# Patient Record
Sex: FEMALE | Race: WHITE | Employment: STUDENT | URBAN - METROPOLITAN AREA
[De-identification: names, ages, dates, MRNs, and addresses within clinical notes are randomized per-mention and may not be internally consistent; named-entity substitution may affect disease eponyms.]

---

## 2017-05-10 ENCOUNTER — OFFICE VISIT (OUTPATIENT)
Dept: FAMILY MEDICINE | Facility: CLINIC | Age: 22
End: 2017-05-10

## 2017-05-10 VITALS
OXYGEN SATURATION: 98 % | DIASTOLIC BLOOD PRESSURE: 83 MMHG | HEIGHT: 70 IN | BODY MASS INDEX: 27.43 KG/M2 | HEART RATE: 74 BPM | RESPIRATION RATE: 18 BRPM | WEIGHT: 191.6 LBS | TEMPERATURE: 98.1 F | SYSTOLIC BLOOD PRESSURE: 136 MMHG

## 2017-05-10 DIAGNOSIS — F64.0 GENDER DYSPHORIA IN ADULT: Primary | ICD-10-CM

## 2017-05-10 NOTE — PROGRESS NOTES
Transgender History Intake:       APURVA Mooney is a 22 year old individual  who presents today for interest in possible feminizing hormone therapy to better align their body with their gender identity.  Came to this clinic via referral from: Psychologist    1-How do you identify? BOLD all that apply     Male   Female Transgender  FTM  MTF  Intersex    Genderqueer Gender non-conforming Bigender Don't know Other:     2. What pronouns do you prefer?  She/Her/Hers/Herself   3-What age did you first feel your gender identity (internal sense of gender) did not match your physical body?      8 years old   4-Have you ever felt depressed or suicidal because your gender identity and body don't match?      YES Has felt depressed on/off, not suicidal  5-Have you legally changed your name? no   If yes: prior name for PETER:   Gender marker on ID's?   no  6-Have you ever seen a health care provider about being transgender?No  When were you first treated and where? NA  7-What hormones have you been on and for how long? (These can be treatments you were prescribed, that you got from a friend or bought without a prescription. Include any treatments you currently take.) NA  8-Ever had any problems with hormone treatment?  No  9-If not on hormones, would you like to be?   Yes, eventually  What are your goals for hormone therapy ? Wants to feel effects of hormones on brain (emotions, skin, sex drive- decreased erections, skin, body hair, breast development)  10-Have you had any gender affirmation surgery? No  11-Do you want to have surgery now or in future?  YES, Maybe (open to idea)  12-Are you out at work or at school or at home?      Some people know. Out to roommates and parents.  Not out  in public.  13-What are your other questions or concerns today? none  14-What else we should know about you?  Will recently came out to family (difficult). Parents disappointed, conservative.    ----------      Past Surgical History:   Procedure  Laterality Date     KNEE SURGERY Left 2014       Patient Active Problem List   Diagnosis   (none) - all problems resolved or deleted     Past Medical History:   Diagnosis Date     Uncomplicated asthma        No current outpatient prescriptions on file.       Family History   Problem Relation Age of Onset     Lung Cancer Maternal Grandmother      Parkinsonism Paternal Grandfather      Dementia Paternal Grandfather      Autoimmune Disease Sister        Allergies   Allergen Reactions     Banana Anaphylaxis       History   Smoking Status     Never Smoker   Smokeless Tobacco     Not on file       Mental Health Assessment:  Non gender related Therapist? Yes, patient has a psychologist (not gender specialist, but works with trans patients)  Chemical use history Alcohol approximately once weekly, marijuana twice weekly, has experimented with mushrooms  Mental Health diagnosis  history Depression on/off (recently started seeing a psychologist 2 months ago)  Medications prescribed for above and by whom? NA  PETER sent to:  [facility] (last physician was a Pediatrician 3-4 years ago)           Review of Systems:   CONSTITUTIONAL: NEGATIVE for fever, chills, change in weight  INTEGUMENTARY/SKIN: NEGATIVE for worrisome rashes, moles or lesions  EYES: POSITIVE for blurry vision  ENT/MOUTH: NEGATIVE for ear, mouth and throat problems  RESP: NEGATIVE for significant cough or SOB  CV: NEGATIVE for chest pain, palpitations or peripheral edema  GI: NEGATIVE for nausea, abdominal pain, heartburn, or change in bowel habits  : NEGATIVE for frequency, dysuria, or hematuria  MUSCULOSKELETAL: NEGATIVE for significant arthralgias or myalgia  NEURO: NEGATIVE for weakness, dizziness or paresthesias  ENDOCRINE: POSITIVE for feeling cold, negative for skin/hair changes  HEME/ALLERGY: POSITIVE for bloody noses (once daily for last 3 weeks)  PSYCHIATRIC: NEGATIVE for changes in mood or affect           Social History     Social History     Social History  "    Marital status: Single     Spouse name: N/A     Number of children: N/A     Years of education: N/A     Social History Main Topics     Smoking status: Never Smoker     Smokeless tobacco: None     Alcohol use Yes     Drug use: Yes      Comment: canabis lsd mushrooms      Sexual activity: No     Other Topics Concern     None     Social History Narrative     None       Marital Status: Single  Who lives in your household? Lives with 3 roommates    Has anyone hurt you physically, for example by pushing, hitting, slapping or kicking you or forcing you to have sex? Denies  Do you feel threatened or controlled by a partner, ex-partner or anyone in your life? Denies    Sexual Health     Sexual concerns:  No   History of sexual abuse:  No  STI History:   Neg  Pregnancy History:  No obstetric history on file.  Last Pap Smear :  No results found for: PAP  Abnormal Pap Hx:  NA    Sexual health and relationships:  Current sexual partners: None     Past sexual partners:    [employer]women           Physical Exam:     Vitals:    05/10/17 1603   BP: 136/83   Pulse: 74   Resp: 18   Temp: 98.1  F (36.7  C)   TempSrc: Oral   SpO2: 98%   Weight: 191 lb 9.6 oz (86.9 kg)   Height: 5' 10.25\" (178.4 cm)     BMI= Body mass index is 27.3 kg/(m^2).   Appearance: male appearance and dress  GENERAL:: healthy, alert and no distress  EYES: Eyes grossly normal to inspection  MS: extremities normal- no gross deformities noted, no edema  Psych: Alert and oriented times 3; coherent speech, normal rate and volume, able to articulate logical thoughts, able to abstract reason, no tangential thoughts, no hallucinations or delusions. Affect is appropriate.  Affect: Appropriate/mood-congruent      Assessment and Plan   Vinicio was seen today for establish care and physical.      Will presented today to establish care with a trans-friendly clinic. Unfortunately, we did not have time to complete a full physical exam, but I will have her come back in the next " "month to complete our visit.  Livan did express frequent bleeding from her nose.  We both think it is most likely from the dry air, but I offered to check platelets and coagulation factors if patient continues to have issues.     She has significant anxiety in recently coming out to family.  Unfortunately family is not supportive at this time.  Livan is seeing a psychologist to facilitate the transition.  Livan has not yet started using female pronouns, but seems happy about the idea.  Would potentially like to be called \"Risa\" in the future.  I discussed how the process works at [facility].  Livan has support through her psychologist.  I discussed that I will need a letter of support from the psychologist prior to starting hormones.  Will most likely refer to Antoinette at next visit as well for additional support.  Will plan to obtain labs and eventually start with spironolactone.  Livan was given a copy of the estrogen consent form to look over between now and next visit.    Diagnoses and all orders for this visit:    Gender dysphoria in adult     Today s visit included assessment of interventions to alleviate symptoms related to gender dysphoria or gender nonconformity, including     psychological support    medical treatment (hormones or blockers)    options for social support or changes in gender expression.   Hormones can be provided in 3-6 months IF:     Patient able to provide informed consent     Likely to take hormones in a responsible manner    Discussed physical effects, benefits, and risk assessment & modification    Discussed the clinical and biochemical monitoring of hormonal changes and the potential impact on reproductive health (see smiavsconsent)    Stable mental health. Transgender patients are at higher risk of suicide. This patient has been assessed for suicide risk.  Oriented to overall gender assessment and hormone start process, may be 3-6 months before hormones start, need for r7ynrbo visits then " q3mo, then q6mo    (This assessment is based on the 2011 published Standards of Care for the Health of Transsexual, Transgender, and Gender-Nonconforming People, Version 7, by the World Professional Association of Transgender Health. WPATH SOC Guidelines)  .   Follow up:  Follow up within 1 month.    Bridgette Olguin M.D.  PGY-2, Family Medicine

## 2017-05-10 NOTE — PROGRESS NOTES
Preceptor Attestation:   Patient seen and discussed with the resident. Assessment and plan reviewed with resident and agreed upon.   Supervising Physician:  Susana Brown MD  [facility]

## 2017-05-10 NOTE — PATIENT INSTRUCTIONS
Effects and Expected Time Course of Feminizing Hormones    Effect Expected Onset Expected Maximum Effect   Body Fat redistribution 3-6 months 2-5 years   Decreased Muscle mass 3-6 months 1-2 years   Softening of skin/decreased oiliness 3-6 months Unknown   Decreased Libido 1-3 months 1-2 years   Decreased Spontaneous Erections 1-3 months 3-6 months   Male Sexual Dysfunction Variable Variable   Breast Growth 3-6 months 2-3 years   Decreased Testicular volume 3-6 months 2-3 years   Decreased sperm production Variable Variable   Thinning and slowed growth of body and facial hair  + 6-12 months >3 years   Male pattern baldness No regrowth, loss stops 1-3 months 1-2 years   +complete removal of male facial hair and body hair requires electrolysis, laser treatment or both      Effects and Expected Time Course of Masculinizing  Hormones    Effect Expected Onset Expected Maximum Effect   Skin oiliness/Acne 1-6 months 1-2 years   Facial/body hair growth 3-6 months 3-5 years    Scalp hair loss >12 months+ Variable   Increased muscle mass/strength 6-12 months 2-5 years   Body fat redistribution 3-6 months 2-5 years   Cessation of menses 2-6 months n/a   Clitoral enlargement 3-6 months 1-2 years   Vaginal atrophy 3-6 months 1-2 years   Deepened voice 3-12 months 1-2 years       Here is the plan from today's visit    1. Gender dysphoria in adult  We will continue our discussion at your next visit.  Please schedule a visit with me at the  within the next month.  We will perform an exam at that time and get labs.  I will also follow up on your bloody nose.    Please call or return to clinic if your symptoms don't go away.    Follow up plan  Please make a clinic appointment for follow up with me ([facility]) within 1 month.    Thank you for coming to [facility] today.  Lab Testing:  **If you had lab testing today and your results are reassuring or normal they will be mailed to you or sent through [employer]  within 7 days.   **If the lab tests need quick action we will call you with the results.  The phone number we will call with results is # [phone] (home) . If this is not the best number please call our clinic and change the number.  Medication Refills:  If you need any refills please call your pharmacy and they will contact us.   If you need to  your refill at a new pharmacy, please contact the new pharmacy directly. The new pharmacy will help you get your medications transferred faster.   Scheduling:  If you have any concerns about today's visit or wish to schedule another appointment please call our office during normal business hours [phone] (8-5:00 M-F)  If a referral was made to a [facility] and you don't get a call from central scheduling please call [phone].  If a Mammogram was ordered for you at [facility] call [phone] to schedule or change your appointment.  If you had an XRay/CT/Ultrasound/MRI ordered the number is [phone] to schedule or change your radiology appointment.   Medical Concerns:  If you have urgent medical concerns please call [phone] at any time of the day.      Effects and Expected Time Course of Feminizing Hormones    Effect Expected Onset Expected Maximum Effect   Body Fat redistribution 3-6 months 2-5 years   Decreased Muscle mass 3-6 months 1-2 years   Softening of skin/decreased oiliness 3-6 months Unknown   Decreased Libido 1-3 months 1-2 years   Decreased Spontaneous Erections 1-3 months 3-6 months   Male Sexual Dysfunction Variable Variable   Breast Growth 3-6 months 2-3 years   Decreased Testicular volume 3-6 months 2-3 years   Decreased sperm production Variable Variable   Thinning and slowed growth of body and facial hair  + 6-12 months >3 years   Male pattern baldness No regrowth, loss stops 1-3 months 1-2 years   +complete removal of male facial hair and body hair requires electrolysis, laser treatment or  both      Effects and Expected Time Course of Masculinizing  Hormones    Effect Expected Onset Expected Maximum Effect   Skin oiliness/Acne 1-6 months 1-2 years   Facial/body hair growth 3-6 months 3-5 years    Scalp hair loss >12 months+ Variable   Increased muscle mass/strength 6-12 months 2-5 years   Body fat redistribution 3-6 months 2-5 years   Cessation of menses 2-6 months n/a   Clitoral enlargement 3-6 months 1-2 years   Vaginal atrophy 3-6 months 1-2 years   Deepened voice 3-12 months 1-2 years

## 2017-05-10 NOTE — MR AVS SNAPSHOT
After Visit Summary   5/10/2017    Vinicio Givens    MRN: [id number]           Patient Information     Date Of Birth          1995        Visit Information        Provider Department      5/10/2017 4:00 PM Bridgette Olguin MD [facility]        Today's Diagnoses     Gender dysphoria in adult    -  1      Care Instructions    Effects and Expected Time Course of Feminizing Hormones    Effect Expected Onset Expected Maximum Effect   Body Fat redistribution 3-6 months 2-5 years   Decreased Muscle mass 3-6 months 1-2 years   Softening of skin/decreased oiliness 3-6 months Unknown   Decreased Libido 1-3 months 1-2 years   Decreased Spontaneous Erections 1-3 months 3-6 months   Male Sexual Dysfunction Variable Variable   Breast Growth 3-6 months 2-3 years   Decreased Testicular volume 3-6 months 2-3 years   Decreased sperm production Variable Variable   Thinning and slowed growth of body and facial hair  + 6-12 months >3 years   Male pattern baldness No regrowth, loss stops 1-3 months 1-2 years   +complete removal of male facial hair and body hair requires electrolysis, laser treatment or both      Effects and Expected Time Course of Masculinizing  Hormones    Effect Expected Onset Expected Maximum Effect   Skin oiliness/Acne 1-6 months 1-2 years   Facial/body hair growth 3-6 months 3-5 years    Scalp hair loss >12 months+ Variable   Increased muscle mass/strength 6-12 months 2-5 years   Body fat redistribution 3-6 months 2-5 years   Cessation of menses 2-6 months n/a   Clitoral enlargement 3-6 months 1-2 years   Vaginal atrophy 3-6 months 1-2 years   Deepened voice 3-12 months 1-2 years       Here is the plan from today's visit    1. Gender dysphoria in adult  We will continue our discussion at your next visit.  Please schedule a visit with me at the  within the next month.  We will perform an exam at that time and get labs.  I will also follow up on your bloody  nose.    Please call or return to clinic if your symptoms don't go away.    Follow up plan  Please make a clinic appointment for follow up with me ([facility]) within 1 month.    Thank you for coming to [facility] today.  Lab Testing:  **If you had lab testing today and your results are reassuring or normal they will be mailed to you or sent through [employer] within 7 days.   **If the lab tests need quick action we will call you with the results.  The phone number we will call with results is # [phone] (home) . If this is not the best number please call our clinic and change the number.  Medication Refills:  If you need any refills please call your pharmacy and they will contact us.   If you need to  your refill at a new pharmacy, please contact the new pharmacy directly. The new pharmacy will help you get your medications transferred faster.   Scheduling:  If you have any concerns about today's visit or wish to schedule another appointment please call our office during normal business hours [phone] (8-5:00 M-F)  If a referral was made to a [facility] and you don't get a call from central scheduling please call [phone].  If a Mammogram was ordered for you at [facility] call [phone] to schedule or change your appointment.  If you had an XRay/CT/Ultrasound/MRI ordered the number is [phone] to schedule or change your radiology appointment.   Medical Concerns:  If you have urgent medical concerns please call [phone] at any time of the day.            Follow-ups after your visit        Who to contact     Please call your clinic at [phone] to:    Ask questions about your health    Make or cancel appointments    Discuss your medicines    Learn about your test results    Speak to your doctor   If you have compliments or concerns about an experience at your clinic, or if you wish to file a complaint, please contact [facility]  "[facility] Patient Relations at [phone] or email us at [email or web address]         Additional Information About Your Visit        [employer] Information     [employer] is an electronic gateway that provides easy, online access to your medical records. With [employer], you can request a clinic appointment, read your test results, renew a prescription or communicate with your care team.     To sign up for [employer] visit the website at [email or web address]   You will be asked to enter the access code listed below, as well as some personal information. Please follow the directions to create your username and password.     Your access code is: [email or web address]  Expires: 2017  4:50 PM     Your access code will  in 90 days. If you need help or a new code, please contact your [facility] or call [phone] for assistance.        Care EveryWhere ID     This is your Care EveryWhere ID. This could be used by other organizations to access your [place] medical records  [id number]        Your Vitals Were     Pulse Temperature Respirations Height Pulse Oximetry BMI (Body Mass Index)    74 98.1  F (36.7  C) (Oral) 18 5' 10.25\" (178.4 cm) 98% 27.3 kg/m2       Blood Pressure from Last 3 Encounters:   05/10/17 136/83    Weight from Last 3 Encounters:   05/10/17 191 lb 9.6 oz (86.9 kg)              Today, you had the following     No orders found for display       Primary Care Provider Office Phone # Fax #    Bridgette Olguin [phone]       [facility] [address] [facility] [address]        Thank you!     Thank you for choosing [facility]  for your care. Our goal is always to provide you with excellent care. Hearing back from our patients is one way we can continue to improve our services. Please take a few minutes to complete the written survey that you may receive in the mail after your visit with us. Thank " you!             Your Updated Medication List - Protect others around you: Learn how to safely use, store and throw away your medicines at www.disposemymeds.org.      Notice  As of 5/10/2017  4:50 PM    You have not been prescribed any medications.

## 2017-05-15 ENCOUNTER — OFFICE VISIT (OUTPATIENT)
Dept: FAMILY MEDICINE | Facility: CLINIC | Age: 22
End: 2017-05-15

## 2017-05-15 VITALS
SYSTOLIC BLOOD PRESSURE: 138 MMHG | HEIGHT: 70 IN | HEART RATE: 63 BPM | RESPIRATION RATE: 18 BRPM | OXYGEN SATURATION: 99 % | TEMPERATURE: 98 F | WEIGHT: 191.6 LBS | DIASTOLIC BLOOD PRESSURE: 83 MMHG | BODY MASS INDEX: 27.43 KG/M2

## 2017-05-15 DIAGNOSIS — Z23 IMMUNIZATION DUE: Primary | ICD-10-CM

## 2017-05-15 DIAGNOSIS — I82.90 BLOOD CLOT IN VEIN: ICD-10-CM

## 2017-05-15 DIAGNOSIS — F64.0 GENDER DYSPHORIA IN ADULT: ICD-10-CM

## 2017-05-15 LAB
ALBUMIN SERPL-MCNC: 4.7 MG/DL (ref 3.8–5)
ALP SERPL-CCNC: 43.1 U/L (ref 31.7–110.7)
ALT SERPL-CCNC: 20.9 U/L (ref 0–45)
AST SERPL-CCNC: 22.6 U/L (ref 0–55)
BILIRUB SERPL-MCNC: 0.5 MG/DL (ref 0.2–1.3)
BUN SERPL-MCNC: 18.3 MG/DL (ref 7–21)
CALCIUM SERPL-MCNC: 9.3 MG/DL (ref 8.5–10.1)
CHLORIDE SERPLBLD-SCNC: 99.5 MMOL/L (ref 98–110)
CHOLEST SERPL-MCNC: 141.3 MG/DL (ref 0–200)
CHOLEST/HDLC SERPL: 2.5 {RATIO} (ref 0–5)
CO2 SERPL-SCNC: 29.3 MMOL/L (ref 20–32)
CREAT SERPL-MCNC: 1 MG/DL (ref 0.7–1.3)
GFR SERPL CREATININE-BSD FRML MDRD: >90 ML/MIN/1.7 M2
GLUCOSE SERPL-MCNC: 104.1 MG'DL (ref 70–99)
GLUCOSE SERPL-MCNC: 109 MG'DL (ref 70–99)
HDLC SERPL-MCNC: 56.7 MG/DL
HEMOGLOBIN: 15.3 G/DL (ref 13.3–17.7)
LDLC SERPL CALC-MCNC: 74 MG/DL (ref 0–129)
POTASSIUM SERPL-SCNC: 3.9 MMOL/DL (ref 3.3–4.5)
PROT SERPL-MCNC: 7.4 G/DL (ref 6.8–8.8)
SODIUM SERPL-SCNC: 134.9 MMOL/L (ref 132.6–141.4)
TRIGL SERPL-MCNC: 50.8 MG/DL (ref 0–150)
VLDL CHOLESTEROL: 10.2 MG/DL (ref 7–32)

## 2017-05-15 NOTE — PROGRESS NOTES
Preceptor Attestation:   Patient seen and discussed with the resident. Assessment and plan reviewed with resident and agreed upon.   Supervising Physician:  Samira Soto MD  [facility]

## 2017-05-15 NOTE — PROGRESS NOTES
Transgender History Intake:    Note:  HPI and Sexual Health copied from prior note 5/10.  Problem list and history updated to reflect patient's history of a DVT (discussed today).  Note today started with a review of ROS and PE.       HPI   Vinicio is a 22 year old individual  who presents today for interest in possible feminizing hormone therapy to better align their body with their gender identity.  Came to this clinic via referral from: Psychologist     1-How do you identify? BOLD all that apply      Male   Female Transgender  FTM  MTF  Intersex     Genderqueer Gender non-conforming Bigender Don't know Other:      2. What pronouns do you prefer? She/Her/Hers/Herself   3-What age did you first feel your gender identity (internal sense of gender) did not match your physical body?    8 years old   4-Have you ever felt depressed or suicidal because your gender identity and body don't match?      YES Has felt depressed on/off, not suicidal  5-Have you legally changed your name? no   If yes: prior name for PETER:   Gender marker on ID's?   no  6-Have you ever seen a health care provider about being transgender?No  When were you first treated and where? NA  7-What hormones have you been on and for how long? (These can be treatments you were prescribed, that you got from a friend or bought without a prescription. Include any treatments you currently take.) NA  8-Ever had any problems with hormone treatment?  No  9-If not on hormones, would you like to be?   Yes, eventually  What are your goals for hormone therapy ? Wants to feel effects of hormones on brain (emotions, skin, sex drive- decreased erections, skin, body hair, breast development)  10-Have you had any gender affirmation surgery? No  11-Do you want to have surgery now or in future?  YES, Maybe (open to idea)  12-Are you out at work or at school or at home? Some people know. Out to roommates and parents. Not out in public.  13-What are your other questions or  concerns today? none  14-What else we should know about you?  Will recently came out to family (difficult). Parents disappointed, conservative.    ----------      Past Surgical History:   Procedure Laterality Date     KNEE SURGERY Left 2014       Patient Active Problem List   Diagnosis     Blood clot in vein     Past Medical History:   Diagnosis Date     Uncomplicated asthma        No current outpatient prescriptions on file.       Family History   Problem Relation Age of Onset     Lung Cancer Maternal Grandmother      Parkinsonism Paternal Grandfather      Dementia Paternal Grandfather      Autoimmune Disease Sister        Allergies   Allergen Reactions     Banana Anaphylaxis       History   Smoking Status     Never Smoker   Smokeless Tobacco     Not on file     Mental Health Assessment:   Non gender related Therapist? Yes, patient has a psychologist (not gender specialist, but works with trans patients)  Chemical use history Alcohol approximately once weekly, marijuana twice weekly, has experimented with mushrooms  Mental Health diagnosis history Depression on/off (recently started seeing a psychologist 2 months ago)  Medications prescribed for above and by whom? NA  PETER sent to:  [facility] (last physician was a Pediatrician 3-4 years ago)           Review of Systems:   CONSTITUTIONAL: NEGATIVE for fever, chills, change in weight  INTEGUMENTARY/SKIN: NEGATIVE for worrisome rashes, moles or lesions  EYES: NEGATIVE for vision changes or irritation  ENT/MOUTH: NEGATIVE for ear, mouth and throat problems  RESP: NEGATIVE for significant cough or SOB  BREAST: NEGATIVE for masses, tenderness or discharge  CV: NEGATIVE for chest pain, palpitations or peripheral edema  GI: NEGATIVE for nausea, abdominal pain, heartburn, or change in bowel habits  : NEGATIVE for frequency, dysuria, or hematuria  MUSCULOSKELETAL: NEGATIVE for significant arthralgias or myalgia  NEURO: NEGATIVE for weakness, dizziness or paresthesias  ENDOCRINE:  "NEGATIVE for temperature intolerance, skin/hair changes  HEME/ALLERGY: NEGATIVE for bleeding problems, POSITIVE for one bloody nose over the last week (last week patient was reporting bloody noses every day).   PSYCHIATRIC: NEGATIVE for changes in mood or affect           Social History     Social History     Social History     Marital status: Single     Spouse name: N/A     Number of children: N/A     Years of education: N/A     Social History Main Topics     Smoking status: Never Smoker     Smokeless tobacco: None     Alcohol use Yes     Drug use: Yes      Comment: canabis lsd mushrooms      Sexual activity: No     Other Topics Concern     None     Social History Narrative       Marital Status: Single  Who lives in your household? Lives with roommates    Has anyone hurt you physically, for example by pushing, hitting, slapping or kicking you or forcing you to have sex? Denies  Do you feel threatened or controlled by a partner, ex-partner or anyone in your life? Denies    Sexual Health     Sexual concerns:  Prefers to have fewer erections  History of sexual abuse:  No  STI History:   Neg  Pregnancy History:  No obstetric history on file.  Last Pap Smear :  NA  Abnormal Pap Hx:  NA    Sexual health and relationships:  Current sexual partners: None     Past sexual partners:    [employer]women           Physical Exam:     Vitals:    05/15/17 1511 05/15/17 1513   BP: 148/79 138/83   Pulse: 63    Resp: 18    Temp: 98  F (36.7  C)    TempSrc: Oral    SpO2: 99%    Weight: 191 lb 9.6 oz (86.9 kg)    Height: 5' 10\" (177.8 cm)      BMI= Body mass index is 27.49 kg/(m^2).   Appearance: male appearance and dress  GENERAL:: healthy, alert and no distress  EYES: Eyes grossly normal to inspection, Pupils large, minimally reactive to light  HENT: ear canals normal, TM's scarred bilaterally, Nose normal, Mouth- no ulcers, no lesions  NECK: no adenopathy, no asymmetry, no masses,  and thyroid normal to palpation, supple  RESP: lungs clear " "to auscultation - no rales, no rhonchi, no wheezes  CV: regular rates and rhythm, normal S1 S2, no S3 or S4 and no murmur, no click or rub -  ABDOMEN: soft, no tenderness, no  hepatosplenomegaly, no masses, normal bowel sounds  MS: extremities normal- no gross deformities noted, no edema  SKIN: no suspicious lesions, no rashes  NEURO: Normal strength and tone, sensory exam grossly normal, mentation intact and speech normal, reflexes symmetric  GU_male: testicles normal without atrophy or masses and penis normal without urethral discharge  Psych: Alert and oriented times 3; coherent speech, normal rate and volume, able to articulate logical thoughts, able to abstract reason, no tangential thoughts, no hallucinations or delusions. Affect is appropriate.  Affect: Appropriate/mood-congruent      Assessment and Plan   Vinicio was seen today for physical.    Katie's (Vinicio) visit today was primarily to complete the intake we started last week.  She fits the criteria for gender dysphoria.  She has minimal mental health history (mild depression and anxiety), but no history of suicide ideation or medical treatment of mental disorders.  She is newly \"out\" to only certain people (roommates and parents), does not feel comfortable being referred to as a female in public yet, but is enthusiastic about the transition, being called \"Katie\", and being referred to as a woman.  She is also eager to start hormone therapy.    Katie is seeing a therapist and we discussed that it would help to have a letter of support from her therapist.  Today Katie said she had a blood clot in her left leg immediately after knee surgery in 2014 (this was not discussed at last visit).  She received anticoagulation for 6 months and has not had any other blood clots.  Discussed this issue with staff.  Since blood clot was in the setting of surgery, it will be okay for her to start estrogen, but only patches.  Will discuss this with Katie in more detail at next " visit.  Feminine hormone consent reviewed with patient today.  Labs also obtained.      Diagnoses and all orders for this visit:    Immunization due  -     ADMIN VACCINE, INITIAL  -     HPV9 (Gardasil 9 )    Blood clot in vein    Gender dysphoria in adult  -     Testosterone Total  -     Comprehensive Metabolic Panel  -     Hemoglobin (HGB)  -     Lipid Cascade  -     Glucose     Today s visit included assessment of interventions to alleviate symptoms related to gender dysphoria or gender nonconformity, including     psychological support    medical treatment (hormones or blockers)    options for social support or changes in gender expression.   Hormones can be provided in 3-6 months IF:     Patient able to provide informed consent     Likely to take hormones in a responsible manner    Discussed physical effects, benefits, and risk assessment & modification    Discussed the clinical and biochemical monitoring of hormonal changes and the potential impact on reproductive health (see smiavsconsent)    Stable mental health. Transgender patients are at higher risk of suicide. This patient has been assessed for suicide risk.  Oriented to overall gender assessment and hormone start process, may be 3-6 months before hormones start, need for a4ccalx visits then q3mo, then q6mo.     Follow up:  Follow up in 1-2 months.    Bridgette Olguin MD

## 2017-05-15 NOTE — MR AVS SNAPSHOT
After Visit Summary   5/15/2017    Vinicio Givens    MRN: [id number]           Patient Information     Date Of Birth          1995        Visit Information        Provider Department      5/15/2017 3:00 PM Bridgette Olguin MD [facility]        Today's Diagnoses     Immunization due    -  1    Blood clot in vein        Gender dysphoria in adult          Care Instructions    Here is the plan from today's visit    1. Immunization due  We will give you your next HPV within the next 6 months  - ADMIN VACCINE, INITIAL  - HPV9 (Gardasil 9 )    2. Blood clot in vein  I will discuss with my supervisors about your specific risk with starting estrogen and your history of a blood clot    3. Gender dysphoria in adult  We will check your labs today.  I will call you if anything is abnormal.  Otherwise, I will send you your labs results.  - Testosterone Total  - Comprehensive Metabolic Panel  - Hemoglobin (HGB)  - Lipid Cascade  - Glucose    Please call or return to clinic if your symptoms don't go away.    Follow up plan  Please make a clinic appointment for follow up with me (BRIDGETTE OLGUIN) in 1-2 months (maximum).  Please have your therapist write a letter on your behalf.  It can be sent to me at [email or web address] or you can send it to our clinic with Attn:  Bridgette Olguin MD  You can also fax the letter to [phone] (Attn:  Bridgette Olguin)      Thank you for coming to [facility] today.  Lab Testing:  **If you had lab testing today and your results are reassuring or normal they will be mailed to you or sent through [employer] within 7 days.   **If the lab tests need quick action we will call you with the results.  The phone number we will call with results is # [phone] (home) . If this is not the best number please call our clinic and change the number.  Medication Refills:  If you need any refills please call your pharmacy and they will contact us.   If you need to   your refill at a new pharmacy, please contact the new pharmacy directly. The new pharmacy will help you get your medications transferred faster.   Scheduling:  If you have any concerns about today's visit or wish to schedule another appointment please call our office during normal business hours [phone] (8-5:00 M-F)  If a referral was made to a [facility] and you don't get a call from central scheduling please call [phone].  If a Mammogram was ordered for you at [facility] call [phone] to schedule or change your appointment.  If you had an XRay/CT/Ultrasound/MRI ordered the number is [phone] to schedule or change your radiology appointment.   Medical Concerns:  If you have urgent medical concerns please call [phone] at any time of the day.          Follow-ups after your visit        Who to contact     Please call your clinic at [phone] to:    Ask questions about your health    Make or cancel appointments    Discuss your medicines    Learn about your test results    Speak to your doctor   If you have compliments or concerns about an experience at your clinic, or if you wish to file a complaint, please contact [facility] Patient Relations at [phone] or email us at [email or web address]         Additional Information About Your Visit        MyChart Information     [employer] is an electronic gateway that provides easy, online access to your medical records. With [employer], you can request a clinic appointment, read your test results, renew a prescription or communicate with your care team.     To sign up for [employer] visit the website at [email or web address]   You will be asked to enter the access code listed below, as well as some personal information. Please follow the directions to create your username and password.     Your access code is: [email or web address]  Expires: 8/8/2017  4:50 PM     Your access code will  " in 90 days. If you need help or a new code, please contact your [facility] or call [phone] for assistance.        Care EveryWhere ID     This is your Care EveryWhere ID. This could be used by other organizations to access your [place] medical records  [id number]        Your Vitals Were     Pulse Temperature Respirations Height Pulse Oximetry BMI (Body Mass Index)    63 98  F (36.7  C) (Oral) 18 5' 10\" (177.8 cm) 99% 27.49 kg/m2       Blood Pressure from Last 3 Encounters:   05/15/17 138/83   05/10/17 136/83    Weight from Last 3 Encounters:   05/15/17 191 lb 9.6 oz (86.9 kg)   05/10/17 191 lb 9.6 oz (86.9 kg)              We Performed the Following     ADMIN VACCINE, INITIAL     Comprehensive Metabolic Panel     Glucose     Hemoglobin (HGB)     HPV9 (Gardasil 9 )     Lipid Cascade     Testosterone Total        Primary Care Provider Office Phone # Fax #    Bridgette Olguin [phone]       [facility] [address] [facility] [address]        Thank you!     Thank you for choosing [facility]  for your care. Our goal is always to provide you with excellent care. Hearing back from our patients is one way we can continue to improve our services. Please take a few minutes to complete the written survey that you may receive in the mail after your visit with us. Thank you!             Your Updated Medication List - Protect others around you: Learn how to safely use, store and throw away your medicines at www.disposemymeds.org.      Notice  As of 5/15/2017  4:10 PM    You have not been prescribed any medications.      "

## 2017-05-15 NOTE — LETTER
May 17, 2017      Vinicio Givens  [address]  [place] [facility] [address]        Dear Katie,    Thank you for getting your care at [facility]. Please see below for your test results.    Resulted Orders   Testosterone Total   Result Value Ref Range    Testosterone Total 840 240 - 950 ng/dL      Comment:      This test was developed and its performance characteristics determined by the   [facility],  Special Chemistry Laboratory. It has   not been cleared or approved by the FDA. The laboratory is regulated under   CLIA   as qualified to perform high-complexity testing. This test is used for   clinical   purposes. It should not be regarded as investigational or for research.     Comprehensive Metabolic Panel   Result Value Ref Range    Albumin 4.7 3.8 - 5.0 mg/dL    Alkaline Phosphatase 43.1 31.7 - 110.7 U/L    ALT 20.9 0.0 - 45.0 U/L    AST 22.6 0.0 - 55.0 U/L    Bilirubin Total 0.5 0.2 - 1.3 mg/dL    Urea Nitrogen 18.3 7.0 - 21.0 mg/dL    Calcium 9.3 8.5 - 10.1 mg/dL    Chloride 99.5 98.0 - 110.0 mmol/L    Carbon Dioxide 29.3 20.0 - 32.0 mmol/L    Creatinine 1.0 0.7 - 1.3 mg/dL    Glucose 104.1 (H) 70.0 - 99.0 mg'dL    Potassium 3.9 3.3 - 4.5 mmol/dL    Sodium 134.9 132.6 - 141.4 mmol/L    Protein Total 7.4 6.8 - 8.8 g/dL    GFR Estimate >90 >60.0 mL/min/1.7 m2    GFR Estimate If Black >90 >60.0 mL/min/1.7 m2   Hemoglobin (HGB)   Result Value Ref Range    Hemoglobin 15.3 13.3 - 17.7 g/dL   Lipid Cascade   Result Value Ref Range    Cholesterol 141.3 0.0 - 200.0 mg/dL    Cholesterol/HDL Ratio 2.5 0.0 - 5.0    HDL Cholesterol 56.7 >40.0 mg/dL    LDL Cholesterol Calculated 74 0 - 129 mg/dL    Triglycerides 50.8 0.0 - 150.0 mg/dL    VLDL Cholesterol 10.2 7.0 - 32.0 mg/dL   Glucose   Result Value Ref Range    Glucose 109.0 (H) 70.0 - 99.0 mg'dL     All of your labs looked great.  Your glucose was a bit elevated- we will keep an eye on this, but otherwise all is normal.    If you have any  concerns about these results please call and leave a message for me or send a MyChart message to the clinic.    Sincerely,    Bridgette Olguin MD

## 2017-05-15 NOTE — PATIENT INSTRUCTIONS
Here is the plan from today's visit    1. Immunization due  We will give you your next HPV within the next 6 months  - ADMIN VACCINE, INITIAL  - HPV9 (Gardasil 9 )    2. Blood clot in vein  I will discuss with my supervisors about your specific risk with starting estrogen and your history of a blood clot    3. Gender dysphoria in adult  We will check your labs today.  I will call you if anything is abnormal.  Otherwise, I will send you your labs results.  - Testosterone Total  - Comprehensive Metabolic Panel  - Hemoglobin (HGB)  - Lipid Cascade  - Glucose    Please call or return to clinic if your symptoms don't go away.    Follow up plan  Please make a clinic appointment for follow up with me (BRIDGETTE OLGUIN) in 1-2 months (maximum).  Please have your therapist write a letter on your behalf.  It can be sent to me at [email or web address] or you can send it to our clinic with Attn:  Bridgette Olguin MD  You can also fax the letter to [phone] (Attn:  Bridgette Olguin)      Thank you for coming to [facility] today.  Lab Testing:  **If you had lab testing today and your results are reassuring or normal they will be mailed to you or sent through [employer] within 7 days.   **If the lab tests need quick action we will call you with the results.  The phone number we will call with results is # [phone] (home) . If this is not the best number please call our clinic and change the number.  Medication Refills:  If you need any refills please call your pharmacy and they will contact us.   If you need to  your refill at a new pharmacy, please contact the new pharmacy directly. The new pharmacy will help you get your medications transferred faster.   Scheduling:  If you have any concerns about today's visit or wish to schedule another appointment please call our office during normal business hours [phone] (8-5:00 M-F)  If a referral was made to a [facility] and you don't get a call  from central scheduling please call [phone].  If a Mammogram was ordered for you at [facility] call [phone] to schedule or change your appointment.  If you had an XRay/CT/Ultrasound/MRI ordered the number is [phone] to schedule or change your radiology appointment.   Medical Concerns:  If you have urgent medical concerns please call [phone] at any time of the day.

## 2017-05-17 LAB — TESTOST SERPL-MCNC: 840 NG/DL (ref 240–950)

## 2017-05-17 NOTE — PROGRESS NOTES
Dear Vinicio Givens    Your recent lab results were normal and reassuring.  Please call [phone] if you have any questions.    Bridgette Olguin M.D.

## 2017-06-15 ENCOUNTER — TELEPHONE (OUTPATIENT)
Dept: FAMILY MEDICINE | Facility: CLINIC | Age: 22
End: 2017-06-15

## 2017-06-15 NOTE — TELEPHONE ENCOUNTER
[facility] phone call message- patient requesting to speak with PCP or provider:    PCP: Bridgette Olguin    Additional Comments: Pt would like to speak to PCP regarding testing for intersex. Pt states PCP will know what it is.     Is a call back needed? Yes    Patient informed that it may take up to 2 business days to hear back from PCP:Yes    OK to leave a message on voice mail? Yes    Primary language: English      needed? No    Call taken on Angeles 15, 2017 at 12:38 PM by Janna Llanos

## 2017-06-20 NOTE — TELEPHONE ENCOUNTER
Patient sent email (in addition to phone call) to me regarding her concerns about recent abnormal sperm tests.  I advised her to make an appointment to see me in clinic (via email).  She did, we will discuss her issues in more detail at that time.    Bridgette Olguin M.D.  PGY-2, Family Medicine

## 2017-06-21 ENCOUNTER — OFFICE VISIT (OUTPATIENT)
Dept: FAMILY MEDICINE | Facility: CLINIC | Age: 22
End: 2017-06-21

## 2017-06-21 VITALS
BODY MASS INDEX: 27.66 KG/M2 | OXYGEN SATURATION: 97 % | RESPIRATION RATE: 18 BRPM | SYSTOLIC BLOOD PRESSURE: 136 MMHG | DIASTOLIC BLOOD PRESSURE: 78 MMHG | TEMPERATURE: 97.6 F | WEIGHT: 193.2 LBS | HEART RATE: 66 BPM | HEIGHT: 70 IN

## 2017-06-21 DIAGNOSIS — N46.9 INFERTILITY MALE: Primary | ICD-10-CM

## 2017-06-21 NOTE — PROGRESS NOTES
Preceptor Attestation:   Patient seen and discussed with the resident. Assessment and plan reviewed with resident and agreed upon.   Supervising Physician:  Bob Pennington MD  [facility]

## 2017-06-21 NOTE — PROGRESS NOTES
"      HPI:       Vinicio Givens is a 22 year old who presents for the following  Patient presents with:  RECHECK: Patient has concerns aboutr sperm not being active        Concern:  Infertility    Patient(Katie) (\"they\") is a 21 yo transgender MTF patient that presents for concerns of infertility.  They went to a sperm bank recently (prior to starting hormones) and was told that they are infertile.  Their sperm count was decreased and motility was 0%.  Patient also has concerns that they may have Klinefelter's Syndrome.  They read the criteria and thinks they have infertility, gynecomastia, a tall slender body habitus with long legs and shorter torso, speech and language difficulties, and reading dysfunction.  Patient denies having small firm testes, motor delay, attention deficits, learning problems or behavioral problems.    Patient is requesting to be checked for Klinfelter's Syndrome today.    Of note, patient also admits to tucking their testicles.  They stated they do this on a regular basis, but had testicles out for 3 days prior to having semen analysis performed at sperm bank.  Patient would like to know if tucking testicles affects fertility.      Problem, Medication and Allergy Lists were reviewed and are current.  Patient is an established patient of this clinic.         Review of Systems:   Review of Systems   Constitutional: Negative for chills and fever.   Respiratory: Negative for cough and shortness of breath.    Cardiovascular: Negative for chest pain.   Gastrointestinal: Negative for abdominal pain.   Genitourinary: Negative for discharge, frequency, penile pain, scrotal swelling and testicular pain.   Neurological: Negative for headaches.   Psychiatric/Behavioral: The patient is not nervous/anxious.              Physical Exam:   Patient Vitals for the past 24 hrs:   BP Temp Temp src Pulse Resp SpO2 Height Weight   06/21/17 1528 136/78 97.6  F (36.4  C) Oral 66 18 97 % 5' 10\" (177.8 cm) 193 lb 3.2 oz " (87.6 kg)     Body mass index is 27.72 kg/(m^2).  Vitals were reviewed and were normal     Physical Exam   Constitutional: He is oriented to person, place, and time. He appears well-developed. No distress.   Tall stature   HENT:   Head: Normocephalic.   Eyes: Conjunctivae are normal.   Neck: Normal range of motion. Neck supple.   Cardiovascular: Normal rate.    Pulmonary/Chest: Effort normal.   Mild gynecomastia   Abdominal: Soft.   Genitourinary: Testes normal. Right testis shows no mass, no swelling and no tenderness. Left testis shows no mass, no swelling and no tenderness.   Genitourinary Comments: Testicles measured approximately 6x4 cm bilaterally   Musculoskeletal: Normal range of motion. He exhibits no edema or tenderness.   Neurological: He is alert and oriented to person, place, and time.   Skin: Skin is warm and dry.   Psychiatric: He has a normal mood and affect. His behavior is normal. Judgment and thought content normal.   Vitals reviewed.        Results:     FSH, LH, Testosterone levels ordered    Assessment and Plan     Vinicio was seen today for recheck.    Patient presented for concerns of Klinefelter's Syndrome and infertility.  Patient states that they tuck their testicles, and I believe this may have contributed to his infertile sperm sample, even if they did not tuck them for up to 3 days prior to giving the semen analysis.  We went through the criteria for Klinefelter's Syndrome together.  Patient circled 5 of the 12 criteria.  His testicles were normal in size.  They have mild gynecomastia and a tall stature.  I think it is unlikely that he has Klinefelter's, but it does seem reasonable to refer him to a specialist that can discuss these issues with him and order genetic testing for Klinefelter's if it is indicated.      Will also check a testosterone level, FSH, and LH to start the workup for infertility.      Will wait for infertility workup prior to starting HRT.    Vinicio was seen today  for recheck.    Diagnoses and all orders for this visit:    Infertility male  -     Testosterone total  -     Follicle stimulating hormone  -     Lutropin  -     UROLOGY ADULT REFERRAL - INTERNAL    There are no discontinued medications.  Options for treatment and follow-up care were reviewed with the patient. Vinicio Givens  engaged in the decision making process and verbalized understanding of the options discussed and agreed with the final plan.    Bridgette Olguin M.D.  PGY-2, Family Medicine

## 2017-06-21 NOTE — MR AVS SNAPSHOT
After Visit Summary   6/21/2017    Vinicio Givens    MRN: [id number]           Patient Information     Date Of Birth          1995        Visit Information        Provider Department      6/21/2017 3:20 PM Bridgette Olguin MD [facility]        Today's Diagnoses     Infertility male    -  1      Care Instructions    Here is the plan from today's visit    1. Infertility male  - Testosterone total  - Follicle stimulating hormone  - Lutropin  I will call you to let you know who I need to refer you to for further evaluation.  It will either be a genetic counselor or an endocrinologist.  They can perform the chromosomal analysis.    Follow up plan  Please make a clinic appointment for follow up with me (BRIDGETTE OLGUIN) as needed.  Thank you for coming to [facility] today.  Lab Testing:  **If you had lab testing today and your results are reassuring or normal they will be mailed to you or sent through Real Intent within 7 days.   **If the lab tests need quick action we will call you with the results.  The phone number we will call with results is # [phone] (home) . If this is not the best number please call our clinic and change the number.  Medication Refills:  If you need any refills please call your pharmacy and they will contact us.   If you need to  your refill at a new pharmacy, please contact the new pharmacy directly. The new pharmacy will help you get your medications transferred faster.   Scheduling:  If you have any concerns about today's visit or wish to schedule another appointment please call our office during normal business hours [phone] (8-5:00 M-F)  If a referral was made to a [facility] and you don't get a call from central scheduling please call [phone].  If a Mammogram was ordered for you at [facility] call [phone] to schedule or change your appointment.  If you had an XRay/CT/Ultrasound/MRI  "ordered the number is [phone] to schedule or change your radiology appointment.   Medical Concerns:  If you have urgent medical concerns please call [phone] at any time of the day.            Follow-ups after your visit        Who to contact     Please call your clinic at [phone] to:    Ask questions about your health    Make or cancel appointments    Discuss your medicines    Learn about your test results    Speak to your doctor   If you have compliments or concerns about an experience at your clinic, or if you wish to file a complaint, please contact [facility] Patient Relations at [phone] or email us at [email or web address]         Additional Information About Your Visit        AirSagehart Information     [employer] is an electronic gateway that provides easy, online access to your medical records. With [employer], you can request a clinic appointment, read your test results, renew a prescription or communicate with your care team.     To sign up for [employer] visit the website at [email or web address]   You will be asked to enter the access code listed below, as well as some personal information. Please follow the directions to create your username and password.     Your access code is: [email or web address]  Expires: 2017  4:50 PM     Your access code will  in 90 days. If you need help or a new code, please contact your [facility] or call [phone] for assistance.        Care EveryWhere ID     This is your Care EveryWhere ID. This could be used by other organizations to access your [facility] medical records  [id number]        Your Vitals Were     Pulse Temperature Respirations Height Pulse Oximetry BMI (Body Mass Index)    66 97.6  F (36.4  C) (Oral) 18 5' 10\" (177.8 cm) 97% 27.72 kg/m2       Blood Pressure from Last 3 Encounters:   17 136/78   05/15/17 138/83   05/10/17 136/83    Weight from Last 3 Encounters: "   06/21/17 193 lb 3.2 oz (87.6 kg)   05/15/17 191 lb 9.6 oz (86.9 kg)   05/10/17 191 lb 9.6 oz (86.9 kg)              We Performed the Following     Follicle stimulating hormone     Lutropin     Testosterone total        Primary Care Provider Office Phone # Fax #    Bridgette Olguin [phone]       [facility] [address] [facility] [address]        Equal Access to Services     LETITIA HAMILTON : Hadii aad ku hadasho Soomaali, waaxda luqadaha, qaybta kaalmada adeegyada, waxay idiin hayaan adeeg richie marie . So [facility] [phone].    ATENCIÓN: Si habla español, tiene a alarcon disposición servicios gratuitos de asistencia lingüística. [facility] [phone].    We comply with applicable federal civil rights laws and [place] laws. We do not discriminate on the basis of race, color, national origin, age, disability sex, sexual orientation or gender identity.            Thank you!     Thank you for choosing [facility]  for your care. Our goal is always to provide you with excellent care. Hearing back from our patients is one way we can continue to improve our services. Please take a few minutes to complete the written survey that you may receive in the mail after your visit with us. Thank you!             Your Updated Medication List - Protect others around you: Learn how to safely use, store and throw away your medicines at www.disposemymeds.org.      Notice  As of 6/21/2017  4:45 PM    You have not been prescribed any medications.

## 2017-06-21 NOTE — LETTER
June 26, 2017      Vinicio Givens  [address]  [place] [address]        Dear Vinicio,    Thank you for getting your care at [facility]. Please see below for your test results.    Resulted Orders   Testosterone total   Result Value Ref Range    Testosterone Total 720 240 - 950 ng/dL      Comment:      This test was developed and its performance characteristics determined by the   [facility],  Special Chemistry Laboratory. It has   not been cleared or approved by the FDA. The laboratory is regulated under   CLIA   as qualified to perform high-complexity testing. This test is used for   clinical   purposes. It should not be regarded as investigational or for research.     Follicle stimulating hormone   Result Value Ref Range    FSH 3.6 0.7 - 10.8 IU/L   Lutropin   Result Value Ref Range    Lutropin 4.1 1.5 - 9.3 IU/L     Your hormone results were normal.  I have put in a Urology referral to further evaluate the semen analysis you had performed at the sperm bank.  They should call you for an appointment.      If you have any concerns about these results please call and leave a message for me or send a MyChart message to the clinic.    Sincerely,    Bridgette Olguin MD

## 2017-06-21 NOTE — PATIENT INSTRUCTIONS
Here is the plan from today's visit    1. Infertility male  - Testosterone total  - Follicle stimulating hormone  - Lutropin  I will call you to let you know who I need to refer you to for further evaluation.  It will either be a genetic counselor or an endocrinologist.  They can perform the chromosomal analysis.    Follow up plan  Please make a clinic appointment for follow up with me ([facility]) as needed.  Thank you for coming to [facility] today.  Lab Testing:  **If you had lab testing today and your results are reassuring or normal they will be mailed to you or sent through Gipis within 7 days.   **If the lab tests need quick action we will call you with the results.  The phone number we will call with results is # [phone] (home) . If this is not the best number please call our clinic and change the number.  Medication Refills:  If you need any refills please call your pharmacy and they will contact us.   If you need to  your refill at a new pharmacy, please contact the new pharmacy directly. The new pharmacy will help you get your medications transferred faster.   Scheduling:  If you have any concerns about today's visit or wish to schedule another appointment please call our office during normal business hours [phone] (8-5:00 M-F)  If a referral was made to a [facility] and you don't get a call from central scheduling please call [phone].  If a Mammogram was ordered for you at [facility] call [phone] to schedule or change your appointment.  If you had an XRay/CT/Ultrasound/MRI ordered the number is [phone] to schedule or change your radiology appointment.   Medical Concerns:  If you have urgent medical concerns please call [phone] at any time of the day.      Urology referral  [phone]  Patient is scheduled on Friday 9/1/17 @ 9:40 am           ThanksRachelle

## 2017-06-22 LAB
FSH SERPL-ACNC: 3.6 IU/L (ref 0.7–10.8)
LH SERPL-ACNC: 4.1 IU/L (ref 1.5–9.3)

## 2017-06-23 LAB — TESTOST SERPL-MCNC: 720 NG/DL (ref 240–950)

## 2017-06-25 ASSESSMENT — ENCOUNTER SYMPTOMS
FREQUENCY: 0
COUGH: 0
NERVOUS/ANXIOUS: 0
CHILLS: 0
SHORTNESS OF BREATH: 0
HEADACHES: 0
ABDOMINAL PAIN: 0
FEVER: 0

## 2017-07-10 ENCOUNTER — PRE VISIT (OUTPATIENT)
Dept: UROLOGY | Facility: CLINIC | Age: 22
End: 2017-07-10

## 2017-07-12 ENCOUNTER — OFFICE VISIT (OUTPATIENT)
Dept: FAMILY MEDICINE | Facility: CLINIC | Age: 22
End: 2017-07-12

## 2017-07-12 VITALS
OXYGEN SATURATION: 98 % | WEIGHT: 197.4 LBS | HEART RATE: 70 BPM | BODY MASS INDEX: 28.32 KG/M2 | DIASTOLIC BLOOD PRESSURE: 73 MMHG | TEMPERATURE: 98.7 F | SYSTOLIC BLOOD PRESSURE: 120 MMHG

## 2017-07-12 DIAGNOSIS — F64.0 GENDER DYSPHORIA IN ADULT: Primary | ICD-10-CM

## 2017-07-12 RX ORDER — SPIRONOLACTONE 100 MG/1
50 TABLET, FILM COATED ORAL DAILY
Qty: 30 TABLET | Refills: 0 | Status: SHIPPED | OUTPATIENT
Start: 2017-07-12 | End: 2017-08-01

## 2017-07-12 RX ORDER — ESTRADIOL 0.05 MG/D
1 PATCH, EXTENDED RELEASE TRANSDERMAL
Qty: 8 PATCH | Refills: 0 | Status: SHIPPED | OUTPATIENT
Start: 2017-07-13 | End: 2017-07-12

## 2017-07-12 RX ORDER — SPIRONOLACTONE 100 MG/1
50 TABLET, FILM COATED ORAL DAILY
Qty: 30 TABLET | Refills: 0 | Status: SHIPPED | OUTPATIENT
Start: 2017-07-12 | End: 2017-07-12

## 2017-07-12 RX ORDER — ESTRADIOL 0.05 MG/D
1 PATCH, EXTENDED RELEASE TRANSDERMAL
Qty: 8 PATCH | Refills: 0 | Status: SHIPPED | OUTPATIENT
Start: 2017-07-13 | End: 2017-12-07

## 2017-07-12 NOTE — MR AVS SNAPSHOT
After Visit Summary   7/12/2017    Vinicio Givens    MRN: [id number]           Patient Information     Date Of Birth          1995        Visit Information        Provider Department      7/12/2017 4:00 PM Bridgette Olguin MD [facility]        Today's Diagnoses     Gender dysphoria in adult    -  1      Care Instructions    Here is the plan from today's visit    1. Gender dysphoria in adult  We are starting estrogen and spironolactone.  I would like you to follow up in 3-4 weeks.  At that time, we will recheck labs and may consider increasing your medications.  - spironolactone (ALDACTONE) 100 MG tablet; Take 0.5 tablets (50 mg) by mouth daily  Dispense: 30 tablet; Refill: 0  - estradiol (VIVELLE-DOT) 0.05 MG/24HR BIW patch; Place 1 patch onto the skin twice a week  Dispense: 8 patch; Refill: 0    Follow up plan  Please make a clinic appointment for follow up with me (BRIDGETTE OLGUIN) in 3-4   weeks.    Thank you for coming to [facility] today.  Lab Testing:  **If you had lab testing today and your results are reassuring or normal they will be mailed to you or sent through SourceClear within 7 days.   **If the lab tests need quick action we will call you with the results.  The phone number we will call with results is # [phone] (home) . If this is not the best number please call our clinic and change the number.  Medication Refills:  If you need any refills please call your pharmacy and they will contact us.   If you need to  your refill at a new pharmacy, please contact the new pharmacy directly. The new pharmacy will help you get your medications transferred faster.   Scheduling:  If you have any concerns about today's visit or wish to schedule another appointment please call our office during normal business hours [phone] (8-5:00 M-F)  If a referral was made to a [facility] and you don't get a call from central scheduling  please call [phone].  If a Mammogram was ordered for you at [facility] call [phone] to schedule or change your appointment.  If you had an XRay/CT/Ultrasound/MRI ordered the number is [phone] to schedule or change your radiology appointment.   Medical Concerns:  If you have urgent medical concerns please call [phone] at any time of the day.            Follow-ups after your visit        Your next 10 appointments already scheduled     Sep 01, 2017  9:40 AM CDT   (Arrive by 9:25 AM)   Infertility with Fran Miranda MD   [facility]ic [facility] ([facility])    [address] [facility]  [address] [place] [facility] [address] [phone]              Who to contact     Please call your clinic at [phone] to:    Ask questions about your health    Make or cancel appointments    Discuss your medicines    Learn about your test results    Speak to your doctor   If you have compliments or concerns about an experience at your clinic, or if you wish to file a complaint, please contact [facility] Patient Relations at [phone] or email us at [email or web address]         Additional Information About Your Visit        [employer] Information     [employer] is an electronic gateway that provides easy, online access to your medical records. With [employer], you can request a clinic appointment, read your test results, renew a prescription or communicate with your care team.     To sign up for [employer] visit the website at [email or web address]   You will be asked to enter the access code listed below, as well as some personal information. Please follow the directions to create your username and password.     Your access code is: [email or web address]  Expires: 2017  4:50 PM     Your access code will  in 90 days. If you need help or a new code, please contact your [facility]  or call [phone] for assistance.        Care EveryWhere ID     This is your Care EveryWhere ID. This could be used by other organizations to access your [facility] medical records  [id number]        Your Vitals Were     Pulse Temperature Pulse Oximetry BMI (Body Mass Index)          70 98.7  F (37.1  C) (Oral) 98% 28.32 kg/m2         Blood Pressure from Last 3 Encounters:   07/12/17 120/73   06/21/17 136/78   05/15/17 138/83    Weight from Last 3 Encounters:   07/12/17 197 lb 6.4 oz (89.5 kg)   06/21/17 193 lb 3.2 oz (87.6 kg)   05/15/17 191 lb 9.6 oz (86.9 kg)              Today, you had the following     No orders found for display         Today's Medication Changes          These changes are accurate as of: 7/12/17  4:43 PM.  If you have any questions, ask your nurse or doctor.               Start taking these medicines.        Dose/Directions    estradiol 0.05 MG/24HR BIW patch   Commonly known as:  VIVELLE-DOT   Used for:  Gender dysphoria in adult   Started by:  Bridgette Olguin MD        Dose:  1 patch   Start taking on:  7/13/2017   Place 1 patch onto the skin twice a week   Quantity:  8 patch   Refills:  0       spironolactone 100 MG tablet   Commonly known as:  ALDACTONE   Used for:  Gender dysphoria in adult   Started by:  Bridgette Olguin MD        Dose:  50 mg   Take 0.5 tablets (50 mg) by mouth daily   Quantity:  30 tablet   Refills:  0            Where to get your medicines      These medications were sent to [facility] [place], [place] - [address] [facility] [address]     Phone:  [phone]     estradiol 0.05 MG/24HR BIW patch    spironolactone 100 MG tablet                Primary Care Provider Office Phone # Fax #    Bridgette Olguin [phone]       [facility] [address]  [facility] [address]        Equal Access to Services     LETITIA HAMILTON AH: armand Lei, yehuda  emani varmachloe marie ah. [facility] [phone].    ATENCIÓN: Si rashid delong, tiene a alarcon disposición servicios gratuitos de asistencia lingüística. Llelsa al [phone].    We comply with applicable federal civil rights laws and [place] laws. We do not discriminate on the basis of race, color, national origin, age, disability sex, sexual orientation or gender identity.            Thank you!     Thank you for choosing [facility]  for your care. Our goal is always to provide you with excellent care. Hearing back from our patients is one way we can continue to improve our services. Please take a few minutes to complete the written survey that you may receive in the mail after your visit with us. Thank you!             Your Updated Medication List - Protect others around you: Learn how to safely use, store and throw away your medicines at www.disposemymeds.org.          This list is accurate as of: 7/12/17  4:43 PM.  Always use your most recent med list.                   Brand Name Dispense Instructions for use Diagnosis    estradiol 0.05 MG/24HR BIW patch   Start taking on:  7/13/2017    VIVELLE-DOT    8 patch    Place 1 patch onto the skin twice a week    Gender dysphoria in adult       spironolactone 100 MG tablet    ALDACTONE    30 tablet    Take 0.5 tablets (50 mg) by mouth daily    Gender dysphoria in adult

## 2017-07-12 NOTE — PATIENT INSTRUCTIONS
Here is the plan from today's visit    1. Gender dysphoria in adult  We are starting estrogen and spironolactone.  I would like you to follow up in 3-4 weeks.  At that time, we will recheck labs and may consider increasing your medications.  - spironolactone (ALDACTONE) 100 MG tablet; Take 0.5 tablets (50 mg) by mouth daily  Dispense: 30 tablet; Refill: 0  - estradiol (VIVELLE-DOT) 0.05 MG/24HR BIW patch; Place 1 patch onto the skin twice a week  Dispense: 8 patch; Refill: 0    Follow up plan  Please make a clinic appointment for follow up with me ([facility]) in 3-4   weeks.    Thank you for coming to [facility] today.  Lab Testing:  **If you had lab testing today and your results are reassuring or normal they will be mailed to you or sent through Corous360 within 7 days.   **If the lab tests need quick action we will call you with the results.  The phone number we will call with results is # [phone] (home) . If this is not the best number please call our clinic and change the number.  Medication Refills:  If you need any refills please call your pharmacy and they will contact us.   If you need to  your refill at a new pharmacy, please contact the new pharmacy directly. The new pharmacy will help you get your medications transferred faster.   Scheduling:  If you have any concerns about today's visit or wish to schedule another appointment please call our office during normal business hours [phone] (8-5:00 M-F)  If a referral was made to a [facility] and you don't get a call from central scheduling please call [phone].  If a Mammogram was ordered for you at [facility] call [phone] to schedule or change your appointment.  If you had an XRay/CT/Ultrasound/MRI ordered the number is [phone] to schedule or change your radiology appointment.   Medical Concerns:  If you have urgent medical concerns please call [phone] at any time of the  day.

## 2017-07-12 NOTE — PROGRESS NOTES
"          APURVA Mooney (\"Katie\") is a 22 year old individual that uses pronouns They/Them/Their/Theirs that presents today for follow up of:  feminizing hormone therapy.     Gender identity: Female    Any special concerns today?  Patient was initially seen by myself 5/10/2017 for their initial evaluation to start HRT.  They went to a sperm bank prior to starting hormones.  Patient was told their sperm was not moving and sperm count was low.  Patient thought they had a diagnosis of Klinefelter's Syndrome, but FSH, LH, and Testosterone were WNL.  It was also brought up in discussion that patient was pushing their testicles into the inguinal canal.  I suggested not tucking the testicles for at least a month and having semen retested.  I also referred patient to [facility] (Urology) for infertility.  Today patient returns and states they went a month without tucking their testicles and provided another sample with the same results (low sperm count, immotile).  Patient no longer desires to workup their fertility issues and does not want to see Urology.  They plan on cancelling their appointment.  Today patient wants to start HRT.  They are 100% confident about their decision.      On hormones?  No  ---    Past Surgical History:   Procedure Laterality Date     KNEE SURGERY Left 2014       Patient Active Problem List   Diagnosis     Blood clot in vein       No current outpatient prescriptions on file.       History   Smoking Status     Never Smoker   Smokeless Tobacco     Not on file      Allergies   Allergen Reactions     Banana Anaphylaxis       Problem, Medication and Allergy Lists were reviewed and updated if needed..         Review of Systems:      NA- Patient has not started hormones    General    Fat redistribution: no    Weight change: no HEENT    Voice change: no     Cardiovascular (CV)    Chest Pains: no    Shortness of breath: no Chest    Decreased exercise tolerance:  no    Breast changes/development: no " "    Gastrointestinal (GI)    Abdominal pain: no    Change in appetite: no Skin    Acne or oily skin: no    Change in hair: no     Genitourinary ()    Abnormal vaginal bleeding: not applicable     Decreased spontaneous erections: no    Change in libido: no    New sexual partners: no Musculoskeletal    Leg pain or swelling: no     Psychiatric (Psych)    Depression: no    Anxiety/Panic: no    Mood:  \"okay\"                    Physical Exam:     Vitals:    07/12/17 1616   BP: 120/73   Pulse: 70   Temp: 98.7  F (37.1  C)   TempSrc: Oral   SpO2: 98%   Weight: 197 lb 6.4 oz (89.5 kg)     BMI= Body mass index is 28.32 kg/(m^2).   Wt Readings from Last 10 Encounters:   07/12/17 197 lb 6.4 oz (89.5 kg)   06/21/17 193 lb 3.2 oz (87.6 kg)   05/15/17 191 lb 9.6 oz (86.9 kg)   05/10/17 191 lb 9.6 oz (86.9 kg)     Appearance: Male appearance and dress    GENERAL:: healthy, alert and no distress  EYES: Eyes grossly normal to inspection  CV: regular rates and rhythm, normal S1 S2, no murmur  ABDOMEN: soft, no tenderness  MS: extremities normal- no gross deformities noted, no edema  NEURO: Normal strength and tone, sensory exam grossly normal, mentation intact and speech normal, reflexes symmetric  Psych: Alert and oriented times 3; coherent speech, normal rate and volume, able to articulate logical thoughts, able to abstract reason, no tangential thoughts, no hallucinations or delusions. Affect is appropriate.  Affect: Appropriate/mood-congruent           Labs:   No labs ordered today.    Assessment and Plan     Vinicio was seen today for medication request.    Vinicio (\"Katie\") is confident about their decision to start estrogen and spironolactone today.  They no longer want to pursue the infertility workup.  Vinicio (\"Katie\") also understands that once they start estrogen, it will be more difficult or impossible to restart the infertility workup.   They have no contraindications to estrogen.  Patient does not smoke, but they did have " a blood clot after knee surgery a few years ago.  This issue was discussed with staff 5/2017.  Patient will be able to take estrogen but ONLY the patches.  Today we reviewed the consent again and it was signed by Vinicio.  I advised them to RTC in 3-4 weeks.  We will recheck labs at that time and increase estrogen and aldactone if patient desires.      Diagnoses and all orders for this visit:    Gender dysphoria in adult  -     spironolactone (ALDACTONE) 100 MG tablet; Take 0.5 tablets (50 mg) by mouth daily  -     estradiol (VIVELLE-DOT) 0.05 MG/24HR BIW patch; Place 1 patch onto the skin twice a week    Contraception:   not needed  .   Counselled patient about controlled substances: No    Follow up:  3-4 months  [facility]  Questions were elicited and answered.     Bridgette Olguin M.D.  PGY-3, Family Medicine

## 2017-07-19 NOTE — PROGRESS NOTES
Preceptor Attestation:   Patient seen and discussed with the resident. Assessment and plan reviewed with resident and agreed upon.   Supervising Physician:  Wesley Andino MD  [facility]

## 2017-08-01 ENCOUNTER — OFFICE VISIT (OUTPATIENT)
Dept: FAMILY MEDICINE | Facility: CLINIC | Age: 22
End: 2017-08-01

## 2017-08-01 VITALS
RESPIRATION RATE: 18 BRPM | TEMPERATURE: 98.2 F | BODY MASS INDEX: 28.12 KG/M2 | SYSTOLIC BLOOD PRESSURE: 134 MMHG | DIASTOLIC BLOOD PRESSURE: 80 MMHG | HEART RATE: 70 BPM | OXYGEN SATURATION: 98 % | WEIGHT: 196 LBS

## 2017-08-01 DIAGNOSIS — F64.0 GENDER DYSPHORIA IN ADULT: ICD-10-CM

## 2017-08-01 LAB
BUN SERPL-MCNC: 19 MG/DL (ref 7–21)
CALCIUM SERPL-MCNC: 9.7 MG/DL (ref 8.5–10.1)
CHLORIDE SERPLBLD-SCNC: 101.1 MMOL/L (ref 98–110)
CO2 SERPL-SCNC: 30.1 MMOL/L (ref 20–32)
CREAT SERPL-MCNC: 0.9 MG/DL (ref 0.7–1.3)
GFR SERPL CREATININE-BSD FRML MDRD: >90 ML/MIN/1.7 M2
GLUCOSE SERPL-MCNC: 133.5 MG'DL (ref 70–99)
POTASSIUM SERPL-SCNC: 4.1 MMOL/DL (ref 3.3–4.5)
SODIUM SERPL-SCNC: 137.5 MMOL/L (ref 132.6–141.4)

## 2017-08-01 RX ORDER — ESTRADIOL 0.1 MG/D
1 FILM, EXTENDED RELEASE TRANSDERMAL
Qty: 12 PATCH | Refills: 1 | Status: SHIPPED | OUTPATIENT
Start: 2017-08-03 | End: 2017-10-17

## 2017-08-01 RX ORDER — SPIRONOLACTONE 100 MG/1
100 TABLET, FILM COATED ORAL DAILY
Qty: 30 TABLET | Refills: 1 | Status: SHIPPED | OUTPATIENT
Start: 2017-08-01 | End: 2017-10-17

## 2017-08-01 NOTE — PROGRESS NOTES
Preceptor Attestation:   Patient seen and discussed with the resident. Assessment and plan reviewed with resident and agreed upon.   Supervising Physician:  Bridgette Powell MD  [facility]

## 2017-08-01 NOTE — PATIENT INSTRUCTIONS
Here is the plan from today's visit    1. Gender dysphoria in adult  We are increasing the dose of the spironolactone and estradiol patch today.  Let me know if there is any problem with your new dosing.    - spironolactone (ALDACTONE) 100 MG tablet; Take 1 tablet (100 mg) by mouth daily  Dispense: 30 tablet; Refill: 1  - estradiol (VIVELLE-DOT) 0.1 MG/24HR BIW patch; Place 1 patch onto the skin twice a week  Dispense: 12 patch; Refill: 1  - Basic Metabolic Panel ([facility])    Please call or return to clinic if your symptoms don't go away.    Follow up plan  Please make a clinic appointment for follow up with me ([facility]) in 2  weeks.    Thank you for coming to [facility] today.  Lab Testing:  **If you had lab testing today and your results are reassuring or normal they will be mailed to you or sent through [facility] within 7 days.   **If the lab tests need quick action we will call you with the results.  The phone number we will call with results is # [phone] (home) . If this is not the best number please call our clinic and change the number.  Medication Refills:  If you need any refills please call your pharmacy and they will contact us.   If you need to  your refill at a new pharmacy, please contact the new pharmacy directly. The new pharmacy will help you get your medications transferred faster.   Scheduling:  If you have any concerns about today's visit or wish to schedule another appointment please call our office during normal business hours [phone] (8-5:00 M-F)  If a referral was made to a [facility] and you don't get a call from central scheduling please call [phone].  If a Mammogram was ordered for you at [facility] call [phone] to schedule or change your appointment.  If you had an XRay/CT/Ultrasound/MRI ordered the number is [phone] to schedule or change your radiology appointment.   Medical Concerns:  If you have urgent  medical concerns please call [phone] at any time of the day.

## 2017-08-01 NOTE — LETTER
August 1, 2017      Vinicio Givens  [address]  [place] [address]        Dear Vinicio,    Thank you for getting your care at [facility]. Please see below for your test results.    Resulted Orders   Basic Metabolic Panel ([facility])   Result Value Ref Range    Urea Nitrogen 19.0 7.0 - 21.0 mg/dL    Calcium 9.7 8.5 - 10.1 mg/dL    Chloride 101.1 98.0 - 110.0 mmol/L    Carbon Dioxide 30.1 20.0 - 32.0 mmol/L    Creatinine 0.9 0.7 - 1.3 mg/dL    Glucose 133.5 (H) 70.0 - 99.0 mg'dL    Potassium 4.1 3.3 - 4.5 mmol/dL    Sodium 137.5 132.6 - 141.4 mmol/L    GFR Estimate >90 >60.0 mL/min/1.7 m2    GFR Estimate If Black >90 >60.0 mL/min/1.7 m2     Your electrolytes look good except for your glucose, which was a little high.  We will keep an eye on that.      If you have any concerns about these results please call and leave a message for me or send a [facility] message to the clinic.    Sincerely,    Bridgette Olguin MD

## 2017-08-01 NOTE — MR AVS SNAPSHOT
After Visit Summary   8/1/2017    Vinicio Givens    MRN: [id number]           Patient Information     Date Of Birth          1995        Visit Information        Provider Department      8/1/2017 4:00 PM Bridgette Olguin MD [facility]        Today's Diagnoses     Gender dysphoria in adult          Care Instructions    Here is the plan from today's visit    1. Gender dysphoria in adult  We are increasing the dose of the spironolactone and estradiol patch today.  Let me know if there is any problem with your new dosing.    - spironolactone (ALDACTONE) 100 MG tablet; Take 1 tablet (100 mg) by mouth daily  Dispense: 30 tablet; Refill: 1  - estradiol (VIVELLE-DOT) 0.1 MG/24HR BIW patch; Place 1 patch onto the skin twice a week  Dispense: 12 patch; Refill: 1  - Basic Metabolic Panel ([facility])    Please call or return to clinic if your symptoms don't go away.    Follow up plan  Please make a clinic appointment for follow up with me (BRIDGETTE OLGUIN) in 2  weeks.    Thank you for coming to [facility] today.  Lab Testing:  **If you had lab testing today and your results are reassuring or normal they will be mailed to you or sent through [employer] within 7 days.   **If the lab tests need quick action we will call you with the results.  The phone number we will call with results is # [phone] (home) . If this is not the best number please call our clinic and change the number.  Medication Refills:  If you need any refills please call your pharmacy and they will contact us.   If you need to  your refill at a new pharmacy, please contact the new pharmacy directly. The new pharmacy will help you get your medications transferred faster.   Scheduling:  If you have any concerns about today's visit or wish to schedule another appointment please call our office during normal business hours [phone] (8-5:00 M-F)  If a referral was made to a [facility] and you don't get a call from central scheduling please call [phone].  If a Mammogram was ordered for you at [facility] call [phone] to schedule or change your appointment.  If you had an XRay/CT/Ultrasound/MRI ordered the number is [phone] to schedule or change your radiology appointment.   Medical Concerns:  If you have urgent medical concerns please call [phone] at any time of the day.            Follow-ups after your visit        Your next 10 appointments already scheduled     Sep 01, 2017  9:40 AM CDT   (Arrive by 9:25 AM)   Infertility with Fran Miranda MD   [facility] ([facility])    [address] [place] [id number] [phone]              Who to contact     Please call your clinic at [phone] to:    Ask questions about your health    Make or cancel appointments    Discuss your medicines    Learn about your test results    Speak to your doctor   If you have compliments or concerns about an experience at your clinic, or if you wish to file a complaint, please contact [facility] Patient Relations at [phone] or email us at [email or web address]         Additional Information About Your Visit        [employer] Information     [employer] is an electronic gateway that provides easy, online access to your medical records. With [employer], you can request a clinic appointment, read your test results, renew a prescription or communicate with your care team.     To sign up for [employer] visit the website at [email or web address]   You will be asked to enter the access code listed below, as well as some personal information. Please follow the directions to create your username and password.     Your access code is: [email or web address]  Expires: 2017  4:50 PM     Your access code will  in 90 days. If you need help or a new code,  please contact your [facility] or call [phone] for assistance.        Care EveryWhere ID     This is your Care EveryWhere ID. This could be used by other organizations to access your [facility] medical records  [id number]        Your Vitals Were     Pulse Temperature Respirations Pulse Oximetry BMI (Body Mass Index)       70 98.2  F (36.8  C) (Oral) 18 98% 28.12 kg/m2        Blood Pressure from Last 3 Encounters:   08/01/17 134/80   07/12/17 120/73   06/21/17 136/78    Weight from Last 3 Encounters:   08/01/17 196 lb (88.9 kg)   07/12/17 197 lb 6.4 oz (89.5 kg)   06/21/17 193 lb 3.2 oz (87.6 kg)              We Performed the Following     Basic Metabolic Panel ([facility])          Today's Medication Changes          These changes are accurate as of: 8/1/17  4:35 PM.  If you have any questions, ask your nurse or doctor.               These medicines have changed or have updated prescriptions.        Dose/Directions    * estradiol 0.05 MG/24HR BIW patch   Commonly known as:  VIVELLE-DOT   This may have changed:  Another medication with the same name was added. Make sure you understand how and when to take each.   Used for:  Gender dysphoria in adult   Changed by:  Bridgette Olguin MD        Dose:  1 patch   Place 1 patch onto the skin twice a week   Quantity:  8 patch   Refills:  0       * estradiol 0.1 MG/24HR BIW patch   Commonly known as:  VIVELLE-DOT   This may have changed:  You were already taking a medication with the same name, and this prescription was added. Make sure you understand how and when to take each.   Used for:  Gender dysphoria in adult   Changed by:  Bridgette Olguin MD        Dose:  1 patch   Start taking on:  8/3/2017   Place 1 patch onto the skin twice a week   Quantity:  12 patch   Refills:  1       spironolactone 100 MG tablet   Commonly known as:  ALDACTONE   This may have changed:  how much to take   Used for:  Gender dysphoria in adult    Changed by:  Bridgette Olguin MD        Dose:  100 mg   Take 1 tablet (100 mg) by mouth daily   Quantity:  30 tablet   Refills:  1       * Notice:  This list has 2 medication(s) that are the same as other medications prescribed for you. Read the directions carefully, and ask your doctor or other care provider to review them with you.         Where to get your medicines      These medications were sent to [facility] [place], [place] - [address] [facility] [address]     Phone:  [phone]     estradiol 0.1 MG/24HR BIW patch    spironolactone 100 MG tablet                Primary Care Provider Office Phone # Fax #    Bridgette Olguin [phone]       [facility] [address]  [facility] [address]        Equal Access to Services     [facility] Center[facility] AH: Hadii elizabeth barajas hadasho Soeloise, waaxda luqadaha, qaybta kaalmada adeegyaanastasiia, emani marie . So [facility] [phone].    ATENCIÓN: Si habla español, tiene a alarcon disposición servicios gratuitos de asistencia lingüística. [facility] [phone].    We comply with applicable federal civil rights laws and [place] laws. We do not discriminate on the basis of race, color, national origin, age, disability sex, sexual orientation or gender identity.            Thank you!     Thank you for choosing \[facility]\"" [facility]  for your care. Our goal is always to provide you with excellent care. Hearing back from our patients is one way we can continue to improve our services. Please take a few minutes to complete the written survey that you may receive in the mail after your visit with us. Thank you!             Your Updated Medication List - Protect others around you: Learn how to safely use, store and throw away your medicines at www.disposemymeds.org.          This list is accurate as of: 8/1/17  4:35 PM.  Always use your most recent med list.                   Brand Name  Dispense Instructions for use Diagnosis    * estradiol 0.05 MG/24HR BIW patch    VIVELLE-DOT    8 patch    Place 1 patch onto the skin twice a week    Gender dysphoria in adult       * estradiol 0.1 MG/24HR BIW patch   Start taking on:  8/3/2017    VIVELLE-DOT    12 patch    Place 1 patch onto the skin twice a week    Gender dysphoria in adult       spironolactone 100 MG tablet    ALDACTONE    30 tablet    Take 1 tablet (100 mg) by mouth daily    Gender dysphoria in adult       * Notice:  This list has 2 medication(s) that are the same as other medications prescribed for you. Read the directions carefully, and ask your doctor or other care provider to review them with you.

## 2017-08-01 NOTE — PROGRESS NOTES
"          APURVA Mooney is a 22 year old individual that uses pronouns They/Them/Their/Theirs that presents today for follow up of:  feminizing hormone therapy. Gender identity: female    Any special concerns today?  concerns about patch falling off, not noticing effects of estrogen    On hormones?  YES +++ Shot day of the week? Not applicable-taking pills/patch/gel      Due for labs?  Yes (BMP)      +++ Refills of meds needed?  Yes  ---    Past Surgical History:   Procedure Laterality Date     KNEE SURGERY Left 2014       Patient Active Problem List   Diagnosis     Blood clot in vein       Current Outpatient Prescriptions   Medication Sig Dispense Refill     spironolactone (ALDACTONE) 100 MG tablet Take 0.5 tablets (50 mg) by mouth daily 30 tablet 0     estradiol (VIVELLE-DOT) 0.05 MG/24HR BIW patch Place 1 patch onto the skin twice a week 8 patch 0       History   Smoking Status     Never Smoker   Smokeless Tobacco     Never Used          Allergies   Allergen Reactions     Banana Anaphylaxis       Problem, Medication and Allergy Lists were reviewed and updated if needed..         Review of Systems:      General    Fat redistribution: no    Weight change: no HEENT    Voice change: no     Cardiovascular (CV)    Chest Pains: no    Shortness of breath: no Chest    Decreased exercise tolerance:  no    Breast changes/development: no     Gastrointestinal (GI)    Abdominal pain: no    Change in appetite: no Skin    Acne or oily skin: less acne, softer skin    Change in hair: no     Genitourinary ()    Abnormal vaginal bleeding: not applicable     Decreased spontaneous erections: no    Change in libido: no  New sexual partners: YES, female     Musculoskeletal    Leg pain or swelling: no     Psychiatric (Psych)    Depression: no    Anxiety/Panic: no    Mood:  \"good\"                    Physical Exam:     Vitals:    08/01/17 1556   BP: 134/80   Pulse: 70   Resp: 18   Temp: 98.2  F (36.8  C)   TempSrc: Oral   SpO2: 98% "   Weight: 196 lb (88.9 kg)     BMI= Body mass index is 28.12 kg/(m^2).   Wt Readings from Last 10 Encounters:   08/01/17 196 lb (88.9 kg)   07/12/17 197 lb 6.4 oz (89.5 kg)   06/21/17 193 lb 3.2 oz (87.6 kg)   05/15/17 191 lb 9.6 oz (86.9 kg)   05/10/17 191 lb 9.6 oz (86.9 kg)     Appearance: Both appearance and dress    GENERAL:: healthy, alert and no distress  RESP: lungs clear to auscultation - no rales, no rhonchi, no wheezes  CV: regular rates and rhythm, normal S1 S2, no S3 or S4 and no murmur, no click or rub -  ABDOMEN: soft, no tenderness, no  hepatosplenomegaly, no masses, normal bowel sounds  MS: extremities normal- no gross deformities noted, no edema  NEURO: Normal strength and tone, sensory exam grossly normal, mentation intact and speech normal, reflexes symmetric  Psych: Alert and oriented times 3; coherent speech, normal rate and volume, able to articulate logical thoughts, able to abstract reason, no tangential thoughts, no hallucinations or delusions. Affect is appropriate.  Affect: Appropriate/mood-congruent           Labs:      BMP pending      Assessment and Plan     Vinicio was seen today for recheck.  Vinicio/Katie is doing well, but eager to go up on estrogen dosing.  Will increase patch to 100 mcg/24 hours and increase aldactone to 100 mg daily.  Will check BMP today (before increasing dose of aldactone).  Patient will need to follow up in 2 months for routine visit and labs.    Diagnoses and all orders for this visit:    Gender dysphoria in adult  -     spironolactone (ALDACTONE) 100 MG tablet; Take 1 tablet (100 mg) by mouth daily  -     estradiol (VIVELLE-DOT) 0.1 MG/24HR BIW patch; Place 1 patch onto the skin twice a week  -     Basic Metabolic Panel ([facility])    Contraception:  not needed (patient is sexually active with a female, but not having intercourse)  .   Counselled patient about controlled substances: Yes.    Follow up:  Follow up in 2 months.  Results by mychart  Questions  were elicited and answered.     Bridgette Olguin M.D.  PGY-3, Family Medicine

## 2017-10-17 ENCOUNTER — OFFICE VISIT (OUTPATIENT)
Dept: FAMILY MEDICINE | Facility: CLINIC | Age: 22
End: 2017-10-17

## 2017-10-17 VITALS
RESPIRATION RATE: 16 BRPM | SYSTOLIC BLOOD PRESSURE: 131 MMHG | OXYGEN SATURATION: 96 % | BODY MASS INDEX: 26.26 KG/M2 | TEMPERATURE: 97.7 F | HEART RATE: 82 BPM | DIASTOLIC BLOOD PRESSURE: 83 MMHG | WEIGHT: 183 LBS

## 2017-10-17 DIAGNOSIS — R73.9 ELEVATED BLOOD SUGAR: Primary | ICD-10-CM

## 2017-10-17 DIAGNOSIS — F64.0 GENDER DYSPHORIA IN ADULT: ICD-10-CM

## 2017-10-17 LAB
ALBUMIN SERPL-MCNC: 4.6 MG/DL (ref 3.8–5)
ALP SERPL-CCNC: 43.2 U/L (ref 31.7–110.7)
ALT SERPL-CCNC: 14.5 U/L (ref 0–45)
AST SERPL-CCNC: 14.7 U/L (ref 0–55)
BILIRUB SERPL-MCNC: 0.4 MG/DL (ref 0.2–1.3)
BUN SERPL-MCNC: 20.8 MG/DL (ref 7–21)
CALCIUM SERPL-MCNC: 9.5 MG/DL (ref 8.5–10.1)
CHLORIDE SERPLBLD-SCNC: 100.6 MMOL/L (ref 98–110)
CHOLEST SERPL-MCNC: 148.8 MG/DL (ref 0–200)
CHOLEST/HDLC SERPL: 2.1 {RATIO} (ref 0–5)
CO2 SERPL-SCNC: 28.3 MMOL/L (ref 20–32)
CREAT SERPL-MCNC: 0.9 MG/DL (ref 0.7–1.3)
GFR SERPL CREATININE-BSD FRML MDRD: >90 ML/MIN/1.7 M2
GLUCOSE SERPL-MCNC: 93.4 MG'DL (ref 70–99)
HBA1C MFR BLD: 5.3 % (ref 4.1–5.7)
HDLC SERPL-MCNC: 69.3 MG/DL
HEMOGLOBIN: 13.5 G/DL (ref 13.3–17.7)
LDLC SERPL CALC-MCNC: 62 MG/DL (ref 0–129)
POTASSIUM SERPL-SCNC: 3.9 MMOL/DL (ref 3.3–4.5)
PROT SERPL-MCNC: 7.4 G/DL (ref 6.8–8.8)
SODIUM SERPL-SCNC: 136.6 MMOL/L (ref 132.6–141.4)
TRIGL SERPL-MCNC: 85.4 MG/DL (ref 0–150)
VLDL CHOLESTEROL: 17.1 MG/DL (ref 7–32)

## 2017-10-17 RX ORDER — ESTRADIOL 0.1 MG/D
1 FILM, EXTENDED RELEASE TRANSDERMAL
Qty: 10 PATCH | Refills: 3 | Status: SHIPPED | OUTPATIENT
Start: 2017-10-19 | End: 2017-12-01

## 2017-10-17 RX ORDER — SPIRONOLACTONE 100 MG/1
150 TABLET, FILM COATED ORAL DAILY
Qty: 90 TABLET | Refills: 1 | Status: SHIPPED | OUTPATIENT
Start: 2017-10-17 | End: 2018-02-22

## 2017-10-17 RX ORDER — ESTRADIOL 0.05 MG/D
1 PATCH, EXTENDED RELEASE TRANSDERMAL
Qty: 10 PATCH | Refills: 3 | Status: SHIPPED | OUTPATIENT
Start: 2017-10-19 | End: 2018-02-22

## 2017-10-17 NOTE — LETTER
October 23, 2017      Vinicio Givens  [address]  [place] [facility] [address]        Dear Katie,    Thank you for getting your care at [facility]. Please see below for your test results.    Resulted Orders   Hemoglobin A1c ([facility])   Result Value Ref Range    Hemoglobin A1C 5.3 4.1 - 5.7 %   Testosterone Total   Result Value Ref Range    Testosterone Total 440 240 - 950 ng/dL      Comment:      This test was developed and its performance characteristics determined by the   [facility],  Special Chemistry Laboratory. It has   not been cleared or approved by the FDA. The laboratory is regulated under   CLIA as qualified to perform high-complexity testing. This test is used for   clinical purposes. It should not be regarded as investigational or for   research.     Comprehensive Metabolic Panel   Result Value Ref Range    Albumin 4.6 3.8 - 5.0 mg/dL    Alkaline Phosphatase 43.2 31.7 - 110.7 U/L    ALT 14.5 0.0 - 45.0 U/L    AST 14.7 0.0 - 55.0 U/L    Bilirubin Total 0.4 0.2 - 1.3 mg/dL    Urea Nitrogen 20.8 7.0 - 21.0 mg/dL    Calcium 9.5 8.5 - 10.1 mg/dL    Chloride 100.6 98.0 - 110.0 mmol/L    Carbon Dioxide 28.3 20.0 - 32.0 mmol/L    Creatinine 0.9 0.7 - 1.3 mg/dL    Glucose 93.4 70.0 - 99.0 mg'dL    Potassium 3.9 3.3 - 4.5 mmol/dL    Sodium 136.6 132.6 - 141.4 mmol/L    Protein Total 7.4 6.8 - 8.8 g/dL    GFR Estimate >90 >60.0 mL/min/1.7 m2    GFR Estimate If Black >90 >60.0 mL/min/1.7 m2   Hemoglobin (HGB)   Result Value Ref Range    Hemoglobin 13.5 13.3 - 17.7 g/dL   Lipid Cascade   Result Value Ref Range    Cholesterol 148.8 0.0 - 200.0 mg/dL    Cholesterol/HDL Ratio 2.1 0.0 - 5.0    HDL Cholesterol 69.3 >40.0 mg/dL    LDL Cholesterol Calculated 62 0 - 129 mg/dL    Triglycerides 85.4 0.0 - 150.0 mg/dL    VLDL Cholesterol 17.1 7.0 - 32.0 mg/dL   Estradiol Ultrasensitive   Result Value Ref Range    Estradiol Ultrasensitive 138 (H) 10 - 40 pg/mL      Comment:      Reference  "Ranges  Prepubertal Males:  0-13 pg/mL  Adult Males:  10-40 pg/mL  This test was developed and its performance characteristics determined by the   [facility],  Special Chemistry Laboratory. It has   not been cleared or approved by the FDA. The laboratory is regulated under   CLIA as qualified to perform high-complexity testing. This test is used for   clinical purposes. It should not be regarded as investigational or for   research.       Your A1c (test for blood sugars over last 3 months) was normal, so you are not \"prediabetic\".  Your testosterone level has come down from about 700 to 440, so this is significant.  Also, your estradiol level was within normal limits.  It should be between 100-200.  I checked the indications for esrradiol testing.  It should only be checked if you are on injectable estrogen, so we most likely will not check it again.  Let me know if you have questions.     If you have any concerns about these results please call and leave a message for me or send a MyChart message to the clinic.    Sincerely,    Bridgette Olguin MD    "

## 2017-10-17 NOTE — PROGRESS NOTES
"          HPI   Vinicio/Katie is a 22 year old individual that uses pronouns She/Her/Hers/Herself that presents today for follow up of:  feminizing hormone therapy. Gender identity: female    Any special concerns today?  No current concerns    On hormones?  YES +++     Due for labs?  Yes      +++ Refills of meds needed?  Yes  ---    Past Surgical History:   Procedure Laterality Date     KNEE SURGERY Left 2014       Patient Active Problem List   Diagnosis     Blood clot in vein       Current Outpatient Prescriptions   Medication Sig Dispense Refill     spironolactone (ALDACTONE) 100 MG tablet Take 1 tablet (100 mg) by mouth daily 30 tablet 1     estradiol (VIVELLE-DOT) 0.1 MG/24HR BIW patch Place 1 patch onto the skin twice a week 12 patch 1     estradiol (VIVELLE-DOT) 0.05 MG/24HR BIW patch Place 1 patch onto the skin twice a week 8 patch 0       History   Smoking Status     Never Smoker   Smokeless Tobacco     Never Used          Allergies   Allergen Reactions     Banana Anaphylaxis       Problem, Medication and Allergy Lists were reviewed and updated if needed..         Review of Systems:      General  Fat redistribution: Yes        Weight change: Maybe HEENT    Voice change: no     Cardiovascular (CV)    Chest Pains: no    Shortness of breath: no Chest    Decreased exercise tolerance:  no    Breast changes/development: no     Gastrointestinal (GI)    Abdominal pain: no    Change in appetite: no Skin    Acne or oily skin: same    Change in hair: same     Genitourinary ()    Abnormal vaginal bleeding: no     Decreased spontaneous erections: no    Change in libido: yes    New sexual partners: no Musculoskeletal    Leg pain or swelling: no     Psychiatric (Psych)    Depression: no    Anxiety/Panic: no    Mood:  \"okay\"                    Physical Exam:   Vitals:    10/17/17 1602   BP: 131/83   Pulse: 82   Resp: 16   Temp: 97.7  F (36.5  C)   TempSrc: Oral   SpO2: 96%   Weight: 183 lb (83 kg)     BMI= Body mass index " is 26.26 kg/(m^2).   Wt Readings from Last 10 Encounters:   10/17/17 183 lb (83 kg)   08/01/17 196 lb (88.9 kg)   07/12/17 197 lb 6.4 oz (89.5 kg)   06/21/17 193 lb 3.2 oz (87.6 kg)   05/15/17 191 lb 9.6 oz (86.9 kg)   05/10/17 191 lb 9.6 oz (86.9 kg)     Appearance: Female appearance and dress    GENERAL:: healthy, alert and no distress  EYES: Eyes grossly normal to inspection  CV: regular rates and rhythm, normal S1 S2  MS: extremities normal- no gross deformities noted, no edema  SKIN: no suspicious lesions, no rashes  NEURO: Normal strength and tone, sensory exam grossly normal, mentation intact and speech normal, reflexes symmetric  Psych: Alert and oriented times 3; coherent speech, normal rate and volume, able to articulate logical thoughts, able to abstract reason, no tangential thoughts, no hallucinations or delusions.  Affect: Appropriate/mood-congruent           Labs:   Labs pending    Assessment and Plan     Vinicio was seen today for recheck medication.    Vinicio/Katie is doing well, no complaints with current regimen.  Eager to go up on dosing.  Will check labs today.  I do not anticipate any problems, so will increase estradiol to 150 mcg/24 hr twice weekly and increase spironolactone to 150 mg daily.  Will plan for follow up in 3-4 months.  (Katie started HRT 7/2017).    Will also check an A1c.  Patient does not have risk factors for Diabetes mellitus, but last serum glucoses have been elevated and patient has concern about it.     Diagnoses and all orders for this visit:    Elevated blood sugar  -     Hemoglobin A1c ([facility])    Gender dysphoria in adult  -     spironolactone (ALDACTONE) 100 MG tablet; Take 1.5 tablets (150 mg) by mouth daily  -     estradiol (VIVELLE-DOT) 0.1 MG/24HR BIW patch; Place 1 patch onto the skin twice a week  -     estradiol (VIVELLE-DOT) 0.05 MG/24HR BIW patch; Place 1 patch onto the skin twice a week  -     Testosterone Total  -     Comprehensive Metabolic Panel  -      Hemoglobin (HGB)  -     Lipid Cascade  -     Cancel: Glucose  -     Estradiol Ultrasensitive    Contraception:  abstinence and not needed  Follow up:  Follow up in 3 months.  [facility]  Questions were elicited and answered.     Bridgette Olguin M.D.  PGY-3, Family Medicine

## 2017-10-17 NOTE — PROGRESS NOTES
Preceptor Attestation:   Patient seen and discussed with the resident. Assessment and plan reviewed with resident and agreed upon.   Supervising Physician:  Bridgette Powell MD  [facility]

## 2017-10-17 NOTE — MR AVS SNAPSHOT
After Visit Summary   10/17/2017    Vinicio Givens    MRN: [id number]           Patient Information     Date Of Birth          1995        Visit Information        Provider Department      10/17/2017 4:00 PM Bridgette Olguin MD [facility]        Today's Diagnoses     Elevated blood sugar    -  1    Gender dysphoria in adult          Care Instructions    Here is the plan from today's visit    1. Elevated blood sugar  I will call you if your A1c is abnormal.  - Hemoglobin A1c ([facility])    2. Gender dysphoria in adult  Ok to go up on spironolactone 150 mg daily.  Ok to use 100 mcg and 50 mcg Vivelle Dots.  - spironolactone (ALDACTONE) 100 MG tablet; Take 1.5 tablets (150 mg) by mouth daily  Dispense: 90 tablet; Refill: 1  - estradiol (VIVELLE-DOT) 0.1 MG/24HR BIW patch; Place 1 patch onto the skin twice a week  Dispense: 10 patch; Refill: 3  - estradiol (VIVELLE-DOT) 0.05 MG/24HR BIW patch; Place 1 patch onto the skin twice a week  Dispense: 10 patch; Refill: 3  - Testosterone Total  - Comprehensive Metabolic Panel  - Hemoglobin (HGB)  - Lipid Cascade  - Glucose  - Estradiol Ultrasensitive    Please call or return to clinic if your symptoms don't go away.    Follow up plan  Please make a clinic appointment for follow up with me (BRIDGETTE OLGUIN) in 3-4   months for follow up.    Thank you for coming to [facility] today.  Lab Testing:  **If you had lab testing today and your results are reassuring or normal they will be mailed to you or sent through [employer] within 7 days.   **If the lab tests need quick action we will call you with the results.  The phone number we will call with results is # [phone] (home) . If this is not the best number please call our clinic and change the number.  Medication Refills:  If you need any refills please call your pharmacy and they will contact us.   If you need to  your refill at a new pharmacy, please contact the new  pharmacy directly. The new pharmacy will help you get your medications transferred faster.   Scheduling:  If you have any concerns about today's visit or wish to schedule another appointment please call our office during normal business hours [phone] (8-5:00 M-F)  If a referral was made to a [facility] and you don't get a call from central scheduling please call [phone].  If a Mammogram was ordered for you at [facility] call [phone] to schedule or change your appointment.  If you had an XRay/CT/Ultrasound/MRI ordered the number is [phone] to schedule or change your radiology appointment.   Medical Concerns:  If you have urgent medical concerns please call [phone] at any time of the day.            Follow-ups after your visit        Who to contact     Please call your clinic at [phone] to:    Ask questions about your health    Make or cancel appointments    Discuss your medicines    Learn about your test results    Speak to your doctor   If you have compliments or concerns about an experience at your clinic, or if you wish to file a complaint, please contact [facility] Patient Relations at [phone] or email us at [email or web address]         Additional Information About Your Visit        Acomplihart Information     [employer] is an electronic gateway that provides easy, online access to your medical records. With [employer], you can request a clinic appointment, read your test results, renew a prescription or communicate with your care team.     To sign up for [employer] visit the website at [email or web address]   You will be asked to enter the access code listed below, as well as some personal information. Please follow the directions to create your username and password.     Your access code is: [email or web address]  Expires: 2017  6:30 AM     Your access code will  in 90 days. If you need help or a new code, please  contact your [facility] or call [phone] for assistance.        Care EveryWhere ID     This is your Care EveryWhere ID. This could be used by other organizations to access your [place] medical records  [id number]        Your Vitals Were     Pulse Temperature Respirations Pulse Oximetry BMI (Body Mass Index)       82 97.7  F (36.5  C) (Oral) 16 96% 26.26 kg/m2        Blood Pressure from Last 3 Encounters:   10/17/17 131/83   08/01/17 134/80   07/12/17 120/73    Weight from Last 3 Encounters:   10/17/17 183 lb (83 kg)   08/01/17 196 lb (88.9 kg)   07/12/17 197 lb 6.4 oz (89.5 kg)              We Performed the Following     Comprehensive Metabolic Panel     Estradiol Ultrasensitive     Glucose     Hemoglobin (HGB)     Hemoglobin A1c ([facility])     Lipid Cascade     Testosterone Total          Today's Medication Changes          These changes are accurate as of: 10/17/17  4:31 PM.  If you have any questions, ask your nurse or doctor.               These medicines have changed or have updated prescriptions.        Dose/Directions    * estradiol 0.05 MG/24HR BIW patch   Commonly known as:  VIVELLE-DOT   This may have changed:  Another medication with the same name was added. Make sure you understand how and when to take each.   Used for:  Gender dysphoria in adult   Changed by:  Bridgette Olguin MD        Dose:  1 patch   Place 1 patch onto the skin twice a week   Quantity:  8 patch   Refills:  0       * estradiol 0.1 MG/24HR BIW patch   Commonly known as:  VIVELLE-DOT   This may have changed:  Another medication with the same name was added. Make sure you understand how and when to take each.   Used for:  Gender dysphoria in adult   Changed by:  Bridgette Olguin MD        Dose:  1 patch   Start taking on:  10/19/2017   Place 1 patch onto the skin twice a week   Quantity:  10 patch   Refills:  3       * estradiol 0.05 MG/24HR BIW patch   Commonly known as:  VIVELLE-DOT    This may have changed:  You were already taking a medication with the same name, and this prescription was added. Make sure you understand how and when to take each.   Used for:  Gender dysphoria in adult   Changed by:  Bridgette Olguin MD        Dose:  1 patch   Start taking on:  10/19/2017   Place 1 patch onto the skin twice a week   Quantity:  10 patch   Refills:  3       spironolactone 100 MG tablet   Commonly known as:  ALDACTONE   This may have changed:  how much to take   Used for:  Gender dysphoria in adult   Changed by:  Bridgette Olguin MD        Dose:  150 mg   Take 1.5 tablets (150 mg) by mouth daily   Quantity:  90 tablet   Refills:  1       * Notice:  This list has 3 medication(s) that are the same as other medications prescribed for you. Read the directions carefully, and ask your doctor or other care provider to review them with you.         Where to get your medicines      These medications were sent to [facility] [place], [place] - [address]   [address] [place] [address]     Phone:  [phone]     estradiol 0.05 MG/24HR BIW patch    estradiol 0.1 MG/24HR BIW patch    spironolactone 100 MG tablet                Primary Care Provider Office Phone # Fax #    Bridgette Olguin [phone]       [address] [facility] [address]        Equal Access to Services     [facility] AH: Hadii elizabeth barajas hadasho Soeloise, waaxda luqadaha, qaybta kaalmada adeegyada, emani marie . So [facility] [phone].    ATENCIÓN: Si habla español, tiene a alarcon disposición servicios gratuitos de asistencia lingüística. Llame al [phone].    We comply with applicable federal civil rights laws and [place] laws. We do not discriminate on the basis of race, color, national origin, age, disability, sex, sexual orientation, or gender identity.            Thank you!     Thank you for choosing [facility]  for your care.  Our goal is always to provide you with excellent care. Hearing back from our patients is one way we can continue to improve our services. Please take a few minutes to complete the written survey that you may receive in the mail after your visit with us. Thank you!             Your Updated Medication List - Protect others around you: Learn how to safely use, store and throw away your medicines at www.disposemymeds.org.          This list is accurate as of: 10/17/17  4:31 PM.  Always use your most recent med list.                   Brand Name Dispense Instructions for use Diagnosis    * estradiol 0.05 MG/24HR BIW patch    VIVELLE-DOT    8 patch    Place 1 patch onto the skin twice a week    Gender dysphoria in adult       * estradiol 0.1 MG/24HR BIW patch   Start taking on:  10/19/2017    VIVELLE-DOT    10 patch    Place 1 patch onto the skin twice a week    Gender dysphoria in adult       * estradiol 0.05 MG/24HR BIW patch   Start taking on:  10/19/2017    VIVELLE-DOT    10 patch    Place 1 patch onto the skin twice a week    Gender dysphoria in adult       spironolactone 100 MG tablet    ALDACTONE    90 tablet    Take 1.5 tablets (150 mg) by mouth daily    Gender dysphoria in adult       * Notice:  This list has 3 medication(s) that are the same as other medications prescribed for you. Read the directions carefully, and ask your doctor or other care provider to review them with you.

## 2017-10-17 NOTE — PATIENT INSTRUCTIONS
Here is the plan from today's visit    1. Elevated blood sugar  I will call you if your A1c is abnormal.  - Hemoglobin A1c ([facility])    2. Gender dysphoria in adult  Ok to go up on spironolactone 150 mg daily.  Ok to use 100 mcg and 50 mcg Vivelle Dots.  - spironolactone (ALDACTONE) 100 MG tablet; Take 1.5 tablets (150 mg) by mouth daily  Dispense: 90 tablet; Refill: 1  - estradiol (VIVELLE-DOT) 0.1 MG/24HR BIW patch; Place 1 patch onto the skin twice a week  Dispense: 10 patch; Refill: 3  - estradiol (VIVELLE-DOT) 0.05 MG/24HR BIW patch; Place 1 patch onto the skin twice a week  Dispense: 10 patch; Refill: 3  - Testosterone Total  - Comprehensive Metabolic Panel  - Hemoglobin (HGB)  - Lipid Cascade  - Glucose  - Estradiol Ultrasensitive    Please call or return to clinic if your symptoms don't go away.    Follow up plan  Please make a clinic appointment for follow up with me ([facility]) in 3-4   months for follow up.    Thank you for coming to [facility] today.  Lab Testing:  **If you had lab testing today and your results are reassuring or normal they will be mailed to you or sent through [employer] within 7 days.   **If the lab tests need quick action we will call you with the results.  The phone number we will call with results is # [phone] (home) . If this is not the best number please call our clinic and change the number.  Medication Refills:  If you need any refills please call your pharmacy and they will contact us.   If you need to  your refill at a new pharmacy, please contact the new pharmacy directly. The new pharmacy will help you get your medications transferred faster.   Scheduling:  If you have any concerns about today's visit or wish to schedule another appointment please call our office during normal business hours [phone] (8-5:00 M-F)  If a referral was made to a [facility] and you don't get a call from central scheduling please call  [phone].  If a Mammogram was ordered for you at [facility] call [phone] to schedule or change your appointment.  If you had an XRay/CT/Ultrasound/MRI ordered the number is [phone] to schedule or change your radiology appointment.   Medical Concerns:  If you have urgent medical concerns please call [phone] at any time of the day.

## 2017-10-19 LAB — TESTOST SERPL-MCNC: 440 NG/DL (ref 240–950)

## 2017-10-20 LAB — ESTRADIOL SERPL HS-MCNC: 138 PG/ML (ref 10–40)

## 2017-12-01 DIAGNOSIS — F64.0 GENDER DYSPHORIA IN ADULT: ICD-10-CM

## 2017-12-01 RX ORDER — ESTRADIOL 0.1 MG/D
1 FILM, EXTENDED RELEASE TRANSDERMAL
Qty: 10 PATCH | Refills: 1 | Status: SHIPPED | OUTPATIENT
Start: 2017-12-04 | End: 2018-02-22

## 2017-12-06 ENCOUNTER — TELEPHONE (OUTPATIENT)
Dept: FAMILY MEDICINE | Facility: CLINIC | Age: 22
End: 2017-12-06

## 2017-12-06 ENCOUNTER — MEDICAL CORRESPONDENCE (OUTPATIENT)
Dept: HEALTH INFORMATION MANAGEMENT | Facility: CLINIC | Age: 22
End: 2017-12-06

## 2017-12-06 ENCOUNTER — HOSPITAL ENCOUNTER (INPATIENT)
Facility: CLINIC | Age: 22
LOS: 15 days | Discharge: HOME | End: 2017-12-22
Attending: FAMILY MEDICINE | Admitting: PSYCHIATRY & NEUROLOGY
Payer: COMMERCIAL

## 2017-12-06 DIAGNOSIS — F28 PSYCHOSIS, INVOLUTIONAL (H): ICD-10-CM

## 2017-12-06 DIAGNOSIS — F29 PSYCHOSIS, UNSPECIFIED PSYCHOSIS TYPE (H): ICD-10-CM

## 2017-12-06 DIAGNOSIS — F19.10 SUBSTANCE ABUSE (H): ICD-10-CM

## 2017-12-06 DIAGNOSIS — F41.9 ANXIETY: Primary | ICD-10-CM

## 2017-12-06 DIAGNOSIS — F19.14: ICD-10-CM

## 2017-12-06 LAB
AMPHETAMINES UR QL SCN: NEGATIVE
BARBITURATES UR QL: NEGATIVE
BENZODIAZ UR QL: NEGATIVE
CANNABINOIDS UR QL SCN: POSITIVE
COCAINE UR QL: NEGATIVE
ETHANOL UR QL SCN: NEGATIVE
OPIATES UR QL SCN: NEGATIVE

## 2017-12-06 PROCEDURE — 80307 DRUG TEST PRSMV CHEM ANLYZR: CPT | Performed by: FAMILY MEDICINE

## 2017-12-06 PROCEDURE — 90791 PSYCH DIAGNOSTIC EVALUATION: CPT

## 2017-12-06 PROCEDURE — 99285 EMERGENCY DEPT VISIT HI MDM: CPT | Mod: 25 | Performed by: FAMILY MEDICINE

## 2017-12-06 PROCEDURE — 96372 THER/PROPH/DIAG INJ SC/IM: CPT | Performed by: FAMILY MEDICINE

## 2017-12-06 PROCEDURE — 80320 DRUG SCREEN QUANTALCOHOLS: CPT | Performed by: FAMILY MEDICINE

## 2017-12-06 PROCEDURE — 99285 EMERGENCY DEPT VISIT HI MDM: CPT | Mod: Z6 | Performed by: FAMILY MEDICINE

## 2017-12-06 ASSESSMENT — ENCOUNTER SYMPTOMS
ABDOMINAL PAIN: 0
FEVER: 0
DYSPHORIC MOOD: 1
SHORTNESS OF BREATH: 0
NERVOUS/ANXIOUS: 1
AGITATION: 1

## 2017-12-06 NOTE — IP AVS SNAPSHOT
MRN:[id number]                      After Visit Summary   12/6/2017    Vinicio Givens    MRN: [id number]           Patient Information     Date Of Birth          1995        Designated Caregiver       Most Recent Value    Caregiver    Will someone help with your care after discharge? no      About your hospital stay     You were admitted on:  December 7, 2017 You last received care in the:  [facility]    You were discharged on:  December 22, 2017       Who to Call     For medical emergencies, please call 911.  For non-urgent questions about your medical care, please call your primary care provider or clinic, [phone]          Attending Provider     Provider Specialty    Oscar Templeton MD Emergency Medicine    Gerald Swenson MD Emergency Medicine    Bhavin Pretty MD Psychiatry       Primary Care Provider Office Phone # Fax #    Bridgette Risa Olguin [phone]      Further instructions from your care team        Behavioral Discharge Planning and Instructions      Summary:  You were admitted on 12/6/2017  due to Psychotic Disorder NOS.  You were treated by Debra Naegele, APRN, CNS and discharged on 12/22/17 from [facility] to Home      Principal Diagnosis:   1.  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   2.  Gender dysphoria.   3.  Cannabis use disorder, moderate.     Treatment Follow-Up: Bogdan Madrid [facility]  [facility]  Office: [phone]  Cell: [phone]  *Your Rule 25 Chemical Dependency Evaluation was completed by Bogdan Madrid. Please follow-up with her at the above number for referral to a treatment program.  Therapy Appointment Follow-Up:  Eloise Bacon Psychologist, [facility],  Address: [address], [place] [address]   Phone: [phone]   *Please place call to schedule follow-up appointment  Attend all scheduled appointments with your outpatient providers. Call at  "least 24 hours in advance if you need to reschedule an appointment to ensure continued access to your outpatient providers.   Major Treatments, Procedures and Findings:  You were provided with: a psychiatric assessment, assessed for medical stability, medication evaluation and/or management, group therapy, milieu management and medical interventions    Symptoms to Report: feeling more aggressive, increased confusion, losing more sleep, mood getting worse or thoughts of suicide    Early warning signs can include: increased depression or anxiety sleep disturbances increased thoughts or behaviors of suicide or self-harm  increased unusual thinking, such as paranoia or hearing voices    Safety and Wellness:  The patient should take medications as prescribed.  Patient's caregivers are highly encouraged to supervise administering of medications and follow treatment recommendations.     Patient's caregivers should ensure patient does not have access to:    Firearms  Medicines (both prescribed and over-the-counter)  Knives and other sharp objects  Ropes and like materials  Alcohol  Car keys  If there is a concern for safety, call 911.    Resources:   Crisis Intervention: 596.725.9556 or 013-700-4331 (TTY: 693.178.2021).  Call anytime for help.  National [place] on Mental Illness (www.[place].diane.org): 995.911.7047 or 859-061-6513.  [place] Association for Children's Mental Health (www.macmh.org): 345.311.7529.  Suicide Awareness Voices of Education (SAVE) (www.save.org): 500-335-XXXX (9283)  National Suicide Prevention Line (www.mentalhealthmn.org): 405-808-VBBB (1148)  Mental Health Consumer/Survivor Network of [place] (www.mhcsn.net): 795.849.3786 or 149-841-8526  Mental Health Association of [place] (www.mentalhealth.org): 500.113.7036 or 964-635-7711  Self- Management and Recovery Training., [employer] (SDI)-- Toll free: 732.629.5972  www.iLink.Traitify  [facility] Crisis (COPE) Response - Adult 696 670-6403  Text 4 Life: txt \"LIFE\" to " "[address]508 for immediate support and crisis intervention  Crisis text line: Text \"START\" to 201-032. Free, confidential, .  Crisis Intervention: 525.954.6044 or 215-363-0578. Call anytime for help.     The treatment team has appreciated the opportunity to work with you.     If you have any questions or concerns our unit number is [phone]  You may be receiving a follow-up phone call within the next three days from a representative from behavioral health.            Pending Results     No orders found from 2017 to 2017.            Admission Information     Date & Time Department Dept. Phone    2017 [facility]patient Mental Health [phone]      Your Vitals Were     Blood Pressure Pulse Temperature Respirations Height Weight    147/74 92 95.6  F (35.3  C) (Oral) 16 1.829 m (6') 79.1 kg (174 lb 6.1 oz)    Pulse Oximetry BMI (Body Mass Index)                99% 23.65 kg/m2          PrivacyProtector Information     PrivacyProtector lets you send messages to your doctor, view your test results, renew your prescriptions, schedule appointments and more. To sign up, go to [email or web address] . Click on \"Log in\" on the left side of the screen, which will take you to the Welcome page. Then click on \"Sign up Now\" on the right side of the page.     You will be asked to enter the access code listed below, as well as some personal information. Please follow the directions to create your username and password.     Your access code is: [email or web address]  Expires: 3/22/2018 12:08 PM     Your access code will  in 90 days. If you need help or a new code, please call your [facility] clinic or [phone].        Care EveryWhere ID     This is your Care EveryWhere ID. This could be used by other organizations to access your [facility] medical records  [id number]        Equal Access to Services     LETITIA HAMILTON AH: Stephenie Garcia, armand spears, emani hays" ah. So [facility] [phone].    ATENCIÓN: Si rashid delong, tiene a alarcon disposición servicios gratuitos de asistencia lingüística. Chelsea al [phone].    We comply with applicable federal civil rights laws and [place] laws. We do not discriminate on the basis of race, color, national origin, age, disability, sex, sexual orientation, or gender identity.               Review of your medicines      START taking        Dose / Directions    hydrOXYzine 25 MG tablet   Commonly known as:  ATARAX   Used for:  Anxiety        Dose:  25-50 mg   Take 1-2 tablets (25-50 mg) by mouth every 4 hours as needed for anxiety   Quantity:  120 tablet   Refills:  1       OLANZapine 20 MG tablet   Commonly known as:  zyPREXA   Used for:  Psychosis, unspecified psychosis type        Dose:  20 mg   Take 1 tablet (20 mg) by mouth At Bedtime   Quantity:  30 tablet   Refills:  1         CONTINUE these medicines which have NOT CHANGED        Dose / Directions    * estradiol 0.05 MG/24HR BIW patch   Commonly known as:  VIVELLE-DOT   Used for:  Gender dysphoria in adult        Dose:  1 patch   Place 1 patch onto the skin twice a week   Quantity:  10 patch   Refills:  3       * estradiol 0.1 MG/24HR BIW patch   Commonly known as:  VIVELLE-DOT   Used for:  Gender dysphoria in adult        Dose:  1 patch   Place 1 patch onto the skin twice a week   Quantity:  10 patch   Refills:  1       Melatonin 10 MG Tabs tablet        Dose:  10 mg   Take 10 mg by mouth nightly as needed for sleep   Refills:  0       spironolactone 100 MG tablet   Commonly known as:  ALDACTONE   Used for:  Gender dysphoria in adult        Dose:  150 mg   Take 1.5 tablets (150 mg) by mouth daily   Quantity:  90 tablet   Refills:  1       * Notice:  This list has 2 medication(s) that are the same as other medications prescribed for you. Read the directions carefully, and ask your doctor or other care provider to review them with you.         Where to get your medicines      These  medications were sent to [facility] [place], [place] - [address]  [address] [place] [address], [facility] [address]     Phone:  [phone]     hydrOXYzine 25 MG tablet    OLANZapine 20 MG tablet                Protect others around you: Learn how to safely use, store and throw away your medicines at www.disposemymeds.org.             Medication List: This is a list of all your medications and when to take them. Check marks below indicate your daily home schedule. Keep this list as a reference.      Medications           Morning Afternoon Evening Bedtime As Needed    * estradiol 0.05 MG/24HR BIW patch   Commonly known as:  VIVELLE-DOT   Place 1 patch onto the skin twice a week   Last time this was given:  1 patch on 12/21/2017  8:23 AM   Next Dose Due:  Monday, 12/25/17                                * estradiol 0.1 MG/24HR BIW patch   Commonly known as:  VIVELLE-DOT   Place 1 patch onto the skin twice a week   Last time this was given:  1 patch on 12/21/2017  8:24 AM   Next Dose Due:  Monday, 12/25/17                                hydrOXYzine 25 MG tablet   Commonly known as:  ATARAX   Take 1-2 tablets (25-50 mg) by mouth every 4 hours as needed for anxiety                                   Melatonin 10 MG Tabs tablet   Take 10 mg by mouth nightly as needed for sleep   Last time this was given:  10 mg on 12/18/2017  9:31 PM                            At bedtime         OLANZapine 20 MG tablet   Commonly known as:  zyPREXA   Take 1 tablet (20 mg) by mouth At Bedtime   Last time this was given:  20 mg on 12/21/2017  8:51 PM                                   spironolactone 100 MG tablet   Commonly known as:  ALDACTONE   Take 1.5 tablets (150 mg) by mouth daily   Last time this was given:  150 mg on 12/22/2017  9:07 AM                                   * Notice:  This list has 2 medication(s) that are the same as other medications prescribed for you. Read the directions carefully, and ask your  doctor or other care provider to review them with you.

## 2017-12-06 NOTE — TELEPHONE ENCOUNTER
[facility] phone call message- general phone call:    Reason for call: Eloise Bacon a physicologist called to speak with Dr Olguin about patient. She states she is in town and heard that patient is in a major crisis. She states that she want to discuss about this with Dr Olguin. Please give her a call    Return call needed: Yes    OK to leave a message on voice mail? Yes    Primary language: English      needed? No    Call taken on December 6, 2017 at 10:26 AM by Janna Llanos

## 2017-12-06 NOTE — TELEPHONE ENCOUNTER
RN returned call to Dr. Arleen Bacon follows her as psychologist. Dr Bacon states patient was off her estradiol for a week and started to develop some paranoia. Dr. Bacon has spoke with parents who think it is getting worse. Dr. Bacon did a phone session with her and pt was paranoid and not tracking well. Dr. Bacon states pt denied substance or alcohol abuse as well as no plan or intent for self harm.      Dr. Bacon recommended to parents to bring her in to be evaluated and did give them mobile crisis line. Dr. Bacon wondering if this could be a side effect of being off estradiol for week    Dr. Bacon also requesting to speak with Dr. Olguin regarding patient    Message routed to PCP    Shanda Romano RN

## 2017-12-06 NOTE — ED NOTES
"Bed: HW01  Expected date: 12/6/17  Expected time: 3:50 PM  Means of arrival:   Comments:  Hen [age]y \"person\"  psych  "

## 2017-12-06 NOTE — IP AVS SNAPSHOT
[facility]    [facility] [address]    [address] [address]    Phone:  [phone]                                       After Visit Summary   12/6/2017    Vinicio Givens    MRN: [id number]           After Visit Summary Signature Page     I have received my discharge instructions, and my questions have been answered. I have discussed any challenges I see with this plan with the nurse or doctor.    ..........................................................................................................................................  Patient/Patient Representative Signature      ..........................................................................................................................................  Patient Representative Print Name and Relationship to Patient    ..................................................               ................................................  Date                                            Time    ..........................................................................................................................................  Reviewed by Signature/Title    ...................................................              ..............................................  Date                                                            Time

## 2017-12-07 PROCEDURE — 25000125 ZZHC RX 250

## 2017-12-07 PROCEDURE — S0166 INJ OLANZAPINE 2.5MG: HCPCS

## 2017-12-07 PROCEDURE — 25000132 ZZH RX MED GY IP 250 OP 250 PS 637: Performed by: EMERGENCY MEDICINE

## 2017-12-07 PROCEDURE — 12400007 ZZH R&B MH INTERMEDIATE UMMC

## 2017-12-07 PROCEDURE — 25000132 ZZH RX MED GY IP 250 OP 250 PS 637: Performed by: CLINICAL NURSE SPECIALIST

## 2017-12-07 RX ORDER — ACETAMINOPHEN 325 MG/1
650 TABLET ORAL EVERY 4 HOURS PRN
Status: DISCONTINUED | OUTPATIENT
Start: 2017-12-07 | End: 2017-12-22 | Stop reason: HOSPADM

## 2017-12-07 RX ORDER — HYDROXYZINE HYDROCHLORIDE 25 MG/1
25-50 TABLET, FILM COATED ORAL EVERY 4 HOURS PRN
Status: DISCONTINUED | OUTPATIENT
Start: 2017-12-07 | End: 2017-12-22 | Stop reason: HOSPADM

## 2017-12-07 RX ORDER — PHENOL 1.4 %
10 AEROSOL, SPRAY (ML) MUCOUS MEMBRANE
COMMUNITY
End: 2019-08-01

## 2017-12-07 RX ORDER — TRAZODONE HYDROCHLORIDE 50 MG/1
50 TABLET, FILM COATED ORAL
Status: DISCONTINUED | OUTPATIENT
Start: 2017-12-07 | End: 2017-12-22 | Stop reason: HOSPADM

## 2017-12-07 RX ORDER — OLANZAPINE 10 MG/2ML
5 INJECTION, POWDER, FOR SOLUTION INTRAMUSCULAR
Status: DISCONTINUED | OUTPATIENT
Start: 2017-12-07 | End: 2017-12-20

## 2017-12-07 RX ORDER — OLANZAPINE 10 MG/2ML
INJECTION, POWDER, FOR SOLUTION INTRAMUSCULAR
Status: COMPLETED
Start: 2017-12-07 | End: 2017-12-07

## 2017-12-07 RX ORDER — ALUMINA, MAGNESIA, AND SIMETHICONE 2400; 2400; 240 MG/30ML; MG/30ML; MG/30ML
30 SUSPENSION ORAL EVERY 4 HOURS PRN
Status: DISCONTINUED | OUTPATIENT
Start: 2017-12-07 | End: 2017-12-22 | Stop reason: HOSPADM

## 2017-12-07 RX ORDER — OLANZAPINE 5 MG/1
10 TABLET, ORALLY DISINTEGRATING ORAL ONCE
Status: DISCONTINUED | OUTPATIENT
Start: 2017-12-07 | End: 2017-12-07

## 2017-12-07 RX ORDER — SPIRONOLACTONE 50 MG/1
150 TABLET, FILM COATED ORAL DAILY
Status: DISCONTINUED | OUTPATIENT
Start: 2017-12-07 | End: 2017-12-22 | Stop reason: HOSPADM

## 2017-12-07 RX ORDER — ESTRADIOL 0.05 MG/D
1 PATCH, EXTENDED RELEASE TRANSDERMAL
Status: DISCONTINUED | OUTPATIENT
Start: 2017-12-07 | End: 2017-12-22 | Stop reason: HOSPADM

## 2017-12-07 RX ORDER — OLANZAPINE 10 MG/2ML
10 INJECTION, POWDER, FOR SOLUTION INTRAMUSCULAR ONCE
Status: COMPLETED | OUTPATIENT
Start: 2017-12-07 | End: 2017-12-07

## 2017-12-07 RX ORDER — ESTRADIOL 0.1 MG/D
1 FILM, EXTENDED RELEASE TRANSDERMAL
Status: DISCONTINUED | OUTPATIENT
Start: 2017-12-07 | End: 2017-12-22 | Stop reason: HOSPADM

## 2017-12-07 RX ORDER — BISACODYL 10 MG
10 SUPPOSITORY, RECTAL RECTAL DAILY PRN
Status: DISCONTINUED | OUTPATIENT
Start: 2017-12-07 | End: 2017-12-22 | Stop reason: HOSPADM

## 2017-12-07 RX ORDER — OLANZAPINE 5 MG/1
5 TABLET ORAL
Status: DISCONTINUED | OUTPATIENT
Start: 2017-12-07 | End: 2017-12-07

## 2017-12-07 RX ORDER — OLANZAPINE 10 MG/2ML
5 INJECTION, POWDER, FOR SOLUTION INTRAMUSCULAR
Status: DISCONTINUED | OUTPATIENT
Start: 2017-12-07 | End: 2017-12-07

## 2017-12-07 RX ORDER — OLANZAPINE 5 MG/1
5-10 TABLET ORAL
Status: DISCONTINUED | OUTPATIENT
Start: 2017-12-07 | End: 2017-12-20

## 2017-12-07 RX ORDER — OLANZAPINE 10 MG/1
10 TABLET, ORALLY DISINTEGRATING ORAL
Status: DISCONTINUED | OUTPATIENT
Start: 2017-12-07 | End: 2017-12-11

## 2017-12-07 RX ADMIN — OLANZAPINE 10 MG: 10 INJECTION, POWDER, FOR SOLUTION INTRAMUSCULAR at 00:32

## 2017-12-07 RX ADMIN — OLANZAPINE 10 MG: 10 TABLET, ORALLY DISINTEGRATING ORAL at 21:10

## 2017-12-07 RX ADMIN — OLANZAPINE 10 MG: 10 INJECTION, POWDER, LYOPHILIZED, FOR SOLUTION INTRAMUSCULAR at 00:32

## 2017-12-07 RX ADMIN — ESTRADIOL 1 PATCH: 0.1 PATCH TRANSDERMAL at 08:51

## 2017-12-07 RX ADMIN — ESTRADIOL 1 PATCH: 0.05 PATCH TRANSDERMAL at 08:50

## 2017-12-07 RX ADMIN — SPIRONOLACTONE 150 MG: 50 TABLET ORAL at 16:29

## 2017-12-07 RX ADMIN — OLANZAPINE 5 MG: 5 TABLET, FILM COATED ORAL at 16:20

## 2017-12-07 ASSESSMENT — ACTIVITIES OF DAILY LIVING (ADL)
DRESS: 0-->INDEPENDENT
SWALLOWING: 0-->SWALLOWS FOODS/LIQUIDS WITHOUT DIFFICULTY
PRIOR_FUNCTIONAL_LEVEL_COMMENT: OK
FALL_HISTORY_WITHIN_LAST_SIX_MONTHS: NO
COGNITION: 2 - DIFFICULTY WITH ORGANIZING THOUGHTS
RETIRED_EATING: 0-->INDEPENDENT
BATHING: 0-->INDEPENDENT
AMBULATION: 0-->INDEPENDENT
TOILETING: 0-->INDEPENDENT
TRANSFERRING: 0-->INDEPENDENT
RETIRED_COMMUNICATION: 0-->UNDERSTANDS/COMMUNICATES WITHOUT DIFFICULTY

## 2017-12-07 NOTE — ED NOTES
Pt agrees to remain calm and cooperative. All restraints removed. Pt fell back to sleep after the restraints were removed.

## 2017-12-07 NOTE — ED PROVIDER NOTES
The pt was signed out at shift change pending inpt bed availability. She rested comfortably throughout the night without difficulty. The pt will be signed out again at shift change, still awaiting and inpt bed.     Ame Snyder MD  12/07/17 0634

## 2017-12-07 NOTE — PLAN OF CARE
"Problem: Psychotic Symptoms  Goal: Psychotic Symptoms  Signs and symptoms of listed problems will be absent or manageable.  ADMIT: This is the first admit for this young person who comes from our ED, gender neutral, (changed his name from denice to pastor to ambrosia to rafa on the unit) brought in by EMS from [facility]. \"Yes, I major in chemical engineering\". When this RN attempted to question pt in his room, he was standing there naked refusing to get dressed and inviting this RN in. (Malik, Psych Assoc was next to me) This RN asked this pt to get dressed and he finally complied but remained resistant. This RN stood in his doorway when doing the nursing assessment. Pt disagrees that there is anything wrong with him and asked to leave and the 72 hour hold was again brought up to the pt and with an explanation by Danielle CNS and this RN, he expressed not agreeing with it. \"I'm confident, I can leave\"! Pt came in for psychosis, poor behaviors. Changed his name multiple times in the ED and now on 4a. He was in seclusion in the ED last evening and as he states, \"I got poked in the butt with a medication\". Pt has done acid in the past, pot daily. Pt quite confused, poor tracking, grandiose, psychotic. Pt is also wanting to have this RN order pot to help him sleep and seemed quite serious. \"you can get me a bong, right\"? Pt has settled well on the unit at this time. Will place on sexual and assault precautions.      "

## 2017-12-07 NOTE — TELEPHONE ENCOUNTER
(Late Entry)  Called Dr. Bacon 12/6 at 4 pm regarding patient and her concerns.  Dr. Bacon (patient's psychologist) described symptoms of anuel and/or psychosis over the last week.  Patient had stopped taking transdermal estrogen (150 mcg daily) abruptly approximately 2 weeks prior and wanted to know if this would have anything to do with patient's symptoms.      After discussion with my staff, abrupt withdrawal from estrogen may exacerbate mood symptoms but most likely would not cause anuel or psychosis.  Recommendation is to restart 50 mcg transdermal estrogen to help with mood symptoms.      Attempted to call patient 12/8 and speak to her directly, but she has been admitted on a 72 hour hold due to her symptoms.  Will reach out to patient and ask her to follow up when discharged.    Bridgette Olguin M.D.  PGY-3, Family Medicine

## 2017-12-07 NOTE — ED NOTES
In person face to face assessment completed, including an evaluation of the patient's immediate reaction to the intervention, complete review of systems assessment, behavioral assessment and review/assessment of history, drugs and medications, recent labs, etc., and behavioral condition.  The patient experienced: No adverse physical outcome from seclusion/restraint initiation.  The intervention of restraint or seclusion needs to continue.     Oscar Templeton MD  12/07/17 0046

## 2017-12-07 NOTE — PROGRESS NOTES
12/07/17 1525   Patient Belongings   Did you bring any home meds/supplements to the hospital?  Yes   Disposition of meds  Sent to security/pharmacy per site process   Patient Belongings cell phone/electronics;shoes;other (see comments)   Disposition of Belongings Locker   Belongings Search Yes   Clothing Search Yes   Second Staff [place] MORALES (pt requested one male staff and one female staff for search)     To bin: tshirt, pants, socks, belt, shoes, coat, backpack, calculator, Iclicker2, [employer] Laptop, $2.73 in change, cell phone. Car keys     To security in two envelopes: One visa card ending in 4141              Half unidentified pill    Added 12/8/17:  [employer] t-shirt, khaki pants, [place] sweatshirt, Book, jeans, turtleneck sweater, green pants, 8 pairs of underwear    Items brought in 12/9/17: [employer] - Board game - has staples and small zip lock bags    Added 12/19/17: pajama pants and blue shirt.    Items brought in on 12/20/17: black tank top, 10 pairs of underwear, phone , 2 socks    A               Admission:  I am responsible for any personal items that are not sent to the safe or pharmacy.  [facility] is not responsible for loss, theft or damage of any property in my possession.    Signature:  _________________________________ Date: _______  Time: _____                                              Staff Signature:  ____________________________ Date: ________  Time: _____      2nd Staff person, if patient is unable/unwilling to sign:    Signature: ________________________________ Date: ________  Time: _____     Discharge:  [facility] has returned all of my personal belongings:    Signature: _________________________________ Date: ________  Time: _____                                          Staff Signature:  ____________________________ Date: ________  Time: _____

## 2017-12-07 NOTE — ED PROVIDER NOTES
History     Chief Complaint   Patient presents with     Psychiatric Evaluation     HPI  Vinicio Givens is a 22 year old male who presents to emergency room with history of paranoid delusions and tangential thought difficulty communicating disorganized thought processes.  Patient is currently a student at University and was brought to [facility] because of concerns about his recent behaviors.  The patient is a history of some depression and anxiety in the past but has been doing well academically have been high functioning.  Patient does have transgender diagnosis and is currently in the process of transitioning from male to female.  Patient had been on testosterone blocking medications as well as estradiol but stopped taking estrogen approximately 5 or 6 days ago.  There is some question as to whether or not the patient may have taken acid earlier this fall and admits to ongoing marijuana use.  Patient is accompanied today by his parents who state that this behavior is very different than her baseline.    I have reviewed the Medications, Allergies, Past Medical and Surgical History, and Social History in the Epic system.    PERSONAL MEDICAL HISTORY  Past Medical History:   Diagnosis Date     Blood clot in vein     Patient had a blood clot after surgery of left knee 4/2014 (occured 4 days after surgery).  Patient took warfarin for 6 months.     Uncomplicated asthma      PAST SURGICAL HISTORY  Past Surgical History:   Procedure Laterality Date     KNEE SURGERY Left 2014     FAMILY HISTORY  Family History   Problem Relation Age of Onset     Lung Cancer Maternal Grandmother      Parkinsonism Paternal Grandfather      Dementia Paternal Grandfather      Autoimmune Disease Sister      SOCIAL HISTORY  Social History   Substance Use Topics     Smoking status: Never Smoker     Smokeless tobacco: Never Used     Alcohol use Yes     MEDICATIONS  No current facility-administered medications for this encounter.      Current  Outpatient Prescriptions   Medication     spironolactone (ALDACTONE) 100 MG tablet     estradiol (VIVELLE-DOT) 0.1 MG/24HR BIW patch     estradiol (VIVELLE-DOT) 0.05 MG/24HR BIW patch     estradiol (VIVELLE-DOT) 0.05 MG/24HR BIW patch     ALLERGIES  Allergies   Allergen Reactions     Banana Anaphylaxis         Review of Systems   Constitutional: Negative for fever.   Respiratory: Negative for shortness of breath.    Cardiovascular: Negative for chest pain.   Gastrointestinal: Negative for abdominal pain.   Psychiatric/Behavioral: Positive for agitation, behavioral problems and dysphoric mood. The patient is nervous/anxious.    All other systems reviewed and are negative.      Physical Exam   BP: 123/85  Pulse: 73  Temp: 98.7  F (37.1  C)  Resp: 16  SpO2: 98 %      Physical Exam   Constitutional: He is oriented to person, place, and time. No distress.   HENT:   Head: Atraumatic.   Mouth/Throat: Oropharynx is clear and moist. No oropharyngeal exudate.   Eyes: Pupils are equal, round, and reactive to light. No scleral icterus.   Cardiovascular: Normal heart sounds and intact distal pulses.    Pulmonary/Chest: Breath sounds normal. No respiratory distress.   Abdominal: Soft. Bowel sounds are normal. There is no tenderness.   Musculoskeletal: He exhibits no edema or tenderness.   Neurological: He is alert and oriented to person, place, and time. He has normal reflexes. No cranial nerve deficit. He exhibits normal muscle tone. Coordination normal.   Skin: Skin is warm. No rash noted. He is not diaphoretic.   Psychiatric: His mood appears anxious. His affect is labile. His speech is rapid and/or pressured and tangential. He is agitated. Thought content is paranoid and delusional. He expresses impulsivity. He exhibits a depressed mood.       ED Course     ED Course     Procedures  Patient was seen by the  please refer to their report in the notes section of the Epic chart dated 12/6/17         Critical Care time:   none         Labs Ordered and Resulted from Time of ED Arrival Up to the Time of Departure from the ED   DRUG ABUSE SCREEN 6 CHEM DEP URINE ([facility]) - Abnormal; Notable for the following:        Result Value    Cannabinoids Qual Urine Positive (*)     All other components within normal limits            Assessments & Plan (with Medical Decision Making)       I have reviewed the nursing notes.    I have reviewed the findings, diagnosis, plan and need for follow up with the patient.  Patient with new-onset psychosis or some question as to whether or not this may be substance-induced at this time patient becoming increasingly tangential escalating behaviors and will require 72 hour hold for admission patient needs stabilization and further evaluation and treatment.    New Prescriptions    No medications on file       Final diagnoses:   Psychosis, unspecified psychosis type   Substance abuse       12/6/2017   [facility], [place], EMERGENCY DEPARTMENT     Oscar Templeton MD  12/06/17 9971

## 2017-12-07 NOTE — PHARMACY-ADMISSION MEDICATION HISTORY
Admission medication history interview status for the 12/6/2017 admission is complete. See Epic admission navigator for allergy information, pharmacy, prior to admission medications and immunization status.     Medication history interview sources:  Patient, Epic electronic medical record ([facility] records)    Changes made to PTA medication list (reason)  Added: melatonin  Deleted: none  Changed: none    Additional medication history information (including reliability of information, actions taken by pharmacist): The patient was a reliable historian and able to confirm the medications from his [facility] record.  He reported he changes his Vivell-dot patches on Sunday night and Thursday morning.      Prior to Admission medications    Medication Sig Last Dose Taking? Auth Provider   Melatonin 10 MG TABS tablet Take 10 mg by mouth nightly as needed for sleep  Yes Unknown, Entered By History   estradiol (VIVELLE-DOT) 0.1 MG/24HR BIW patch Place 1 patch onto the skin twice a week 12/7/2017 at AM Yes Oumou Lopez MD   spironolactone (ALDACTONE) 100 MG tablet Take 1.5 tablets (150 mg) by mouth daily 12/6/2017 at AM Yes Bridgette Powell MD   estradiol (VIVELLE-DOT) 0.05 MG/24HR BIW patch Place 1 patch onto the skin twice a week 12/7/2017 at AM Yes Bridgette Powell MD         Medication history completed by: Bridgette Ashton, PharmD, BCPP

## 2017-12-07 NOTE — ED NOTES
"ED physician saw pt. Pt became upset and yelled \"I'm insane.\" Pt tried to escape and run.  Pt was taken down per security and psych tech.Code 21 called. Pt placed in 5 point restraints and on the backboard. Pt was explained to that he would be taken out of restraints once he calmed down. Zyprexa given IM.   "

## 2017-12-08 LAB
ALBUMIN SERPL-MCNC: 3.7 G/DL (ref 3.4–5)
ALP SERPL-CCNC: 37 U/L (ref 40–150)
ALT SERPL W P-5'-P-CCNC: 17 U/L (ref 0–70)
ANION GAP SERPL CALCULATED.3IONS-SCNC: 6 MMOL/L (ref 3–14)
AST SERPL W P-5'-P-CCNC: 12 U/L (ref 0–45)
BASOPHILS # BLD AUTO: 0.1 10E9/L (ref 0–0.2)
BASOPHILS NFR BLD AUTO: 0.8 %
BILIRUB SERPL-MCNC: 0.9 MG/DL (ref 0.2–1.3)
BUN SERPL-MCNC: 13 MG/DL (ref 7–30)
CALCIUM SERPL-MCNC: 8.7 MG/DL (ref 8.5–10.1)
CHLORIDE SERPL-SCNC: 107 MMOL/L (ref 94–109)
CHOLEST SERPL-MCNC: 110 MG/DL
CO2 SERPL-SCNC: 29 MMOL/L (ref 20–32)
CREAT SERPL-MCNC: 0.97 MG/DL (ref 0.66–1.25)
DIFFERENTIAL METHOD BLD: NORMAL
EOSINOPHIL # BLD AUTO: 0.3 10E9/L (ref 0–0.7)
EOSINOPHIL NFR BLD AUTO: 3.4 %
ERYTHROCYTE [DISTWIDTH] IN BLOOD BY AUTOMATED COUNT: 12.8 % (ref 10–15)
GFR SERPL CREATININE-BSD FRML MDRD: >90 ML/MIN/1.7M2
GLUCOSE SERPL-MCNC: 83 MG/DL (ref 70–99)
HCT VFR BLD AUTO: 41.3 % (ref 40–53)
HDLC SERPL-MCNC: 69 MG/DL
HGB BLD-MCNC: 13.8 G/DL (ref 13.3–17.7)
IMM GRANULOCYTES # BLD: 0 10E9/L (ref 0–0.4)
IMM GRANULOCYTES NFR BLD: 0.1 %
LDLC SERPL CALC-MCNC: 31 MG/DL
LYMPHOCYTES # BLD AUTO: 4.5 10E9/L (ref 0.8–5.3)
LYMPHOCYTES NFR BLD AUTO: 48.4 %
MCH RBC QN AUTO: 30.4 PG (ref 26.5–33)
MCHC RBC AUTO-ENTMCNC: 33.4 G/DL (ref 31.5–36.5)
MCV RBC AUTO: 91 FL (ref 78–100)
MONOCYTES # BLD AUTO: 0.9 10E9/L (ref 0–1.3)
MONOCYTES NFR BLD AUTO: 10 %
NEUTROPHILS # BLD AUTO: 3.5 10E9/L (ref 1.6–8.3)
NEUTROPHILS NFR BLD AUTO: 37.3 %
NONHDLC SERPL-MCNC: 41 MG/DL
NRBC # BLD AUTO: 0 10*3/UL
NRBC BLD AUTO-RTO: 0 /100
PLATELET # BLD AUTO: 188 10E9/L (ref 150–450)
POTASSIUM SERPL-SCNC: 4 MMOL/L (ref 3.4–5.3)
PROT SERPL-MCNC: 7 G/DL (ref 6.8–8.8)
RBC # BLD AUTO: 4.54 10E12/L (ref 4.4–5.9)
SODIUM SERPL-SCNC: 142 MMOL/L (ref 133–144)
TRIGL SERPL-MCNC: 48 MG/DL
TSH SERPL DL<=0.005 MIU/L-ACNC: 2.29 MU/L (ref 0.4–4)
WBC # BLD AUTO: 9.3 10E9/L (ref 4–11)

## 2017-12-08 PROCEDURE — 99223 1ST HOSP IP/OBS HIGH 75: CPT | Mod: AI | Performed by: CLINICAL NURSE SPECIALIST

## 2017-12-08 PROCEDURE — 12400007 ZZH R&B MH INTERMEDIATE UMMC

## 2017-12-08 PROCEDURE — 80053 COMPREHEN METABOLIC PANEL: CPT | Performed by: CLINICAL NURSE SPECIALIST

## 2017-12-08 PROCEDURE — 25000132 ZZH RX MED GY IP 250 OP 250 PS 637: Performed by: CLINICAL NURSE SPECIALIST

## 2017-12-08 PROCEDURE — 36415 COLL VENOUS BLD VENIPUNCTURE: CPT | Performed by: CLINICAL NURSE SPECIALIST

## 2017-12-08 PROCEDURE — 80061 LIPID PANEL: CPT | Performed by: CLINICAL NURSE SPECIALIST

## 2017-12-08 PROCEDURE — 97150 GROUP THERAPEUTIC PROCEDURES: CPT | Mod: GO

## 2017-12-08 PROCEDURE — 84443 ASSAY THYROID STIM HORMONE: CPT | Performed by: CLINICAL NURSE SPECIALIST

## 2017-12-08 PROCEDURE — 85025 COMPLETE CBC W/AUTO DIFF WBC: CPT | Performed by: CLINICAL NURSE SPECIALIST

## 2017-12-08 RX ADMIN — OLANZAPINE 10 MG: 10 TABLET, ORALLY DISINTEGRATING ORAL at 22:36

## 2017-12-08 RX ADMIN — OLANZAPINE 10 MG: 10 TABLET, ORALLY DISINTEGRATING ORAL at 10:21

## 2017-12-08 RX ADMIN — SPIRONOLACTONE 150 MG: 50 TABLET ORAL at 10:20

## 2017-12-08 ASSESSMENT — ACTIVITIES OF DAILY LIVING (ADL)
GROOMING: INDEPENDENT
GROOMING: INDEPENDENT
DRESS: INDEPENDENT
ORAL_HYGIENE: INDEPENDENT
ORAL_HYGIENE: INDEPENDENT
DRESS: INDEPENDENT

## 2017-12-08 NOTE — PROGRESS NOTES
Initial Psychosocial Assessment    I have reviewed the chart, met with the patient, and developed Care Plan.  Information for assessment was obtained from:     Chart Review and Patient interview.    Presenting Problem:  Pt is admitted to [facility] for under the care of Debra Naegele, APRN for psychotic and disorganized thinking.  He has been using LSD and cannabis.  Parents and friends are concerned with his behaviors.    History of Mental Health and Chemical Dependency:  No previous psychiatric admissions.  Sees an outpatient psychologist for gender dysphoria.  Pt has taken LSD at least one time in recent months.  Family and friends report he has been using marijuana in various forms lately.    Family Description (Constellation, Family Psychiatric History):  Pt was born into an intact nuclear family in [facility].  He is the 3rd born of 4 children.  He has 3 sisters.      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    Legal Issues:  No    Ethnic/Cultural Issues:  Pt has self-identified as transgender and has been undergoing hormone therapy.  Gender pronouns were fluid on admission, changing from she to- not sure- to they- to she.  Name is also fluid.  DEC  was told Janey, then changed to Diane, now is Billie or Shelby.    Spiritual Orientation:  Raised Episcopalian but does not practice.     Service History:  No    Social Functioning (organization, interests):  Thinking about joining the [employer] group at  of .    Current Treatment Providers are:  Eloise Bacon Psychologist, [facility],  Address: [facility] [address] [facility] [id number], [place] [address]   Phone: [phone]  fax: [phone]      Social Service Assessment/Plan:  Pt will stabilize thinking and moods.  Pt will participate in psychiatric consultation, therapeutic milieu, and join in  Psychotherapy groups.  CTC will work with Team to develop an appropriate aftercare plan.

## 2017-12-08 NOTE — PROGRESS NOTES
12/08/17 1442   Behavioral Health   Hallucinations denies / not responding to hallucinations   Thinking confused;distractable   Orientation person: oriented;situation, disoriented   Memory other (see comment)  (to be further determined)   Insight poor   Judgement impaired   Eye Contact at examiner   Affect blunted, flat;tense   Mood depressed   Physical Appearance/Attire untidy   Hygiene neglected grooming - unclean body, hair, teeth   Suicidality other (see comments)  (Denies - history of chronic thoughts)   1. Wish to be Dead No   2. Non-Specific Active Suicidal Thoughts  No   Change in Protective Factors? No   Enviromental Risk Factors None   Self Injury other (see comment)  (denies)   Elopement (nothign to report)   Activity hyperactive (agitated, impulsive)   Speech clear;coherent   Medication Sensitivity no observed side effects   Psychomotor / Gait balanced;steady   Psycho Education   Type of Intervention 1:1 intervention   Response participates, initiates socially appropriate   Hours 0.5   Treatment Detail (Check In )   Activities of Daily Living   Hygiene/Grooming independent   Oral Hygiene independent   Dress independent   Room Organization independent   Behavioral Health Interventions   Psychotic Symptoms provide emotional support;reality orientation  (Grounding)   Patient was out of zir room most the shift and did attend two groups.  Patient was minimally participating in the groups and was minimally social with most of the peers group, but seems to have a longer most social conversation with one peer.  The patient was was tense during this shift and was constantly moving, or wandering, around the unit.  The patient understands they are in the hospital, but they lack the insight about why they are here.  The patient was confused while talking with staff and had trouble following simple tasks/instrucations.  The patient was asking about filling out menus, and after being given detail instruction and  assistance, they had trouble completing the task.  The stated they did not want to be 'special' and that the 's choice would be fine.  It appeared they became overwhelmed with trying to do their menu.  The patient was was being indecisive about their name of choice and pronouns of choice, changing with each contact.  The patient is oriented they identify as transgender, but is still working though what that mean for them.  The patient was tangential with speaking about their feelings and also about SI and SiB.  The closest this writer was able to get to answers was that they have chronic SI and SiB thoughts, but nothing today.  The patient endourced feeling confused and is seeking to get more stable.

## 2017-12-08 NOTE — H&P
"DATE OF ASSESSMENT:  12/08/2017      IDENTIFYING INFORMATION:  Vinicio Givens is a 22-year-old transgendered male to female who wants to be referred to as Billie with middle name Ong and prefers they pronouns.      CHIEF COMPLAINT:  \"My professors wouldn't tell me anything.\"      HISTORY OF PRESENT ILLNESS:  Vinicio Givens is a 22-year-old male to female transgendered person who is presenting with psychosis and disorganized thinking.  The patient was brought for evaluation due to concerns about recent behaviors.  The patient has been expressing paranoid ideation including saying that the professors at the [employer] were giving very complicated answers to patient's questions so that patient would not be able to understand.  The patient believes the professors were doing this intentionally and then stopped answering patient's questions.  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  The patient has maintained a 3.6 grade point average.  Spoke with patient's father regarding recent behavior.  The patient's father is reporting a common theme of paranoia.  The patient has been talking about how people are talking and scheming about patient and then will apologize about it.  The patient had been studying for an exam and took Adderall and stayed up for 48 hours.  The patient's parents talked with the patient's roommates regarding patient's drug use.  Parents found edible cannabis in the dorm room.  The patient reports that they used acid 1.75 tabs.  The patient stated that they cried for 2 days.  The patient is also reporting that patient smokes marijuana 3-4 times per week. Patient has change their name multiple times in the last 2 days. Patient has been agitated. Upon admission to the unit patient took off their clothes and needs encouragement to get dressed. Patient walked toward nurse unclothed.  The patient's father is very concerned about " "the significant change in patient's behavior and disorganized thinking recently and supports commitment MI CD.      PSYCHIATRIC REVIEW OF SYSTEMS:  The patient states that patient is \"okay\".  The patient reports that anxiety has been an issue, especially around exams.  The patient reports that patient stayed up for 48 hours due to taking Adderall.  The patient is endorsing psychosis including paranoia, talking about how other people are talking about the patient.  The patient denies any auditory or visual hallucinations.  The patient denies any panic attacks, denies any symptoms of PTSD, eating disorder or OCD. Patient denies suicidal or homicidal ideation.     PSYCHIATRIC HISTORY:  No prior inpatient hospitalizations.  The patient has sought out therapy twice, most recently this year for gender dysphoria.  The patient denies any prior suicide or self-injurious behavior.  The patient has not been treated with any psychiatric medications.      PAST MEDICAL HISTORY:  Blood clot in vein after surgery of left knee in 04/2014.  The patient took warfarin for 6 months.  Uncomplicated asthma.      SURGICAL HISTORY:  Knee surgery, left knee dated 2014.      SUBSTANCE ABUSE HISTORY:  The patient reports using cannabis 3-4 times per week.  The patient reports at one point was smoking daily.  The patient states that patient made hash and used that.  The patient reports using acid 1 time.  The patient has used Adderall to study and has stayed up multiple nights in a row.  The patient states that thye has taken other drugs but then became cryptic and did not want to disclose what other drugs they had been taking.  The patient has not had any prior chemical dependency treatments.  U-tox was positive for cannabis.      FAMILY HISTORY:  No prior mental health issues in family.      SOCIAL HISTORY:  The patient is living on [facility].  The patient is studying to be a , currently is a rome.  The " patient has been a good student.  The patient has 3 sisters and patient denies any abuse or trauma.      MEDICAL REVIEW OF SYSTEMS:  Reviewed documentation completed by Dr. Oscar Templeton dated 12/06/2017.  No changes noted.      PHYSICAL EXAMINATION:   VITAL SIGNS:  Blood pressure 123/85, pulse is 73, temperature is 98.7 Fahrenheit, respirations 16, SpO2 is at 98%.  Reviewed documentation for physical examination completed by Dr. Oscar Templeton dated 12/06/2017.  No changes noted.      MENTAL STATUS EXAMINATION:  The patient appears stated age.  The patient is dressed in scrubs.  The patient is very disheveled.  The patient was in room rearranging the mattresses.  The patient's room was strewn with cups and bed linen.  The patient was very guarded when talking with provider.  The patient maintained adequate eye contact.  The patient did not display any psychomotor abnormalities.  Speech was spontaneous.  The patient used conversational rate, rhythm and tone.  The patient was very guarded in answers.  The patient described mood as okay.  Affect was blunted and not congruent.  Thought process was very disorganized.  Associations were loosening.  Thought content endorses psychosis including paranoid ideation.  The patient made multiple statements about people talking about him.  The patient denied any auditory or visual hallucinations.  The patient denies any passive suicidal thoughts.  The patient denies any homicidal thoughts.  Insight and judgment are impaired.  Cognition appears intact including intact to interviewing including orientation to person, place, time and situation, use of language and fund of knowledge.  Recent and remote memory are grossly intact.  Muscle strength, tone and gait appear to be within normal limits upon observation.      ASSESSMENT:   1.  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   2.  Gender dysphoria.   3.  Cannabis use disorder, moderate.      PLAN:   1.  The patient has been  admitted to behavioral unit 4A on a 72-hour hold, which was placed on .  We will continue hold to allow for a period of observation and willingness to cooperate with treatment plan.  Discussed petitioning for commitment with the patient's father.  The patient is disorganized and vulnerable and is at risk for harming themself.  The patient has been demonstrating agitation and has needed Zyprexa in order to calm.  The patient's father is very worried about the patient putting patient's health at risk if they left the hospital.   2.  Discussed medications with patient.  Initiated Zyprexa 10 mg b.i.d.  The patient initially refused but eventually took the medication.   3.  When patient clears, will obtain chemical health assessment.   4.  Psychosocial treatments to be addressed with CTC.         DEBRA A. NAEGELE, APRN, CNS             D: 2017 12:11   T: 2017 12:53   MT: ANAHI      Name:     AMIRA DÍAZ   MRN:      [id number]        Account:      [id number]   :      1995           Admitted:     [id number]      Document: [id number]

## 2017-12-08 NOTE — PROGRESS NOTES
"Pt's mother called after pt's sister received a concerning voicemail from pt. According to patient's mother, this voicemail stated \"Thank you for everything. Goodbye\". Spoke with pt regarding this voicemail. Pt looks confused when asked about this voicemail, as if he didn't recall leaving it. When asked pt if he had any thoughts, plans or intentions of hurting himself on the unit, pt states \"no\". Pt guarded and slow to respond to questions. Difficult to get responses from pt and for him to answer assessment questions, though pt does contract for safety. Pt's mother and sister here to visit this evening during visiting hours. Will continue 15 minute checks and routine assessments for safety.  "

## 2017-12-08 NOTE — PLAN OF CARE
Problem: General Plan of Care (Inpatient Behavioral)  Goal: Team Discussion  Team Plan:  BEHAVIORAL TEAM DISCUSSION    Participants: THAO Frank, Sarah Marin RN, WILY Fernandez  Progress: Minimal just admitted  Continued Stay Criteria/Rationale: Pt is extremely disorganized and dangerous to self  Medical/Physical: Monitored by internal medicine  Precautions:   Behavioral Orders   Procedures     Assault precautions     Code 1 - Restrict to Unit     Elopement precautions     Routine Programming     As clinically indicated     Sexual precautions     Status 15     Every 15 minutes.     Plan: Petition for commitment, psychiatric evaluation, begin medication management, CTC will work on aftercare plan.  Rationale for change in precautions or plan: No changes

## 2017-12-08 NOTE — PROGRESS NOTES
"Pt began the shift stating that he needed to do everything for himself.  Pt became distraught when this  explained that they would not be able to make their own coffee.  Pt was visiblly upset when informed that he was not allowed to clean the containers which hold patient coffee.  Pt insisted on seeing the \"printed standards\" for how the pots were cleaned.  Pt became distraught after community meeting stating that it was unfair that pts were not allowed to touch while on the unit.      Pt stated to this  that it was his life mission to help everyone in the world to \"become more of themselves\" and that he was willing to do the work to make this happen.  Pt refused to eat supper.  Pt would drink juice from sealed containers.    As the evening progressed pt became more interactive with peers and appeared to become more comfortable in the milieu.  Pt appeared distraught when informed \"it would be best if you used your legal name to sign legal documents.\"  After a five minute discussion with this  and reassurance that we would continue to use whatever name they preferred while interacting with pt, pt was willing to sign an PETER and Electronic Health Record release.    On multiple occassions pt approached staff stating that he was \"ready to leave.\"  The pt's legal situation was explained to him which he appeared to understand.  A few hours later pt would re-approach staff stating that he was \"ready to leave.\"  Pt required regular redirection through out the night.    Pt remained redirectable through out the shift and appeared to respond positively to abundant reassurance.       12/07/17 2000   Behavioral Health   Thinking delusional;paranoid   Orientation person: oriented;place: oriented;date: oriented   Insight poor   Judgement impaired   Eye Contact staring;at examiner   Affect tense   Mood depressed   Physical Appearance/Attire untidy   Suicidality (denies)   1. Wish to be Dead No   2. " Non-Specific Active Suicidal Thoughts  No   Change in Protective Factors? No   Enviromental Risk Factors None   Self Injury (denies)   Elopement (no present concerns)   Activity other (see comment)  (present in milieu)   Speech flight of ideas;clear   Medication Sensitivity no stated side effects;no observed side effects   Psychomotor / Gait balanced;steady   Psycho Education   Type of Intervention 1:1 intervention   Response participates with cues/redirection   Hours 0.5

## 2017-12-08 NOTE — PROGRESS NOTES
"Attendence: Pt. Attended scheduled 3 of 3 OT sessions today.   Observations: pt went by several names throughout day, one of which included  \"Estrellita.\" pt appeared disorganized in discussion though could follow with discussion. Pt able to remain focused and engaged in painting task for duration of OT clinic. However during discussion pt would often repeat questions and say \"what are we talking about again?\" and \"I'm not sure about that.\" pt was difficult to follow and appeared slightly agitated though remained calm throughout groups. Initial assessment initiated, though pt did not return self-assessment.     12/08/17 1500   Occupational Therapy   Type of Intervention structured groups   Response Participates with cues/redirection   Hours 2.5         "

## 2017-12-09 PROCEDURE — 90853 GROUP PSYCHOTHERAPY: CPT

## 2017-12-09 PROCEDURE — 97150 GROUP THERAPEUTIC PROCEDURES: CPT | Mod: GO

## 2017-12-09 PROCEDURE — 12400007 ZZH R&B MH INTERMEDIATE UMMC

## 2017-12-09 PROCEDURE — 25000132 ZZH RX MED GY IP 250 OP 250 PS 637: Performed by: CLINICAL NURSE SPECIALIST

## 2017-12-09 RX ADMIN — OLANZAPINE 10 MG: 10 TABLET, ORALLY DISINTEGRATING ORAL at 10:17

## 2017-12-09 RX ADMIN — OLANZAPINE 10 MG: 10 TABLET, ORALLY DISINTEGRATING ORAL at 20:51

## 2017-12-09 ASSESSMENT — ACTIVITIES OF DAILY LIVING (ADL)
GROOMING: INDEPENDENT
GROOMING: INDEPENDENT
DRESS: INDEPENDENT
ORAL_HYGIENE: INDEPENDENT

## 2017-12-09 NOTE — PROGRESS NOTES
"   12/08/17 2100   Behavioral Health   Hallucinations other (see comment)  (none reported)   Thinking confused   Orientation person: oriented   Memory other (see comment)  (AIDA)   Insight poor   Judgement impaired   Eye Contact at examiner;staring   Affect blunted, flat;tense   Mood depressed   Physical Appearance/Attire other (see comment)  (pt showered)   Hygiene well groomed;other (see comment)  (pt showered)   Suicidality other (see comments)  (none reported or concerned)   1. Wish to be Dead No   2. Non-Specific Active Suicidal Thoughts  No   Self Injury other (see comment)  (none reported or observed)   Elopement (no concerns)   Activity withdrawn   Speech clear;coherent   Medication Sensitivity no stated side effects;no observed side effects   Psychomotor / Gait balanced;steady   Activities of Daily Living   Hygiene/Grooming independent   Oral Hygiene independent   Dress independent   Room Organization independent   Behavioral Health Interventions   Psychotic Symptoms maintain safety precautions;provide emotional support;establish therapeutic relationship;build upon strengths   Social and Therapeutic Interventions (Psychotic Symptoms) encourage socialization with peers;encourage effective boundaries with peers;encourage participation in therapeutic groups and milieu activities     Pt was observed giving delayed responses. Writer asked pt \"Is your name Lay?\" Pt replied \"It doesn't matter.\" Pt mom called unit and informed unit that pt had called xer sister and said \"thanks for everything, goodbye.\" Pt was cooperative with staff. Pt had a visit with family this evening. Pt did not shave today but the family reported that pt would like to shave. Pt showered this evening. Pt reported to writer \"I'm starting to understand now,\" and appeared to be able to have increased short-term memory recall than in the beginning of the shift. Pt asked this writer for bracelet (which had keys to unit attached). Pt was " withdrawn in the milieu and did not attend community meeting.

## 2017-12-09 NOTE — PROGRESS NOTES
Attendence: Pt. Attended scheduled 3  of 3 OT sessions today.   Observations: pt pt was quiet though able to participate throughout group. Pt was focused 75% of the time though would need cues to remember what the topic of conversation was or discussion question, or to remember it was the pt turn.       12/09/17 1700   Occupational Therapy   Type of Intervention structured groups   Response Participates   Hours 3

## 2017-12-09 NOTE — PROGRESS NOTES
"Pt walked out of bedroom into hallway with eyes shut and proceeded toward the desk with outstretched arms. Writer told pt Xe may open their eyes and pt replied \"Are you sure you want me too?\" Pt was asked to keep eyes open in order to not run into anything in the hallway and pt then opened eyes.  "

## 2017-12-10 PROCEDURE — 90853 GROUP PSYCHOTHERAPY: CPT

## 2017-12-10 PROCEDURE — 25000132 ZZH RX MED GY IP 250 OP 250 PS 637: Performed by: CLINICAL NURSE SPECIALIST

## 2017-12-10 PROCEDURE — 12400007 ZZH R&B MH INTERMEDIATE UMMC

## 2017-12-10 PROCEDURE — 97150 GROUP THERAPEUTIC PROCEDURES: CPT | Mod: GO

## 2017-12-10 RX ADMIN — OLANZAPINE 10 MG: 10 TABLET, ORALLY DISINTEGRATING ORAL at 21:16

## 2017-12-10 RX ADMIN — SPIRONOLACTONE 150 MG: 50 TABLET ORAL at 10:10

## 2017-12-10 RX ADMIN — OLANZAPINE 10 MG: 10 TABLET, ORALLY DISINTEGRATING ORAL at 10:11

## 2017-12-10 ASSESSMENT — ACTIVITIES OF DAILY LIVING (ADL)
GROOMING: INDEPENDENT
GROOMING: INDEPENDENT
ORAL_HYGIENE: INDEPENDENT
DRESS: INDEPENDENT

## 2017-12-10 NOTE — PLAN OF CARE
"Problem: Psychotic Symptoms  Goal: Psychotic Symptoms  Signs and symptoms of listed problems will be absent or manageable.   Outcome: Therapy, progress towards functional goals is fair  Patient has been up and visible in the milieu. Remains soft spoken, but good eye contact.  Reports feeling \"OK\", but tired--feels he has not been sleeping well.  Speech somewhat slow and deliberate.  Denies endorsement of suicide, self harm, or thought to hurt others.  Some disorganized thinking--was insistent early that everyone be awakened for breakfast and that he would eat breakfast when everyone was out of their rooms. Denies voices, and does not overtly appear to be responding. Very deliberate about taking Ze medications--insisting they be crushed, put in a drinking cup and then the cup be filled with soda and sips very slowly. Feels comfortable with his living situation (4 roommates), thinks ze may have a \"crush\" on one of them, but is unsure.  Goal: \"need to be well\".  Selectively social.  Describes mood as \"sad\" rather than depressed or anxious. Affect is mid-range. Denies concerns regarding appetite, medication side effects.      "

## 2017-12-10 NOTE — PROGRESS NOTES
"Attendence: Pt. Attended scheduled 2 of 3 OT sessions today.   Observations: pt was quiet though able to participate throughout group. Pt was focused 50% of the time though would need cues to remember what the topic of conversation was or discussion question, or to remember it was the pt turn. Pt engaged with peers however including learning how to play a new game. Pt appear slightly disorganized with out of context verbalizing such as \"is it ok if I go to the bathroom, never mind\"      12/10/17 1600   Occupational Therapy   Type of Intervention structured groups   Response Participates with cues/redirection   Hours 2.5         "

## 2017-12-10 NOTE — PROGRESS NOTES
"Per staff report pt complaining of tingling in fingers and poor sleep and is questioning wether or not these symptoms are side effects of his medication.      Pt was started on Zyprexa on December 7.    Pts complaints could be related to Zyprexa (per Web MD). Requesting attending physician to assess pts complaints.  Due to pts psychosis this information was not shared with pt     Update:  Further assessment- pt complaints are rather vague.  With regards to the tingling sensation: \"well, over my whole body, sometimes underneath my arms\"      With regards to sleep- \"I just don't think I'm sleeping well\"  Pt made no comments of an inability to sit still. Pt was observed sitting still for most of shift  "

## 2017-12-10 NOTE — PROGRESS NOTES
"   12/09/17 1900   Behavioral Health   Hallucinations denies / not responding to hallucinations   Thinking confused;delusional   Orientation person: oriented;place: oriented   Memory baseline memory   Insight poor   Judgement impaired   Eye Contact at examiner   Affect blunted, flat   Mood irritable;mood is calm   Physical Appearance/Attire attire appropriate to age and situation   Hygiene well groomed   Suicidality other (see comments)  (pt denied SI)   1. Wish to be Dead No   2. Non-Specific Active Suicidal Thoughts  No   Self Injury other (see comment)  (pt denied SIB)   Elopement Statements about wanting to leave   Activity withdrawn   Speech clear;coherent   Medication Sensitivity other (see comment)  (see progress notes)   Psychomotor / Gait balanced;steady   Safety   Assault status 15;private room   Elopement status 15   Sexual status 15;private room   Activities of Daily Living   Hygiene/Grooming independent   Oral Hygiene independent   Dress independent   Room Organization independent   Behavioral Health Interventions   Psychotic Symptoms maintain safety precautions;establish therapeutic relationship;provide emotional support;build upon strengths   Social and Therapeutic Interventions (Psychotic Symptoms) encourage participation in therapeutic groups and milieu activities;encourage effective boundaries with peers;encourage socialization with peers     Pt denied SI and SIB. Pt reported that the medications may be causing soreness, muscle spasms in leg and eye lids and tingling fingers. Pt asked to be transferred to another place where the pt would be allowed to smoke marijuana before bed time. Pt reported that they are less anxious than the previous three days and is \"steadily decreasing.\" Pt reported \"horrible\" appetite. Pt asked what they needed to do in order to discharge and pt was encouraged to participate in groups, take medications and practice coping skills. Pt reported the following stressors \"not " "presenting as myself, school and some friendships.\" Pt reported \"my concentration recently dropped,\" and expressed feelings of agitation and confusion \"when talking to people.\" Pt was cooperative with staff. Pt was withdrawn but visible in the milieu. Pt has a visit with family and friends tonight.  "

## 2017-12-11 ENCOUNTER — TELEPHONE (OUTPATIENT)
Dept: FAMILY MEDICINE | Facility: CLINIC | Age: 22
End: 2017-12-11

## 2017-12-11 PROCEDURE — 99232 SBSQ HOSP IP/OBS MODERATE 35: CPT | Performed by: CLINICAL NURSE SPECIALIST

## 2017-12-11 PROCEDURE — 25000132 ZZH RX MED GY IP 250 OP 250 PS 637: Performed by: CLINICAL NURSE SPECIALIST

## 2017-12-11 PROCEDURE — 97150 GROUP THERAPEUTIC PROCEDURES: CPT | Mod: GO

## 2017-12-11 PROCEDURE — 12400007 ZZH R&B MH INTERMEDIATE UMMC

## 2017-12-11 PROCEDURE — 25000132 ZZH RX MED GY IP 250 OP 250 PS 637: Performed by: EMERGENCY MEDICINE

## 2017-12-11 PROCEDURE — 90853 GROUP PSYCHOTHERAPY: CPT

## 2017-12-11 RX ORDER — OLANZAPINE 10 MG/1
10 TABLET, ORALLY DISINTEGRATING ORAL ONCE
Status: COMPLETED | OUTPATIENT
Start: 2017-12-11 | End: 2017-12-11

## 2017-12-11 RX ORDER — OLANZAPINE 5 MG/1
5 TABLET ORAL
Status: DISCONTINUED | OUTPATIENT
Start: 2017-12-12 | End: 2017-12-13

## 2017-12-11 RX ADMIN — SPIRONOLACTONE 150 MG: 50 TABLET ORAL at 09:08

## 2017-12-11 RX ADMIN — ESTRADIOL 1 PATCH: 0.1 PATCH TRANSDERMAL at 09:13

## 2017-12-11 RX ADMIN — ESTRADIOL 1 PATCH: 0.05 PATCH TRANSDERMAL at 09:09

## 2017-12-11 RX ADMIN — OLANZAPINE 10 MG: 10 TABLET, ORALLY DISINTEGRATING ORAL at 22:26

## 2017-12-11 RX ADMIN — OLANZAPINE 10 MG: 10 TABLET, ORALLY DISINTEGRATING ORAL at 09:08

## 2017-12-11 ASSESSMENT — ACTIVITIES OF DAILY LIVING (ADL)
DRESS: INDEPENDENT
ORAL_HYGIENE: INDEPENDENT
ORAL_HYGIENE: INDEPENDENT
HYGIENE/GROOMING: INDEPENDENT
DRESS: INDEPENDENT
GROOMING: INDEPENDENT

## 2017-12-11 NOTE — PROGRESS NOTES
"[facility], [place]   Psychiatric Progress Note        Interim History:   The patient's care was discussed with the treatment team during the daily team meeting and/or staff's chart notes were reviewed.  Staff report patient has attended groups.    Spoke with patient's father and reported that the court supported petition for MICD commitment. Patient understands that ze is under a court hold. Patient is making paranoid statements about Zyprexa. Ze believes that ze is getting more than 20 mg. Patient became very tearful and requested to have zer Zyprexa dosed 5 mg every four hours. Patient reports ze is \"exahusted.\" Ze reports the groups are helpful.     Patient displays disorganzied thinking. Ze has multiple yellow posit it notes on zer bedroom door expressing zer different thoughts. Patient is expressing paranoid thinking. Ze beleivs ze is not getting Zyprexa and is probably not getting 10 mg but more. Patient's father expressed his concern about patient's disorganization. Patient has improved since starting Zyprexa but continues to be disorganized.     Patient has changed  name multiple times. Today ze wants to be called Shell with ze and zer pronouns.zer       Medications:       estradiol biweekly   Transdermal Q8H     estradiol biweekly   Transdermal Once per day on Mon Thu     estradiol  1 patch Transdermal Once per day on Mon Thu     estradiol  1 patch Transdermal Once per day on Mon Thu     influenza quadrivalent (PF) vacc age 3 yrs and older  0.5 mL Intramuscular Prior to discharge     spironolactone  150 mg Oral Daily     OLANZapine zydis  10 mg Oral 2 times daily          Allergies:     Allergies   Allergen Reactions     Banana Anaphylaxis     Throat swelling          Labs:   No results found for this or any previous visit (from the past 24 hour(s)).       Psychiatric Examination:     /82  Pulse 92  Temp 95.9  F (35.5  C) (Oral)  Resp 16  Ht 1.829 m (6')  Wt 80.1 kg " (176 lb 9.6 oz)  SpO2 99%  BMI 23.95 kg/m2  Weight is 176 lbs 9.6 oz  Body mass index is 23.95 kg/(m^2).  Orthostatic Vitals       Most Recent      Sitting Orthostatic /82 12/11 0802    Sitting Orthostatic Pulse (bpm) 92 12/11 0802    Standing Orthostatic /78 12/11 0802    Standing Orthostatic Pulse (bpm) 115 12/11 0802            Appearance: awake, alert, adequately groomed and dressed in hospital scrubs  Attitude:  guarded  Eye Contact:  good  Mood:  angry and anxious  Affect:  intensity is heightened  Speech:  rambling  Psychomotor Behavior:  no evidence of tardive dyskinesia, dystonia, or tics  Throught Process:  disorganized  Associations:  loosening of associations present  Thought Content:  no evidence of suicidal ideation or homicidal ideation  Insight:  limited  Judgement:  limited  Oriented to:  time, person, and place  Attention Span and Concentration:  fair  Recent and Remote Memory:  intact         Precautions:     Behavioral Orders   Procedures     Assault precautions     Code 1 - Restrict to Unit     Elopement precautions     Routine Programming     As clinically indicated     Sexual precautions     Status 15     Every 15 minutes.          DIagnoses:   1.  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   2.  Gender dysphoria.   3.  Cannabis use disorder, moderate.   4. R/o Schizophreniform disorder             Plan:   Legal: Petitioned for MICD commitment. Court supported petition. Patient is on a court hold as of 12/11 at 4:06 pm per Dara from court. Parents support petition.    Medication management: Zyprexa 5mg QID    Dispo: Stabilization with medications,, MICD treatment.

## 2017-12-11 NOTE — PROGRESS NOTES
Attendence: Pt. Attended scheduled 3 of 3 OT sessions today.   Observations: pt was quiet though able to participate throughout group. Pt was focused 50% of the time though would need cues to remember what the topic of conversation was or discussion question, or to remember it was the pt turn. Pt requested to clean the brushes though needed one reminder to complete task, pt asked to engage in tasks with peers.      12/11/17 1300   Occupational Therapy   Type of Intervention structured groups   Response Participates with cues/redirection   Hours 3

## 2017-12-11 NOTE — PROGRESS NOTES
Writer took call from pre-petition screener Renee Sweeney at 12:35pm today. Renee confirms that petition will be supported and the unit will be receiving confirmation of court hold.     Took call later in the day from Ammy at 's office ([phone]) requesting that we fax Holden to them at [phone].  This was done and originals placed in chart.

## 2017-12-11 NOTE — PROGRESS NOTES
"Pt woke at the beginning of the shift, was cooperative with vital signs, and participated in community meeting.  Pt stated that their mood was \"calm but bitterly.\"  Pt recited a long list they had written of all the things that they felt needed to be accomplished.  Pt stated that their goal was to learn about the commitment process.  Pt was very verbose and required minor redirection in order to allow others time to share.    Pt presented as overly apologetic and deferential throughout the shift.  Pt frequently asked permission to do things that generally would not require permission such as sitting in a chair or entering the lounge while the television is on.  Pt approached this  and asked if there was anything they could do \"in order to make your life easier.\"    Pt wandered in and out of group.  Pt engaged with staff on a variety of shifting topics.  Pt appeared easily distracted.         12/11/17 1100 Behavioral Health   Hallucinations denies / not responding to hallucinations   Thinking confused;poor concentration   Orientation person: oriented;place: oriented;date: oriented   Memory baseline memory   Insight poor   Judgement impaired   Eye Contact at examiner   Affect blunted, flat   Mood mood is calm   Physical Appearance/Attire attire appropriate to age and situation   Hygiene well groomed   Suicidality (none reportered or observed)   1. Wish to be Dead No   2. Non-Specific Active Suicidal Thoughts  No   Self Injury (none stated nor observed)   Elopement (no present conerns)   Activity restless   Speech clear;coherent   Medication Sensitivity no stated side effects;no observed side effects   Psychomotor / Gait balanced;steady   Psycho Education   Type of Intervention 1:1 intervention   Response participates with cues/redirection   Hours 0.5   Activities of Daily Living   Hygiene/Grooming independent   Oral Hygiene independent   Dress independent   Room Organization independent   Behavioral Health " Interventions   Psychotic Symptoms maintain safety precautions   Social and Therapeutic Interventions (Psychotic Symptoms) encourage participation in therapeutic groups and milieu activities

## 2017-12-11 NOTE — PROGRESS NOTES
"Clinical Nutrition Services Brief Note - Nutrition Education    Vinicio is a 22 year old seen by the dietitian today for a consult for pt/family request - pt requesting nutrition consult in order to \"live a healthier lifestyle overall\".     Nutrition History:  Per discussion with provider, pt has been psychotic and having disorganized thinking. During visit, pt was disorganized, but expressed they wanted to know more about healthy and sustainable eating. Pt reports they don't follow any specific diet at home but were told that the Zyprexa medication they are on will affect their potassium and would like more information on this (however, per provider, this medication will only create increased appetite, no effect on potassium). They state they eat a lot of pizza at home and not many fruits/vegetables.     Nutrition Intervention:  Nutrition Education: Provided education on general healthy eating using the my plate planner method. Encouraged making half plate vegetables, 1/4 plate protein, 1/4 plate starches/grains, 1 serving of dairy. At this time patient interrupted writer and said they realized just then that they would learn better if they taught themselves. Pt requested additional educational handouts, provided 100 calorie snack list and portion guide handouts. Pt had no further nutrition-related questions at this time.    Nutrition Follow-up/Monitoring:  RD to sign off as no nutrition follow-up warranted at this time. Please consult if further needs arise prior to discharge.    Lori Lazar RD, LD  Pager #[phone]        "

## 2017-12-11 NOTE — TELEPHONE ENCOUNTER
"[facility] Family Medicine phone call message- general phone call:    Reason for call: I contacted mother to schedule a follow up discharge appointment. Per mother, they \"have a court date in a few days\". \"She may be committed\". So mother did not want to schedule a follow up appointment at this time. She does have my direct number to call me, should she need to . Mother did ask if it was \"possible for  to go to the hospital to see her (4th floor, [place])\". \"She trusts  and I feel that she needs positive encouragement, that if she listens to what the hospital has to say, everything will work out fine\". Message forwarded to .    Return call needed: No, visit to the hospital    OK to leave a message on voice mail? Yes    Primary language: English      needed? No    Call taken on December 11, 2017 at 3:40 PM by Mili Carr  "

## 2017-12-11 NOTE — PROGRESS NOTES
"   12/10/17 2200   Behavioral Health   Hallucinations denies / not responding to hallucinations   Thinking confused;distractable   Orientation person: oriented;place: oriented;date: oriented;time: oriented   Memory baseline memory   Insight poor   Judgement impaired   Eye Contact at examiner   Affect blunted, flat   Mood mood is calm   Physical Appearance/Attire attire appropriate to age and situation   Hygiene well groomed   Suicidality other (see comments)  (none reported or observed)   1. Wish to be Dead No   2. Non-Specific Active Suicidal Thoughts  No   Self Injury other (see comment)  (none reported or observed)   Elopement (no concerns)   Activity withdrawn;other (see comment);restless  (pt is visible in the milieu)   Speech clear;coherent   Medication Sensitivity no observed side effects;no stated side effects   Psychomotor / Gait balanced;steady   Activities of Daily Living   Hygiene/Grooming independent   Oral Hygiene independent   Dress independent   Room Organization independent   Behavioral Health Interventions   Psychotic Symptoms maintain safety precautions;establish therapeutic relationship;provide emotional support;build upon strengths   Social and Therapeutic Interventions (Psychotic Symptoms) encourage participation in therapeutic groups and milieu activities;encourage effective boundaries with peers;encourage socialization with peers     Pt had to be reminded three times that they had visitors in the lounge before they came out. Pt expressed desire to play games with peers and staff. Pt had blunted, flat affect with short responses. Pt is visible in the milieu. Pt would like to be called \"Shell\" and not \"Billie.\" Pt attended community meeting. Pt had a visit with family. Pt is often heard saying \"I don't know what to do right now.\" Pt was cooperative with staff directions.  "

## 2017-12-12 PROCEDURE — H2032 ACTIVITY THERAPY, PER 15 MIN: HCPCS

## 2017-12-12 PROCEDURE — 25000132 ZZH RX MED GY IP 250 OP 250 PS 637: Performed by: CLINICAL NURSE SPECIALIST

## 2017-12-12 PROCEDURE — 12400007 ZZH R&B MH INTERMEDIATE UMMC

## 2017-12-12 PROCEDURE — 99232 SBSQ HOSP IP/OBS MODERATE 35: CPT | Performed by: CLINICAL NURSE SPECIALIST

## 2017-12-12 RX ADMIN — OLANZAPINE 5 MG: 5 TABLET, FILM COATED ORAL at 20:17

## 2017-12-12 RX ADMIN — SPIRONOLACTONE 150 MG: 50 TABLET ORAL at 10:35

## 2017-12-12 RX ADMIN — OLANZAPINE 5 MG: 5 TABLET, FILM COATED ORAL at 13:46

## 2017-12-12 RX ADMIN — OLANZAPINE 5 MG: 5 TABLET, FILM COATED ORAL at 10:36

## 2017-12-12 RX ADMIN — OLANZAPINE 5 MG: 5 TABLET, FILM COATED ORAL at 17:03

## 2017-12-12 ASSESSMENT — ACTIVITIES OF DAILY LIVING (ADL)
HYGIENE/GROOMING: INDEPENDENT
DRESS: SCRUBS (BEHAVIORAL HEALTH);INDEPENDENT
GROOMING: HANDWASHING;INDEPENDENT
LAUNDRY: WITH SUPERVISION
ORAL_HYGIENE: INDEPENDENT
DRESS: INDEPENDENT
ORAL_HYGIENE: INDEPENDENT

## 2017-12-12 NOTE — PROGRESS NOTES
"   12/12/17 1300   Art Therapy   Type of Intervention structured groups   Response participates with cues/redirection   Hours 1   Pt attended for about an hour.Ze stared at times and said he didn't feel well, writer would check in verbally and ask them if they were ok. Pt reported they wanted to be called Lay today. Pt said they are a trans woman. Writer was drawing a floral pattern and pt complimented writer on her drawings and asked writer if she would draw them a tattoo of estradial. He reminded writer several times that he wants to work on this tattoo drawing and he also asked his nurse to print medical information about female hormones.    Pt attended afternoon group and worked with the [employer] molecular graphic and [employer]. They put a very nice tattoo design on paper. The design was like a tree. Writer encouraged them to use this tree for their house, tree person assessment. They were cooperative. They were able to name 5 top positive coping skills  Being\" being myself, trying my hardest, music, movies , and family. They made a very self aware comment asking if drawing which was a current coping skill in this group was just a distraction or because it was depicting being themselves and validating being transgendered. They said the drawing made them feel good. Writer validated that it was a great observation and that it was both a distraction and positive self expression.  "

## 2017-12-12 NOTE — PROGRESS NOTES
Pt began the day approaching this  to apologize for their behavior the previous day.  Pt stated that they had been irritated and their choice of words had been intended to frustrate this .  Pt and this  agreed that today was a new day and that we would begin anew.    Pt inquired about hospital policies regarding the placing of signs on the unit.  Pt stated that they have a short memory and thought it would be useful if they could place signs through out the unit reminder them of the location of the toaster and other items on the unit.  Pt stated that this was vital to their treatment on the unit and eventual recovery.  Pt eventually agreed to write notes in their room.    Pt required some redirection away from the desk and regarding unit structure.

## 2017-12-12 NOTE — PROGRESS NOTES
"Pt checked in feeling 'improved/ neutral.' Pt's stated goal of the day was, \"to complete my personal care plan and additional documents\"; this goal was met. Pt presents as tense and cooperative with a blunt/flat affect. Pt attended groups and participated appropriately. Pt reported feeling agitated (6), irritable (6), confused (4), and anxious (4). Pt denies auditory and visual hallucinations. Pt denies SI and SIB. Pt completed zer personal care plan and it was placed in the CTC bin. Pt asked specific questions about the commitment process and medications. This writer suggested for the pt to check in with zer assigned RN. During conversation, pt verbalized difficulty with short-term memory loss and believes it could be a side effect from the medication zer on. Pt's parents and sister came to visit. Pt had a productive night yet was fixated on zer legal involvement. Pt was irritated staff was able to answer specific questions in related to zer case. This writer and additional staff suggested the pt reaches out to patient relations.      12/11/17 3951   Behavioral Health   Hallucinations denies / not responding to hallucinations   Thinking confused;poor concentration   Orientation person: oriented;place: oriented;date: oriented;time: oriented   Memory baseline memory   Insight poor   Judgement impaired   Eye Contact at examiner   Affect blunted, flat   Mood mood is calm   Physical Appearance/Attire attire appropriate to age and situation   Hygiene well groomed   Suicidality other (see comments)  (Pt denies SI)   1. Wish to be Dead No   2. Non-Specific Active Suicidal Thoughts  No   Self Injury other (see comment)  (Pt denies SIB)   Elopement (No elopement concerns)   Activity restless   Speech clear;coherent   Medication Sensitivity no stated side effects;no observed side effects   Psychomotor / Gait balanced;steady   Safety   Suicidality Status 15   Psycho Education   Type of Intervention 1:1 intervention   Response " participates with cues/redirection   Hours 0.5   Treatment Detail Triggers   Activities of Daily Living   Hygiene/Grooming independent   Oral Hygiene independent   Dress independent   Room Organization independent   Behavioral Health Interventions   Psychotic Symptoms maintain safety precautions;monitor need to revise level of observation;maintain safe secure environment;reality orientation;simple, clear language;decrease environmental stimulation;redirection of intrusive behaviors;redirection of aggressive behaviors;assist patient in developing safety plan;assist patient in following safety plan;encourage nutrition and hydration;encourage participation / independence with adls;provide emotional support;establish therapeutic relationship;assist with developing & utilizing healthy coping strategies;build upon strengths   Social and Therapeutic Interventions (Psychotic Symptoms) encourage socialization with peers;encourage effective boundaries with peers;encourage participation in therapeutic groups and milieu activities

## 2017-12-12 NOTE — TELEPHONE ENCOUNTER
Spoke with patient's mother.  Patient is currently committed at [facility] for behavioral health issues.  Her condition is improving (less confused, tangential, inappropriate) but is still not herself.  Mother asked if I could come by to see patient at [facility].  Will try to come by tonight between 7-8 pm.    Bridgette Olguin M.D.  PGY-3, Family Medicine

## 2017-12-12 NOTE — PROGRESS NOTES
"[facility], [place]   Psychiatric Progress Note        Interim History:   The patient's care was discussed with the treatment team during the daily team meeting and/or staff's chart notes were reviewed.  Staff report patient has been attending groups.     Patient refused medication today at noon. Talked with patient and patient stated, \"I forgot to take my medication.\" Patient was then willing to take Zyprexa. Patient continues to demonstrate disorganized thinking. He has multiple post it notes on his bedroom door reminding him of his thoughts.     Patient stated he is not agitated today. He gave provider a written note saying that he loved me. Will meet patient with staff . Patient has been making multiple compliments on what provider is wearing. Patient has been more calm today. Stated that ze felt better today than yesterday. Medication was changed to 5 mg QID per patient request. Patient felt he would not get \"overdosed\" if ze was given medication multiple times per day.         Medications:       OLANZapine  5 mg Oral 4x daily     estradiol biweekly   Transdermal Q8H     estradiol biweekly   Transdermal Once per day on Mon Thu     estradiol  1 patch Transdermal Once per day on Mon Thu     estradiol  1 patch Transdermal Once per day on Mon Thu     influenza quadrivalent (PF) vacc age 3 yrs and older  0.5 mL Intramuscular Prior to discharge     spironolactone  150 mg Oral Daily          Allergies:     Allergies   Allergen Reactions     Banana Anaphylaxis     Throat swelling          Labs:   No results found for this or any previous visit (from the past 24 hour(s)).       Psychiatric Examination:     /82  Pulse 92  Temp 95.9  F (35.5  C) (Oral)  Resp 16  Ht 1.829 m (6')  Wt 80.1 kg (176 lb 9.6 oz)  SpO2 99%  BMI 23.95 kg/m2  Weight is 176 lbs 9.6 oz  Body mass index is 23.95 kg/(m^2).  Orthostatic Vitals       Most Recent      Sitting Orthostatic /82 12/11 0802    " Sitting Orthostatic Pulse (bpm) 92 12/11 0802    Standing Orthostatic /78 12/11 0802    Standing Orthostatic Pulse (bpm) 115 12/11 0802          Appearance: awake, alert, adequately groomed and dressed in hospital scrubs  Attitude:  guarded  Eye Contact:  good  Mood:  angry and anxious  Affect:  intensity is heightened  Speech:  rambling  Psychomotor Behavior:  no evidence of tardive dyskinesia, dystonia, or tics  Throught Process:  disorganized  Associations:  loosening of associations present  Thought Content:  no evidence of suicidal ideation or homicidal ideation  Insight:  limited  Judgement:  limited  Oriented to:  time, person, and place  Attention Span and Concentration:  fair  Recent and Remote Memory:  intact         Precautions:     Behavioral Orders   Procedures     Assault precautions     Code 1 - Restrict to Unit     Elopement precautions     Routine Programming     As clinically indicated     Sexual precautions     Status 15     Every 15 minutes.          DIagnoses:   1.  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   2.  Gender dysphoria.   3.  Cannabis use disorder, moderate.   4. R/o Schizophreniform disorder           Plan:     Legal: Petitioned for MICD commitment. Court supported petition. Patient is on a court hold as of 12/11 at 4:06 pm per Dara from court. Parents support petition.     Medication management: Zyprexa 5mg QID     Dispo: Stabilization with medications,, MICD treatment.

## 2017-12-12 NOTE — PROGRESS NOTES
"Writer met with patient for 1:1 conversation and patient expressed concerns about the commitment process and the medications. Writer discussed with patient about the concerns and patient expressed an understanding. Patient is very distractible with disorganized thinking, and making numerous request.  Patient is sometimes forgetful, denies SI/SIB, denies depression, anxiety, psychotic thinking. Patient refused 1200 Zyprexa dose saying \" the medication is making my joint stiff\". Patient denies other side effects. Patient is closely being monitored and encouraged to report worsening side effect. Patient's attending provider notified.   "

## 2017-12-12 NOTE — PROGRESS NOTES
"CTC met with patient at zer request and answered questions regarding commitment process. Patient repeatedly asked writer \"is this a safe place to talk\" and would answer each question with \"do you think.....\" repeating the question back to writer.  Patient seemed satisfied with information that was provided and thanked writer for meeting with zer.    "

## 2017-12-13 ENCOUNTER — DOCUMENTATION ONLY (OUTPATIENT)
Dept: FAMILY MEDICINE | Facility: CLINIC | Age: 22
End: 2017-12-13

## 2017-12-13 PROCEDURE — H2032 ACTIVITY THERAPY, PER 15 MIN: HCPCS

## 2017-12-13 PROCEDURE — 90686 IIV4 VACC NO PRSV 0.5 ML IM: CPT | Performed by: PSYCHIATRY & NEUROLOGY

## 2017-12-13 PROCEDURE — 99232 SBSQ HOSP IP/OBS MODERATE 35: CPT | Performed by: CLINICAL NURSE SPECIALIST

## 2017-12-13 PROCEDURE — 12400007 ZZH R&B MH INTERMEDIATE UMMC

## 2017-12-13 PROCEDURE — 25000132 ZZH RX MED GY IP 250 OP 250 PS 637: Performed by: CLINICAL NURSE SPECIALIST

## 2017-12-13 PROCEDURE — 25000128 H RX IP 250 OP 636: Performed by: PSYCHIATRY & NEUROLOGY

## 2017-12-13 RX ORDER — OLANZAPINE 10 MG/1
10 TABLET, ORALLY DISINTEGRATING ORAL 2 TIMES DAILY
Status: DISCONTINUED | OUTPATIENT
Start: 2017-12-14 | End: 2017-12-18

## 2017-12-13 RX ORDER — OLANZAPINE 5 MG/1
5 TABLET, ORALLY DISINTEGRATING ORAL
Status: DISCONTINUED | OUTPATIENT
Start: 2017-12-13 | End: 2017-12-13

## 2017-12-13 RX ORDER — OLANZAPINE 10 MG/1
10 TABLET, ORALLY DISINTEGRATING ORAL 2 TIMES DAILY
Status: DISCONTINUED | OUTPATIENT
Start: 2017-12-13 | End: 2017-12-13

## 2017-12-13 RX ADMIN — INFLUENZA A VIRUS A/MICHIGAN/45/2015 X-275 (H1N1) ANTIGEN (FORMALDEHYDE INACTIVATED), INFLUENZA A VIRUS A/HONG KONG/4801/2014 X-263B (H3N2) ANTIGEN (FORMALDEHYDE INACTIVATED), INFLUENZA B VIRUS B/PHUKET/3073/2013 ANTIGEN (FORMALDEHYDE INACTIVATED), AND INFLUENZA B VIRUS B/BRISBANE/60/2008 ANTIGEN (FORMALDEHYDE INACTIVATED) 0.5 ML: 15; 15; 15; 15 INJECTION, SUSPENSION INTRAMUSCULAR at 13:55

## 2017-12-13 RX ADMIN — OLANZAPINE 10 MG: 5 TABLET, FILM COATED ORAL at 13:17

## 2017-12-13 RX ADMIN — OLANZAPINE 15 MG: 10 TABLET, ORALLY DISINTEGRATING ORAL at 21:30

## 2017-12-13 RX ADMIN — OLANZAPINE 5 MG: 5 TABLET, ORALLY DISINTEGRATING ORAL at 10:14

## 2017-12-13 ASSESSMENT — ACTIVITIES OF DAILY LIVING (ADL)
HYGIENE/GROOMING: INDEPENDENT
DRESS: SCRUBS (BEHAVIORAL HEALTH)
ORAL_HYGIENE: INDEPENDENT
LAUNDRY: WITH SUPERVISION

## 2017-12-13 NOTE — PROGRESS NOTES
"Pt upset about wanting to see their family. Writer suggested having family visit during visiting hours, but pt did not respond to writer's words. Pt states they do not know where their family is. Pt states they assume their family is in [place] and all they want to do is see their family. \"Heaven is not a place on earth\". Pt states they have never missed their family before. Pt visibly upset, crying in milieu. Very slow to accept redirection.   "

## 2017-12-13 NOTE — PROGRESS NOTES
Placed call to patient's parents to inform them that patient's preliminary hearing is on 12/14 at 2:15 pm. Writer provided address for hearing, 's phone number and phone number for patient's .

## 2017-12-13 NOTE — PROGRESS NOTES
Pt was awake intermittently overnight. Pt approached the desk around 0500 with multiple requests. Pt asked if they could have the cord for the exercise bike. Pt was advised that the cord was not given out on the night shift. Pt then asked if they could take a shower, but was advised the showers didn't open until about 0730. Pt asked staff to play a card game, but due to activity on the unit and a new admission, staff were unable to. Pt then approached writer stating that they didn't think they had received all of their medications yesterday, specifically the spironolactone. Writer verified that pt had received all of their scheduled medications yesterday. Writer had to reassure pt multiple times that the did get all of their meds yesterday. Pt continues to frequently approach staff with random requests and questions. Pt has also changed preferred name multiple times this morning as well.

## 2017-12-13 NOTE — PROGRESS NOTES
Pt would like to be taken off elopement and given their personal clothes. Pt says being in their female clothes is an important part of their transition from male to female, and being in the unisex scrubs is upsetting to them.

## 2017-12-13 NOTE — PROGRESS NOTES
"Pt woke this morning and began calling this  by the wrong name.  After multiple reminders pt continued to demonstrate an inability to remember this staff member's name but was able to remember the name of every other staff person.      Pt requested to be called \"Debward\" instead of Shell or Lay for the day.  Pt became upset and indignant when a staff person who'd not yet been informed of the change referred to them as \"Shell.\"    Pt stated that today was Saturday.  Pt was unwilling to look at a calender or any other indicator of date.  Pt would make a request of staff and when the staff person would approach pt with the item requested, pt would make another request stating that they did not want the first item now but would take it later.    Pt required regular redirection and reminders regarding unit expectations.  Pt was resistant to staff requests such as putting on a shirt while in the hallway.  Pt was intrusive into the care of other patients telling them that they should not take their medications or approaching peers and stating \"please, I am begging you to play a game with me.\"    At the end of the shift pt became visibly upset.  Pt refused to talk to this  and began to pull on and kick unit doors while sobbing.  Pt was willing to converse with a female staff person.  Pt stated that they needed to see their family.  After a lengthy intervention pt was willing to move away from the unit door and sit in the lounge where sobbing and demanding to see their family continued.       12/13/17 1100   Behavioral Health   Hallucinations denies / not responding to hallucinations   Thinking distractable;delusional;paranoid   Orientation date, disoriented;person: oriented;place: oriented   Memory short term   Insight poor   Judgement impaired   Eye Contact at examiner   Affect blunted, flat;irritable   Mood mood is calm   Physical Appearance/Attire attire appropriate to age and situation   Hygiene well " groomed   Suicidality (denies)   1. Wish to be Dead No   2. Non-Specific Active Suicidal Thoughts  No   Self Injury (denies)   Elopement Statements about wanting to leave   Activity restless   Speech tangential;clear   Medication Sensitivity no stated side effects;no observed side effects   Psychomotor / Gait balanced;steady   Psycho Education   Type of Intervention 1:1 intervention   Response participates with cues/redirection   Hours 0.5   Activities of Daily Living   Hygiene/Grooming independent   Oral Hygiene independent   Dress scrubs (behavioral health)   Laundry with supervision   Room Organization independent   Behavioral Health Interventions   Psychotic Symptoms maintain safety precautions   Social and Therapeutic Interventions (Psychotic Symptoms) encourage effective boundaries with peers

## 2017-12-13 NOTE — PLAN OF CARE
"Problem: Psychotic Symptoms  Goal: Psychotic Symptoms  Signs and symptoms of listed problems will be absent or manageable.   48 Hour Nursing Assessment    Patient evaluation continues. Assessed mood, anxiety, thoughts and behavior. Is progressing toward goals. Encourage participation in groups and developing healthy coping skills. Will continue to assess. Patient denies auditory or visual hallucinations. Refer to daily team meeting notes for individualized plan of care.    Pt reported having a \"great\" day. Pt has been visible in the milieu and cooperative on the unit. Pt's parents visited, which they stated went well. Pt appears to be confused and forgetful at times. Writer has explained to pt that they change their patch on Mondays and Thursdays multiple times this week. Writer provided a December calendar for pt. Pt has been taking their meds and prefers to take them crushed up in pudding. Pt denies any sleep or nutrition concerns.             "

## 2017-12-13 NOTE — PLAN OF CARE
Problem: General Plan of Care (Inpatient Behavioral)  Goal: Individualization/Patient Specific Goal (IP Behavioral)  The patient and/or their representative will achieve their patient-specific goals related to the plan of care.    The patient-specific goals include:   Personal Plan of Care    Reasons you are in the Hospital  1.  Wasn't getting enough sleep  2.  Wasn't eating  3.  Over stressed with friendship  4.  Over stressed with coming out/school    Goals for Discharge   1.  Have a sleep schedule/go over with   2.  Have eating schedule  3.  Set up outpatient appointment          Problem: Patient Care Overview  Goal: Individualization/Patient Specific Goal (IP Behavioral)  The patient and/or their representative will achieve their patient-specific goals related to the plan of care.    The patient-specific goals include:   Personal Plan of Care    Reasons you are in the Hospital  1.  Wasn't getting enough sleep  2.  Wasn't eating  3.  Over stressed with friendship  4.  Over stressed with coming out/school    Goals for Discharge   1.  Have a sleep schedule/go over with   2.  Have eating schedule  3.  Set up outpatient appointment

## 2017-12-14 PROCEDURE — 90853 GROUP PSYCHOTHERAPY: CPT

## 2017-12-14 PROCEDURE — 25000132 ZZH RX MED GY IP 250 OP 250 PS 637: Performed by: EMERGENCY MEDICINE

## 2017-12-14 PROCEDURE — 99232 SBSQ HOSP IP/OBS MODERATE 35: CPT | Performed by: CLINICAL NURSE SPECIALIST

## 2017-12-14 PROCEDURE — 97150 GROUP THERAPEUTIC PROCEDURES: CPT | Mod: GO

## 2017-12-14 PROCEDURE — 12400007 ZZH R&B MH INTERMEDIATE UMMC

## 2017-12-14 PROCEDURE — 25000132 ZZH RX MED GY IP 250 OP 250 PS 637: Performed by: CLINICAL NURSE SPECIALIST

## 2017-12-14 RX ADMIN — OLANZAPINE 10 MG: 10 TABLET, ORALLY DISINTEGRATING ORAL at 10:20

## 2017-12-14 RX ADMIN — OLANZAPINE 10 MG: 10 TABLET, ORALLY DISINTEGRATING ORAL at 21:34

## 2017-12-14 RX ADMIN — ESTRADIOL 1 PATCH: 0.1 PATCH TRANSDERMAL at 10:20

## 2017-12-14 RX ADMIN — ESTRADIOL 1 PATCH: 0.05 PATCH TRANSDERMAL at 10:18

## 2017-12-14 RX ADMIN — SPIRONOLACTONE 150 MG: 50 TABLET ORAL at 10:20

## 2017-12-14 ASSESSMENT — ACTIVITIES OF DAILY LIVING (ADL)
DRESS: INDEPENDENT
ORAL_HYGIENE: INDEPENDENT
GROOMING: INDEPENDENT
DRESS: SCRUBS (BEHAVIORAL HEALTH)
ORAL_HYGIENE: INDEPENDENT
GROOMING: INDEPENDENT

## 2017-12-14 NOTE — PROGRESS NOTES
Attendence: Pt. Attended scheduled 1 of 2 OT sessions today.   Observations: pt was not seen in PM group though was motivated to participate with peers during AM group. had positive participation and contributed to group discussion with insight      12/14/17 1600   Occupational Therapy   Type of Intervention structured groups   Response Participates   Hours 1

## 2017-12-14 NOTE — PROGRESS NOTES
Pt was present and active in the milieu  Pt attended groups  Pt ate both meals     Pt spent the day interacting with other peers in the milieu. Pt appeared to be confused and delusional for much of the day. Pt frequently seemed to forget where they were or what was going on. Pt was able to be calm and rational at a few points but this quickly faded. Pt at one point said they had to pee but wouldn't go to own room. Pt was guided to their room by this writer pt then proceeded past the room to another pts room and said they were going to use that bathroom instead. Pt was redirected from this and then the pt tried the door to the stairway. Pt was redirected from this and then used their own bathroom. Pt also had a moment of teariness and distress about wanting to go home while in a group.  Pt was unwilling to leave group and was able to calm down slowly after talking with this writer and a few more moments of distress and tears. Pt also on a few occasions would look at this writer and ask if they should play the game. Pt would then be quiet when asked what this was about and then would walk away slowly. Pt was overall very confused and delusional for most of the shift. Pt had a flat affect much of the time.     Pt denied any SI SIB.        12/14/17 8285   Behavioral Health   Hallucinations denies / not responding to hallucinations   Thinking distractable;delusional;poor concentration;confused   Orientation person, disoriented;place, disoriented;situation, disoriented   Memory baseline memory   Insight poor   Judgement impaired   Eye Contact at examiner   Affect blunted, flat;incongruent   Mood mood is calm   Physical Appearance/Attire attire appropriate to age and situation   Hygiene well groomed   Suicidality other (see comments)  (denies)   1. Wish to be Dead No   2. Non-Specific Active Suicidal Thoughts  No   Self Injury other (see comment)  (denies)   Elopement Statements about wanting to leave   Activity  restless;other (see comment)  (visible in the milieu)   Speech incoherent;clear;coherent   Medication Sensitivity no stated side effects   Psychomotor / Gait balanced;steady   Activities of Daily Living   Hygiene/Grooming independent   Oral Hygiene independent   Dress scrubs (behavioral health)   Room Organization independent

## 2017-12-14 NOTE — PROGRESS NOTES
Behavioral Health  Note  Behavioral Health  Spirituality Group Note    Unit 4AW    Name: Vinicio Givens    YOB: 1995   MRN: [id number]    Age: 22 year old    Patient attended -led group, which included discussion of spirituality, coping with illness and building resilience.  Patient attended group for 1 hrs.  Patient was very emotional and had a hard time staying on topic.    Izabella Zavaleta M.S., M.Div.  Staff   Pager [phone]

## 2017-12-14 NOTE — PROGRESS NOTES
[facility], [place]   Psychiatric Progress Note        Interim History:   The patient's care was discussed with the treatment team during the daily team meeting and/or staff's chart notes were reviewed.  Staff report patient attended his initial court hearing.     Patient changes his name multiple times per day. He has been refusing his medication due to his paranoia. Patient was dysregulated yesterday and las laying on the floor crying. Patient continues to demonstrate disorganized thinking.         Medications:       OLANZapine zydis  10 mg Oral BID     estradiol biweekly   Transdermal Q8H     estradiol biweekly   Transdermal Once per day on Mon Thu     estradiol  1 patch Transdermal Once per day on Mon Thu     estradiol  1 patch Transdermal Once per day on Mon Thu     spironolactone  150 mg Oral Daily          Allergies:     Allergies   Allergen Reactions     Banana Anaphylaxis     Throat swelling          Labs:   No results found for this or any previous visit (from the past 24 hour(s)).       Psychiatric Examination:     /85  Pulse 95  Temp 96  F (35.6  C) (Oral)  Resp 16  Ht 1.829 m (6')  Wt 79.1 kg (174 lb 6.1 oz)  SpO2 99%  BMI 23.65 kg/m2  Weight is 174 lbs 6.14 oz  Body mass index is 23.65 kg/(m^2).  Orthostatic Vitals       Most Recent      Sitting Orthostatic BP --  Comment: Pt refused 12/14 0906    Sitting Orthostatic Pulse (bpm) --  Comment: Pt refused 12/14 0906    Standing Orthostatic BP --  Comment: Pt refused 12/14 0906    Standing Orthostatic Pulse (bpm) --  Comment: Pt refused 12/14 0906      Appearance: awake, alert, adequately groomed and dressed in hospital scrubs  Attitude:  guarded  Eye Contact:  good  Mood:  angry and anxious  Affect:  intensity is heightened  Speech:  rambling  Psychomotor Behavior:  no evidence of tardive dyskinesia, dystonia, or tics  Throught Process:  disorganized  Associations:  loosening of associations present  Thought  Content:  no evidence of suicidal ideation or homicidal ideation  Insight:  limited  Judgement:  limited  Oriented to:  time, person, and place  Attention Span and Concentration:  fair  Recent and Remote Memory:  intact       Precautions:     Behavioral Orders   Procedures     Assault precautions     Code 1 - Restrict to Unit     Routine Programming     As clinically indicated     Sexual precautions     Status 15     Every 15 minutes.          DIagnoses:   1.  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   2.  Gender dysphoria.   3.  Cannabis use disorder, moderate.   4. R/o Schizophreniform disorder         Plan:     Legal: Petitioned for MICD commitment. Court supported petition. Patient is on a court hold as of 12/11 at 4:06 pm per Dara from court. Parents support petition.      Medication management: Zyprexa 10mg BID      Dispo: Stabilization with medications,, MICD treatment.

## 2017-12-14 NOTE — PROGRESS NOTES
"Pt woke around 0500 and approached the desk. Pt appears to be confused this morning. Pt stated they needed to talk to their family and asked how they could do that. Pt was advised that they could have them come visit or they could call their family. Pt needed reminders about when then phones are turned on. Pt stated, I had a dream. I was taken out of here on a stretcher and I opened my eyes and my family was surrounding me, smiling. Was that real or a dream?\" Psych associate advised pt that if if just happened, then it was just a dream. Pt then asked, \"Can you help me make it real?\" Pt was advised that that would not be possible. Moments later, pt approached the desk again and stated that they had a dream. This time pt stated, \"I had a dream I was taken out of here on a stretcher and my family was all around me and they were crying. Can you make it real?\" Pt as again advised that this was not possible. Pt returned to their room. Moments later, pt was approaching the desk again, this time without a shirt on and covering their eyes, repeating the same statements about the dream. Psych associate accompanied pt back to their room, where pt was crying and tangential, stating that they are a \"failure\" and that they miss their family. Psych associate spent a significant amount of time with pt, offering reassurance. Later, pt approached the desk and asked, \"If I were trying to have the best day possible, if I were trying to be the best person possible, would I sit in the lounge and wait or go down to that fire escape door and try to break out of here?\" Pt was advised that they should sit in the lounge and that it would not be good if they tried to escape.    "

## 2017-12-14 NOTE — PROGRESS NOTES
"Pt has again refused his medication with his multiple stipulations as to how he is going to take them. \"I need pudding\", \"I need coffee with them\", \"can you smash them into a fine powder\". This is constant with this pt and at times it can take hours for pt to take a few small pills. Pt very paranoid, psychotic and completley disorganized. Pt is seen talking,  mumbling to self quite often. This RN will continue to try to get pt to take his medication.  "

## 2017-12-14 NOTE — PROGRESS NOTES
"   12/13/17 2217   Behavioral Health   Hallucinations denies / not responding to hallucinations   Thinking confused;distractable;delusional;poor concentration   Orientation person, disoriented;situation, disoriented   Memory short term   Insight poor   Judgement impaired   Eye Contact at examiner   Affect blunted, flat;incongruent   Mood mood is calm   Physical Appearance/Attire multiple layers   Hygiene well groomed   Suicidality (Pt denies)   Self Injury (Pt denies)   Elopement Statements about wanting to leave   Activity restless   Speech tangential;clear   Medication Sensitivity no stated side effects     Pt seemed obsessed with collecting his meals tickets from start of shift. Pt mentioned meeting the goal of forgiving his dad but was not clear about what his dad did. Afterwards pt repeatedly mentioned, \" I should had forgiven my dad while they were here, but well I guess I did\". Pt appeared delusional, often confused and distractible. Pt seemed responding to internal stimuli and showing psychotic symptoms. Pt did deny SI and SIB. Pt overall, had a good shift. Pt was pleasant and cooperative. Pt did not attend group and was not observed taking shower. Pt ate some food from his dinner tray. Pt also mentioned eating a sandwich his visitors brought him.    "

## 2017-12-14 NOTE — PROGRESS NOTES
Pt returned from court at 1645.  Pt was happy to be back.  Denies SI/SIB.  Pt remains on a court hold and will return to court for trial on 12/19/17 at 0900.

## 2017-12-15 PROCEDURE — 25000132 ZZH RX MED GY IP 250 OP 250 PS 637: Performed by: CLINICAL NURSE SPECIALIST

## 2017-12-15 PROCEDURE — 90853 GROUP PSYCHOTHERAPY: CPT

## 2017-12-15 PROCEDURE — 25000125 ZZHC RX 250: Performed by: CLINICAL NURSE SPECIALIST

## 2017-12-15 PROCEDURE — 97150 GROUP THERAPEUTIC PROCEDURES: CPT | Mod: GO

## 2017-12-15 PROCEDURE — 25000132 ZZH RX MED GY IP 250 OP 250 PS 637: Performed by: EMERGENCY MEDICINE

## 2017-12-15 PROCEDURE — 99232 SBSQ HOSP IP/OBS MODERATE 35: CPT | Performed by: CLINICAL NURSE SPECIALIST

## 2017-12-15 PROCEDURE — 12400007 ZZH R&B MH INTERMEDIATE UMMC

## 2017-12-15 RX ADMIN — Medication 10 MG: at 21:45

## 2017-12-15 RX ADMIN — SPIRONOLACTONE 150 MG: 50 TABLET ORAL at 08:25

## 2017-12-15 RX ADMIN — ESTRADIOL 1 PATCH: 0.1 PATCH TRANSDERMAL at 09:07

## 2017-12-15 RX ADMIN — OLANZAPINE 10 MG: 10 TABLET, ORALLY DISINTEGRATING ORAL at 20:35

## 2017-12-15 RX ADMIN — OLANZAPINE 10 MG: 10 TABLET, ORALLY DISINTEGRATING ORAL at 08:25

## 2017-12-15 RX ADMIN — OLANZAPINE 5 MG: 10 INJECTION, POWDER, LYOPHILIZED, FOR SOLUTION INTRAMUSCULAR at 15:18

## 2017-12-15 ASSESSMENT — ACTIVITIES OF DAILY LIVING (ADL)
ORAL_HYGIENE: INDEPENDENT
DRESS: STREET CLOTHES
GROOMING: INDEPENDENT
DRESS: INDEPENDENT
ORAL_HYGIENE: INDEPENDENT
GROOMING: INDEPENDENT

## 2017-12-15 NOTE — PROGRESS NOTES
"Patient was visible in the milieu when Pt returned from the court. According to Pt, court went fine and patient got committed. Patient stated that Ze likes it here, and would like to stay. Patient denies SI, SIB, depression,  hallucinations, and rated mood at seven out of ten. Around 2040, patient became  restless, exhibiting poor boundaries as evidenced by hugging Pt \"Q\" and was given room break. Patient went to sleep after the incident. Overall, patient appears restless, tangential, exhibits poor boundaries, displays labile affect, and needs redirections.       12/14/17 2046   Behavioral Health   Hallucinations denies / not responding to hallucinations   Thinking distractable;poor concentration   Orientation person: oriented;place: oriented   Memory baseline memory   Insight poor   Judgement impaired   Eye Contact at examiner   Affect incongruent   Mood labile   Physical Appearance/Attire appears stated age;attire appropriate to age and situation   Hygiene well groomed   Suicidality other (see comments)  (Pt denies)   1. Wish to be Dead No   2. Non-Specific Active Suicidal Thoughts  No   Self Injury other (see comment)  (Pt denies)   Elopement (Pt denies)   Activity other (see comment)  (Participates, and needs redirections)   Speech tangential   Psychomotor / Gait balanced   Sleep/Rest/Relaxation   Day/Evening Time Hours up all shift   Coping/Psychosocial   Verbalized Emotional State acceptance   Supportive Measures active listening utilized;verbalization of feelings encouraged;relaxation techniques promoted;problem solving facilitated   Trust Relationship/Rapport thoughts/feelings acknowledged;questions encouraged;questions answered;empathic listening provided;emotional support provided   Activities of Daily Living   Hygiene/Grooming independent   Oral Hygiene independent   Dress independent   Room Organization independent   Activity   Activity Assistance Provided independent   Behavioral Health Interventions "   Psychotic Symptoms maintain safety precautions;maintain safe secure environment;decrease environmental stimulation;redirection of intrusive behaviors;provide emotional support;establish therapeutic relationship;build upon strengths   Social and Therapeutic Interventions (Psychotic Symptoms) encourage socialization with peers;encourage effective boundaries with peers;encourage participation in therapeutic groups and milieu activities

## 2017-12-15 NOTE — PLAN OF CARE
Problem: General Plan of Care (Inpatient Behavioral)  Goal: Team Discussion  Team Plan:    BEHAVIORAL TEAM DISCUSSION    Participants: Deborah Naegele NP, RN Riki Garcia, [facility] Ame Kahn, Director Samira Stokes, [facility] Lady  Progress:minimal progress  Continued Stay Criteria/Rationale: Pt is in the commitment process and is on a court hold.  Medical/Physical: receiving hormone replacement  Precautions:   Behavioral Orders   Procedures     Assault precautions     Code 1 - Restrict to Unit     Routine Programming     As clinically indicated     Sexual precautions     Status 15     Every 15 minutes.     Plan: pursue commitment and rojo order, pt to complete neuropsychological testing for cognitive functioning, communicate with family, CTC to explore options for discharge planning, manage medications per provider.  Rationale for change in precautions or plan:

## 2017-12-15 NOTE — PROGRESS NOTES
"Continuity of Care Note    Case is a patient of mine at [facility].  I stopped by to visit her while she is being evaluated for psychotic symptoms.    When I saw her, she stated she was doing better.  It was during visiting hours and she was playing a board game with her mom and dad.  The four of us made small talk for about 30 minutes and she appeared to be thinking logically although she did express an aversion to taking the antipsychotics that she had been placed on.  She stated she did not like the way they made her feel.  She continues to use the estradiol patches (currently taking 150 mcg) and states she no longer wants to take the spironolactone.  She also prefers \"ze\" pronouns now.  We did not discuss the reasons for her hospitalizations but I encouraged her to continue to participate in group activities, therapy, and take medications as advised.  I also asked her to follow up with me after she is discharged.    Bridgette Olguin M.D.  PGY-3, Family Medicine      "

## 2017-12-15 NOTE — PROGRESS NOTES
"Pt climbed behind the desk and refused to get out from behind the desk.   Pt kept saying \"I don't need protection, I'm fine\".  Staff tried several times but was unsuccessful in redirecting pt back to his room.  Code 21 was called,however pt started walking towards his room while the code was being called.  Pt continues to be disorganized and psychotic.  Pt was given I.M. Zyprexa but pt was cooperative and did not need to be held down.  Pt has been sleeping since Zyprexa was given.  Will continue to monitor.  "

## 2017-12-15 NOTE — PROGRESS NOTES
"This writer attempted to check-in with pt but was unable to complete due to pt's tangential thinking. Pt presents as confused and mildly cooperative with a labile affect. Pt attended groups and participated appropriately. Pt does not appear to be responding to auditory or visual hallucinations. In regards to SI and SIB, none reported or observed. Pt repeatedly stated, \"I feel like I'm going insane.\" This writer encouraged the pt to use different terminology and expressed how the environment can be difficult. Towards the end of the shift, code 21 was called due to the pt crawling over the door to the desk. Pt was not open to redirection.      12/15/17 1536   Behavioral Health   Hallucinations denies / not responding to hallucinations   Thinking distractable;poor concentration   Orientation person: oriented;place: oriented;date: oriented;time: oriented   Memory baseline memory   Insight poor   Judgement impaired   Eye Contact at examiner   Affect incongruent   Mood labile   Physical Appearance/Attire appears stated age   Hygiene well groomed   Suicidality other (see comments)  (None reported or observed)   1. Wish to be Dead No   2. Non-Specific Active Suicidal Thoughts  No   Self Injury other (see comment)  (None reported or observed)   Elopement Statements about wanting to leave   Activity hyperactive (agitated, impulsive);restless   Speech rambling;tangential   Medication Sensitivity no observed side effects   Psychomotor / Gait balanced   Safety   Suicidality Status 15   Psycho Education   Type of Intervention 1:1 intervention   Response participates, initiates socially appropriate   Hours 0.5   Treatment Detail Taking Action   Activities of Daily Living   Hygiene/Grooming independent   Oral Hygiene independent   Dress independent   Room Organization independent   Activity   Activity Assistance Provided independent   Behavioral Health Interventions   Psychotic Symptoms maintain safety precautions;monitor need to " revise level of observation;maintain safe secure environment;reality orientation;simple, clear language;decrease environmental stimulation;redirection of intrusive behaviors;redirection of aggressive behaviors;assist patient in developing safety plan;assist patient in following safety plan;encourage nutrition and hydration;encourage participation / independence with adls;provide emotional support;establish therapeutic relationship;assist with developing & utilizing healthy coping strategies;build upon strengths   Social and Therapeutic Interventions (Psychotic Symptoms) encourage socialization with peers;encourage effective boundaries with peers;encourage participation in therapeutic groups and milieu activities

## 2017-12-15 NOTE — PROGRESS NOTES
Writer spoke with pt's father Gaurav to discuss the discharge process. Gaurav was concerned about services in the community that can address pt's gender issues, mental health issues and chemical use. Writer shared that at this point it is difficult to determine if pt's symptoms are related to drug use or if this will end up being a SPMI. If pt does clear to baseline, writer recommended [facility] to treat drug use and provide LGBTQ resources and support. We also discussed IRTs placement as an option if neuro-psych testing shows SPMI. Pt is covered under [employer] commercial insurance that will not fund IRTs. Gaurav was going to research the cost of an IRTs if they want to pay out of pocket. Writer told Gaurav medical assistance is an option down the road which will fund on IRTs. If pt clears, the family is willing to have patient live at home and seek out patient mental health support. Writer did say that pt would benefit from MICD treatment as pt's brain needs time to heal.

## 2017-12-15 NOTE — PROGRESS NOTES
Pt had been found standing inside the desk area near the shredder and swing door that had been remained open. Pt just stood there with delayed response; gentle persuasion/directionnot successful; and finally moved after writer firmly instructed patient to jump on the carpet area. Pt walked away.    Again at 1505, pt managed to wander back near the  area requiring to be redirected with multiple prompts. Staff to monitor behavior for safety; Evening staff recommended to have doors locked at desk at tall times.

## 2017-12-15 NOTE — PROGRESS NOTES
"Attendence: Pt. Attended scheduled 3 of 3 OT sessions today.   Observations: pt is minimally intrusive on other patients reporting \"can I help, can I do something.\" Pt wants to participate, and with encouragement from therapist was able to effectively engage with peers on group tasks, with materials provided pt engaged in complex craft task that involved problem solving. Pt affect is incongruent, pt appears confrontational verbally with calm affect such as saying \"why not? Why can't I leave.?\" Or \"I think I'm going to go now.\" This writer has difficulty interpreting pt communication but otherwise pt is loosely appropriate and on topic. Pt demonstrated ability to focuse for 15-20mn at a time.     12/15/17 0412   Occupational Therapy   Type of Intervention structured groups   Response Participates with cues/redirection   Hours 1.5           "

## 2017-12-15 NOTE — PROGRESS NOTES
"[facility], [place]   Psychiatric Progress Note        Interim History:   The patient's care was discussed with the treatment team during the daily team meeting and/or staff's chart notes were reviewed.  Staff report patient is attending groups.    Spoke with patient's father who wanted an update  on discharge planing. Dicussed with father that patient is not ready for discharge. Patient is disorganized  and labile. Patient has been refusing medications at times. He needs lots of encouragement with regard to taking medications.     Discussed with father and patient neuro psych testing. Patient sustained a concussion in 2014. Obtaining records. Want to determine if there is a change from baseline. Patient is reporting a \"hard time remembering things\". He write everything down on post it notes and puts in on his door.     Continue Zyprexa 10 mg BID.  Will schedule family meeting after testing and court decision.          Medications:       OLANZapine zydis  10 mg Oral BID     estradiol biweekly   Transdermal Q8H     estradiol biweekly   Transdermal Once per day on Mon Thu     estradiol  1 patch Transdermal Once per day on Mon Thu     estradiol  1 patch Transdermal Once per day on Mon Thu     spironolactone  150 mg Oral Daily          Allergies:     Allergies   Allergen Reactions     Banana Anaphylaxis     Throat swelling          Labs:   No results found for this or any previous visit (from the past 24 hour(s)).       Psychiatric Examination:     /87  Pulse 84  Temp 96.4  F (35.8  C) (Oral)  Resp 16  Ht 1.829 m (6')  Wt 79.1 kg (174 lb 6.1 oz)  SpO2 99%  BMI 23.65 kg/m2  Weight is 174 lbs 6.14 oz  Body mass index is 23.65 kg/(m^2).  Orthostatic Vitals       Most Recent      Sitting Orthostatic /87 12/15 0832    Sitting Orthostatic Pulse (bpm) 84 12/15 0832    Standing Orthostatic /76 12/15 0832    Standing Orthostatic Pulse (bpm) 100 12/15 0832        Appearance: " awake, alert, adequately groomed and dressed in hospital scrubs  Attitude:  guarded  Eye Contact:  good  Mood:  angry and anxious  Affect:  intensity is heightened  Speech:  rambling  Psychomotor Behavior:  no evidence of tardive dyskinesia, dystonia, or tics  Throught Process:  disorganized  Associations:  loosening of associations present  Thought Content:  no evidence of suicidal ideation or homicidal ideation  Insight:  limited  Judgement:  limited  Oriented to:  time, person, and place  Attention Span and Concentration:  fair  Recent and Remote Memory:  intact       Precautions:     Behavioral Orders   Procedures     Assault precautions     Code 1 - Restrict to Unit     Routine Programming     As clinically indicated     Sexual precautions     Status 15     Every 15 minutes.          DIagnoses:   1.  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   2.  Gender dysphoria.   3.  Cannabis use disorder, moderate.   4. R/o Schizophreniform disorder  5. History of concussion 2014         Plan:     Legal: Petitioned for MICD commitment. Court supported petition. Patient is on a court hold as of 12/11 at 4:06 pm per Dara from court. Parents support petition. Second court hearing scheduled for Tuesday 12/19 at 9:00 am.      Medication management: Zyprexa 10mg BID      Dispo: Stabilization with medications,, MICD treatment.

## 2017-12-16 PROCEDURE — H2032 ACTIVITY THERAPY, PER 15 MIN: HCPCS

## 2017-12-16 PROCEDURE — 12400007 ZZH R&B MH INTERMEDIATE UMMC

## 2017-12-16 PROCEDURE — 25000132 ZZH RX MED GY IP 250 OP 250 PS 637: Performed by: CLINICAL NURSE SPECIALIST

## 2017-12-16 RX ADMIN — OLANZAPINE 10 MG: 5 TABLET, FILM COATED ORAL at 01:14

## 2017-12-16 RX ADMIN — OLANZAPINE 10 MG: 10 TABLET, ORALLY DISINTEGRATING ORAL at 08:29

## 2017-12-16 RX ADMIN — SPIRONOLACTONE 150 MG: 50 TABLET ORAL at 08:29

## 2017-12-16 RX ADMIN — OLANZAPINE 10 MG: 10 TABLET, ORALLY DISINTEGRATING ORAL at 22:12

## 2017-12-16 ASSESSMENT — ACTIVITIES OF DAILY LIVING (ADL)
GROOMING: INDEPENDENT
DRESS: STREET CLOTHES
ORAL_HYGIENE: INDEPENDENT

## 2017-12-16 NOTE — PLAN OF CARE
Problem: Psychotic Symptoms  Goal: Psychotic Symptoms  Signs and symptoms of listed problems will be absent or manageable.   Outcome: Improving   12/15/17 2131   Psychotic Symptoms   Psychotic Symptoms Assessed all   Psychotic Symptoms Present affect;mood;anxiety   Pt has been calm and social after he received prn Zyprexa during the code.  Pt has been present in the milieu.  Pt needs constant redirection and continues to display psychotic sx.  Pt rated their anxiety at 6.  Denies feeling depressed.  Maintained full range affect.  Denies SI/SIB.  Pt has been pacing the hallways stating she cannot sleep.  Prn melatonin given per request.  Pt also c/o intrusive thoughts and was encouraged by writer to talk to staff.  Pt also believed staff are 3 -D alien and are here at their schaefer and call.  However, pt  reports they felt better after check in with staff.  Pt requested a shower before bedtime.  Will continue to assess.

## 2017-12-16 NOTE — PROGRESS NOTES
Pt awoke and approached the desk at approximately 0100. Pt was initially not responding to staff and was avoidant of eye contact. Pt eventually spoke to psych associate. Pt received PRN Zyprexa 10 mg PO at approximately 0115. See psych associate's note for more information.

## 2017-12-16 NOTE — PROGRESS NOTES
"Pt checked in feeling 'down and cloudy.' Pt's stated goal of the day was, \"to play Catan with someone\"; this goal was met. Pt presents as confused and cooperative with full range affect. Pt reported having paranoid thoughts. To add, pt reported feeling depressed (3), agitated (5), and confused. Pt denies auditory and visual hallucinations. In regards to SI, pt verbalized having passive thoughts with no plan or intent to act. Pt denies SIB. Pt took a shower and mentioned feeling more cleared and calm. Pt's parents came during visiting time and they played Catan together. Towards the end of the shift, pt appeared paranoid with this writer.      12/16/17 1527   Behavioral Health   Hallucinations denies / not responding to hallucinations   Thinking distractable;poor concentration   Orientation person: oriented   Memory baseline memory   Insight poor   Judgement impaired   Eye Contact at examiner   Affect incongruent   Mood labile   Physical Appearance/Attire attire appropriate to age and situation;neat   Hygiene well groomed   Suicidality thoughts only   1. Wish to be Dead No   2. Non-Specific Active Suicidal Thoughts  No   Self Injury other (see comment)  (Pt denies SIB)   Elopement (No elopement concerns)   Activity hyperactive (agitated, impulsive)   Speech rambling;tangential   Medication Sensitivity no stated side effects;no observed side effects   Psychomotor / Gait balanced;steady   Safety   Suicidality Status 15   Psycho Education   Type of Intervention 1:1 intervention   Response participates, initiates socially appropriate   Hours 0.5   Treatment Detail Ways to [place]   Activities of Daily Living   Hygiene/Grooming independent   Oral Hygiene independent   Dress street clothes   Room Organization independent   Activity   Activity Assistance Provided independent   Behavioral Health Interventions   Psychotic Symptoms maintain safety precautions;monitor need to revise level of observation;maintain safe secure " environment;reality orientation;simple, clear language;decrease environmental stimulation;redirection of intrusive behaviors;redirection of aggressive behaviors;assist patient in developing safety plan;assist patient in following safety plan;encourage nutrition and hydration;encourage participation / independence with adls;provide emotional support;establish therapeutic relationship;assist with developing & utilizing healthy coping strategies;build upon strengths   Social and Therapeutic Interventions (Psychotic Symptoms) encourage socialization with peers;encourage effective boundaries with peers;encourage participation in therapeutic groups and milieu activities

## 2017-12-16 NOTE — PROGRESS NOTES
Patient had a difficult time sleeping tonight. There were several instances where patient emerged from room and approached desk looking confused and disoriented. At around 1:00 am, patient emerged from room and shared that Ze cannot shake the fact that Ze has been a failure. When this writer inquired more about why Ze thought Ze was a failure, patient could not formulate an appropriate response and would perplexingly ask this writer what Ze should say. Patient ruminated on this topic for the majority of the night shift and at one time appeared to escalate; this writer engaged in a lengthy conversation with patient and Ze appeared to calm considerably afterward. Patient was able to take some medication and settled to sleep at around 4:30 am.

## 2017-12-17 PROCEDURE — 25000132 ZZH RX MED GY IP 250 OP 250 PS 637: Performed by: CLINICAL NURSE SPECIALIST

## 2017-12-17 PROCEDURE — 12400007 ZZH R&B MH INTERMEDIATE UMMC

## 2017-12-17 RX ADMIN — OLANZAPINE 10 MG: 10 TABLET, ORALLY DISINTEGRATING ORAL at 07:47

## 2017-12-17 RX ADMIN — OLANZAPINE 10 MG: 10 TABLET, ORALLY DISINTEGRATING ORAL at 20:43

## 2017-12-17 RX ADMIN — Medication 10 MG: at 22:32

## 2017-12-17 RX ADMIN — SPIRONOLACTONE 150 MG: 50 TABLET ORAL at 07:47

## 2017-12-17 ASSESSMENT — ACTIVITIES OF DAILY LIVING (ADL)
LAUNDRY: WITH SUPERVISION
GROOMING: INDEPENDENT
ORAL_HYGIENE: INDEPENDENT
DRESS: SCRUBS (BEHAVIORAL HEALTH)

## 2017-12-17 NOTE — CONSULTS
Writer met with patient to begin psychological testing. About half way through testing, patient requested a break for a snack. Patient took a break and during that break family was on the unit to visit. Writer spoke with patient about shortening the visit and then completing testing. Patient initially agreed to this, but when writer later returned to complete testing, patient stated they do not want to complete testing today.    Writer will attempt to finish evaluation on Tuesday (12/19).      Molly Holliday Psy.D  Post Doctoral Psychology Resident  [facility]  [phone]

## 2017-12-17 NOTE — PLAN OF CARE
"Problem: Psychotic Symptoms  Goal: Psychotic Symptoms  Signs and symptoms of listed problems will be absent or manageable.   Outcome: No Change  Pts goal today was \"to not be tired\" and pt states ze is \"waking up\".  Pt denied depression and anxiety.  Pt denies SI and SIB.  PT states ze has no pain but is \"sore\". Pt explains ze is sore due \"not exercising\" Writer explained several exercises ze could do on the unit.  Pt plans to discharge to his parents house when ready.  Denies hallucinations, stated when first arrived ze would as an example have a hair clip out but it would look like an ant.  Pt's mother and friend visited.  Pt would like to be called Katie.  Pt's mother reminded us that he would like to use the keyboard at times.  Pt ate breakfast and lunch.        "

## 2017-12-17 NOTE — PROGRESS NOTES
Pt was visible in milieu for most of evening. Pt was appropriate social with staff and peers. Pt had roommates visit which went well.  Pt says he has a little depression and anxiety but it has improved significantly since the day.  Pt denies SI/SIB. Pt talked about plans to visit family in [place] when discharged.  No further concerns to report at this time.     12/16/17 2100   Behavioral Health   Hallucinations denies / not responding to hallucinations   Thinking poor concentration   Orientation person: oriented;place: oriented;date: oriented;time: oriented   Memory baseline memory   Insight poor   Judgement impaired   Eye Contact at examiner   Affect full range affect   Mood mood is calm   Physical Appearance/Attire attire appropriate to age and situation   Hygiene well groomed   Suicidality other (see comments)  (denies)   1. Wish to be Dead No   2. Non-Specific Active Suicidal Thoughts  No   Self Injury (denies)   Elopement (none)   Activity other (see comment)  (visible in milieu)   Speech clear   Medication Sensitivity no stated side effects;no observed side effects   Psychomotor / Gait balanced;steady   Psycho Education   Type of Intervention 1:1 intervention   Response participates, initiates socially appropriate   Hours 0.5   Treatment Detail Check in   Behavioral Health Interventions   Psychotic Symptoms maintain safety precautions;maintain safe secure environment;assist patient in following safety plan;encourage nutrition and hydration;encourage participation / independence with adls;provide emotional support;assist with developing & utilizing healthy coping strategies;build upon strengths;assess patient response to medication   Social and Therapeutic Interventions (Psychotic Symptoms) encourage socialization with peers;encourage effective boundaries with peers;encourage participation in therapeutic groups and milieu activities

## 2017-12-18 PROCEDURE — H2032 ACTIVITY THERAPY, PER 15 MIN: HCPCS

## 2017-12-18 PROCEDURE — 25000132 ZZH RX MED GY IP 250 OP 250 PS 637: Performed by: CLINICAL NURSE SPECIALIST

## 2017-12-18 PROCEDURE — 25000132 ZZH RX MED GY IP 250 OP 250 PS 637: Performed by: EMERGENCY MEDICINE

## 2017-12-18 PROCEDURE — 12400007 ZZH R&B MH INTERMEDIATE UMMC

## 2017-12-18 PROCEDURE — 99232 SBSQ HOSP IP/OBS MODERATE 35: CPT | Performed by: CLINICAL NURSE SPECIALIST

## 2017-12-18 RX ORDER — OLANZAPINE 10 MG/1
20 TABLET, ORALLY DISINTEGRATING ORAL AT BEDTIME
Status: DISCONTINUED | OUTPATIENT
Start: 2017-12-19 | End: 2017-12-21

## 2017-12-18 RX ORDER — OLANZAPINE 10 MG/1
10 TABLET, ORALLY DISINTEGRATING ORAL ONCE
Status: COMPLETED | OUTPATIENT
Start: 2017-12-18 | End: 2017-12-18

## 2017-12-18 RX ADMIN — Medication 10 MG: at 21:31

## 2017-12-18 RX ADMIN — OLANZAPINE 10 MG: 10 TABLET, ORALLY DISINTEGRATING ORAL at 08:03

## 2017-12-18 RX ADMIN — ESTRADIOL 1 PATCH: 0.05 PATCH TRANSDERMAL at 10:16

## 2017-12-18 RX ADMIN — SPIRONOLACTONE 150 MG: 50 TABLET ORAL at 08:03

## 2017-12-18 RX ADMIN — ESTRADIOL 1 PATCH: 0.1 PATCH TRANSDERMAL at 10:20

## 2017-12-18 RX ADMIN — OLANZAPINE 10 MG: 10 TABLET, ORALLY DISINTEGRATING ORAL at 21:30

## 2017-12-18 ASSESSMENT — ACTIVITIES OF DAILY LIVING (ADL)
ORAL_HYGIENE: INDEPENDENT
DRESS: INDEPENDENT
ORAL_HYGIENE: INDEPENDENT
LAUNDRY: UNABLE TO COMPLETE
GROOMING: INDEPENDENT
LAUNDRY: WITH SUPERVISION
DRESS: INDEPENDENT
HYGIENE/GROOMING: INDEPENDENT

## 2017-12-18 NOTE — PROGRESS NOTES
"   12/17/17 1800   Art Therapy   Type of Intervention structured groups   Response participates with encouragement   Hours 2.5   Pt attended all groups and was cooperative. \"Katie\" didn't like the rule about no erasers in rooms butt hecandis was accepting of this. Their family brought store bought holiday cookies for the pts.   "

## 2017-12-18 NOTE — PROGRESS NOTES
Pt indicated goal is to be patient  and to take zier medication and plans towards discharge include taking a Psych testing and completed personal care plan.  Pt denies Psychotic symptoms  Denies SI/SIB, denies auditory or visual hallucination  Pt is concern why ze is still here  Pt insuccated good appetite, good sleep and no pain  Pt is concern why ze is still here     12/18/17 1258   Behavioral Health   Hallucinations denies / not responding to hallucinations   Thinking confused;distractable;poor concentration   Orientation person: oriented;place: oriented;date: oriented;time: oriented   Memory baseline memory   Insight denial of illness;poor   Judgement impaired   Eye Contact at examiner   Affect blunted, flat   Mood mood is calm   Physical Appearance/Attire disheveled   Hygiene well groomed   Suicidality other (see comments)  (Pt denies SI thoughts)   1. Wish to be Dead No   2. Non-Specific Active Suicidal Thoughts  No   Self Injury other (see comment)  (Pt denies SI thoughts)   Elopement (Pt is not at risk of elopement at the moment)   Activity (Pt participated in grp,socialized,visible in the milieu)   Speech clear;coherent   Psychomotor / Gait balanced;steady   Psycho Education   Type of Intervention 1:1 intervention   Response participates, initiates socially appropriate   Hours 0.5   Activities of Daily Living   Hygiene/Grooming independent   Oral Hygiene independent   Dress independent   Laundry unable to complete   Room Organization independent   Activity   Activity Assistance Provided independent   Behavioral Health Interventions   Psychotic Symptoms encourage nutrition and hydration;encourage participation / independence with adls;provide emotional support;establish therapeutic relationship;assist with developing & utilizing healthy coping strategies;build upon strengths   Social and Therapeutic Interventions (Psychotic Symptoms) encourage socialization with peers;encourage effective boundaries with  peers;encourage participation in therapeutic groups and milieu activities

## 2017-12-18 NOTE — PROGRESS NOTES
12/17/17 2147   Behavioral Health   Hallucinations denies / not responding to hallucinations   Thinking distractable   Orientation time: oriented;date: oriented;place: oriented;person: oriented   Memory baseline memory   Insight poor   Judgement impaired   Eye Contact at examiner   Affect full range affect   Mood mood is calm   Physical Appearance/Attire appears stated age   Hygiene well groomed   1. Wish to be Dead No   2. Non-Specific Active Suicidal Thoughts  No   Self Injury other (see comment)  (Denies)   Speech coherent;clear   Medication Sensitivity no stated side effects   Psychomotor / Gait balanced;steady   Psycho Education   Type of Intervention 1:1 intervention   Response participates, initiates socially appropriate   Hours 0.5   Activities of Daily Living   Hygiene/Grooming independent   Oral Hygiene independent   Dress scrubs (behavioral health)   Laundry with supervision   Room Organization independent   Activity   Activity Assistance Provided independent   Behavioral Health Interventions   Psychotic Symptoms maintain safety precautions;assist patient in developing safety plan;assist patient in following safety plan;provide emotional support;encourage participation / independence with adls;establish therapeutic relationship   Social and Therapeutic Interventions (Psychotic Symptoms) encourage socialization with peers;encourage participation in therapeutic groups and milieu activities   Pt spent most of zer time in the room and zer was observed on the phone this shift. Pt appears isolative/withdrawn to room and out for making requests. Pt did decline one on one with the writer while checking in. Pt denies suicidal ideations/SIB

## 2017-12-18 NOTE — PROGRESS NOTES
"[facility], [place]   Psychiatric Progress Note        Interim History:   The patient's care was discussed with the treatment team during the daily team meeting and/or staff's chart notes were reviewed.  Staff report patient is participating in groups.     Patient breeched the nurse station on Friday evening. Patient report that she wanted to find out \"what was back there\". Discussed the importance of privacy and that for safety reasons patient's are not allowed behind the nurses station. Patient felt like the rule did not make sense but agreed not to try to go behind the nurses station.     Patient's father was notified of the breech and patient receiving IM medication. Patient did not complete neuro psych testing . Neuropsychologist will return this week to finish. Yesenia father is obtaining records after patient concussion to determine if there is any change from baseline. Patient will remain on Zyprexa 20 mg daily. She requested to have the medication scheduled before bedtime.          Medications:       OLANZapine zydis  10 mg Oral BID     estradiol biweekly   Transdermal Q8H     estradiol biweekly   Transdermal Once per day on Mon Thu     estradiol  1 patch Transdermal Once per day on Mon Thu     estradiol  1 patch Transdermal Once per day on Mon Thu     spironolactone  150 mg Oral Daily          Allergies:     Allergies   Allergen Reactions     Banana Anaphylaxis     Throat swelling          Labs:   No results found for this or any previous visit (from the past 24 hour(s)).       Psychiatric Examination:     /74  Pulse 103  Temp 96.4  F (35.8  C) (Oral)  Resp 16  Ht 1.829 m (6')  Wt 79.1 kg (174 lb 6.1 oz)  SpO2 99%  BMI 23.65 kg/m2  Weight is 174 lbs 6.14 oz  Body mass index is 23.65 kg/(m^2).  Orthostatic Vitals       Most Recent      Sitting Orthostatic /74 12/17 0838    Sitting Orthostatic Pulse (bpm) 103 12/17 0838    Standing Orthostatic /79 12/17 " 0838    Standing Orthostatic Pulse (bpm) 116 12/17 0838        Appearance: awake, alert, adequately groomed and dressed in hospital scrubs  Attitude:  guarded  Eye Contact:  good  Mood:  angry and anxious  Affect:  intensity is heightened  Speech:  rambling  Psychomotor Behavior:  no evidence of tardive dyskinesia, dystonia, or tics  Throught Process:  disorganized  Associations:  loosening of associations present  Thought Content:  no evidence of suicidal ideation or homicidal ideation  Insight:  limited  Judgement:  limited  Oriented to:  time, person, and place  Attention Span and Concentration:  fair  Recent and Remote Memory:  intact         Precautions:     Behavioral Orders   Procedures     Code 1 - Restrict to Unit     Routine Programming     As clinically indicated     Sexual precautions     Status 15     Every 15 minutes.          DIagnoses:   1.  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   2.  Gender dysphoria.   3.  Cannabis use disorder, moderate.   4. R/o Schizophreniform disorder  5. History of concussion 2014            Plan:   Legal: Petitioned for MICD commitment. Court supported petition. Patient is on a court hold as of 12/11 at 4:06 pm per Dara from court. Parents support petition. Second court hearing scheduled for Tuesday 12/19 at 9:00 am.      Medication management: Zyprexa 20 mg at HS starting on 12/19      Dispo: Stabilization with medications,, MICD treatment.

## 2017-12-18 NOTE — PROGRESS NOTES
12/17/17 2147   Behavioral Health   Hallucinations denies / not responding to hallucinations   Thinking distractable   Orientation time: oriented;date: oriented;place: oriented;person: oriented   Memory baseline memory   Insight poor   Judgement impaired   Eye Contact at examiner   Affect full range affect   Mood mood is calm   Physical Appearance/Attire appears stated age   Hygiene well groomed   1. Wish to be Dead No   2. Non-Specific Active Suicidal Thoughts  No   Self Injury other (see comment)  (Denies)   Speech coherent;clear   Medication Sensitivity no stated side effects   Psychomotor / Gait balanced;steady   Psycho Education   Type of Intervention 1:1 intervention   Response participates, initiates socially appropriate   Hours 0.5   Activities of Daily Living   Hygiene/Grooming independent   Oral Hygiene independent   Dress scrubs (behavioral health)   Laundry with supervision   Room Organization independent   Activity   Activity Assistance Provided independent   Behavioral Health Interventions   Psychotic Symptoms maintain safety precautions;assist patient in developing safety plan;assist patient in following safety plan;provide emotional support;encourage participation / independence with adls;establish therapeutic relationship   Social and Therapeutic Interventions (Psychotic Symptoms) encourage socialization with peers;encourage participation in therapeutic groups and milieu activities   Pt reported being stressed out and he was observed watching sports this shift. He denies suicidal ideation and he has some family issues with his wife. He has been quiet and appears cooperative and pleasant on approach. Pt's mom and brother visited this evening and the interaction went well.

## 2017-12-18 NOTE — PROGRESS NOTES
"   12/18/17 1600   Art Therapy   Type of Intervention structured groups   Response participates with cues/redirection   Hours 3   Pt was mostly kind and cooperative throughout the day in groups. In the afternoon they came in and said \"help me, I'm scared.\" Writer talked him through it and theyseemed like they couldn't let go of some fear. They said they were afraid they would never see their family again . Writer talked about their family being here several times to visit and that was not going to happen. They then asked another pt if they stared into space often. The other pt said \" yes you do.\" Pt just seemed off in the afternoon.  "

## 2017-12-19 PROCEDURE — H2032 ACTIVITY THERAPY, PER 15 MIN: HCPCS

## 2017-12-19 PROCEDURE — 25000132 ZZH RX MED GY IP 250 OP 250 PS 637: Performed by: CLINICAL NURSE SPECIALIST

## 2017-12-19 PROCEDURE — 12400007 ZZH R&B MH INTERMEDIATE UMMC

## 2017-12-19 PROCEDURE — 99232 SBSQ HOSP IP/OBS MODERATE 35: CPT | Performed by: CLINICAL NURSE SPECIALIST

## 2017-12-19 RX ADMIN — OLANZAPINE 20 MG: 10 TABLET, ORALLY DISINTEGRATING ORAL at 20:48

## 2017-12-19 RX ADMIN — SPIRONOLACTONE 150 MG: 50 TABLET ORAL at 08:18

## 2017-12-19 ASSESSMENT — ACTIVITIES OF DAILY LIVING (ADL)
GROOMING: INDEPENDENT
DRESS: INDEPENDENT
ORAL_HYGIENE: INDEPENDENT
DRESS: INDEPENDENT
ORAL_HYGIENE: INDEPENDENT
GROOMING: INDEPENDENT

## 2017-12-19 NOTE — PROGRESS NOTES
Pt active and social in milieu for most of shift. Pt played [employer] pong with peers and staff. Pt reports feeling worse than yesterday but is not sure why. Reports having fatigue during the day which is new. Pt has been sleeping fine. Pt feels safe on unit and does not want to go home before ready. Pt would like to go live with mom and dad upon discharge and attend some sort of day treatment during the day. Pt reports does not like change and that's why pt would like to go back to parents after discharge. Pt reports keep smelling vomit and feces throughout shift but is not sure if it is real or not. Denies SI, SIB, HI, and AH.        12/18/17 2000   Behavioral Health   Hallucinations denies / not responding to hallucinations   Thinking distractable;confused   Orientation person: oriented;place: oriented   Memory baseline memory   Insight poor   Judgement impaired   Eye Contact at examiner   Affect blunted, flat;sad   Mood mood is calm   Physical Appearance/Attire attire appropriate to age and situation;appears stated age   Hygiene well groomed   Suicidality other (see comments)  (denies)   1. Wish to be Dead No   2. Non-Specific Active Suicidal Thoughts  No   Self Injury other (see comment)  (denies)   Elopement (no concerns )   Activity (visible and social in milieu)   Speech clear;coherent   Medication Sensitivity no stated side effects;no observed side effects   Psychomotor / Gait balanced;steady   Psycho Education   Type of Intervention 1:1 intervention   Response participates, initiates socially appropriate   Hours 0.5   Activities of Daily Living   Hygiene/Grooming independent   Oral Hygiene independent   Dress independent   Laundry with supervision   Room Organization independent

## 2017-12-19 NOTE — PROGRESS NOTES
[facility], [place]   Psychiatric Progress Note        Interim History:   The patient's care was discussed with the treatment team during the daily team meeting and/or staff's chart notes were reviewed.  Staff report patient went to court today.     Patient reports he was placed on a stay of commitment. Discussed with patient what that meant for him. He reports that he wants  to take his Zyprexa. He is scheduled for a Rule 25 on Thursday. Patient was more subdued after returning from court. He continues to improve. He appears more organized in his thinking.     Patient completed his neuropsych testing and results were delivered back to psychology. Analysis is pending.         Medications:       OLANZapine zydis  20 mg Oral At Bedtime     estradiol biweekly   Transdermal Q8H     estradiol biweekly   Transdermal Once per day on Mon Thu     estradiol  1 patch Transdermal Once per day on Mon Thu     estradiol  1 patch Transdermal Once per day on Mon Thu     spironolactone  150 mg Oral Daily          Allergies:     Allergies   Allergen Reactions     Banana Anaphylaxis     Throat swelling          Labs:   No results found for this or any previous visit (from the past 24 hour(s)).       Psychiatric Examination:     /74  Pulse 103  Temp 96.4  F (35.8  C) (Oral)  Resp 16  Ht 1.829 m (6')  Wt 79.1 kg (174 lb 6.1 oz)  SpO2 99%  BMI 23.65 kg/m2  Weight is 174 lbs 6.14 oz  Body mass index is 23.65 kg/(m^2).  Orthostatic Vitals     None      Appearance: awake, alert, adequately groomed and dressed in hospital scrubs  Attitude:  guarded  Eye Contact:  good  Mood:  angry and anxious  Affect:  intensity is heightened  Speech:  rambling  Psychomotor Behavior:  no evidence of tardive dyskinesia, dystonia, or tics  Throught Process:  disorganized  Associations:  loosening of associations present  Thought Content:  no evidence of suicidal ideation or homicidal ideation  Insight:   limited  Judgement:  limited  Oriented to:  time, person, and place  Attention Span and Concentration:  fair  Recent and Remote Memory:  intact         Precautions:     Behavioral Orders   Procedures     Code 1 - Restrict to Unit     Routine Programming     As clinically indicated     Sexual precautions     Status 15     Every 15 minutes.          DIagnoses:   1.  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   2.  Gender dysphoria.   3.  Cannabis use disorder, moderate.   4. R/o Schizophreniform disorder  5. History of concussion 2014          Plan:     Legal: Stay of commitment a of 12/19/2017.      Medication management: Zyprexa 20 mg at HS starting on 12/19      Dispo: Stabilization with medications,, MICD treatment.

## 2017-12-19 NOTE — PROGRESS NOTES
"   12/19/17 1400   Art Therapy   Type of Intervention structured groups   Response participates with encouragement   Hours 1.5   Pt attended the second part of group after returning from court. They did a thoughtful self portrait and also expressed an interest in talking to writer 1:1 about Art Therapy and Social Justice. Pt was a bit quiet, they mentioned that \" court had scared them.\", because change and new environments are difficult for them.    Pt asked to speak to writer 1:1, writer will do this Wednesday morning.   Writer gave pt written info about the field of Art Therapy which he expressed an interest in learning more about.  "

## 2017-12-19 NOTE — PROGRESS NOTES
Per [facility] [address], pt placed on a stay of commitment. The conditions are that pt take meds, complete Rule 25, and cooperate with staff and discharge plan.  The [facility] CM is [facility] [phone].

## 2017-12-19 NOTE — PROGRESS NOTES
Pt was present in the milieu  Pt attended groups and was an active participant  Pt ate all meals     Pt was quiet and subdued during this shift. Pt did not have any emotional outbursts and appeared flat and tense at times. Pt was quiet when around other pts for the most part. Pt displayed a flat affect. Pt was quiet and gave short answers when talking to other people.        12/19/17 1452   Behavioral Health   Hallucinations denies / not responding to hallucinations   Thinking distractable   Orientation person: oriented;place: oriented;time: oriented   Memory baseline memory   Insight poor   Judgement impaired   Eye Contact at examiner   Affect blunted, flat;tense   Mood mood is calm   Physical Appearance/Attire attire appropriate to age and situation   Hygiene well groomed   Suicidality other (see comments)  (does not appear responding )   1. Wish to be Dead No   2. Non-Specific Active Suicidal Thoughts  No   Self Injury other (see comment)  (does not appear responding)   Elopement (no concerns at this time)   Activity other (see comment)  (present in the milieu)   Speech clear;coherent   Medication Sensitivity no stated side effects   Psychomotor / Gait balanced;steady   Activities of Daily Living   Hygiene/Grooming independent   Oral Hygiene independent   Dress independent   Room Organization independent

## 2017-12-20 PROCEDURE — H2032 ACTIVITY THERAPY, PER 15 MIN: HCPCS

## 2017-12-20 PROCEDURE — 12400007 ZZH R&B MH INTERMEDIATE UMMC

## 2017-12-20 PROCEDURE — 90832 PSYTX W PT 30 MINUTES: CPT

## 2017-12-20 PROCEDURE — 25000132 ZZH RX MED GY IP 250 OP 250 PS 637: Performed by: CLINICAL NURSE SPECIALIST

## 2017-12-20 PROCEDURE — 99232 SBSQ HOSP IP/OBS MODERATE 35: CPT | Performed by: CLINICAL NURSE SPECIALIST

## 2017-12-20 RX ORDER — OLANZAPINE 5 MG/1
5-10 TABLET, ORALLY DISINTEGRATING ORAL 2 TIMES DAILY PRN
Status: DISCONTINUED | OUTPATIENT
Start: 2017-12-20 | End: 2017-12-22 | Stop reason: HOSPADM

## 2017-12-20 RX ADMIN — SPIRONOLACTONE 150 MG: 50 TABLET ORAL at 08:12

## 2017-12-20 RX ADMIN — OLANZAPINE 20 MG: 10 TABLET, ORALLY DISINTEGRATING ORAL at 21:37

## 2017-12-20 ASSESSMENT — ACTIVITIES OF DAILY LIVING (ADL)
DRESS: INDEPENDENT
GROOMING: INDEPENDENT
LAUNDRY: UNABLE TO COMPLETE
ORAL_HYGIENE: INDEPENDENT
ORAL_HYGIENE: INDEPENDENT
DRESS: SCRUBS (BEHAVIORAL HEALTH);INDEPENDENT
GROOMING: INDEPENDENT
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
## 2017-12-20 NOTE — PROGRESS NOTES
Was visible in the milieu, and reported that Ze feels fine, denies SI, SIB, hallucinations, and depression. Patient requested to talk to  Provider tomorrow about Pt's discharge. Overall, patient appears anxious, socially withdrawn, relates well with other patients,displays incongruent affect, and accepts redirections.     12/19/17 1845   Behavioral Health   Hallucinations denies / not responding to hallucinations   Thinking distractable   Orientation person: oriented;place: oriented   Memory baseline memory   Insight admits / accepts   Judgement impaired   Eye Contact at examiner   Affect incongruent   Mood anxious   Physical Appearance/Attire appears stated age;attire appropriate to age and situation   Hygiene well groomed   Suicidality other (see comments)  (Pt denies)   1. Wish to be Dead No   2. Non-Specific Active Suicidal Thoughts  No   Self Injury other (see comment)  (Pt denies)   Elopement (No concern)   Activity withdrawn;other (see comment)  (Participates)   Speech clear;coherent   Psychomotor / Gait balanced;steady   Sleep/Rest/Relaxation   Day/Evening Time Hours up all shift   Coping/Psychosocial   Verbalized Emotional State acceptance   Supportive Measures verbalization of feelings encouraged;self-care encouraged;relaxation techniques promoted;positive reinforcement provided;problem solving facilitated;active listening utilized   Trust Relationship/Rapport thoughts/feelings acknowledged;reassurance provided;questions encouraged;questions answered   Activities of Daily Living   Hygiene/Grooming independent   Oral Hygiene independent   Dress independent   Room Organization independent   Activity   Activity Assistance Provided independent   Behavioral Health Interventions   Psychotic Symptoms maintain safety precautions;maintain safe secure environment;decrease environmental stimulation;provide emotional support;establish therapeutic relationship;build upon strengths   Social and Therapeutic Interventions  (Psychotic Symptoms) encourage socialization with peers;encourage effective boundaries with peers;encourage participation in therapeutic groups and milieu activities

## 2017-12-20 NOTE — PROGRESS NOTES
[facility], [place]   Psychiatric Progress Note        Interim History:   The patient's care was discussed with the treatment team during the daily team meeting and/or staff's chart notes were reviewed.  Staff report patient completed neuropsych testing.     Patient agreed to a stay of commitment. Discussed with patient that he will meet with a CD  on Thursday. Patient wants to go to treatment but wants to live at home.     Patient is more organized. He was able to finish the neuropsych testing with encouragement. He has been calmer . He denies suicidal ideation. He presents with a flat affect . He has been more subdued since he returned from court yesterday. Patient has been compliant with Zyprexa.          Medications:       OLANZapine zydis  20 mg Oral At Bedtime     estradiol biweekly   Transdermal Q8H     estradiol biweekly   Transdermal Once per day on Mon Thu     estradiol  1 patch Transdermal Once per day on Mon Thu     estradiol  1 patch Transdermal Once per day on Mon Thu     spironolactone  150 mg Oral Daily          Allergies:     Allergies   Allergen Reactions     Banana Anaphylaxis     Throat swelling          Labs:   No results found for this or any previous visit (from the past 24 hour(s)).       Psychiatric Examination:     /74  Pulse 95  Temp 98.6  F (37  C) (Oral)  Resp 16  Ht 1.829 m (6')  Wt 79.1 kg (174 lb 6.1 oz)  SpO2 99%  BMI 23.65 kg/m2  Weight is 174 lbs 6.14 oz  Body mass index is 23.65 kg/(m^2).  Orthostatic Vitals       Most Recent      Sitting Orthostatic /74 12/20 0810    Sitting Orthostatic Pulse (bpm) 95 12/20 0810    Standing Orthostatic /75 12/20 0810    Standing Orthostatic Pulse (bpm) 115 12/20 0810        Appearance: awake, alert, adequately groomed and dressed in hospital scrubs  Attitude:  guarded  Eye Contact:  good  Mood:  angry and anxious  Affect:  intensity is heightened  Speech:  rambling  Psychomotor  Behavior:  no evidence of tardive dyskinesia, dystonia, or tics  Throught Process:  disorganized  Associations:  loosening of associations present  Thought Content:  no evidence of suicidal ideation or homicidal ideation  Insight:  limited  Judgement:  limited  Oriented to:  time, person, and place  Attention Span and Concentration:  fair  Recent and Remote Memory:  intact          Precautions:     Behavioral Orders   Procedures     Code 1 - Restrict to Unit     Routine Programming     As clinically indicated     Sexual precautions     Status 15     Every 15 minutes.          DIagnoses:   1.  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   2.  Gender dysphoria.   3.  Cannabis use disorder, moderate.   4. R/o Schizophreniform disorder  5. History of concussion 2014          Plan:      Legal: Stay of commitment a of 12/19/2017.      Medication management: Zyprexa 20 mg at HS starting on 12/19      Dispo: Stabilization with medications,, MICD treatment per parents..

## 2017-12-20 NOTE — PROGRESS NOTES
"Pt was present in the milieu  Pt attended groups and was an active participant  Pt ate both meals     Pt was mostly withdrawn during the day. Pt spent some time socializing over card games otherwise pt kept to self and was quiet for most of the day. Pt reported feeling \"all over the place\". When asked what this meant pt stated having some negative thoughts but feeling ok. Pt denied any depression or anxiety. Pt appeared calm and appeared to be dealing with any negative thoughts well.     Pt denied SI SIB Depression and Anxiety         12/20/17 1416   Behavioral Health   Hallucinations denies / not responding to hallucinations   Thinking distractable   Orientation person: oriented;place: oriented;time: oriented   Memory baseline memory   Insight admits / accepts   Judgement impaired   Eye Contact at examiner   Affect blunted, flat;tense   Mood mood is calm   Physical Appearance/Attire attire appropriate to age and situation   Hygiene well groomed   Suicidality other (see comments)  (denies)   1. Wish to be Dead No   2. Non-Specific Active Suicidal Thoughts  No   Self Injury other (see comment)  (denies)   Elopement (no concerns at this time)   Activity withdrawn   Speech clear;coherent   Medication Sensitivity no stated side effects   Psychomotor / Gait balanced;steady   Activities of Daily Living   Hygiene/Grooming independent   Oral Hygiene independent   Dress independent   Room Organization independent   Behavioral Health Interventions   Psychotic Symptoms maintain safety precautions;monitor need to revise level of observation;maintain safe secure environment;reality orientation;simple, clear language;decrease environmental stimulation;redirection of intrusive behaviors;provide emotional support;establish therapeutic relationship;build upon strengths;assist with developing & utilizing healthy coping strategies   Social and Therapeutic Interventions (Psychotic Symptoms) encourage socialization with peers;encourage " effective boundaries with peers;encourage participation in therapeutic groups and milieu activities

## 2017-12-20 NOTE — PROGRESS NOTES
Spoke to , Z[facility] [phone], via phone.  She will be on vacation starting tomorrow until Jan 2.  In her absence, her supervisor Jonathan Rahman can be called at [phone].     They are in favor of a MI/CD program that can support pt in her gender dysphoria.  They feel that [facility] or [facility] would be best. They are okay with pt returning home if the recommendation is outpatient programing.  They would also be okay with pt returning home while awaiting inpatient treatment if that is recommended.  She thinks that pt's parents would support this plan also.     When pt does discharge, they would like to be present for the discharge meeting.

## 2017-12-20 NOTE — PROGRESS NOTES
NAME: Vinicio Givens  MRN: [id number]  : 1995  PAYNE: 2017    Neuropsychology Laboratory  [facility]  [address]  [place] [address] [phone]    NEUROPSYCHOLOGICAL EVALUATION    RELEVANT HISTORY AND REASON FOR REFERRAL    This is a report of neuropsychological consultation regarding Vinicio Givens, a 22-year-old, right-handed, transgendered individual (male to female) currently hospitalized in a locked psychiatric unit on court commitment for what appears to be a psychotic episode. There have been concerns about cognitive difficulty and disorganization. They are referred for neuropsychological evaluation of brain function by Debra Ann Naegele, APRN, CNS.     The patient has stated her preferred name is Katie/Janey. There are notes indicate shifting gender identifications during this admission, with changing preferences for pronouns of she/her or they/their. Notes from primary are visits earlier in the year indicate female identification and preference for they/their pronouns, which will be used for this report.     I understand that the hospitalization began on 2017 and was preceded by a few weeks of increasing paranoid or persecutory ideation. The patient reported feeling like there were lasting effects from one use of LSD earlier this . They were using marijuana regularly, and cannabinoids screening was positive on admission. They began taking feminizing hormones earlier this summer and abruptly stopped them two weeks prior to the hospitalization. They were described as anxious, labile, agitated, tangential and disorganized (with loosening of associations and inability to track conversations), pressured, paranoid, delusional (grandiosity), and depressed. Early on, there were issues with trying to run from the unit and requirements for restraints and intramuscular olanzapine injection. Staff notes indicate some continued issues with impulsivity and  reduced adherence to boundaries from staff and with other patients.     Suicidal ideation and hallucinatory experiences have been consistently denied. There has been no evidence of tardive dyskinesia, dystonia, or tics. Orientation and memory have been seen as intact. Focus, concentration, insight, and judgment have been seen as reduced or limited. They are described as relying heavily on sticky notes to keep track of everything. Ms. Naegele raised concerns about possible cognitive contribution from a concussion in 2014.     In my interview, Katie reported always relying on notes and lists to track things. They felt this was more prominent in the early days of the admission but has seemed better as a routine was getting established. They described a longstanding tendency to  stare off  while thinking to themself, and that they are  well known  for this habit among friends and roommates. They said the roommates had not raised any concerns about cognitive changes in weeks prior to this admission.     They described the hospitalization as prompted by inability to complete school work. They denied any paranoid or persecutory ideation. I described reports in the medical records about professors seeming to work against them ( ...professors at the [facility] [employer] were giving very complicated answers to patient's questions so that patient would not be able to understand. The patient believes the professors were doing this intentionally and then stopped answering patient's questions,  per notes from Ms. Naegele). Katie indicated not knowing what I was talking about and guessed it must have been more about the course material becoming more difficult rather than professors refusing to provide more information or conspiring against them.     They denied any hallucinatory experiences. Suicidal ideation was denied. They stated this is their first psychiatric hospitalization. The first few days on olanzapine seemed accompanied  by exhaustion, but energy levels seem a bit better now.     Sleep is reported as pretty good now, but they feel less rested without access to marijuana. They reported using marijuana 2 to 4 times per week in order to fall asleep. They denied any problems with alcohol use. They did not think trazodone was being given regularly during this admission.     There is no history of stroke, seizure, migraine, or motoric dyscontrol. Visual acuity seems a little worse, whereas senses of taste and hearing seem heightened.     The past concussion was described as a minor and uncomplicated. I do not have access to acute records, and I see no related notes in University records or through [facility]. They told me that they hit their head during a wrestling practice. There was momentary altered awareness, and their vision seemed to have a green cast to it. Dizziness and a little confusion was present for the rest of the day. A few days later, they noticed some cognitive slowing and memory trouble that seemed to last for 1 to 2 months, and then it cleared.     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 Early academic functioning is notable for delays and struggles in language arts. They reported receiving special education services for reading until late elementary school (~4th grade). Reading, spelling, and pronunciation skills are still areas of weakness. In contrast, they reported always being about one grade level ahead for math skills. 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 They reported having a college GPA of 3.6. They have not been working outside of school.     Regarding relevant family history, Katie reported suspicion that their mother would qualify for a diagnosis of bipolar disorder, but other mental or behavioral issues in the family were denied.     BEHAVIORAL OBSERVATIONS    Katie was polite and cooperative with the examination. Mood  was neutral, with mood-congruent affect. Attentional control was appropriate. Comprehension was normal. They were alert and not somnolent. There were no indications in interview of markedly disorganized, tangential, or loosened thought processes. There were indications of subtle thought blocking and being a bit overly ideational or pseudophilosophical. Insight appeared fair, but I do not have access to collateral informants to confirm the patient s descriptions of recent history. Effort and persistence appeared to be good during the testing session. They were given the MMPI-2-RF with instructions to work on it right away, but it was not completed until ~24 hours later. They reported not working on it because it seemed unimportant. Approximately 25% of the items had darkening of both true and false circles, with one erased (i.e., they been either initially mismarked or the patient changed answers). Validity scales on the MMPI-2-RF were abnormal (see below). The cognitive data are seen as reasonable reflections of her current functioning, although current circumstances and a less than ideal testing environment could reduce reliability of the data. The self-report questionnaire data have a higher likelihood of being neither reliable nor valid.     MEASURES ADMINISTERED    Of note, Molly Holliday PsyD, a post-doc from [employer] began some cognitive assessments on 12/17/2017. She informs me that items from the RBANS and WAIS-IV were obtained, and more will be attempted when she returns to the clinic on 12/20/2017. In order to avoid unnecessary repetition of tests, I obtained a truncated evaluation. The results of this evaluation should be interpreted in light of the results from Dr. Holliday s evaluation, and vice versa.     In addition to my clinical interview and the [place] Multiphasic Personality [id number]-3-KM, the following measures were administered by a trained  psychometrist, under my direct supervision: Controlled Oral Word Association Test; Animal Naming Test; Test of Sustained Attention and Tracking; [place] Card Sorting Test; Jono-Osterrieth Complex Figure Test.    RESULTS AND INTERPRETATION    Complex figure copying was normal, with no indications of fundamental perceptual or constructional defects. All figure elements were drawn with normal gestalt organization and accuracy. A few minutes later, free recall of the figure was average. Of note, the patient was spontaneously able to provide the majority of details from the RBANS story memory test during my interview (a day after Dr. Holliday administered the measure), which should reduce any concerns about anterograde verbal memory. Associative verbal fluency was above average for alphabetic constraints and low average for semantic constraints (production rates were equivalent across conditions, but semantic fluency usually has higher yield). Immediate attention, sustained attention, working memory, vigilance, and control over divided attention were normal on a variety of items requiring speeded verbal responding (e.g., reciting days of the week forward and backward, counting while interspersing the alphabet). Conceptualization, inductive reasoning, and using continuous feedback to correct errors were in the normal range on an ambiguous card-sorting task. Notably, there was one set-loss error, which is rarely seen in healthy individuals and may be an indication of prefrontal issues or diminished engagement with the task.     The patient s response set on the MMPI-2-RF was probably invalid. As noted above, a little over 25% of the responses had been marked both true and false at some point. There were abnormal elevations on multiple validity scales, with indications of inconsistent responding, possible overreporting of psychological symptoms (but without excessive endorsement of psychosis symptoms), very likely  overreporting of somatic or seemingly neurologic symptoms, and possible overreporting associated with non-credible memory concerns. There were no indications of defensiveness or overly favorable self-presentation. In this context, Two of the core clinical scales were highly elevated, and five more were borderline to mildly elevated. The highest scale is associated with significant persecutory ideation that likely rises to the level of paranoid delusions. The second highest scale relates to pathological preoccupations with physical health, with likely expression of psychological distress through somatic complaints, fatigue, and malaise. In descending order, the other borderline to mild scales relate to aberrant perceptual experiences and disorganized thinking associated with psychosis, obsessive/ruminative anxiety and heightened stress reactivity, antisocial behaviors or generally falling outside of societal norms, subjective dysphoria and vegetative depression, and generalized demoralization and pessimism. Overall, the profile could be consistent with a psychotic disorder. However, the profile could be misleading from potential overreporting of symptoms due to acute distress (i.e., possible embellishment of suffering) or limited effort to respond appropriately and consistently to the test items.     IMPRESSIONS AND RECOMMENDATIONS    The neuropsychological results are abnormal.     The present cognitive dataset was limited by choice, as another provider was performing concurrent cognitive testing. Nonetheless, none of the patient s performances suggested impaired cognition. This included measures of attention, concentration, working memory, executive functioning, visuoperceptual and constructional abilities, lexical retrieval, and memory. All performances were within the normal range. Staff descriptions of disorganization and scattered cognition are within expectations for individuals experiencing a psychotic  episode, and this may explain the presenting concerns. Given the results of the MMPI-2-RF, there may also be a role for indirect expression of emotional distress through somatic concerns, which includes cognitive inefficiency.     The patient describes a mild, uncomplicated concussion several years ago. In that setting, directly related cognitive symptoms are expected to clear within 1  weeks, with exacerbation or prolonged recovery common in individuals with somatizing tendencies. The description of 1-2 months of mild symptoms is thus entirely within expectations for the injury and the patient s likely emotional coping patterns. There is no reason to suspect that past concussion has direct bearing on cognitive or behavioral abnormalities in the present admission.     A psychotic disorder is possible but not the only explanation. Persecutory ideation, somatic hyperfocus, and antisocial/alienated tendencies fit conceptually with expectations for the struggles associated with transgender status, but empirical data on expected transgender MMPI profiles is rather limited.    The primary recommendation is continued mental health treatment, with psychotherapeutic and pharmacologic approaches. After this admission, contact with the Department of Psychiatry s First Episode Psychosis clinic would be reasonable.     If cognitive functioning does not seem to return towards baseline with improved psychiatric status, then outpatient neuropsychological reevaluation could be considered.    Prashant Phillips, PhD,   Clinical Neuropsychologist

## 2017-12-20 NOTE — PROGRESS NOTES
48 hour: Pt stated he didn't feel right and requested a zyprexa 5 mg. This was given to pt but it wasn't the dissolvable zydis so this RN informed pt to swallow it down. Pt tried but kept it in his mouth and then had to spit it out because the taste was bad. Pt then declined another med. Pt appears to be involved with groups.

## 2017-12-21 ENCOUNTER — TRANSFERRED RECORDS (OUTPATIENT)
Dept: HEALTH INFORMATION MANAGEMENT | Facility: CLINIC | Age: 22
End: 2017-12-21

## 2017-12-21 ENCOUNTER — TELEPHONE (OUTPATIENT)
Dept: FAMILY MEDICINE | Facility: CLINIC | Age: 22
End: 2017-12-21

## 2017-12-21 PROCEDURE — 12400007 ZZH R&B MH INTERMEDIATE UMMC

## 2017-12-21 PROCEDURE — 90853 GROUP PSYCHOTHERAPY: CPT

## 2017-12-21 PROCEDURE — 97150 GROUP THERAPEUTIC PROCEDURES: CPT | Mod: GO

## 2017-12-21 PROCEDURE — 25000132 ZZH RX MED GY IP 250 OP 250 PS 637: Performed by: CLINICAL NURSE SPECIALIST

## 2017-12-21 PROCEDURE — 99232 SBSQ HOSP IP/OBS MODERATE 35: CPT | Performed by: CLINICAL NURSE SPECIALIST

## 2017-12-21 PROCEDURE — 25000132 ZZH RX MED GY IP 250 OP 250 PS 637: Performed by: EMERGENCY MEDICINE

## 2017-12-21 RX ORDER — OLANZAPINE 10 MG/1
20 TABLET ORAL AT BEDTIME
Status: DISCONTINUED | OUTPATIENT
Start: 2017-12-21 | End: 2017-12-22 | Stop reason: HOSPADM

## 2017-12-21 RX ORDER — OLANZAPINE 20 MG/1
20 TABLET ORAL AT BEDTIME
Qty: 30 TABLET | Refills: 1 | Status: SHIPPED | OUTPATIENT
Start: 2017-12-21 | End: 2018-04-27

## 2017-12-21 RX ORDER — HYDROXYZINE HYDROCHLORIDE 25 MG/1
25-50 TABLET, FILM COATED ORAL EVERY 4 HOURS PRN
Qty: 120 TABLET | Refills: 1 | Status: SHIPPED | OUTPATIENT
Start: 2017-12-21 | End: 2018-02-22

## 2017-12-21 RX ADMIN — OLANZAPINE 20 MG: 10 TABLET, FILM COATED ORAL at 20:51

## 2017-12-21 RX ADMIN — ESTRADIOL 1 PATCH: 0.1 PATCH TRANSDERMAL at 08:24

## 2017-12-21 RX ADMIN — ESTRADIOL 1 PATCH: 0.05 PATCH TRANSDERMAL at 08:23

## 2017-12-21 RX ADMIN — SPIRONOLACTONE 150 MG: 50 TABLET ORAL at 08:23

## 2017-12-21 ASSESSMENT — ACTIVITIES OF DAILY LIVING (ADL)
DRESS: SCRUBS (BEHAVIORAL HEALTH);INDEPENDENT
DRESS: SCRUBS (BEHAVIORAL HEALTH)
ORAL_HYGIENE: INDEPENDENT
GROOMING: INDEPENDENT
ORAL_HYGIENE: INDEPENDENT
GROOMING: INDEPENDENT

## 2017-12-21 NOTE — PROGRESS NOTES
Received phone call from pt's father Gaurav who was asking about the referral process to treatment. Answered his questions and set up family meeting time of 1:00 pm for 12/22 to go over discharge information and treatment options.  CM Key Page will facilitate this meeting.

## 2017-12-21 NOTE — PROGRESS NOTES
"[facility]   Psychiatric Progress Note        Interim History:   The patient's care was discussed with the treatment team during the daily team meeting and/or staff's chart notes were reviewed.  Staff report patient is attending groups.     Patient completed CD assessment. Patient has been improving and is displaying organized thinking.  Mood is described as \"OK\". She denies suicidal ideation.     Meds: Zyprexa 20 HS, estradiol patch, spironolactone.     Proposed discharge on Friday 12/22.         Medications:       OLANZapine  20 mg Oral At Bedtime     estradiol biweekly   Transdermal Q8H     estradiol biweekly   Transdermal Once per day on Mon Thu     estradiol  1 patch Transdermal Once per day on Mon Thu     estradiol  1 patch Transdermal Once per day on Mon Thu     spironolactone  150 mg Oral Daily          Allergies:     Allergies   Allergen Reactions     Banana Anaphylaxis     Throat swelling          Labs:   No results found for this or any previous visit (from the past 24 hour(s)).       Psychiatric Examination:     /74  Pulse 95  Temp 98.6  F (37  C) (Oral)  Resp 16  Ht 1.829 m (6')  Wt 79.1 kg (174 lb 6.1 oz)  SpO2 99%  BMI 23.65 kg/m2  Weight is 174 lbs 6.14 oz  Body mass index is 23.65 kg/(m^2).  Orthostatic Vitals       Most Recent      Sitting Orthostatic /74 12/20 0810    Sitting Orthostatic Pulse (bpm) 95 12/20 0810    Standing Orthostatic /75 12/20 0810    Standing Orthostatic Pulse (bpm) 115 12/20 0810          Appearance: awake, alert, adequately groomed and dressed in hospital scrubs  Attitude:  guarded  Eye Contact:  good  Mood:  angry and anxious  Affect:  intensity is heightened  Speech:  rambling  Psychomotor Behavior:  no evidence of tardive dyskinesia, dystonia, or tics  Throught Process:  organized  Associations:  loosening of associations present  Thought Content:  no evidence of suicidal ideation or homicidal " ideation  Insight:  limited  Judgement:  limited  Oriented to:  time, person, and place  Attention Span and Concentration:  fair  Recent and Remote Memory:  intact            Precautions:     Behavioral Orders   Procedures     Code 1 - Restrict to Unit     Routine Programming     As clinically indicated     Sexual precautions     Status 15     Every 15 minutes.          DIagnoses:   1.  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   2.  Gender dysphoria.   3.  Cannabis use disorder, moderate.   4.  History of concussion 2014         Plan:     Legal: Stay of commitment a of 12/19/2017.      Medication management: Zyprexa 20 mg at HS starting on 12/19      Dispo: Stabilization with medications,, MICD treatment per parents..

## 2017-12-21 NOTE — PROGRESS NOTES
"Attendence: Pt. Attended scheduled 2 of 2 OT sessions today.   Observations: pt participate in all groups and engaged with peers, pt appeared calm and focused throughout group. Pt appears to continue to demonstrate \"paranoid\" similar behavior such as questioning what appears to be Irrevellent things and actions by therapist such as \"why are you not answering questions?\" or \"why did you just do that?\", \"why did you stop\" etc. Otherwise pt was pleasant throughout groups.     12/21/17 1500   Occupational Therapy   Type of Intervention structured groups   Response Participates   Hours 2       "

## 2017-12-21 NOTE — PROGRESS NOTES
Writer got update from Bogdan at [facility] that she is making a referral for inpatient treatment for [place], Pride, or [place]. States she will have the assessment done in 2-3 days. Pt did not sign any PETER's for Bogdan. Pt will be d/c from unit back home on 12/22 and will need to follow-up with Bogdan for the referral process. Bogdan was updated via voicemail on pt's discharge from hospital and that pt will follow-up with her.  Writer placed call to pt's MAYCO Caruso [facility] at [phone] to update on d/c date. Zuly states she will be on vacation until January 2nd but will have her Supervisor Jonathan Rahman check in with pt to confirm pt is following up with referral.  AVS updated with all follow-up information.

## 2017-12-21 NOTE — PLAN OF CARE
Problem: Psychotic Symptoms  Goal: Psychotic Symptoms  Signs and symptoms of listed problems will be absent or manageable.   Outcome: Improving   12/20/17 2040   Psychotic Symptoms   Psychotic Symptoms Assessed all   Psychotic Symptoms Present none   Pt was calm, social and visible in the milieu.  Pt attended and participated in groups.  Pt denies SI/SIB..  Pt  watched movies and played ping pong with a peer. Pt's parents visited, visit went well.  Pt reports they miss school and wish they can go back to school, but they realize that they need to go to treatment.  Pt denies being depressed/sad/anxious.

## 2017-12-21 NOTE — PROGRESS NOTES
Writer met with pt to see how they were doing.  Pt appeared calm, with a flat affect.  Denies depression, anxiety, SI/SIB.  While meeting with patient, Dr. Naegele informed pt that they will be discharged tomorrow and that the Zyprexa will be switched from a dissolvable tab to an oral pill.  Pt stated this made them nervous as it is something new.  Writer provided support and encouragement.  Writer then suggested pt go to OT group, which pt appeared happy to attend.

## 2017-12-21 NOTE — DISCHARGE INSTRUCTIONS
Behavioral Discharge Planning and Instructions      Summary:  You were admitted on 12/6/2017  due to Psychotic Disorder NOS.  You were treated by Debra Naegele, APRN, CNS and discharged on 12/22/17 from [facility] to Home      Principal Diagnosis:   1.  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   2.  Gender dysphoria.   3.  Cannabis use disorder, moderate.     Treatment Follow-Up: Bogdan Madrid [facility] [place]  [facility]  Office: [phone]  Cell: [phone]  *Your Rule 25 Chemical Dependency Evaluation was completed by Bogdan Madrid. Please follow-up with her at the above number for referral to a treatment program.  Therapy Appointment Follow-Up:  Eloise Bacon Psychologist, [facility],  Address: [address], [place] [address]   Phone: [phone]   *Please place call to schedule follow-up appointment  Attend all scheduled appointments with your outpatient providers. Call at least 24 hours in advance if you need to reschedule an appointment to ensure continued access to your outpatient providers.   Major Treatments, Procedures and Findings:  You were provided with: a psychiatric assessment, assessed for medical stability, medication evaluation and/or management, group therapy, milieu management and medical interventions    Symptoms to Report: feeling more aggressive, increased confusion, losing more sleep, mood getting worse or thoughts of suicide    Early warning signs can include: increased depression or anxiety sleep disturbances increased thoughts or behaviors of suicide or self-harm  increased unusual thinking, such as paranoia or hearing voices    Safety and Wellness:  The patient should take medications as prescribed.  Patient's caregivers are highly encouraged to supervise administering of medications and follow treatment recommendations.     Patient's caregivers should ensure patient does not have access to:    Firearms  Medicines (both prescribed and  "over-the-counter)  Knives and other sharp objects  Ropes and like materials  Alcohol  Car keys  If there is a concern for safety, call 911.    Resources:   Crisis Intervention: 843.447.4727 or 223-067-3586 (TTY: 612.635.7466).  Call anytime for help.  National [place] on Mental Illness (www.[place].diane.org): 986.815.3149 or 084-952-6768.  [place] Association for Children's Mental Health (www.macmh.org): 856.372.4076.  Suicide Awareness Voices of Education (SAVE) (www.save.org): 845-176-HPCR (9420)  National Suicide Prevention Line (www.mentalhealthmn.org): 464-482-SQZP (6990)  Mental Health Consumer/Survivor Network of [place] (www.mhcsn.net): 899.860.4501 or 513-776-6070  Mental Health Association of [place] (www.mentalhealth.org): 862.206.7284 or 437-130-6759  Self- Management and Recovery Training., SMART-- Toll free: 164.235.8494  www.[employer].org  [facility] Crisis (COPE) Response - Adult 859 792-7560  Text 4 Life: txt \"LIFE\" to 33502 for immediate support and crisis intervention  Crisis text line: Text \"START\" to 401-521. Free, confidential, 24/7.  Crisis Intervention: 563.648.9126 or 735-207-1321. Call anytime for help.     The treatment team has appreciated the opportunity to work with you.     If you have any questions or concerns our unit number is [phone]  You may be receiving a follow-up phone call within the next three days from a representative from behavioral health.          "

## 2017-12-21 NOTE — TELEPHONE ENCOUNTER
"[facility] phone call message- general phone call:    Reason for call: Per messaging report, patient called at 6:21pm on 12/20/17. Message: \"Pt in hospital, wanted to check in with doctor\".    Return call needed: unknown; message received via messaging report    OK to leave a message on voice mail? unknown; message received via messaging report    Primary language: English      needed? No    Call taken on December 21, 2017 at 10:00 AM by Reyna Ag  "

## 2017-12-21 NOTE — DISCHARGE SUMMARY
Psychiatric Discharge Summary    Vinicio Givens MRN# [id number]   Age: 22 year old YOB: 1995     Date of Admission:  12/6/2017  Date of Discharge:  12/22/2017  Admitting Physician:  Bhavin Pretty MD  Discharge Physician:  Debra A. Naegele, APRN CNS (Contact: [phone])         Event Leading to Hospitalization:   Vinicio Givens is a 22-year-old male to female transgendered person who is presenting with psychosis and disorganized thinking.  The patient was brought for evaluation due to concerns about recent behaviors.  The patient has been expressing paranoid ideation including saying that the professors at the [employer] were giving very complicated answers to patient's questions so that patient would not be able to understand.  The patient believes the professors were doing this intentionally and then stopped answering patient's questions.  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  The patient has maintained a 3.6 grade point average.  Spoke with patient's father regarding recent behavior.  The patient's father is reporting a common theme of paranoia.  The patient has been talking about how people are talking and scheming about patient and then will apologize about it.  The patient had been studying for an exam and took Adderall and stayed up for 48 hours.  The patient's parents talked with the patient's roommates regarding patient's drug use.  Parents found edible cannabis in the dorm room.  The patient reports that they used acid 1.75 tabs.  The patient stated that they cried for 2 days.  The patient is also reporting that patient smokes marijuana 3-4 times per week. Patient has change their name multiple times in the last 2 days. Patient has been agitated. Upon admission to the unit patient took off their clothes and needs encouragement to get dressed. Patient walked toward nurse unclothed.  The patient's father is  very concerned about the significant change in patient's behavior and disorganized thinking recently and supports commitment MI CD.            See Admission note by Debra Naegele APRN, CNS on 12/8/2017or additional details.          DIagnoses:   1.  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   2.  Gender dysphoria.   3.  Cannabis use disorder, moderate.   4.  History of concussion 2014            Labs:     Results for orders placed or performed during the hospital encounter of 12/06/17   Drug abuse screen 6 urine (tox)   Result Value Ref Range    Amphetamine Qual Urine Negative NEG^Negative    Barbiturates Qual Urine Negative NEG^Negative    Benzodiazepine Qual Urine Negative NEG^Negative    Cannabinoids Qual Urine Positive (A) NEG^Negative    Cocaine Qual Urine Negative NEG^Negative    Ethanol Qual Urine Negative NEG^Negative    Opiates Qualitative Urine Negative NEG^Negative   CBC with platelets differential   Result Value Ref Range    WBC 9.3 4.0 - 11.0 10e9/L    RBC Count 4.54 4.4 - 5.9 10e12/L    Hemoglobin 13.8 13.3 - 17.7 g/dL    Hematocrit 41.3 40.0 - 53.0 %    MCV 91 78 - 100 fl    MCH 30.4 26.5 - 33.0 pg    MCHC 33.4 31.5 - 36.5 g/dL    RDW 12.8 10.0 - 15.0 %    Platelet Count 188 150 - 450 10e9/L    Diff Method Automated Method     % Neutrophils 37.3 %    % Lymphocytes 48.4 %    % Monocytes 10.0 %    % Eosinophils 3.4 %    % Basophils 0.8 %    % Immature Granulocytes 0.1 %    Nucleated RBCs 0 0 /100    Absolute Neutrophil 3.5 1.6 - 8.3 10e9/L    Absolute Lymphocytes 4.5 0.8 - 5.3 10e9/L    Absolute Monocytes 0.9 0.0 - 1.3 10e9/L    Absolute Eosinophils 0.3 0.0 - 0.7 10e9/L    Absolute Basophils 0.1 0.0 - 0.2 10e9/L    Abs Immature Granulocytes 0.0 0 - 0.4 10e9/L    Absolute Nucleated RBC 0.0    Comprehensive metabolic panel   Result Value Ref Range    Sodium 142 133 - 144 mmol/L    Potassium 4.0 3.4 - 5.3 mmol/L    Chloride 107 94 - 109 mmol/L    Carbon Dioxide 29 20 - 32 mmol/L    Anion Gap 6 3 - 14 mmol/L     Glucose 83 70 - 99 mg/dL    Urea Nitrogen 13 7 - 30 mg/dL    Creatinine 0.97 0.66 - 1.25 mg/dL    GFR Estimate >90 >60 mL/min/1.7m2    GFR Estimate If Black >90 >60 mL/min/1.7m2    Calcium 8.7 8.5 - 10.1 mg/dL    Bilirubin Total 0.9 0.2 - 1.3 mg/dL    Albumin 3.7 3.4 - 5.0 g/dL    Protein Total 7.0 6.8 - 8.8 g/dL    Alkaline Phosphatase 37 (L) 40 - 150 U/L    ALT 17 0 - 70 U/L    AST 12 0 - 45 U/L   TSH with free T4 reflex and/or T3 as indicated   Result Value Ref Range    TSH 2.29 0.40 - 4.00 mU/L   Lipid panel   Result Value Ref Range    Cholesterol 110 <200 mg/dL    Triglycerides 48 <150 mg/dL    HDL Cholesterol 69 >39 mg/dL    LDL Cholesterol Calculated 31 <100 mg/dL    Non HDL Cholesterol 41 <130 mg/dL            Consults:   12/18/2017       NEUROPSYCHOLOGICAL EVALUATION     RELEVANT HISTORY AND REASON FOR REFERRAL     This is a report of neuropsychological consultation regarding Vinicio Givens, a 22-year-old, right-handed, transgendered individual (male to female) currently hospitalized in a locked psychiatric unit on court commitment for what appears to be a psychotic episode. There have been concerns about cognitive difficulty and disorganization. They are referred for neuropsychological evaluation of brain function by Debra Ann Naegele, APRN, CNS.      The patient has stated her preferred name is Katie/Janey. There are notes indicate shifting gender identifications during this admission, with changing preferences for pronouns of she/her or they/their. Notes from primary are visits earlier in the year indicate female identification and preference for they/their pronouns, which will be used for this report.      I understand that the hospitalization began on 12/06/2017 and was preceded by a few weeks of increasing paranoid or persecutory ideation. The patient reported feeling like there were lasting effects from one use of LSD earlier this fall. They were using marijuana regularly, and cannabinoids screening  was positive on admission. They began taking feminizing hormones earlier this summer and abruptly stopped them two weeks prior to the hospitalization. They were described as anxious, labile, agitated, tangential and disorganized (with loosening of associations and inability to track conversations), pressured, paranoid, delusional (grandiosity), and depressed. Early on, there were issues with trying to run from the unit and requirements for restraints and intramuscular olanzapine injection. Staff notes indicate some continued issues with impulsivity and reduced adherence to boundaries from staff and with other patients.      Suicidal ideation and hallucinatory experiences have been consistently denied. There has been no evidence of tardive dyskinesia, dystonia, or tics. Orientation and memory have been seen as intact. Focus, concentration, insight, and judgment have been seen as reduced or limited. They are described as relying heavily on sticky notes to keep track of everything. Ms. Naegele raised concerns about possible cognitive contribution from a concussion in 2014.      In my interview, Katie reported always relying on notes and lists to track things. They felt this was more prominent in the early days of the admission but has seemed better as a routine was getting established. They described a longstanding tendency to  stare off  while thinking to themself, and that they are  well known  for this habit among friends and roommates. They said the roommates had not raised any concerns about cognitive changes in weeks prior to this admission.      They described the hospitalization as prompted by inability to complete school work. They denied any paranoid or persecutory ideation. I described reports in the medical records about professors seeming to work against them ( ...professors at the [facility] were giving very complicated answers to patient's questions so that patient would not be able to  understand. The patient believes the professors were doing this intentionally and then stopped answering patient's questions,  per notes from MsJoseph Naegele). Katie indicated not knowing what I was talking about and guessed it must have been more about the course material becoming more difficult rather than professors refusing to provide more information or conspiring against them.      They denied any hallucinatory experiences. Suicidal ideation was denied. They stated this is their first psychiatric hospitalization. The first few days on olanzapine seemed accompanied by exhaustion, but energy levels seem a bit better now.      Sleep is reported as pretty good now, but they feel less rested without access to marijuana. They reported using marijuana 2 to 4 times per week in order to fall asleep. They denied any problems with alcohol use. They did not think trazodone was being given regularly during this admission.      There is no history of stroke, seizure, migraine, or motoric dyscontrol. Visual acuity seems a little worse, whereas senses of taste and hearing seem heightened.      The past concussion was described as a minor and uncomplicated. I do not have access to acute records, and I see no related notes in University records or through [facility]. They told me that they hit their head during a wrestling practice. There was momentary altered awareness, and their vision seemed to have a green cast to it. Dizziness and a little confusion was present for the rest of the day. A few days later, they noticed some cognitive slowing and memory trouble that seemed to last for 1 to 2 months, and then it cleared.      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 Early academic functioning is notable for delays and struggles in language arts. They reported receiving special education services for reading until late elementary school (~4th grade). Reading, spelling, and pronunciation skills are  still areas of weakness. In contrast, they reported always being about one grade level ahead for math skills. 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 They reported having a college GPA of 3.6. They have not been working outside of school.      Regarding relevant family history, Katie reported suspicion that their mother would qualify for a diagnosis of bipolar disorder, but other mental or behavioral issues in the family were denied.      BEHAVIORAL OBSERVATIONS     Katie was polite and cooperative with the examination. Mood was neutral, with mood-congruent affect. Attentional control was appropriate. Comprehension was normal. They were alert and not somnolent. There were no indications in interview of markedly disorganized, tangential, or loosened thought processes. There were indications of subtle thought blocking and being a bit overly ideational or pseudophilosophical. Insight appeared fair, but I do not have access to collateral informants to confirm the patient s descriptions of recent history. Effort and persistence appeared to be good during the testing session. They were given the MMPI-2-RF with instructions to work on it right away, but it was not completed until ~24 hours later. They reported not working on it because it seemed unimportant. Approximately 25% of the items had darkening of both true and false circles, with one erased (i.e., they been either initially mismarked or the patient changed answers). Validity scales on the MMPI-2-RF were abnormal (see below). The cognitive data are seen as reasonable reflections of her current functioning, although current circumstances and a less than ideal testing environment could reduce reliability of the data. The self-report questionnaire data have a higher likelihood of being neither reliable nor valid.      MEASURES ADMINISTERED     Of note, Molly Holliday PsyD, a post-doc from [facility] [employer] [facility]  [facility] began some cognitive assessments on 12/17/2017. She informs me that items from the RBANS and WAIS-IV were obtained, and more will be attempted when she returns to the clinic on 12/20/2017. In order to avoid unnecessary repetition of tests, I obtained a truncated evaluation. The results of this evaluation should be interpreted in light of the results from Dr. Holliday s evaluation, and vice versa.      In addition to my clinical interview and the [place] Multiphasic Personality [id number], the following measures were administered by a trained psychometrist, under my direct supervision: Controlled Oral Word Association Test; Animal Naming Test; Test of Sustained Attention and Tracking; [place] Card Sorting Test; Jono-Osterrieth Complex Figure Test.     RESULTS AND INTERPRETATION     Complex figure copying was normal, with no indications of fundamental perceptual or constructional defects. All figure elements were drawn with normal gestalt organization and accuracy. A few minutes later, free recall of the figure was average. Of note, the patient was spontaneously able to provide the majority of details from the RBANS story memory test during my interview (a day after Dr. Holliday administered the measure), which should reduce any concerns about anterograde verbal memory. Associative verbal fluency was above average for alphabetic constraints and low average for semantic constraints (production rates were equivalent across conditions, but semantic fluency usually has higher yield). Immediate attention, sustained attention, working memory, vigilance, and control over divided attention were normal on a variety of items requiring speeded verbal responding (e.g., reciting days of the week forward and backward, counting while interspersing the alphabet). Conceptualization, inductive reasoning, and using continuous feedback to correct errors were in the normal range on an ambiguous card-sorting  task. Notably, there was one set-loss error, which is rarely seen in healthy individuals and may be an indication of prefrontal issues or diminished engagement with the task.      The patient s response set on the MMPI-2-RF was probably invalid. As noted above, a little over 25% of the responses had been marked both true and false at some point. There were abnormal elevations on multiple validity scales, with indications of inconsistent responding, possible overreporting of psychological symptoms (but without excessive endorsement of psychosis symptoms), very likely overreporting of somatic or seemingly neurologic symptoms, and possible overreporting associated with non-credible memory concerns. There were no indications of defensiveness or overly favorable self-presentation. In this context, Two of the core clinical scales were highly elevated, and five more were borderline to mildly elevated. The highest scale is associated with significant persecutory ideation that likely rises to the level of paranoid delusions. The second highest scale relates to pathological preoccupations with physical health, with likely expression of psychological distress through somatic complaints, fatigue, and malaise. In descending order, the other borderline to mild scales relate to aberrant perceptual experiences and disorganized thinking associated with psychosis, obsessive/ruminative anxiety and heightened stress reactivity, antisocial behaviors or generally falling outside of societal norms, subjective dysphoria and vegetative depression, and generalized demoralization and pessimism. Overall, the profile could be consistent with a psychotic disorder. However, the profile could be misleading from potential overreporting of symptoms due to acute distress (i.e., possible embellishment of suffering) or limited effort to respond appropriately and consistently to the test items.      IMPRESSIONS AND RECOMMENDATIONS     The  neuropsychological results are abnormal.      The present cognitive dataset was limited by choice, as another provider was performing concurrent cognitive testing. Nonetheless, none of the patient s performances suggested impaired cognition. This included measures of attention, concentration, working memory, executive functioning, visuoperceptual and constructional abilities, lexical retrieval, and memory. All performances were within the normal range. Staff descriptions of disorganization and scattered cognition are within expectations for individuals experiencing a psychotic episode, and this may explain the presenting concerns. Given the results of the MMPI-2-RF, there may also be a role for indirect expression of emotional distress through somatic concerns, which includes cognitive inefficiency.      The patient describes a mild, uncomplicated concussion several years ago. In that setting, directly related cognitive symptoms are expected to clear within 1  weeks, with exacerbation or prolonged recovery common in individuals with somatizing tendencies. The description of 1-2 months of mild symptoms is thus entirely within expectations for the injury and the patient s likely emotional coping patterns. There is no reason to suspect that past concussion has direct bearing on cognitive or behavioral abnormalities in the present admission.      A psychotic disorder is possible but not the only explanation. Persecutory ideation, somatic hyperfocus, and antisocial/alienated tendencies fit conceptually with expectations for the struggles associated with transgender status, but empirical data on expected transgender MMPI profiles is rather limited.     The primary recommendation is continued mental health treatment, with psychotherapeutic and pharmacologic approaches. After this admission, contact with the Department of Psychiatry s First Episode Psychosis clinic would be reasonable.      If cognitive functioning does not  "seem to return towards baseline with improved psychiatric status, then outpatient neuropsychological reevaluation could be considered.     Prashant Phillips, PhD,   Clinical Neuropsychologist        DATE OF EVALUATION:  12/21/2017       PSYCHOLOGICAL EVALUATION       BACKGROUND INFORMATION:  Vinicio (preferred name Ceci Givens is a 22-year-old transgendered male to female who was seen for a psychological evaluation to help with the neuropsych testing that was completed by Dr. Phillips on 12/18/2017.  There were concerns over a possible head injury and difficulty with memory.  Records indicate that Katie had been keeping post-it notes all over their space to help them remember things and were having a very difficult time tracking information.       Katie reports that she brought herself into the hospital due to the fact that she was starting to rely on drugs to get through school.  She is currently attending the [employer] and is a rome majoring in chemical engineering.  She reports she gets A's and B's at school.  She attended [employer] and did well in high school as well and got A's and B's and denies ever having academic accommodations.  Reports that they got along well with peers at school as well.  They state that they are currently living with roommates just off of campus.  They denied being employed presently stating that they were focusing on school.       Katie reported that if she could have 3 wishes they would be to be with her family, to always do the right thing and to have everyone be themselves without hurting others.  She described her mood on the day of the evaluation as \"calm.\"  She stated she is closest to her parents.  She stated 3 things she enjoys doing are knitting, learning and playing video games.  She reports that she has fears of not being with her family.       Katie reported that her health is \"not the best, it used to be better.\"  She reports that she is currently on Zyprexa and " "melatonin and has an allergy to bananas.  As mentioned previously, Katie sustained a concussion somewhere between 10th and 11th grade through wrestling.  She denies being hospitalized and denies any lingering symptoms from this trauma.  She does report that she is currently being prescribed estrogen from her primary doctor.       When asked about trauma, Katie reports that they do not know of any history for themselves of physical, sexual or emotional abuse.  They do report that as a child they moved from [place] to [place] to [place] and that this was difficult for them.  They report that they have 2 older sisters and a younger sister.       Katie denied any history of auditory or visual hallucinations.  It was noted, however, in the hospital record that when Katie was first admitted to the hospital there were significant symptoms of psychosis noted and there was a question of whether these were organic in nature or whether they were related to Katie's drug use.       Katie reports that she sleeps well at night and uses the melatonin when she has a difficulty.  She reports that her appetite was previously not very good but has significantly improved.       When asked about symptoms of depression, Katie reports \"something has always been missing.\"  She states that she feels as though the depression is related to her trans issues.  She reports that she has sometimes had thoughts of suicide in the past but is always able to push those thoughts out of her head.  She did not report or endorse any other depressive symptoms.       Katie denied any anxiety symptoms.       Katie reports that prior to coming to the hospital they were using weed 3-4 times per week, sometimes none, often with other people but also sometimes alone.  They enjoyed using marijuana as it would help them calm down, help them sleep and \"make movies more entertaining.\"  They also report that they were using Adderall approximately 1 time per week alone to help them " get work done.  They report that they were also drinking a glass of wine a night, but in the past have abused alcohol significantly more.       Katie reports that they are unsure about a family history of mental health.  As far as they know, they do not think that any of their siblings have any mental health diagnoses nor do their parents.       Katie reports that they see Eloise Bacon at [facility] for therapy once every other week but reports that this is off and on and the time sometimes change.  When asked to rate their mood on the day of the evaluation on a scale from 1-10 (1 being awful, 10 being wonderful), Katie rated her mood at an 8 or 7.  She reported that her strengths are learning, stopping and listening to people and being a creative unique person.  She stated that things that are difficult for her communicating, spelling vocabulary words, pronunciation and understanding body language.       MENTAL STATUS AND BEHAVIOR:  Testing for Katie was the 2 days.  Initially testing was started on 12/17/2017.  However, Katie's family came for a family visit and Katie then did not want to participate in testing.  Throughout the testing, Katie was extremely guarded and asked the writer many times what the testing was for.  She seems suspicious as to why she had to complete the testing process.  On the second day of the evaluation when writer presented on 12/21/2017, Katie initially asked if she had to do testing.  Katie then spoke with her nurse who informed Katie that this would be best helping with placement.  It was also noted that Katie had been in court recently over a possible civil commitment.  Katie then agreed to do testing.       Katie was still continually guarded with writer throughout testing and did not endorse many mental health symptoms and would be very suspicious of writer when writer asked about mental health symptoms.  Katie maintained adequate eye contact throughout the evaluation and was oriented to person,  place and time.  Katie seemed to lack insight overall into her mental health and seems to have poor judgment when it comes to mental health decisions and seems to not completely understand the possible mental health diagnoses facing her.       TESTS ADMINISTERED:  Wechsler Adult Intelligence Scale-4th Edition (WAIS-IV), Repeatable Battery for the Assessment of Neuropsychological Status (RBANS-A).       TEST RESULTS:  It is important to note that there was a neuropsychological exam performed on Katie on 12/18/2017 by Dr. Alan PsyD.  It is recommended that reader also consult that report and take that into account with this report for overall results of Katie's functioning.       Katie was administered the WAIS-IV to gauge their cognitive functioning.  Katie seemed to give her best effort on the testing, although as noted before she was suspicious as to the purpose of this testing.  On the WAIS-IV, subtest scores range from 1-19 with an average score of 10 and a standard deviation of 3.  Katie's subtest scores are as follows:     Block design 19.   Similarities 12.   Digit span 8 (longest digit forward 8, longest digit backward 4, longest digit sequencing 4).   Matrix reasoning 16.   Vocabulary 12.   Arithmetic 5.   Symbol search 9.   Visual puzzle 10.   Information (prorated due to concerns with effort).   Coding 7.       Subtest scores were then combined to form composite scores.  Composite scores have an average score of 100 with a standard deviation of 15.  Katie's composite scores are as follows:     Verbal comprehension (prorated) 110, 75th percentile, high average range.   Perceptual reasoning 129, 97th percentile, superior range.   Working memory, 80, 9th percentile, low average range.   Processing speed 89, 23rd percentile, low average range.   Full score IQ invalid.   General ability index 123, 94th percentile, superior range.       As can be seen from above, the information subtest was prorated due to Katie not wanting  to participate in testing the second day.  Writer shortened testing to get the best participation out of Katie.  It is also noted that there are significant discrepancies in the above scores which is why a full scale IQ could not be calculated.  Perceptual reasoning seems to be a general strength for Katie and throughout testing Katie seemed to do best on things that were visual and required puzzles.  Processing speed and the working memory are in the low average range and do suggest that Katie likely processes information more slowly than others and may have a little bit more difficulty than others remembering information.  However, they are still in the low average range which does not suggest any clinically significant impairment.       Katie was also administered the RBANS-A to look at Katie's overall functioning from a neuropsych screening standpoint.  Katie's scores are as follows:     Immediate memory 87, scaled score of 8, 19th percentile.   Visuospatial/constructional 121, scaled score of 15, 92nd percentile.   Language 85, scaled score of 7, 16th percentile.   Attention 79, scaled score of 6, 8th percentile.   Delayed memory 97, scaled score of 10, percentile of 42.   Total scale score 91, scaled score of 9, percentile of 27.       In the above scaled score, scaled scores range from 1-19 with an average score of 10 and a standard deviation of 3.       As can be seen from above, Katie did well overall on the testing.  Their attention was slightly lower; however, it is difficult to determine if this is due to possible internal stimuli, difficulties with medication or withdrawing from medications.  Overall, it is still not extremely concerning that they were able to do well for the most part during the evaluation.  There were times when they may have been responding to internal stimuli, however.       It also be noted that with his memory scores are all within normal limits.  It was also noted in the neuropsychological  evaluation referenced earlier that the day after being administered the RBANS story Katie was able to then again repeat the story to Dr. Phillips during testing.       SUMMARY:  Katie is a 22-year-old transgendered (male to female) individual who was seen for psychological testing to help with placement and evaluation.  At the time that they were seen at the hospital they were also appearing in court on a possible civil commitment due to ongoing mental health concerns.  With writer they were quite guarded with regards to diagnosis and history.  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  It does seem possible that the use may have been causing Katie's symptoms and no other diagnosis will be made at this time.       With regards to overall cognitive and memory functioning, Katie did not had any concerning scores.  The neuropsychological evaluation completed by Dr. Phillips on 12/18/2017 should be referenced for further information on executive functioning and neuropsychological status.  There were a significant discrepancy in Katie's cognitive scores suggesting the working memory and processing speed are difficulties for her right now.  However, it is possible this is not always the case and when more healthy in terms of mental health and having longer sober times they may do better in this area.  The RBANS also shows that Katie's memory is well within clinical limits.  Their attention was slightly low which may have been due to a variety of factors, but is still not overly concerning.       TREATMENT PLAN AND SUGGESTIONS:     1.  It is recommended that Katie obtained some kind of treatment following discharge from the hospital to help her maintain her sobriety.     2.  Katie should continue individual therapy with her outpatient therapist to continue working on mental health needs.   3.  It may be beneficial for Katie to have ongoing  psychiatric medication management.  Whether this is through civil commitment or an outpatient provider is left to the court system as well as Katie's providers to determine.   4.  It may be beneficial if Katie continues to struggle with memory difficulties to have updated outpatient neuropsychological testing when Katie is in a more stable condition.       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       MEDICAL:  History of concussion in 10th or 11th grade without hospitalization.       RELEVANT PSYCHOSOCIAL FACTORS:  Difficult time performing academically due to drug use.       RECOMMENDATIONS:  Please refer to Dr. Deb Naegele's recommendations in the hospital record.                 Hospital Course:   Vinicio Givens was admitted to [facility] with attending Bhavin Pretty MD on a 72 hour mental health hold. The patient was placed under status 15 (15 minute checks) to ensure patient safety. Petitioned for MICD commitment. Patient is on a stay of commitment.    Patient was admitted for psychosis. Patient had been using substances and began displaying behavior not at his baseline. Patient was started on Zyprexa and was titrated to 20 mg. Patient refused medication in the beginning due to her paranoia but eventually  paranoia resolved and patient was consistent with taking her medications. Patient slowly began showing improvement with consistent treatment with Zyprexa.     Patient was given education on the detrimental effects of mood altering substances on her mental health and the risk of reducing the efficacy of her prescribed medications.     Vinicio Givens did participate in groups and was visible in the milieu. The patient's symptoms of psychosis improved. Patient presents with a stable mood and denies suicidal ideation. Patient has protective factors of supportive parents and CD treatment after discharge.     Patient will return home to live  with his parents and when a bed is available to attend inpatient CD treatment. This plan was discussed with parents and they agree to the treatment plan.     Vinicio Givens was released to home. At the time of discharge Vinicio Givens was determined to not be a danger to himself or others.          Discharge Medications:     Current Discharge Medication List      START taking these medications    Details   hydrOXYzine (ATARAX) 25 MG tablet Take 1-2 tablets (25-50 mg) by mouth every 4 hours as needed for anxiety  Qty: 120 tablet, Refills: 1    Associated Diagnoses: Anxiety      OLANZapine (ZYPREXA) 20 MG tablet Take 1 tablet (20 mg) by mouth At Bedtime  Qty: 30 tablet, Refills: 1    Associated Diagnoses: Psychosis, unspecified psychosis type         CONTINUE these medications which have NOT CHANGED    Details   Melatonin 10 MG TABS tablet Take 10 mg by mouth nightly as needed for sleep      estradiol (VIVELLE-DOT) 0.1 MG/24HR BIW patch Place 1 patch onto the skin twice a week  Qty: 10 patch, Refills: 1    Associated Diagnoses: Gender dysphoria in adult      spironolactone (ALDACTONE) 100 MG tablet Take 1.5 tablets (150 mg) by mouth daily  Qty: 90 tablet, Refills: 1    Associated Diagnoses: Gender dysphoria in adult      estradiol (VIVELLE-DOT) 0.05 MG/24HR BIW patch Place 1 patch onto the skin twice a week  Qty: 10 patch, Refills: 3    Associated Diagnoses: Gender dysphoria in adult                  Psychiatric Examination:   Appearance:  awake, alert and adequately groomed  Attitude:  cooperative  Eye Contact:  good  Mood:  better  Affect:  appropriate and in normal range  Speech:  clear, coherent  Psychomotor Behavior:  no evidence of tardive dyskinesia, dystonia, or tics  Thought Process:  linear  Associations:  no loose associations  Thought Content:  no evidence of suicidal ideation or homicidal ideation  Insight:  fair  Judgment:  fair  Oriented to:  time, person, and place  Attention Span and Concentration:   fair  Recent and Remote Memory:  intact  Language: Able to name objects, Able to repeat phrases and Able to read and write  Fund of Knowledge: adequate  Muscle Strength and Tone: normal  Gait and Station: Normal         Discharge Plan:         Summary:  You were admitted on 12/6/2017  due to Psychotic Disorder NOS.  You were treated by Debra Naegele, APRN, CNS and discharged on 12/22/17 from [facility] to Home        Principal Diagnosis:   1.  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   2.  Gender dysphoria.   3.  Cannabis use disorder, moderate.      Treatment Follow-Up: Bogdan Madrid [facility]  [facility]  Office: [phone]  Cell: [phone]  *Your Rule 25 Chemical Dependency Evaluation was completed by Bogdan Madrid. Please follow-up with her at the above number for referral to a treatment program.  Therapy Appointment Follow-Up:  Eloise Bacon Psychologist, [facility],  Address: [address], [place] [address]   Phone: [phone]   *Please place call to schedule follow-up appointment  Attend all scheduled appointments with your outpatient providers. Call at least 24 hours in advance if you need to reschedule an appointment to ensure continued access to your outpatient providers.   Major Treatments, Procedures and Findings:  You were provided with: a psychiatric assessment, assessed for medical stability, medication evaluation and/or management, group therapy, milieu management and medical interventions     Symptoms to Report: feeling more aggressive, increased confusion, losing more sleep, mood getting worse or thoughts of suicide     Early warning signs can include: increased depression or anxiety sleep disturbances increased thoughts or behaviors of suicide or self-harm  increased unusual thinking, such as paranoia or hearing voices     Safety and Wellness:  The patient should take medications as prescribed.  Patient's caregivers are highly encouraged to supervise  "administering of medications and follow treatment recommendations.     Patient's caregivers should ensure patient does not have access to:    Firearms  Medicines (both prescribed and over-the-counter)  Knives and other sharp objects  Ropes and like materials  Alcohol  Car keys  If there is a concern for safety, call 911.     Resources:   Crisis Intervention: 320.254.4118 or 430-950-2076 (TTY: 681.811.2418).  Call anytime for help.  National [place] on Mental Illness (www.[place].diane.org): 505.114.8928 or 865-808-1178.  [place] Association for Children's Mental Health (www.macmh.org): 965.883.5464.  Suicide Awareness Voices of Education (SAVE) (www.save.org): 857-461-FKYC (4715)  National Suicide Prevention Line (www.mentalhealthmn.org): 491-396-TRLR (6560)  Mental Health Consumer/Survivor Network of [place] (www.mhcsn.net): 125.613.7491 or 738-323-5114  Mental Health Association of [place] (www.mentalhealth.org): 970.550.1417 or 708-403-2169  Self- Management and Recovery Training., [employer]-- Toll free: 850.751.3144  www.[employer].PlanetHS  [facility] Crisis (COPE) Response - Adult 482 529-2270  Text 4 Life: txt \"LIFE\" to 81159 for immediate support and crisis intervention  Crisis text line: Text \"START\" to 211-576. Free, confidential, 24/7.  Crisis Intervention: 943.842.2781 or 309-334-1795. Call anytime for help.      The treatment team has appreciated the opportunity to work with you.     If you have any questions or concerns our unit number is [phone]  You may be receiving a follow-up phone call within the next three days from a representative from behavioral health.        Attestation:  The patient has been seen and evaluated by me,  Debra A. Naegele, APRN CNS on 12/22/2017  Discharge time > 30 minutes  "

## 2017-12-22 VITALS
RESPIRATION RATE: 16 BRPM | BODY MASS INDEX: 23.62 KG/M2 | OXYGEN SATURATION: 99 % | HEIGHT: 72 IN | SYSTOLIC BLOOD PRESSURE: 147 MMHG | DIASTOLIC BLOOD PRESSURE: 74 MMHG | HEART RATE: 92 BPM | TEMPERATURE: 95.6 F | WEIGHT: 174.38 LBS

## 2017-12-22 PROCEDURE — 90853 GROUP PSYCHOTHERAPY: CPT

## 2017-12-22 PROCEDURE — 97150 GROUP THERAPEUTIC PROCEDURES: CPT | Mod: GO

## 2017-12-22 PROCEDURE — 25000132 ZZH RX MED GY IP 250 OP 250 PS 637: Performed by: CLINICAL NURSE SPECIALIST

## 2017-12-22 PROCEDURE — 99239 HOSP IP/OBS DSCHRG MGMT >30: CPT | Performed by: CLINICAL NURSE SPECIALIST

## 2017-12-22 RX ADMIN — SPIRONOLACTONE 150 MG: 50 TABLET ORAL at 09:07

## 2017-12-22 ASSESSMENT — ACTIVITIES OF DAILY LIVING (ADL)
ORAL_HYGIENE: INDEPENDENT
DRESS: INDEPENDENT
LAUNDRY: UNABLE TO COMPLETE
GROOMING: INDEPENDENT

## 2017-12-22 NOTE — PLAN OF CARE
Problem: General Plan of Care (Inpatient Behavioral)  Goal: Individualization/Patient Specific Goal (IP Behavioral)  The patient and/or their representative will achieve their patient-specific goals related to the plan of care.    The patient-specific goals include:    Outcome: Adequate for Discharge Date Met: 12/22/17  Patient discharged to home from  with parents. AVS discussed and all questions answered. Patient's prescribed medications were sent with patient and discussed with patient and parents. All belongings were also returned to patient. Patient denies any thoughts of suicide or wanting to be dead, and denies any thoughts of self-harm. Patient states he has a good support network of friends and family, and that reading is a good coping skill. Patient and parents plan to seek outpatient treatment for patient.     Problem: Patient Care Overview  Goal: Individualization/Patient Specific Goal (IP Behavioral)  The patient and/or their representative will achieve their patient-specific goals related to the plan of care.    The patient-specific goals include:    Outcome: Adequate for Discharge Date Met: 12/22/17  Patient discharged to home from  with parents. AVS discussed and all questions answered. Patient's prescribed medications were sent with patient and discussed with patient and parents. All belongings were also returned to patient. Patient denies any thoughts of suicide or wanting to be dead, and denies any thoughts of self-harm. Patient states he has a good support network of friends and family, and that reading is a good coping skill. Patient and parents plan to seek outpatient treatment for patient.

## 2017-12-22 NOTE — PROGRESS NOTES
"Attendence: Pt. Attended scheduled 2 of 3 OT sessions today.   Observations: pt reported \"I really am looking forward to going home\" and \"thanks for doing this group\" pt completed all tasks and remained with focused attention throughout group. Pt had positive affect and initiated participation in groups.     12/22/17 3112   Occupational Therapy   Type of Intervention structured groups   Response Participates   Hours 2           "

## 2017-12-22 NOTE — CONSULTS
"DATE OF EVALUATION:  12/21/2017      PSYCHOLOGICAL EVALUATION      BACKGROUND INFORMATION:  Vinicio (preferred name Ceci Givens is a 22-year-old transgendered male to female who was seen for a psychological evaluation to help with the neuropsych testing that was completed by Dr. Phillips on 12/18/2017.  There were concerns over a possible head injury and difficulty with memory.  Records indicate that Katie had been keeping post-it notes all over their space to help them remember things and were having a very difficult time tracking information.      Katie reports that she brought herself into the hospital due to the fact that she was starting to rely on drugs to get through school.  She is currently attending the [employer] and is a rome majoring in chemical engineering.  She reports she gets A's and B's at school.  She attended [employer] and did well in high school as well and got A's and B's and denies ever having academic accommodations.  Reports that they got along well with peers at school as well.  They state that they are currently living with roommates just off of campus.  They denied being employed presently stating that they were focusing on school.      Katie reported that if she could have 3 wishes they would be to be with her family, to always do the right thing and to have everyone be themselves without hurting others.  She described her mood on the day of the evaluation as \"calm.\"  She stated she is closest to her parents.  She stated 3 things she enjoys doing are knitting, learning and playing video games.  She reports that she has fears of not being with her family.      Katie reported that her health is \"not the best, it used to be better.\"  She reports that she is currently on Zyprexa and melatonin and has an allergy to bananas.  As mentioned previously, Katie sustained a concussion somewhere between 10th and 11th grade through wrestling.  She denies being hospitalized and denies any lingering " "symptoms from this trauma.  She does report that she is currently being prescribed estrogen from her primary doctor.      When asked about trauma, Katie reports that they do not know of any history for themselves of physical, sexual or emotional abuse.  They do report that as a child they moved from [place] to [place] to [place] and that this was difficult for them.  They report that they have 2 older sisters and a younger sister.      Katie denied any history of auditory or visual hallucinations.  It was noted, however, in the hospital record that when Katie was first admitted to the hospital there were significant symptoms of psychosis noted and there was a question of whether these were organic in nature or whether they were related to Katie's drug use.      Katie reports that she sleeps well at night and uses the melatonin when she has a difficulty.  She reports that her appetite was previously not very good but has significantly improved.      When asked about symptoms of depression, Katie reports \"something has always been missing.\"  She states that she feels as though the depression is related to her trans issues.  She reports that she has sometimes had thoughts of suicide in the past but is always able to push those thoughts out of her head.  She did not report or endorse any other depressive symptoms.      Katie denied any anxiety symptoms.      Katie reports that prior to coming to the hospital they were using weed 3-4 times per week, sometimes none, often with other people but also sometimes alone.  They enjoyed using marijuana as it would help them calm down, help them sleep and \"make movies more entertaining.\"  They also report that they were using Adderall approximately 1 time per week alone to help them get work done.  They report that they were also drinking a glass of wine a night, but in the past have abused alcohol significantly more.      Katie reports that they are unsure about a family history of mental " health.  As far as they know, they do not think that any of their siblings have any mental health diagnoses nor do their parents.      Katie reports that they see Eloise Bacon at [facility] for therapy once every other week but reports that this is off and on and the time sometimes change.  When asked to rate their mood on the day of the evaluation on a scale from 1-10 (1 being awful, 10 being wonderful), Katie rated her mood at an 8 or 7.  She reported that her strengths are learning, stopping and listening to people and being a creative unique person.  She stated that things that are difficult for her communicating, spelling vocabulary words, pronunciation and understanding body language.      MENTAL STATUS AND BEHAVIOR:  Testing for Katie was the 2 days.  Initially testing was started on 12/17/2017.  However, Katie's family came for a family visit and Katie then did not want to participate in testing.  Throughout the testing, Katie was extremely guarded and asked the writer many times what the testing was for.  She seems suspicious as to why she had to complete the testing process.  On the second day of the evaluation when writer presented on 12/21/2017, Katie initially asked if she had to do testing.  Katie then spoke with her nurse who informed Katie that this would be best helping with placement.  It was also noted that Katie had been in court recently over a possible civil commitment.  Katie then agreed to do testing.      Katie was still continually guarded with writer throughout testing and did not endorse many mental health symptoms and would be very suspicious of writer when writer asked about mental health symptoms.  Katie maintained adequate eye contact throughout the evaluation and was oriented to person, place and time.  Katie seemed to lack insight overall into her mental health and seems to have poor judgment when it comes to mental health decisions and seems to not completely understand the possible mental health  diagnoses facing her.      TESTS ADMINISTERED:  Wechsler Adult Intelligence Scale-4th Edition (WAIS-IV), Repeatable Battery for the Assessment of Neuropsychological Status (RBANS-A).      TEST RESULTS:  It is important to note that there was a neuropsychological exam performed on Katie on 12/18/2017 by Dr. Alan PsyD.  It is recommended that reader also consult that report and take that into account with this report for overall results of Katie's functioning.      Katie was administered the WAIS-IV to gauge their cognitive functioning.  Katie seemed to give her best effort on the testing, although as noted before she was suspicious as to the purpose of this testing.  On the WAIS-IV, subtest scores range from 1-19 with an average score of 10 and a standard deviation of 3.  Katie's subtest scores are as follows:     Block design 19.   Similarities 12.   Digit span 8 (longest digit forward 8, longest digit backward 4, longest digit sequencing 4).   Matrix reasoning 16.   Vocabulary 12.   Arithmetic 5.   Symbol search 9.   Visual puzzle 10.   Information (prorated due to concerns with effort).   Coding 7.      Subtest scores were then combined to form composite scores.  Composite scores have an average score of 100 with a standard deviation of 15.  Katie's composite scores are as follows:     Verbal comprehension (prorated) 110, 75th percentile, high average range.   Perceptual reasoning 129, 97th percentile, superior range.   Working memory, 80, 9th percentile, low average range.   Processing speed 89, 23rd percentile, low average range.   Full score IQ invalid.   General ability index 123, 94th percentile, superior range.      As can be seen from above, the information subtest was prorated due to Katie not wanting to participate in testing the second day.  Writer shortened testing to get the best participation out of Katie.  It is also noted that there are significant discrepancies in the above scores which is why a full scale IQ  could not be calculated.  Perceptual reasoning seems to be a general strength for Katie and throughout testing Katie seemed to do best on things that were visual and required puzzles.  Processing speed and the working memory are in the low average range and do suggest that Katie likely processes information more slowly than others and may have a little bit more difficulty than others remembering information.  However, they are still in the low average range which does not suggest any clinically significant impairment.      Katie was also administered the RBANS-A to look at Katie's overall functioning from a neuropsych screening standpoint.  Katie's scores are as follows:     Immediate memory 87, scaled score of 8, 19th percentile.   Visuospatial/constructional 121, scaled score of 15, 92nd percentile.   Language 85, scaled score of 7, 16th percentile.   Attention 79, scaled score of 6, 8th percentile.   Delayed memory 97, scaled score of 10, percentile of 42.   Total scale score 91, scaled score of 9, percentile of 27.      In the above scaled score, scaled scores range from 1-19 with an average score of 10 and a standard deviation of 3.      As can be seen from above, Katie did well overall on the testing.  Their attention was slightly lower; however, it is difficult to determine if this is due to possible internal stimuli, difficulties with medication or withdrawing from medications.  Overall, it is still not extremely concerning that they were able to do well for the most part during the evaluation.  There were times when they may have been responding to internal stimuli, however.      It also be noted that with his memory scores are all within normal limits.  It was also noted in the neuropsychological evaluation referenced earlier that the day after being administered the RBANS story Katie was able to then again repeat the story to Dr. Phillips during testing.      SUMMARY:  Katie is a 22-year-old transgendered (male to female)  individual who was seen for psychological testing to help with placement and evaluation.  At the time that they were seen at the hospital they were also appearing in court on a possible civil commitment due to ongoing mental health concerns.  With writer they were quite guarded with regards to diagnosis and history.  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  It does seem possible that the use may have been causing Katie's symptoms and no other diagnosis will be made at this time.      With regards to overall cognitive and memory functioning, Katie did not had any concerning scores.  The neuropsychological evaluation completed by Dr. Phillips on 12/18/2017 should be referenced for further information on executive functioning and neuropsychological status.  There were a significant discrepancy in Katie's cognitive scores suggesting the working memory and processing speed are difficulties for her right now.  However, it is possible this is not always the case and when more healthy in terms of mental health and having longer sober times they may do better in this area.  The RBANS also shows that Katie's memory is well within clinical limits.  Their attention was slightly low which may have been due to a variety of factors, but is still not overly concerning.      TREATMENT PLAN AND SUGGESTIONS:     1.  It is recommended that Katie obtained some kind of treatment following discharge from the hospital to help her maintain her sobriety.     2.  Katie should continue individual therapy with her outpatient therapist to continue working on mental health needs.   3.  It may be beneficial for Katie to have ongoing psychiatric medication management.  Whether this is through civil commitment or an outpatient provider is left to the court system as well as Katie's providers to determine.   4.  It may be beneficial if Katie continues to struggle with  memory difficulties to have updated outpatient neuropsychological testing when Katie is in a more stable condition.      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      MEDICAL:  History of concussion in 10th or 11th grade without hospitalization.      RELEVANT PSYCHOSOCIAL FACTORS:  Difficult time performing academically due to drug use.      RECOMMENDATIONS:  Please refer to Dr. Deb Naegele's recommendations in the hospital record.         J CARLOS MATTHEW PSYD, LP       As dictated by YARON CARRION, Psychology Resident           D: 2017 16:17   T: 2017 00:21   MT:       Name:     AMIRA DÍAZ   MRN:      [id number]        Account:       [id number]   :      1995           Consult Date:  2017      Document: [id number]

## 2017-12-22 NOTE — PROGRESS NOTES
Left voicemail for Bogdan at [facility] that releases have been signed and to please fax the assessment to [facility].

## 2017-12-22 NOTE — PROGRESS NOTES
12/21/17 1900   Behavioral Health   Hallucinations denies / not responding to hallucinations   Thinking distractable   Orientation person: oriented;place: oriented;date: oriented;time: oriented   Memory baseline memory   Insight insight appropriate to situation   Judgement impaired   Eye Contact at examiner   Affect blunted, flat   Mood mood is calm   Physical Appearance/Attire attire appropriate to age and situation   Hygiene well groomed   Suicidality other (see comments)  (none reported or observed)   1. Wish to be Dead No   2. Non-Specific Active Suicidal Thoughts  No   Self Injury other (see comment)  (none reported or observed)   Elopement (no concerns)   Activity withdrawn;other (see comment)  (pt is visible in the milieu)   Speech coherent;clear   Medication Sensitivity no observed side effects;no stated side effects   Psychomotor / Gait balanced;steady   Activities of Daily Living   Hygiene/Grooming independent   Oral Hygiene independent   Dress scrubs (behavioral health)   Activity   Activity Assistance Provided independent   Behavioral Health Interventions   Psychotic Symptoms maintain safety precautions;provide emotional support;establish therapeutic relationship;build upon strengths;assist with developing & utilizing healthy coping strategies;redirection of intrusive behaviors;simple, clear language;maintain safe secure environment   Social and Therapeutic Interventions (Psychotic Symptoms) encourage socialization with peers;encourage effective boundaries with peers;encourage participation in therapeutic groups and milieu activities     Pt ate dinner. Pt attended community meeting and participated appropriately in focus group. Pt had a visit with friends. Pt was withdrawn but visible in the milieu. Pt did not make as many requests as the previous three days. Pt mood appears calm. Pt had blunted, flat affect. Pt was observed playing a game with a peer.

## 2017-12-22 NOTE — PROGRESS NOTES
Re: Family Meeting      Writer met with patient and parents to discuss discharge plan. Patient agreed to sign PETER's for [facility]. THAO Garber contacted [facility] asking them to forward CD assessment to both treatment facilities.  Patient will be staying with parents until a bed becomes available. Once parents were out of ear shot, this writer had to reconvince patient to sign PETER for [facility] and [facility], which he did eventually do.      eKy Carpenter, [facility], LADC

## 2018-01-09 ENCOUNTER — TELEPHONE (OUTPATIENT)
Dept: BEHAVIORAL HEALTH | Facility: CLINIC | Age: 23
End: 2018-01-09

## 2018-01-09 NOTE — TELEPHONE ENCOUNTER
Andrés Call the  who handled patients case called in requesting that we fax the patients AVS and Discharge Summary to [facility] as the parents are having a difficult time requesting this information from our records department. Writer faxed the information to two [facility] locations fax numbers [phone] and [phone].

## 2018-01-11 ENCOUNTER — TELEPHONE (OUTPATIENT)
Dept: FAMILY MEDICINE | Facility: CLINIC | Age: 23
End: 2018-01-11

## 2018-01-11 NOTE — TELEPHONE ENCOUNTER
Returned call to Nurse at [facility] where patient is admitted. Wanting to confirm patient's dose of estradiol. As noted by PCP, patient should be taking 150 mcg with two prescriptions prescribed. RN faxed to [phone].    Aleah Nguyễn RN

## 2018-01-11 NOTE — TELEPHONE ENCOUNTER
[facility] phone call message- medication clarification/question:    Full Medication Name: estradiol (VIVELLE-DOT) 0.1 MG/24HR BIW patch    Question: Patient is admitted to the [facility] and Alice is calling to confirm the dose on the above medication. Please call to discuss. Thank you!     Pharmacy confirmed as   [id number] - [place], [place] - [address] [place] [id number]  Phone: [phone] Fax: [phone]  : Yes    OK to leave a message on voice mail? No    Call taken on January 11, 2018 at 12:37 PM by Andrés Lira

## 2018-02-22 ENCOUNTER — OFFICE VISIT (OUTPATIENT)
Dept: FAMILY MEDICINE | Facility: CLINIC | Age: 23
End: 2018-02-22
Payer: COMMERCIAL

## 2018-02-22 VITALS
BODY MASS INDEX: 27.02 KG/M2 | SYSTOLIC BLOOD PRESSURE: 118 MMHG | TEMPERATURE: 98.4 F | OXYGEN SATURATION: 96 % | WEIGHT: 199.2 LBS | HEART RATE: 81 BPM | RESPIRATION RATE: 16 BRPM | DIASTOLIC BLOOD PRESSURE: 69 MMHG

## 2018-02-22 DIAGNOSIS — F64.0 GENDER DYSPHORIA IN ADULT: ICD-10-CM

## 2018-02-22 DIAGNOSIS — F99 PSYCHIATRIC DISORDER: ICD-10-CM

## 2018-02-22 RX ORDER — SPIRONOLACTONE 100 MG/1
TABLET, FILM COATED ORAL
Qty: 60 TABLET | Refills: 1 | Status: SHIPPED | OUTPATIENT
Start: 2018-02-22 | End: 2018-04-27

## 2018-02-22 RX ORDER — ESTRADIOL 0.1 MG/D
FILM, EXTENDED RELEASE TRANSDERMAL
Qty: 16 PATCH | Refills: 1 | Status: SHIPPED | OUTPATIENT
Start: 2018-02-22 | End: 2018-04-27

## 2018-02-22 NOTE — MR AVS SNAPSHOT
After Visit Summary   2/22/2018    Vinicio Givens    MRN: [id number]           Patient Information     Date Of Birth          1995        Visit Information        Provider Department      2/22/2018 1:20 PM Bridgette Olguin MD [facility]        Today's Diagnoses     Gender dysphoria in adult          Care Instructions    Here is the plan from today's visit    1. Gender dysphoria in adult  We are increasing your estrogen today to 2 patches (100 mcg each) twice a week.  We are also increasing the Spironolactone to 200 mg daily.  We will need you to have your electrolytes checked in approximately 2 weeks.  Ideally, call ahead, but not necessary.   - estradiol (VIVELLE-DOT) 0.1 MG/24HR BIW patch; Apply 2 patches twice a week  Dispense: 16 patch; Refill: 1  - spironolactone (ALDACTONE) 100 MG tablet; Take 2 tablets (200 mg) daily  Dispense: 60 tablet; Refill: 1  - Basic Metabolic Panel ([facility]); Future    Follow up plan  Please make a clinic appointment for follow up with me (BRIDGETTE OLGUIN) in 2  months for follow up or sooner if you are having any problems or mood changes.    Thank you for coming to [facility] today.  Lab Testing:  **If you had lab testing today and your results are reassuring or normal they will be mailed to you or sent through [employer] within 7 days.   **If the lab tests need quick action we will call you with the results.  The phone number we will call with results is # [phone] (home) . If this is not the best number please call our clinic and change the number.  Medication Refills:  If you need any refills please call your pharmacy and they will contact us.   If you need to  your refill at a new pharmacy, please contact the new pharmacy directly. The new pharmacy will help you get your medications transferred faster.   Scheduling:  If you have any concerns about today's visit or wish to schedule another appointment please call our  office during normal business hours [phone] (8-5:00 M-F)  If a referral was made to a [facility] and you don't get a call from central scheduling please call [phone].  If a Mammogram was ordered for you at [facility] call [phone] to schedule or change your appointment.  If you had an XRay/CT/Ultrasound/MRI ordered the number is [phone] to schedule or change your radiology appointment.   Medical Concerns:  If you have urgent medical concerns please call [phone] at any time of the day.    Birdgette Olguin MD            Follow-ups after your visit        Future tests that were ordered for you today     Open Future Orders        Priority Expected Expires Ordered    Basic Metabolic Panel ([facility]) Routine 3/5/2018 3/5/2019 2018            Who to contact     Please call your clinic at [phone] to:    Ask questions about your health    Make or cancel appointments    Discuss your medicines    Learn about your test results    Speak to your doctor            Additional Information About Your Visit        [email or web address] Information     [email or web address] is an electronic gateway that provides easy, online access to your medical records. With [email or web address], you can request a clinic appointment, read your test results, renew a prescription or communicate with your care team.     To sign up for [email or web address] visit the website at [email or web address]   You will be asked to enter the access code listed below, as well as some personal information. Please follow the directions to create your username and password.     Your access code is: [email or web address]  Expires: 3/22/2018 12:08 PM     Your access code will  in 90 days. If you need help or a new code, please contact your [facility] or call [phone] for assistance.        Care EveryWhere ID     This is your Care EveryWhere ID. This could be used by other organizations to access your [facility]  records  [id number]        Your Vitals Were     Pulse Temperature Respirations Pulse Oximetry BMI (Body Mass Index)       81 98.4  F (36.9  C) (Oral) 16 96% 27.02 kg/m2        Blood Pressure from Last 3 Encounters:   02/22/18 118/69   12/22/17 147/74   10/17/17 131/83    Weight from Last 3 Encounters:   02/22/18 199 lb 3.2 oz (90.4 kg)   12/12/17 174 lb 6.1 oz (79.1 kg)   10/17/17 183 lb (83 kg)                 Today's Medication Changes          These changes are accurate as of 2/22/18  1:51 PM.  If you have any questions, ask your nurse or doctor.               These medicines have changed or have updated prescriptions.        Dose/Directions    * estradiol 0.05 MG/24HR BIW patch   Commonly known as:  VIVELLE-DOT   This may have changed:  Another medication with the same name was changed. Make sure you understand how and when to take each.   Used for:  Gender dysphoria in adult   Changed by:  Bridgette Olguin MD        Dose:  1 patch   Place 1 patch onto the skin twice a week   Quantity:  10 patch   Refills:  3       * estradiol 0.1 MG/24HR BIW patch   Commonly known as:  VIVELLE-DOT   This may have changed:    - how much to take  - how to take this  - when to take this  - additional instructions   Used for:  Gender dysphoria in adult   Changed by:  Bridgette Olguin MD        Apply 2 patches twice a week   Quantity:  16 patch   Refills:  1       * spironolactone 100 MG tablet   Commonly known as:  ALDACTONE   This may have changed:  Another medication with the same name was added. Make sure you understand how and when to take each.   Used for:  Gender dysphoria in adult   Changed by:  Bridgette Olguin MD        Dose:  150 mg   Take 1.5 tablets (150 mg) by mouth daily   Quantity:  90 tablet   Refills:  1       * spironolactone 100 MG tablet   Commonly known as:  ALDACTONE   This may have changed:  You were already taking a medication with the same name, and this prescription was added. Make sure  you understand how and when to take each.   Used for:  Gender dysphoria in adult   Changed by:  Bridgette Olguin MD        Take 2 tablets (200 mg) daily   Quantity:  60 tablet   Refills:  1       * Notice:  This list has 4 medication(s) that are the same as other medications prescribed for you. Read the directions carefully, and ask your doctor or other care provider to review them with you.         Where to get your medicines      These medications were sent to [facility] [place], [place] - [address] [facility] [address]     Phone:  [phone]     estradiol 0.1 MG/24HR BIW patch    spironolactone 100 MG tablet                Primary Care Provider Office Phone # Fax #    Bridgette Olguin [phone]       [address]  [facility] [address]        Equal Access to Services     [facility] : Hadii elizabeth barajas hadasho Soeloise, waaxda luqadaha, qaybta kaalmada adechloeyada, emani marie . So [facility] [phone].    ATENCIÓN: Si habla español, tiene a alarcon disposición servicios gratuitos de asistencia lingüística. [facility] [phone].    We comply with applicable federal civil rights laws and [place] laws. We do not discriminate on the basis of race, color, national origin, age, disability, sex, sexual orientation, or gender identity.            Thank you!     Thank you for choosing [facility]  for your care. Our goal is always to provide you with excellent care. Hearing back from our patients is one way we can continue to improve our services. Please take a few minutes to complete the written survey that you may receive in the mail after your visit with us. Thank you!             Your Updated Medication List - Protect others around you: Learn how to safely use, store and throw away your medicines at www.disposemymeds.org.          This list is accurate as of 2/22/18  1:51 PM.  Always use your most recent med  list.                   Brand Name Dispense Instructions for use Diagnosis    * estradiol 0.05 MG/24HR BIW patch    VIVELLE-DOT    10 patch    Place 1 patch onto the skin twice a week    Gender dysphoria in adult       * estradiol 0.1 MG/24HR BIW patch    VIVELLE-DOT    16 patch    Apply 2 patches twice a week    Gender dysphoria in adult       hydrOXYzine 25 MG tablet    ATARAX    120 tablet    Take 1-2 tablets (25-50 mg) by mouth every 4 hours as needed for anxiety    Anxiety       Melatonin 10 MG Tabs tablet      Take 10 mg by mouth nightly as needed for sleep        OLANZapine 20 MG tablet    zyPREXA    30 tablet    Take 1 tablet (20 mg) by mouth At Bedtime    Psychosis, unspecified psychosis type       * spironolactone 100 MG tablet    ALDACTONE    90 tablet    Take 1.5 tablets (150 mg) by mouth daily    Gender dysphoria in adult       * spironolactone 100 MG tablet    ALDACTONE    60 tablet    Take 2 tablets (200 mg) daily    Gender dysphoria in adult       * Notice:  This list has 4 medication(s) that are the same as other medications prescribed for you. Read the directions carefully, and ask your doctor or other care provider to review them with you.

## 2018-02-22 NOTE — PROGRESS NOTES
Preceptor Attestation:  Patient seen and discussed with the resident. Assessment and plan reviewed with resident and agreed upon.  Supervising Physician:  Timmy Smith MD  [facility]

## 2018-02-22 NOTE — PATIENT INSTRUCTIONS
Here is the plan from today's visit    1. Gender dysphoria in adult  We are increasing your estrogen today to 2 patches (100 mcg each) twice a week.  We are also increasing the Spironolactone to 200 mg daily.  We will need you to have your electrolytes checked in approximately 2 weeks.  Ideally, call ahead, but not necessary.   - estradiol (VIVELLE-DOT) 0.1 MG/24HR BIW patch; Apply 2 patches twice a week  Dispense: 16 patch; Refill: 1  - spironolactone (ALDACTONE) 100 MG tablet; Take 2 tablets (200 mg) daily  Dispense: 60 tablet; Refill: 1  - Basic Metabolic Panel ([facility]); Future    Follow up plan  Please make a clinic appointment for follow up with me ([facility]) in 2  months for follow up or sooner if you are having any problems or mood changes.    Thank you for coming to [facility] today.  Lab Testing:  **If you had lab testing today and your results are reassuring or normal they will be mailed to you or sent through [employer] within 7 days.   **If the lab tests need quick action we will call you with the results.  The phone number we will call with results is # [phone] (home) . If this is not the best number please call our clinic and change the number.  Medication Refills:  If you need any refills please call your pharmacy and they will contact us.   If you need to  your refill at a new pharmacy, please contact the new pharmacy directly. The new pharmacy will help you get your medications transferred faster.   Scheduling:  If you have any concerns about today's visit or wish to schedule another appointment please call our office during normal business hours [phone] (8-5:00 M-F)  If a referral was made to a [facility] and you don't get a call from central scheduling please call [phone].  If a Mammogram was ordered for you at [facility] call [phone] to schedule or change your appointment.  If you had an XRay/CT/Ultrasound/MRI ordered the  number is [phone] to schedule or change your radiology appointment.   Medical Concerns:  If you have urgent medical concerns please call [phone] at any time of the day.    Bridgette Olguin MD

## 2018-02-22 NOTE — PROGRESS NOTES
"          APURVA Toth is a 22 year old individual that uses pronouns She/Her/Hers/Herself that presents today for follow up of:  feminizing hormone therapy. Gender identity: female    Patient recently hospitalized for psychosis- this was a new diagnosis for patient (drug induced vs idiopathic).  She is taking time off school right now.  She attends individual therapy on a weekly basis and is seeing a psychiatrist.  She would like to stop the Zyprexa, which was started during the hospitalization.     Patient now \"out\" to everyone.     Any special concerns today?  no current concerns    Patient does have a history of a DVT (provoked), occurred after knee surgery    On hormones?  YES +++      Due for labs?  No      +++ Refills of meds needed?  Yes, would like to go up on Estrogen and Spironolactone    ---    Past Surgical History:   Procedure Laterality Date     KNEE SURGERY Left 2014       Patient Active Problem List   Diagnosis     Blood clot in vein     Psychiatric disorder       Current Outpatient Prescriptions   Medication Sig Dispense Refill     estradiol (VIVELLE-DOT) 0.1 MG/24HR BIW patch Apply 2 patches twice a week 16 patch 1     spironolactone (ALDACTONE) 100 MG tablet Take 2 tablets (200 mg) daily 60 tablet 1     OLANZapine (ZYPREXA) 20 MG tablet Take 1 tablet (20 mg) by mouth At Bedtime 30 tablet 1     Melatonin 10 MG TABS tablet Take 10 mg by mouth nightly as needed for sleep       spironolactone (ALDACTONE) 100 MG tablet Take 1.5 tablets (150 mg) by mouth daily 90 tablet 1     estradiol (VIVELLE-DOT) 0.05 MG/24HR BIW patch Place 1 patch onto the skin twice a week 10 patch 3     hydrOXYzine (ATARAX) 25 MG tablet Take 1-2 tablets (25-50 mg) by mouth every 4 hours as needed for anxiety (Patient not taking: Reported on 2/22/2018) 120 tablet 1     [DISCONTINUED] estradiol (VIVELLE-DOT) 0.1 MG/24HR BIW patch Place 1 patch onto the skin twice a week 10 patch 1       History   Smoking Status     Never Smoker " "  Smokeless Tobacco     Never Used          Allergies   Allergen Reactions     Banana Anaphylaxis     Throat swelling       Problem, Medication and Allergy Lists were reviewed and updated if needed..         Review of Systems:      General    Fat redistribution: YES    Weight change: YES HEENT    Voice change: NO     Cardiovascular (CV)    Chest Pains: no    Shortness of breath: no Chest    Decreased exercise tolerance:  not applicable    Breast changes/development: YES, more breast development     Gastrointestinal (GI)    Abdominal pain: no    Change in appetite: no Skin    Acne or oily skin: no    Change in hair: no     Genitourinary ()    Abnormal vaginal bleeding: not applicable     Decreased spontaneous erections: YES    Change in libido: YES    New sexual partners: YES, cis-man (considers herself pansexual) Musculoskeletal    Leg pain or swelling: no     Psychiatric (Psych)    Depression: YES- feeling \"low\" but has been getting better over the last 2 weeks.  Feels bored. Not taking classes right now    Anxiety/Panic: YES- about being \"out\"    Mood:  neutral                    Physical Exam:     Vitals:    02/22/18 1321   BP: 118/69   Pulse: 81   Resp: 16   Temp: 98.4  F (36.9  C)   TempSrc: Oral   SpO2: 96%   Weight: 199 lb 3.2 oz (90.4 kg)     BMI= Body mass index is 27.02 kg/(m^2).   Wt Readings from Last 10 Encounters:   02/22/18 199 lb 3.2 oz (90.4 kg)   12/12/17 174 lb 6.1 oz (79.1 kg)   10/17/17 183 lb (83 kg)   08/01/17 196 lb (88.9 kg)   07/12/17 197 lb 6.4 oz (89.5 kg)   06/21/17 193 lb 3.2 oz (87.6 kg)   05/15/17 191 lb 9.6 oz (86.9 kg)   05/10/17 191 lb 9.6 oz (86.9 kg)     Appearance: Female appearance and dress    GENERAL:: healthy, alert and no distress  CV:  RRR, no murmurs  Lungs: CTA bilaterally  Abd: nontender, nondistended  Ext: No edema  Neuro: CNs 2-12 grossly intact  Affect: Appropriate/mood-congruent, affect mildly flat           Labs:   No labs ordered today.     Assessment and Plan " "    Vinicio was seen today for recheck.    Diagnoses and all orders for this visit:    Gender dysphoria in adult  Patient desires to have estrogen increased.  Currently taking 0.15 mg twice weekly.  We increased to max dose of 0.2 mg twice weekly.  We also increased spironolactone to 200 mg daily.  Patient advised to return to clinic for lab check next week to check electrolytes (with increased spironolactone dose).  Patient is not due for labs until 7/2018.  Patient will follow up in 2 months (make sure estrogen increase is okay with mood, no adverse effects).  -     estradiol (VIVELLE-DOT) 0.1 MG/24HR BIW patch; Apply 2 patches twice a week  -     spironolactone (ALDACTONE) 100 MG tablet; Take 2 tablets (200 mg) daily  -     Basic Metabolic Panel ([facility]); Future    Contraception:   not needed currently (recently had an encounter with cis-man), but is \"pansexual\".  We discussed using condoms if patient is with a transwoman or ciswoman    Counselled patient about controlled substances: Not applicable    Follow up:  Follow up in 2 months  Results by laura  Questions were elicited and answered.     Bridgette Olguin M.D.  PGY-3, Family Medicine    Phone number: [phone]  "

## 2018-04-27 ENCOUNTER — OFFICE VISIT (OUTPATIENT)
Dept: FAMILY MEDICINE | Facility: CLINIC | Age: 23
End: 2018-04-27
Payer: COMMERCIAL

## 2018-04-27 VITALS
OXYGEN SATURATION: 100 % | RESPIRATION RATE: 16 BRPM | BODY MASS INDEX: 25.58 KG/M2 | HEART RATE: 75 BPM | TEMPERATURE: 97 F | WEIGHT: 188.6 LBS | SYSTOLIC BLOOD PRESSURE: 136 MMHG | DIASTOLIC BLOOD PRESSURE: 82 MMHG

## 2018-04-27 DIAGNOSIS — F64.0 GENDER DYSPHORIA IN ADULT: ICD-10-CM

## 2018-04-27 DIAGNOSIS — R53.83 OTHER FATIGUE: Primary | ICD-10-CM

## 2018-04-27 DIAGNOSIS — Z00.00 HEALTH CARE MAINTENANCE: ICD-10-CM

## 2018-04-27 LAB
BUN SERPL-MCNC: 15.6 MG/DL (ref 7–21)
CALCIUM SERPL-MCNC: 9.3 MG/DL (ref 8.5–10.1)
CHLORIDE SERPLBLD-SCNC: 99.2 MMOL/L (ref 98–110)
CO2 SERPL-SCNC: 25.8 MMOL/L (ref 20–32)
CREAT SERPL-MCNC: 0.8 MG/DL (ref 0.7–1.3)
GFR SERPL CREATININE-BSD FRML MDRD: >90 ML/MIN/1.7 M2
GLUCOSE SERPL-MCNC: 97.1 MG'DL (ref 70–99)
HGB BLD-MCNC: 13.5 G/DL (ref 13.3–17.7)
POTASSIUM SERPL-SCNC: 4 MMOL/DL (ref 3.3–4.5)
SODIUM SERPL-SCNC: 133.6 MMOL/L (ref 132.6–141.4)
TSH SERPL DL<=0.005 MIU/L-ACNC: 1.36 MU/L (ref 0.4–4)

## 2018-04-27 RX ORDER — ESTRADIOL 0.1 MG/D
FILM, EXTENDED RELEASE TRANSDERMAL
Qty: 16 PATCH | Refills: 2 | Status: SHIPPED | OUTPATIENT
Start: 2018-04-27 | End: 2018-08-13

## 2018-04-27 RX ORDER — SPIRONOLACTONE 100 MG/1
TABLET, FILM COATED ORAL
Qty: 60 TABLET | Refills: 2 | Status: SHIPPED | OUTPATIENT
Start: 2018-04-27 | End: 2018-08-13

## 2018-04-27 NOTE — MR AVS SNAPSHOT
After Visit Summary   4/27/2018    Vinicio Givens    MRN: [id number]           Patient Information     Date Of Birth          1995        Visit Information        Provider Department      4/27/2018 1:00 PM Bridgette Olguin MD [facility]        Today's Diagnoses     Other fatigue    -  1    Gender dysphoria in adult          Care Instructions    Here is the plan from today's visit    1. Gender dysphoria in adult  WE will refill your meds today.  Return to clinic in 3 months for further refills and labs.    - estradiol (VIVELLE-DOT) 0.1 MG/24HR BIW patch; Apply 2 patches twice a week  Dispense: 16 patch; Refill: 2  - spironolactone (ALDACTONE) 100 MG tablet; Take 2 tablets (200 mg) daily  Dispense: 60 tablet; Refill: 2  - Basic Metabolic Panel ([facility])    2. Other fatigue  For your fatigue, I am checking routine labs.  I will get back to you with lab results and send them to you as well.  I will also tell you what my colleague says regarding your hormones and fatigue.   - Testosterone total  - Hemoglobin  - TSH with free T4 reflex  - Vitamin D Level      Follow up plan  Please make a clinic appointment for follow up with me (BRIDGETTE OLGUIN) in 3  months for follow up.    Thank you for coming to [facility] today.  Lab Testing:  **If you had lab testing today and your results are reassuring or normal they will be mailed to you or sent through [employer] within 7 days.   **If the lab tests need quick action we will call you with the results.  The phone number we will call with results is # [phone] (home) . If this is not the best number please call our clinic and change the number.  Medication Refills:  If you need any refills please call your pharmacy and they will contact us.   If you need to  your refill at a new pharmacy, please contact the new pharmacy directly. The new pharmacy will help you get your medications transferred faster.    Scheduling:  If you have any concerns about today's visit or wish to schedule another appointment please call our office during normal business hours [phone] (8-5:00 M-F)  If a referral was made to a [facility] and you don't get a call from central scheduling please call [phone].  If a Mammogram was ordered for you at [facility] call [phone] to schedule or change your appointment.  If you had an XRay/CT/Ultrasound/MRI ordered the number is [phone] to schedule or change your radiology appointment.   Medical Concerns:  If you have urgent medical concerns please call [phone] at any time of the day.    Bridgette Olguin MD            Follow-ups after your visit        Who to contact     Please call your clinic at [phone] to:    Ask questions about your health    Make or cancel appointments    Discuss your medicines    Learn about your test results    Speak to your doctor            Additional Information About Your Visit        MediaflyharJ[employer] Software Information     ioGenetics is an electronic gateway that provides easy, online access to your medical records. With io[employer]etics, you can request a clinic appointment, read your test results, renew a prescription or communicate with your care team.     To sign up for io[employer]etics visit the website at [email or web address]   You will be asked to enter the access code listed below, as well as some personal information. Please follow the directions to create your username and password.     Your access code is: [email or web address]  Expires: 2018  1:36 PM     Your access code will  in 90 days. If you need help or a new code, please contact your [facility] or call [phone] for assistance.        Care EveryWhere ID     This is your Care EveryWhere ID. This could be used by other organizations to access your [facility] medical records  [id number]        Your Vitals Were     Pulse Temperature  Respirations Pulse Oximetry BMI (Body Mass Index)       75 97  F (36.1  C) (Oral) 16 100% 25.58 kg/m2        Blood Pressure from Last 3 Encounters:   04/27/18 136/82   02/22/18 118/69   12/22/17 147/74    Weight from Last 3 Encounters:   04/27/18 188 lb 9.6 oz (85.5 kg)   02/22/18 199 lb 3.2 oz (90.4 kg)   12/12/17 174 lb 6.1 oz (79.1 kg)              We Performed the Following     Basic Metabolic Panel (\[facility]\"")     Hemoglobin     Testosterone total     TSH with free T4 reflex     Vitamin D Level          Today's Medication Changes          These changes are accurate as of 4/27/18  1:36 PM.  If you have any questions, ask your nurse or doctor.               Stop taking these medicines if you haven't already. Please contact your care team if you have questions.     OLANZapine 20 MG tablet   Commonly known as:  zyPREXA   Stopped by:  Bridgette Olguin MD                Where to get your medicines      These medications were sent to [facility] [place], [place] - [address]   [address] [place] [address]     Phone:  [phone]     estradiol 0.1 MG/24HR BIW patch    spironolactone 100 MG tablet                Primary Care Provider Office Phone # Fax #    Bridgette Olguin [phone]       [address] [facility] [address]        Equal Access to Services     [facility]: Hadii elizabeth barajas hadchiquiso Soeloise, waaxda luqadaha, qaybta kaalmada adechloeyada, emani marie . So [facility] [phone].    ATENCIÓN: Si habla español, tiene a alarcon disposición servicios gratuitos de asistencia lingüística. Llame al [phone].    We comply with applicable federal civil rights laws and [place] laws. We do not discriminate on the basis of race, color, national origin, age, disability, sex, sexual orientation, or gender identity.            Thank you!     Thank you for choosing [facility]  for your care. Our goal is always to provide you  with excellent care. Hearing back from our patients is one way we can continue to improve our services. Please take a few minutes to complete the written survey that you may receive in the mail after your visit with us. Thank you!             Your Updated Medication List - Protect others around you: Learn how to safely use, store and throw away your medicines at www.disposemymeds.org.          This list is accurate as of 4/27/18  1:36 PM.  Always use your most recent med list.                   Brand Name Dispense Instructions for use Diagnosis    estradiol 0.1 MG/24HR BIW patch    VIVELLE-DOT    16 patch    Apply 2 patches twice a week    Gender dysphoria in adult       Melatonin 10 MG Tabs tablet      Take 10 mg by mouth nightly as needed for sleep        spironolactone 100 MG tablet    ALDACTONE    60 tablet    Take 2 tablets (200 mg) daily    Gender dysphoria in adult

## 2018-04-27 NOTE — PROGRESS NOTES
APURVA Toth is a 23 year old individual that uses pronouns She/Her/Hers/Herself that presents today for follow up of:  feminizing hormone therapy. Gender identity: female    Any special concerns today?  Fatigue.  Thinks it is because testosterone level is low.  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  Living with roommates, denies depression but feels bored.  Patient not exercise.     On hormones?  YES +++ Estrogen patch      Due for labs?  No      +++ Refills of meds needed?  Yes  ---    Past Surgical History:   Procedure Laterality Date     KNEE SURGERY Left 2014       Patient Active Problem List   Diagnosis     Blood clot in vein     Psychiatric disorder     Gender dysphoria in adult       Current Outpatient Prescriptions   Medication Sig Dispense Refill     estradiol (VIVELLE-DOT) 0.1 MG/24HR BIW patch Apply 2 patches twice a week 16 patch 2     Melatonin 10 MG TABS tablet Take 10 mg by mouth nightly as needed for sleep       spironolactone (ALDACTONE) 100 MG tablet Take 2 tablets (200 mg) daily 60 tablet 2     [DISCONTINUED] estradiol (VIVELLE-DOT) 0.1 MG/24HR BIW patch Apply 2 patches twice a week 16 patch 1     [DISCONTINUED] spironolactone (ALDACTONE) 100 MG tablet Take 2 tablets (200 mg) daily 60 tablet 1       History   Smoking Status     Never Smoker   Smokeless Tobacco     Never Used        Allergies   Allergen Reactions     Banana Anaphylaxis     Throat swelling     Problem, Medication and Allergy Lists were reviewed and updated if needed..         Review of Systems:      General    Fat redistribution: YES    Weight change: YES HEENT    Voice change: no     Cardiovascular (CV)    Chest Pains: no    Shortness of breath: no Chest    Decreased exercise tolerance:  YES    Breast changes/development: YES     Gastrointestinal (GI)    Abdominal pain: no    Change in appetite: YES Skin    Acne or oily skin: no    Change in hair: no      Genitourinary ()    Abnormal vaginal bleeding: not applicable     Decreased spontaneous erections: YES    Change in libido: no    New sexual partners: no Musculoskeletal    Leg pain or swelling: no     Psychiatric (Psych)    Depression: YES- mild depression, doesn't like being out of school, feels bored.  Sees a therapist (not sure if it is helping)    Anxiety/Panic: no    Mood:  neutral and lazy                    Physical Exam:     Vitals:    04/27/18 1307   BP: 136/82   Pulse: 75   Resp: 16   Temp: 97  F (36.1  C)   TempSrc: Oral   SpO2: 100%   Weight: 188 lb 9.6 oz (85.5 kg)     BMI= Body mass index is 25.58 kg/(m^2).   Wt Readings from Last 10 Encounters:   04/27/18 188 lb 9.6 oz (85.5 kg)   02/22/18 199 lb 3.2 oz (90.4 kg)   12/12/17 174 lb 6.1 oz (79.1 kg)   10/17/17 183 lb (83 kg)   08/01/17 196 lb (88.9 kg)   07/12/17 197 lb 6.4 oz (89.5 kg)   06/21/17 193 lb 3.2 oz (87.6 kg)   05/15/17 191 lb 9.6 oz (86.9 kg)   05/10/17 191 lb 9.6 oz (86.9 kg)     Appearance: Female appearance and dress    GENERAL:: healthy, alert and no distress  EYES: Eyes grossly normal to inspection  RESP: lungs clear to auscultation - no rales, no rhonchi, no wheezes  CV: regular rates and rhythm, normal S1 S2, no murmur  MS: extremities normal- no gross deformities noted, no edema  SKIN: no suspicious lesions, no rashes  Psych: Alert and oriented times 3; coherent speech, normal rate and volume, able to articulate logical thoughts, able to abstract reason, no tangential thoughts, no hallucinations or delusions. Affect is appropriate.  Affect: Appropriate/mood-congruent           Labs:   Labs pending    Assessment and Plan   Vinicio was seen today for recheck.    Janey presents today for estrogen patch and spironolactone refills.  At his last visit we increased his spironolactone so a BMP was also obtained today.  In addition to needing refills, his primary complaint today is fatigue.  He is concerned that the decreased  testosterone levels is causing his fatigue.  This could be a possibility but I would also like to investigate other causes of fatigue such as anemia (unlikely), hypothyroid, and low vitamin D.  We also discussed his social environment.  Currently patient has little structure and feels bored.  He is taking time off school since he was recently hospitalized for psychosis.  He is not exercising.  He denies depression, but I think his lifestyle may be contributing to fatigue and boredom.  I encourage lifestyle modification such as exercise.  Will also check a testosterone level per patient request.  I also sent a message to Dr. Carlton regarding estrogen contributing fatigue in trans-male to female patients.  If testosterone is significantly decreased, we may need to either decrease his estradiol or spironolactone.    Diagnoses and all orders for this visit:    Other fatigue  -     Testosterone total  -     Hemoglobin  -     TSH with free T4 reflex  -     Vitamin D Level    Gender dysphoria in adult  -     estradiol (VIVELLE-DOT) 0.1 MG/24HR BIW patch; Apply 2 patches twice a week  -     spironolactone (ALDACTONE) 100 MG tablet; Take 2 tablets (200 mg) daily  -     Basic Metabolic Panel ([facility])    Health care maintenance  -     ADMIN VACCINE, INITIAL  -     HPV9 (Gardasil 9 )    Contraception:  not needed    Follow up:  Follow up in 3 months for refills and labs    Questions were elicited and answered.     Bridgtete Olguin M.D.  PGY-3, Family Medicine

## 2018-04-27 NOTE — PROGRESS NOTES
Preceptor Attestation:   Patient seen and discussed with the resident. Assessment and plan reviewed with resident and agreed upon.   Supervising Physician:  Raymond Cope MD  [facility]

## 2018-04-28 ASSESSMENT — PATIENT HEALTH QUESTIONNAIRE - PHQ9: SUM OF ALL RESPONSES TO PHQ QUESTIONS 1-9: 8

## 2018-04-30 LAB — DEPRECATED CALCIDIOL+CALCIFEROL SERPL-MC: 14 UG/L (ref 20–75)

## 2018-05-01 ENCOUNTER — TELEPHONE (OUTPATIENT)
Dept: FAMILY MEDICINE | Facility: CLINIC | Age: 23
End: 2018-05-01

## 2018-05-01 LAB — TESTOST SERPL-MCNC: 9 NG/DL (ref 240–950)

## 2018-05-02 NOTE — TELEPHONE ENCOUNTER
Called patient to discuss labs, no answer, LVM for patient to call clinic back.  Labs were okay except for low testosterone level (very low, as patient expected!).  It was 9 (last test 10/2017) was 440.  I spoke with Dr. Carlton (transgender expert) about the low level.  She suggested decreasing the estrogen dose.  I specifically asked about the spironolactone, but she stated it wouldn't make much difference and it is most like the low testosterone levels that is making patient so tired.  I would recommend taking 100 mcg (1 patch) twice weekly instead of 150 mcg (2 patches).    Also, vitamin D level was low.  This may also be contributing to fatigue.  I recommend OTC Vitamin D supplements (2000 IU/day).  TSH and hemoglobin were normal.  If she has additional questions, I would be happy to return call.    Thanks,  Bridgette Olguin M.D.  PGY-3, Family Medicine

## 2018-05-03 NOTE — TELEPHONE ENCOUNTER
Patient returned call, relayed message below. Patient would like a call back to discuss the change in the patch dosage. Please call back, okay to leave message.    Carmen Heath, Patient Representative

## 2018-05-03 NOTE — TELEPHONE ENCOUNTER
RN attempted to reach pt. Unable to reach. Message routed to PCP to discuss change in dosage    Shanda Romano RN

## 2018-05-03 NOTE — TELEPHONE ENCOUNTER
Called patient, no answer, LVM for patient to call clinic.    Bridgette Olguin M.D.  PGY-3, Family Medicine

## 2018-07-11 ENCOUNTER — OFFICE VISIT (OUTPATIENT)
Dept: FAMILY MEDICINE | Facility: CLINIC | Age: 23
End: 2018-07-11
Payer: COMMERCIAL

## 2018-07-11 VITALS
DIASTOLIC BLOOD PRESSURE: 83 MMHG | RESPIRATION RATE: 16 BRPM | HEART RATE: 98 BPM | BODY MASS INDEX: 25.52 KG/M2 | SYSTOLIC BLOOD PRESSURE: 125 MMHG | WEIGHT: 188.2 LBS | OXYGEN SATURATION: 97 %

## 2018-07-11 DIAGNOSIS — F64.0 GENDER DYSPHORIA IN ADULT: ICD-10-CM

## 2018-07-11 DIAGNOSIS — Z01.818 PREOP GENERAL PHYSICAL EXAM: Primary | ICD-10-CM

## 2018-07-11 LAB
% GRANULOCYTES: 64.2 %G (ref 40–75)
ALBUMIN SERPL-MCNC: 4.9 MG/DL (ref 3.8–5)
ALP SERPL-CCNC: 43.9 U/L (ref 31.7–110.7)
ALT SERPL-CCNC: 20.5 U/L (ref 0–45)
AST SERPL-CCNC: 15.2 U/L (ref 0–55)
BILIRUB SERPL-MCNC: 0.7 MG/DL (ref 0.2–1.3)
BILIRUBIN UR: NEGATIVE
BLOOD UR: NEGATIVE
BUN SERPL-MCNC: 10.9 MG/DL (ref 7–21)
CALCIUM SERPL-MCNC: 9.7 MG/DL (ref 8.5–10.1)
CHLORIDE SERPLBLD-SCNC: 98 MMOL/L (ref 98–110)
CO2 SERPL-SCNC: 28.7 MMOL/L (ref 20–32)
CREAT SERPL-MCNC: 0.8 MG/DL (ref 0.7–1.3)
GFR SERPL CREATININE-BSD FRML MDRD: >90 ML/MIN/1.7 M2
GLUCOSE SERPL-MCNC: 103.1 MG'DL (ref 70–99)
GLUCOSE URINE: NEGATIVE
GRANULOCYTES #: 4 K/UL (ref 1.6–8.3)
HCT VFR BLD AUTO: 44.7 % (ref 40–53)
HEMOGLOBIN: 13.9 G/DL (ref 13.3–17.7)
KETONES UR QL: ABNORMAL
LEUKOCYTE ESTERASE UR: NEGATIVE
LYMPHOCYTES # BLD AUTO: 1.7 K/UL (ref 0.8–5.3)
LYMPHOCYTES NFR BLD AUTO: 27.5 %L (ref 20–48)
MCH RBC QN AUTO: 30.1 PG (ref 26.5–35)
MCHC RBC AUTO-ENTMCNC: 31.1 G/DL (ref 32–36)
MCV RBC AUTO: 96.8 FL (ref 78–100)
MID #: 0.5 K/UL (ref 0–2.2)
MID %: 8.3 %M (ref 0–20)
NITRITE UR QL STRIP: NEGATIVE
PH UR STRIP: 6 [PH] (ref 5–7)
PLATELET # BLD AUTO: 243 K/UL (ref 150–450)
POTASSIUM SERPL-SCNC: 4.2 MMOL/DL (ref 3.3–4.5)
PROT SERPL-MCNC: 8 G/DL (ref 6.8–8.8)
PROTEIN UR: ABNORMAL
RBC # BLD AUTO: 4.62 M/UL (ref 4.4–5.9)
SODIUM SERPL-SCNC: 135.8 MMOL/L (ref 132.6–141.4)
SP GR UR STRIP: 1.02
UROBILINOGEN UR STRIP-ACNC: ABNORMAL
WBC # BLD AUTO: 6.3 K/UL (ref 4–11)

## 2018-07-11 NOTE — MR AVS SNAPSHOT
After Visit Summary   7/11/2018    Vinicio Givens    MRN: [id number]           Patient Information     Date Of Birth          1995        Visit Information        Provider Department      7/11/2018 3:20 PM Bridgette Olguin MD [facility]        Today's Diagnoses     Preop general physical exam    -  1    Gender dysphoria in adult          Care Instructions      Before Your Surgery      Call your surgeon if there is any change in your health. This includes signs of a cold or flu (such as a sore throat, runny nose, cough, rash or fever).    Do not smoke, drink alcohol or take over the counter medicine (unless your surgeon or primary care doctor tells you to) for the 24 hours before and after surgery.    If you take prescribed drugs: Follow your doctor s orders about which medicines to take and which to stop until after surgery.    Eating and drinking prior to surgery: follow the instructions from your surgeon    Take a shower or bath the night before surgery. Use the soap your surgeon gave you to gently clean your skin. If you do not have soap from your surgeon, use your regular soap. Do not shave or scrub the surgery site.  Wear clean pajamas and have clean sheets on your bed.     Presurgery Checklist  You are scheduled to have surgery. The healthcare staff will try to make your stay comfortable. Use the guidelines below to remind yourself what to do before surgery. Be sure to follow any specific pre-op instructions from your surgeon or nurse.   Preparing for Surgery  Ask your surgeon if you ll need a blood transfusion during surgery and if so, how to prepare for it. In some cases, you can donate blood before surgery. If needed, this blood can be given back (transfused) to you during or after surgery.  If you are having abdominal surgery, ask what you need to do to clear your bowel.  Tell your surgeon if you have allergies to any medications or foods.  Arrange for an adult  family member or friend to drive you home after surgery. If possible, have someone ready to help you at home as you recover.  Call the surgeon if you get a cold, fever, sore throat, diarrhea, or other health problem just before surgery. Your surgeon can decide whether or not to postpone the surgery.  Medications  Tell your surgeon about all medications you take, including prescription and over-the-counter products such as herbal remedies and vitamins. Ask if you should continue taking them.  If you take ibuprofen, naproxen, or  blood thinners  such as aspirin, clopidogrel (Plavix), or warfarin (Coumadin), ask your surgeon whether you should stop taking them and how long before surgery you should stop.  You may be told to take antibiotics just before surgery to prevent infection. If so, follow instructions carefully on how to take them.  If you are told to take medications called anticoagulants to prevent blood clots after surgery, be sure to follow the instructions on how to take them.  Stop Smoking  If you smoke, healing may take longer. So at least 2 week(s) before surgery, stop smoking.  Bathing or Showering Before Surgery  If instructed, wash with antibacterial soap. Afterward, do not use lotions or powders.  If you are having surgery on the head, you may be asked to shampoo with antibacterial soap. Follow instructions for doing so.  Do Not Remove Hair from the Surgery Site  Do not shave hair from the incision site, unless you are given specific instructions to do so. Usually, if hair needs to be removed, it will be done at the hospital right before surgery.  Don t Eat or Drink  Your doctor will tell you when to stop eating and drinking. If you do not follow your doctor's instructions, your procedure may be postponed or rescheduled for another day.  If your surgeon tells you to continue any medications, take them with small sips of water.  You can brush your teeth and rinse your mouth, but don t swallow any  water.  Day of Surgery  Do not wear makeup. Do not use perfume, deodorant, or hairspray. Remove nail polish and artificial nails.  Leave jewelry (including rings), watches, and other valuables at home.  Be sure to bring health insurance cards or forms and a photo ID.  Bring a list of your medications (include the name, dose, how often you take them, and the time last dose was taken).  Arrive on time at the hospital or surgery facility.    Here is the plan from today's visit    1. Preop general physical exam  I see no reason that you will not be cleared for surgery.    - CBC with Diff Plt ([facility])  - EKG 12-lead complete w/read - Clinics  - Comprehensive Metabolic Panel ([facility])  - Urinalysis, Micro If (UA) ([facility])      Please call or return to clinic if your symptoms don't go away.    Follow up plan  Please make a clinic appointment for follow up with your primary care physician in 1 year.      Thank you for coming to [facility] today.  Lab Testing:  **If you had lab testing today and your results are reassuring or normal they will be mailed to you or sent through [employer] within 7 days.   **If the lab tests need quick action we will call you with the results.  The phone number we will call with results is # [phone] (home) . If this is not the best number please call our clinic and change the number.  Medication Refills:  If you need any refills please call your pharmacy and they will contact us.   If you need to  your refill at a new pharmacy, please contact the new pharmacy directly. The new pharmacy will help you get your medications transferred faster.   Scheduling:  If you have any concerns about today's visit or wish to schedule another appointment please call our office during normal business hours [phone] (8-5:00 M-F)  If a referral was made to a [facility] and you don't get a call from central scheduling please call [phone].  If a Mammogram was  ordered for you at [facility] call [phone] to schedule or change your appointment.  If you had an XRay/CT/Ultrasound/MRI ordered the number is [phone] to schedule or change your radiology appointment.   Medical Concerns:  If you have urgent medical concerns please call [phone] at any time of the day.    Bridgette Olguin MD                Here is the plan from today's visit    1. Gender dysphoria in adult  We will start prometrium per your request.  Please follow up with your new provider or myself in the next month.    - progesterone (PROMETRIUM) 200 MG capsule; Take 1 capsule (200 mg) by mouth daily  Dispense: 30 capsule; Refill: 0    Follow up plan  Please make a clinic appointment for follow up with me (BRIDGETTE OLGUIN) in 1  month for follow up.    Thank you for coming to [facility] today.  Lab Testing:  **If you had lab testing today and your results are reassuring or normal they will be mailed to you or sent through [facility] within 7 days.   **If the lab tests need quick action we will call you with the results.  The phone number we will call with results is # [phone] (home) . If this is not the best number please call our clinic and change the number.  Medication Refills:  If you need any refills please call your pharmacy and they will contact us.   If you need to  your refill at a new pharmacy, please contact the new pharmacy directly. The new pharmacy will help you get your medications transferred faster.   Scheduling:  If you have any concerns about today's visit or wish to schedule another appointment please call our office during normal business hours [phone] (8-5:00 M-F)  If a referral was made to a [facility] and you don't get a call from central scheduling please call [phone].  If a Mammogram was ordered for you at [facility] call [phone] to schedule or change your appointment.  If you had an  XRay/CT/Ultrasound/MRI ordered the number is [phone] to schedule or change your radiology appointment.   Medical Concerns:  If you have urgent medical concerns please call [phone] at any time of the day.    Bridgette Olguin MD              Follow-ups after your visit        Who to contact     Please call your clinic at [phone] to:    Ask questions about your health    Make or cancel appointments    Discuss your medicines    Learn about your test results    Speak to your doctor            Additional Information About Your Visit        Care EveryWhere ID     This is your Care EveryWhere ID. This could be used by other organizations to access your [facility] medical records  [id number]        Your Vitals Were     Pulse Respirations Pulse Oximetry BMI (Body Mass Index)          98 16 97% 25.52 kg/m2         Blood Pressure from Last 3 Encounters:   07/11/18 125/83   04/27/18 136/82   02/22/18 118/69    Weight from Last 3 Encounters:   07/11/18 188 lb 3.2 oz (85.4 kg)   04/27/18 188 lb 9.6 oz (85.5 kg)   02/22/18 199 lb 3.2 oz (90.4 kg)              We Performed the Following     CBC with Diff Plt ([facility])     Comprehensive Metabolic Panel ([facility])     EKG 12-lead complete w/read - Clinics     Urinalysis, Micro If (UA) ([facility])          Today's Medication Changes          These changes are accurate as of 7/11/18  4:11 PM.  If you have any questions, ask your nurse or doctor.               Start taking these medicines.        Dose/Directions    progesterone 200 MG capsule   Commonly known as:  PROMETRIUM   Used for:  Gender dysphoria in adult   Started by:  Bridgette Olguin MD        Dose:  200 mg   Take 1 capsule (200 mg) by mouth daily   Quantity:  30 capsule   Refills:  0            Where to get your medicines      These medications were sent to [facility] [place], [place] - [address] [facility] [address]     Phone:  [phone]     progesterone 200  MG capsule                Primary Care Provider Office Phone # Fax #    Bridgette Risa Olguin [phone]       [address] [facility] [address]        Equal Access to Services     LETITIA HAMILTON : Stepehnie barajas clive Garcia, wakrystinada luesequiel, [place] kaefren grewal, emani quiroz frank adair. So [facility] [phone].    ATENCIÓN: Si habla español, tiene a alarcon disposición servicios gratuitos de asistencia lingüística. Llame al [phone].    We comply with applicable federal civil rights laws and [place] laws. We do not discriminate on the basis of race, color, national origin, age, disability, sex, sexual orientation, or gender identity.            Thank you!     Thank you for choosing [facility]  for your care. Our goal is always to provide you with excellent care. Hearing back from our patients is one way we can continue to improve our services. Please take a few minutes to complete the written survey that you may receive in the mail after your visit with us. Thank you!             Your Updated Medication List - Protect others around you: Learn how to safely use, store and throw away your medicines at www.disposemymeds.org.          This list is accurate as of 7/11/18  4:11 PM.  Always use your most recent med list.                   Brand Name Dispense Instructions for use Diagnosis    estradiol 0.1 MG/24HR BIW patch    VIVELLE-DOT    16 patch    Apply 2 patches twice a week    Gender dysphoria in adult       Melatonin 10 MG Tabs tablet      Take 10 mg by mouth nightly as needed for sleep        progesterone 200 MG capsule    PROMETRIUM    30 capsule    Take 1 capsule (200 mg) by mouth daily    Gender dysphoria in adult       spironolactone 100 MG tablet    ALDACTONE    60 tablet    Take 2 tablets (200 mg) daily    Gender dysphoria in adult

## 2018-07-11 NOTE — PROGRESS NOTES
[facility]  [address],  [address]  [facility] [address]  Phone: [phone]  Fax: [phone]    7/11/2018    Adult PRE-OP Evaluation:    Vinicio Givens, 1995 presents for pre-operative evaluation and assessment as requested by Dr. Jimenez, prior to undergoing surgery/procedure for facial feminization surgery .      Date of Surgery/ Procedure: August 21, 2018  Hospital/Surgical Facility: [facility]  Tel: [phone]  Fax: [phone]  Email: [email or web address]     Primary Physician: Bridgette Olguin  Type of Anesthesia Anticipated: General  History of anesthesia complications: NONE  History of  abnormal bleeding: NONE   History of blood transfusions: NO  Patient has a Health Care Directive or Living Will:  NO    Preoperative Questions   1. NO - Do you have a history of heart attack, stroke, stent, bypass or surgery on an artery in the head, neck, heart or legs?  2. NO - Do you ever have any pain or discomfort in your chest?  3. NO - Have you ever had a severe pain across the front of your chest lasting for half an hour or more?  4. NO - Do you have a history of Congestive Heart Failure?  5. NO - Are you troubled by shortness of breath when: walking on the level/ up a slight hill/ at night?  6. NO - Does your chest ever sound wheezy or whistling?  7. NO - Do you currently have a cold, bronchitis or other respiratory infection?  8. NO - Have you had a cold, bronchitis or other respiratory infection within the last 2 weeks?  9. NO - Do you usually have a cough?  10. NO - Do you sometimes get pains in the calves of your legs when you walk?  11. Yes - Do you or anyone in your family have previous history of blood clots? Provoked blood clot after knee surgery  12. NO - Do you or does anyone in your family have a serious bleeding problem such as prolonged bleeding following surgeries or cuts?  13. NO - Have you ever had problems with  anemia or been told to take iron pills?  14. NO - Have you had any abnormal blood loss such as black, tarry or bloody stools, or abnormal vaginal bleeding?  15. NO - Have you ever had a blood transfusion?  16. NO - Have you or any of your relatives ever had problems with anesthesia?  17. NO - Do you have sleep apnea, excessive snoring or daytime drowsiness?  18. NO - Do you have any prosthetic heart valves?  19. NO - Do you have prosthetic joints?  20. NO - Is there any chance that you may be pregnant?    Patient Active Problem List   Diagnosis     Blood clot in vein     Psychiatric disorder     Gender dysphoria in adult       Current Outpatient Prescriptions on File Prior to Visit:  estradiol (VIVELLE-DOT) 0.1 MG/24HR BIW patch Apply 2 patches twice a week   Melatonin 10 MG TABS tablet Take 10 mg by mouth nightly as needed for sleep   spironolactone (ALDACTONE) 100 MG tablet Take 2 tablets (200 mg) daily     No current facility-administered medications on file prior to visit.     OTC products: Vitamin D    Allergies   Allergen Reactions     Banana Anaphylaxis     Throat swelling     Latex Allergy: NO    Social History     Social History     Marital status: Single     Spouse name: N/A     Number of children: N/A     Years of education: N/A     Social History Main Topics     Smoking status: Never Smoker     Smokeless tobacco: Never Used     Alcohol use Yes     Drug use: Yes      Comment: canabis lsd mushrooms      Sexual activity: No     Other Topics Concern     None     Social History Narrative       REVIEW OF SYSTEMS:   Constitutional, HEENT, cardiovascular, pulmonary, GI, , musculoskeletal, neuro, skin, endocrine and psych systems are negative, except as otherwise noted.    EXAM:     Patient Vitals for the past 24 hrs:   BP Pulse Resp SpO2 Weight   07/11/18 1527 125/83 98 16 97 % 188 lb 3.2 oz (85.4 kg)     Body mass index is 25.52 kg/(m^2).  GENERAL: healthy, alert and no distress  EYES: Eyes grossly normal to  inspection, extraocular movements - intact, and PERRL  HENT: ear canals- normal; TMs- normal; Nose- normal; Mouth- no ulcers, no lesions  NECK: no tenderness, no adenopathy, no asymmetry, no masses, no stiffness; thyroid- normal to palpation  RESP: lungs clear to auscultation - no rales, no rhonchi, no wheezes  CV: regular rates and rhythm, normal S1 S2, no S3 or S4 and no murmur, no click or rub -  ABDOMEN: soft, no tenderness, no  hepatosplenomegaly, no masses, normal bowel sounds  MS: extremities- no gross deformities noted, no edema  SKIN: no suspicious lesions, no rashes  NEURO: strength and tone- normal, sensory exam- grossly normal, mentation- intact, speech- normal, reflexes- symmetric  BACK: no CVA tenderness, no paralumbar tenderness  PSYCH: Alert and oriented times 3; speech- coherent , normal rate and volume; able to articulate logical thoughts  LYMPHATICS: ant. cervical- normal, post. cervical- normal    DIAGNOSTICS:        Labs:  Results for AMIRA DÍAZ (MRN [id number]) as of 7/16/2018 08:34   Ref. Range 7/11/2018 16:30   Sodium Latest Ref Range: 132.6 - 141.4 mmol/L 135.8   Potassium Latest Ref Range: 3.3 - 4.5 mmol/dL 4.2   Chloride Latest Ref Range: 98.0 - 110.0 mmol/L 98.0   Carbon Dioxide Latest Ref Range: 20.0 - 32.0 mmol/L 28.7   Urea Nitrogen Latest Ref Range: 7.0 - 21.0 mg/dL 10.9   Creatinine Latest Ref Range: 0.7 - 1.3 mg/dL 0.8   GFR Estimate Latest Ref Range: >60.0 mL/min/1.7 m2 >90   GFR Estimate If Black Latest Ref Range: >60.0 mL/min/1.7 m2 >90   Calcium Latest Ref Range: 8.5 - 10.1 mg/dL 9.7   Albumin Latest Ref Range: 3.8 - 5.0 mg/dL 4.9   Protein Total Latest Ref Range: 6.8 - 8.8 g/dL 8.0   Bilirubin Total Latest Ref Range: 0.2 - 1.3 mg/dL 0.7   Alkaline Phosphatase Latest Ref Range: 31.7 - 110.7 U/L 43.9   ALT Latest Ref Range: 0.0 - 45.0 U/L 20.5   AST Latest Ref Range: 0.0 - 55.0 U/L 15.2   Glucose Latest Ref Range: 70.0 - 99.0 mg'dL 103.1 (H)   WBC Latest Ref Range: 4.0 - 11.0  K/uL 6.3   Hemoglobin Latest Ref Range: 13.3 - 17.7 g/dL 13.9   Hematocrit Latest Ref Range: 40.0 - 53.0 % 44.7   Platelets Latest Ref Range: 150.0 - 450.0 K/uL 243.0   RBC Latest Ref Range: 4.40 - 5.90 M/uL 4.62   MCV Latest Ref Range: 78.0 - 100.0 fL 96.8   MCH Latest Ref Range: 26.5 - 35.0 pg 30.1   MCHC Latest Ref Range: 32.0 - 36.0 g/dL 31.1 (L)   % Lymphocytes Latest Ref Range: 20.0 - 48.0 %L 27.5   % Granulocytes Latest Ref Range: 40.0 - 75.0 %G 64.2   Mid % Latest Ref Range: 0.0 - 20.0 %M 8.3   GRANULOCYTES # Latest Ref Range: 1.6 - 8.3 K/uL 4.0   Lymphocytes # Latest Ref Range: 0.8 - 5.3 K/uL 1.7   Mid # Latest Ref Range: 0.0 - 2.2 K/uL 0.5       Results for AMIRA DÍAZ (MRN [id number]) as of 7/16/2018 08:34   Ref. Range 7/11/2018 16:32   Glucose Urine Latest Ref Range: NEGATIVE  Negative   Bilirubin UR Latest Ref Range: NEGATIVE  Negative   Ketones Urine Latest Ref Range: NEGATIVE  Trace (A)   Specific Gravity Urine Latest Ref Range: 1.005 - 1.030  1.020   pH Urine Latest Ref Range: 4.5 - 8.0  6.0   Protein UR Latest Ref Range: NEGATIVE  Trace (A)   Urobilinogen mg/dL Latest Ref Range: 0.2 E.U./dL  0.2 E.U./dL   Nitrite Urine Latest Ref Range: NEGATIVE  Negative   Blood UR Latest Ref Range: NEGATIVE  Negative   Leukocyte Esterase UR Latest Ref Range: NEGATIVE  Negative       EKG: appears normal, NSR, Normal Sinus Rhythm, normal axis, normal intervals, no acute ST/T changes c/w ischemia, no LVH by voltage criteria, unchanged from previous tracings      RISK ASSESSMENT:     Cardiovascular Risk:  -Patient is able to participate in strenuous activities without chest pain.  -The patient does not have chest pain with exertion.  -Patient does not have a history of congestive heart failure.    -The patient does not have a history of stroke and does not have a history of valvular disease.    Pulmonary Risk:  -In terms of risk factors for pulmonary complication, the patient has no risk factors    Perioperative  Complications:  -The patient has had a blood clot in the past (provoked after knee surgery), no history of bleeding problems in the past.    -The patient has not had complications from surgeries.    -The patient does not have a family history of any anesthesia or surgical complications.      IMPRESSION:   Reason for surgery/procedure: Gender dysphoria    The proposed surgical procedure is considered INTERMEDIATE risk.    For above listed surgery and anesthesia:   Patient is at low risk for surgery/procedure and perioperative/procedure complications.    RECOMMENDATIONS:     Fasting:  Must be NPO after midnight    Preop Plan:  --Approval given to proceed with proposed procedure, without further diagnostic evaluation    Medications:  Patient should take their regular medications the morning of surgery unless otherwise instructed.    Hold aspirin 7 days prior to surgery.  Hold ibuprofen for 5 days prior.  Hold ACE inhibitors and ARB's the day of surgery. Hold spironolactone day of surgery.  Patient will need DVT prophylaxis.    Bridgette Olguin M.D.  PGY-3, Family Medicine    Please contact our office if there are any further questions or information required about this patient.

## 2018-07-11 NOTE — PATIENT INSTRUCTIONS
Before Your Surgery      Call your surgeon if there is any change in your health. This includes signs of a cold or flu (such as a sore throat, runny nose, cough, rash or fever).    Do not smoke, drink alcohol or take over the counter medicine (unless your surgeon or primary care doctor tells you to) for the 24 hours before and after surgery.    If you take prescribed drugs: Follow your doctor s orders about which medicines to take and which to stop until after surgery.    Eating and drinking prior to surgery: follow the instructions from your surgeon    Take a shower or bath the night before surgery. Use the soap your surgeon gave you to gently clean your skin. If you do not have soap from your surgeon, use your regular soap. Do not shave or scrub the surgery site.  Wear clean pajamas and have clean sheets on your bed.     Presurgery Checklist  You are scheduled to have surgery. The healthcare staff will try to make your stay comfortable. Use the guidelines below to remind yourself what to do before surgery. Be sure to follow any specific pre-op instructions from your surgeon or nurse.   Preparing for Surgery  Ask your surgeon if you ll need a blood transfusion during surgery and if so, how to prepare for it. In some cases, you can donate blood before surgery. If needed, this blood can be given back (transfused) to you during or after surgery.  If you are having abdominal surgery, ask what you need to do to clear your bowel.  Tell your surgeon if you have allergies to any medications or foods.  Arrange for an adult family member or friend to drive you home after surgery. If possible, have someone ready to help you at home as you recover.  Call the surgeon if you get a cold, fever, sore throat, diarrhea, or other health problem just before surgery. Your surgeon can decide whether or not to postpone the surgery.  Medications  Tell your surgeon about all medications you take, including prescription and  over-the-counter products such as herbal remedies and vitamins. Ask if you should continue taking them.  If you take ibuprofen, naproxen, or  blood thinners  such as aspirin, clopidogrel (Plavix), or warfarin (Coumadin), ask your surgeon whether you should stop taking them and how long before surgery you should stop.  You may be told to take antibiotics just before surgery to prevent infection. If so, follow instructions carefully on how to take them.  If you are told to take medications called anticoagulants to prevent blood clots after surgery, be sure to follow the instructions on how to take them.  Stop Smoking  If you smoke, healing may take longer. So at least 2 week(s) before surgery, stop smoking.  Bathing or Showering Before Surgery  If instructed, wash with antibacterial soap. Afterward, do not use lotions or powders.  If you are having surgery on the head, you may be asked to shampoo with antibacterial soap. Follow instructions for doing so.  Do Not Remove Hair from the Surgery Site  Do not shave hair from the incision site, unless you are given specific instructions to do so. Usually, if hair needs to be removed, it will be done at the hospital right before surgery.  Don t Eat or Drink  Your doctor will tell you when to stop eating and drinking. If you do not follow your doctor's instructions, your procedure may be postponed or rescheduled for another day.  If your surgeon tells you to continue any medications, take them with small sips of water.  You can brush your teeth and rinse your mouth, but don t swallow any water.  Day of Surgery  Do not wear makeup. Do not use perfume, deodorant, or hairspray. Remove nail polish and artificial nails.  Leave jewelry (including rings), watches, and other valuables at home.  Be sure to bring health insurance cards or forms and a photo ID.  Bring a list of your medications (include the name, dose, how often you take them, and the time last dose was taken).  Arrive  on time at the hospital or surgery facility.    Here is the plan from today's visit    1. Preop general physical exam  I see no reason that you will not be cleared for surgery.    - CBC with Diff Plt ([facility])  - EKG 12-lead complete w/read - Clinics  - Comprehensive Metabolic Panel ([facility])  - Urinalysis, Micro If (UA) ([facility])      Please call or return to clinic if your symptoms don't go away.    Follow up plan  Please make a clinic appointment for follow up with your primary care physician in 1 year.      Thank you for coming to [facility] today.  Lab Testing:  **If you had lab testing today and your results are reassuring or normal they will be mailed to you or sent through [employer] within 7 days.   **If the lab tests need quick action we will call you with the results.  The phone number we will call with results is # [phone] (home) . If this is not the best number please call our clinic and change the number.  Medication Refills:  If you need any refills please call your pharmacy and they will contact us.   If you need to  your refill at a new pharmacy, please contact the new pharmacy directly. The new pharmacy will help you get your medications transferred faster.   Scheduling:  If you have any concerns about today's visit or wish to schedule another appointment please call our office during normal business hours [phone] (8-5:00 M-F)  If a referral was made to a [facility] and you don't get a call from central scheduling please call [phone].  If a Mammogram was ordered for you at [facility] call [phone] to schedule or change your appointment.  If you had an XRay/CT/Ultrasound/MRI ordered the number is [phone] to schedule or change your radiology appointment.   Medical Concerns:  If you have urgent medical concerns please call [phone] at any time of the day.    Bridgette Olguin MD                Here is the plan from today's  visit    1. Gender dysphoria in adult  We will start prometrium per your request.  Please follow up with your new provider or myself in the next month.    - progesterone (PROMETRIUM) 200 MG capsule; Take 1 capsule (200 mg) by mouth daily  Dispense: 30 capsule; Refill: 0    Follow up plan  Please make a clinic appointment for follow up with me (BRIDGETTE OLGUIN) in 1  month for follow up.    Thank you for coming to [facility] today.  Lab Testing:  **If you had lab testing today and your results are reassuring or normal they will be mailed to you or sent through [employer] within 7 days.   **If the lab tests need quick action we will call you with the results.  The phone number we will call with results is # [phone] (home) . If this is not the best number please call our clinic and change the number.  Medication Refills:  If you need any refills please call your pharmacy and they will contact us.   If you need to  your refill at a new pharmacy, please contact the new pharmacy directly. The new pharmacy will help you get your medications transferred faster.   Scheduling:  If you have any concerns about today's visit or wish to schedule another appointment please call our office during normal business hours [phone] (8-5:00 M-F)  If a referral was made to a [facility] and you don't get a call from central scheduling please call [phone].  If a Mammogram was ordered for you at [facility] call [phone] to schedule or change your appointment.  If you had an XRay/CT/Ultrasound/MRI ordered the number is [phone] to schedule or change your radiology appointment.   Medical Concerns:  If you have urgent medical concerns please call [phone] at any time of the day.    Bridgette Olguin MD

## 2018-07-11 NOTE — LETTER
July 18, 2018      Vinicio Givens  [address]  [place] [address]        Dear Vinicio,    Your preop labs were normal and I have sent your preop to your surgeon.    Thank you for getting your care at [facility]. Please see below for your test results.    Resulted Orders   CBC with Diff Plt ([facility])   Result Value Ref Range    WBC 6.3 4.0 - 11.0 K/uL    Lymphocytes # 1.7 0.8 - 5.3 K/uL    % Lymphocytes 27.5 20.0 - 48.0 %L    Mid # 0.5 0.0 - 2.2 K/uL    Mid % 8.3 0.0 - 20.0 %M    GRANULOCYTES # 4.0 1.6 - 8.3 K/uL    % Granulocytes 64.2 40.0 - 75.0 %G    RBC 4.62 4.40 - 5.90 M/uL    Hemoglobin 13.9 13.3 - 17.7 g/dL    Hematocrit 44.7 40.0 - 53.0 %    MCV 96.8 78.0 - 100.0 fL    MCH 30.1 26.5 - 35.0 pg    MCHC 31.1 (L) 32.0 - 36.0 g/dL    Platelets 243.0 150.0 - 450.0 K/uL   Comprehensive Metabolic Panel ([facility])   Result Value Ref Range    Albumin 4.9 3.8 - 5.0 mg/dL    Alkaline Phosphatase 43.9 31.7 - 110.7 U/L    ALT 20.5 0.0 - 45.0 U/L    AST 15.2 0.0 - 55.0 U/L    Bilirubin Total 0.7 0.2 - 1.3 mg/dL    Urea Nitrogen 10.9 7.0 - 21.0 mg/dL    Calcium 9.7 8.5 - 10.1 mg/dL    Chloride 98.0 98.0 - 110.0 mmol/L    Carbon Dioxide 28.7 20.0 - 32.0 mmol/L    Creatinine 0.8 0.7 - 1.3 mg/dL    Glucose 103.1 (H) 70.0 - 99.0 mg'dL    Potassium 4.2 3.3 - 4.5 mmol/dL    Sodium 135.8 132.6 - 141.4 mmol/L    Protein Total 8.0 6.8 - 8.8 g/dL    GFR Estimate >90 >60.0 mL/min/1.7 m2    GFR Estimate If Black >90 >60.0 mL/min/1.7 m2   Urinalysis, Micro If (UA) ([facility])   Result Value Ref Range    Specific Gravity Urine 1.020 1.005 - 1.030    pH Urine 6.0 4.5 - 8.0    Leukocyte Esterase UR Negative NEGATIVE    Nitrite Urine Negative NEGATIVE    Protein UR Trace (A) NEGATIVE    Glucose Urine Negative NEGATIVE    Ketones Urine Trace (A) NEGATIVE    Urobilinogen mg/dL 0.2 E.U./dL 0.2 E.U./dL    Bilirubin UR Negative NEGATIVE    Blood UR Negative NEGATIVE       If you have any concerns about these results please call and  leave a message for me or send a MyChart message to the clinic.    Sincerely,    Bridgette Olguin MD

## 2018-07-11 NOTE — PROGRESS NOTES
Preceptor Attestation:   Patient seen, evaluated and discussed with the resident. I personally viewed the EKG and agree with the interpretation documented by the resident. I have verified the content of the note, which accurately reflects my assessment of the patient and the plan of care.   Supervising Physician:  Gagan Stover MD

## 2018-07-16 NOTE — PROGRESS NOTES
APURVA Toth is a 23 year old individual that uses pronouns She/Her/Hers/Herself that presents today for follow up of:  feminizing hormone therapy. Gender identity: female    Any special concerns today?  concerns about hormones, would like to start progesterone in addition to estrogen    On hormones?  YES  Due for labs?  Yes    +++ Refills of meds needed?  Yes  ---    Past Surgical History:   Procedure Laterality Date     KNEE SURGERY Left 2014       Patient Active Problem List   Diagnosis     Blood clot in vein     Psychiatric disorder     Gender dysphoria in adult       Current Outpatient Prescriptions   Medication Sig Dispense Refill     estradiol (VIVELLE-DOT) 0.1 MG/24HR BIW patch Apply 2 patches twice a week 16 patch 2     Melatonin 10 MG TABS tablet Take 10 mg by mouth nightly as needed for sleep       progesterone (PROMETRIUM) 200 MG capsule Take 1 capsule (200 mg) by mouth daily 30 capsule 0     spironolactone (ALDACTONE) 100 MG tablet Take 2 tablets (200 mg) daily 60 tablet 2       History   Smoking Status     Never Smoker   Smokeless Tobacco     Never Used        Allergies   Allergen Reactions     Banana Anaphylaxis     Throat swelling       Problem, Medication and Allergy Lists were reviewed and updated if needed..         Review of Systems:      General    Fat redistribution: YES    Weight change: YES HEENT    Voice change: no     Cardiovascular (CV)    Chest Pains: no    Shortness of breath: no Chest    Decreased exercise tolerance:  no    Breast changes/development: YES     Gastrointestinal (GI)    Abdominal pain: no    Change in appetite: no Skin    Acne or oily skin: no    Change in hair: no     Genitourinary ()    Abnormal vaginal bleeding: not applicable     Decreased spontaneous erections: no    Change in libido: YES, decreased libido    New sexual partners: no Musculoskeletal    Leg pain or swelling: no     Psychiatric (Psych)    Depression: no    Anxiety/Panic: no    Mood:   "\"fine\" and \"okay\"                    Physical Exam:     Vitals:    07/11/18 1527   BP: 125/83   Pulse: 98   Resp: 16   SpO2: 97%   Weight: 188 lb 3.2 oz (85.4 kg)     BMI= Body mass index is 25.52 kg/(m^2).   Wt Readings from Last 10 Encounters:   07/11/18 188 lb 3.2 oz (85.4 kg)   04/27/18 188 lb 9.6 oz (85.5 kg)   02/22/18 199 lb 3.2 oz (90.4 kg)   12/12/17 174 lb 6.1 oz (79.1 kg)   10/17/17 183 lb (83 kg)   08/01/17 196 lb (88.9 kg)   07/12/17 197 lb 6.4 oz (89.5 kg)   06/21/17 193 lb 3.2 oz (87.6 kg)   05/15/17 191 lb 9.6 oz (86.9 kg)   05/10/17 191 lb 9.6 oz (86.9 kg)     Appearance: Female appearance and dress    GENERAL:: healthy, alert and no distress  EYES: Eyes grossly normal to inspection  RESP: lungs clear to auscultation - no rales, no rhonchi, no wheezes  BREAST: small breasts present  CV: regular rates and rhythm, normal S1 S2 and no murmur  ABDOMEN: soft, no tenderness  MS: extremities normal- no gross deformities noted, no edema  SKIN: no suspicious lesions, no rashes  NEURO: Normal strength and tone, sensory exam grossly normal, mentation intact and speech normal  Psych: Alert and oriented times 3; coherent speech, normal rate and volume, able to articulate logical thoughts, able to abstract reason, no tangential thoughts, no hallucinations or delusions. Affect is appropriate.             Labs:   Pending (orderd as part of preop labs)    Assessment and Plan     Vinicio was seen today for pre-op exam (separate encounter) and gender dysphoria.  Patient plans to continue same dose of estrogen (6 mg daily), spironolactone (100 mg orally twice a day), and requests to start prometrium.  Discussed risk of depression with prometrium (especially with patient's recent history of hospitalization for psychosis), will plan for follow up in 1 month.     Diagnoses and all orders for this visit:    Gender dysphoria in adult  -     progesterone (PROMETRIUM) 200 MG capsule; Take 1 capsule (200 mg) by mouth daily  -  "    CBC with Diff Plt ([facility])  -     Comprehensive Metabolic Panel ([facility])    Contraception:   not needed, not sexually active, prefers cis-men  .   Counselled patient about controlled substances: Not applicable    Follow up:  Follow up in 1 month (starting prometrium)  Results by laura  Questions were elicited and answered.     Bridgette Olguin M.D.  PGY-3, Family Medicine

## 2018-07-31 ENCOUNTER — OFFICE VISIT (OUTPATIENT)
Dept: FAMILY MEDICINE | Facility: CLINIC | Age: 23
End: 2018-07-31
Payer: COMMERCIAL

## 2018-07-31 VITALS
WEIGHT: 185.8 LBS | OXYGEN SATURATION: 95 % | SYSTOLIC BLOOD PRESSURE: 113 MMHG | RESPIRATION RATE: 16 BRPM | BODY MASS INDEX: 25.2 KG/M2 | TEMPERATURE: 98.2 F | HEART RATE: 80 BPM | DIASTOLIC BLOOD PRESSURE: 74 MMHG

## 2018-07-31 DIAGNOSIS — Z02.89 ENCOUNTER FOR COMPLETION OF FORM WITH PATIENT: Primary | ICD-10-CM

## 2018-07-31 NOTE — PROGRESS NOTES
Preceptor Attestation:   Patient seen, evaluated and discussed with the resident. I have verified the content of the note, which accurately reflects my assessment of the patient and the plan of care.   Supervising Physician:  Bridgette Powell MD

## 2018-07-31 NOTE — PROGRESS NOTES
HPI       Vinicio Givens is a 23 year old  who presents for   Chief Complaint   Patient presents with     Patient Request for Note/Letter     Request letter to state gender change etc         Concern: Request for gender change letter    Patient is a 29 year old trans-MTF that presents requesting a letter for gender change.  She has no other complaints.  I have been treating her for over two years and she is committed to the change.  She has been taking estrogen, progesterone, and spironolactone for over a year for feminization effects and plans to have mild facial reconstruction in the next month.  She has no other complaints today.      +++++++    Problem, Medication and Allergy Lists were reviewed and updated if needed..    Patient is an established patient of this clinic..         Review of Systems:   Review of Systems   All other systems reviewed and are negative.           Physical Exam:     Vitals:    07/31/18 1553   BP: 113/74   Pulse: 80   Resp: 16   Temp: 98.2  F (36.8  C)   TempSrc: Oral   SpO2: 95%   Weight: 185 lb 12.8 oz (84.3 kg)     Body mass index is 25.2 kg/(m^2).       Physical Exam   Constitutional: He is oriented to person, place, and time. He appears well-developed. No distress.   Appears female in appearance and dress   HENT:   Head: Normocephalic.   Eyes: Conjunctivae are normal.   Neck: Normal range of motion. Neck supple.   Cardiovascular: Normal rate.    Pulmonary/Chest: Effort normal.   Abdominal: Soft.   Musculoskeletal: Normal range of motion. He exhibits no edema or tenderness.   Neurological: He is alert and oriented to person, place, and time.   Skin: Skin is warm and dry.   Psychiatric: He has a normal mood and affect. His behavior is normal. Judgment and thought content normal.   Vitals reviewed.        Results:   No labs ordered    Assessment and Plan        Vinicio was seen today for patient request for note/letter.    Patient presents only for letter request.  Letter filled  out with patient.  No other complaints/requests.     Diagnoses and all orders for this visit:    Encounter for completion of form with patient       There are no discontinued medications.    Options for treatment and follow-up care were reviewed with the patient. Vinicio Givens  engaged in the decision making process and verbalized understanding of the options discussed and agreed with the final plan.    Bridgette Olguin M.D.  PGY-3, Family Medicine

## 2018-07-31 NOTE — LETTER
July 31, 2018      Vinicio Givens IV (to be changed to Janey Givens)  [id number]  [facility] [address]      Dear Vinicio (to be changed to Janey Givens),    Per your request, this letter documents your participation in transgender health services at the [facility], [employer].  I am writing in support of your petition for name and gender marker change on your identification documents.  Your gender identity is female, you have made permanent physical changes to support your female gender identity, and you have been living as a female full-time since 2016.  Given that your identity is female and you live as a woman in society full time, your records, including birth certificate, should be amended to accurately reflect your female gender identification.  These documents are inaccurate as they presently read.  Additionally, you should be allowed to use facilities, including restrooms designated female, as this is congruent with your expressed gender role.     If you have any questions, or are in need of additional information, please do not hesitate to contact me.      Sincerely,            Bridgette Olguin MD

## 2018-07-31 NOTE — LETTER
July 31, 2018      Vinicio Givens to be changed to Janey Suceer  [address]  [facility] [address]      Dear Vinicio (to be changed to Janey),    Per your request, this letter documents your participation in transgender health services at the [facility], [employer] [facility] [employer].  I am writing in support of your petition for name and gender marker change on your identification documents.  Your gender identity is female, you have made permanent physical changes to support your female gender identity, and you have been living as a female full-time since 2016.  Given that your identity is female and you live as a woman in society full time, your records, including birth certificate, should be amended to accurately reflect your female gender identification.  These documents are inaccurate as they presently read.  Additionally, you should be allowed to use facilities, including restrooms designated female, as this is congruent with your expressed gender role.     If you have any questions, or are in need of additional information, please do not hesitate to contact me.      Sincerely,          Bridgette Olguin MD

## 2018-07-31 NOTE — MR AVS SNAPSHOT
After Visit Summary   7/31/2018    Vinicio Givens    MRN: [id number]           Patient Information     Date Of Birth          1995        Visit Information        Provider Department      7/31/2018 3:40 PM Bridgette Olguin MD [facility]        Today's Diagnoses     Encounter for completion of form with patient    -  1      Care Instructions    Declined          Follow-ups after your visit        Follow-up notes from your care team     Return for as scheduled.      Who to contact     Please call your clinic at [phone] to:    Ask questions about your health    Make or cancel appointments    Discuss your medicines    Learn about your test results    Speak to your doctor            Additional Information About Your Visit        Care EveryWhere ID     This is your Care EveryWhere ID. This could be used by other organizations to access your [place] medical records  [id number]        Your Vitals Were     Pulse Temperature Respirations Pulse Oximetry BMI (Body Mass Index)       80 98.2  F (36.8  C) (Oral) 16 95% 25.2 kg/m2        Blood Pressure from Last 3 Encounters:   07/31/18 113/74   07/11/18 125/83   04/27/18 136/82    Weight from Last 3 Encounters:   07/31/18 185 lb 12.8 oz (84.3 kg)   07/11/18 188 lb 3.2 oz (85.4 kg)   04/27/18 188 lb 9.6 oz (85.5 kg)              Today, you had the following     No orders found for display       Primary Care Provider Office Phone # Fax #    Bridgette Olguin [phone]       [address] [place] [address]        Equal Access to Services     LETITIA HAMILTON AH: Hadii aad ku hadasho Soomaali, waaxda luqadaha, qaybta kaalmada adeegyada, waxajay paulin haylylyn danilo cline'joe . So [facility] [phone].    ATENCIÓN: Si habla español, tiene a alarcon disposición servicios gratuitos de asistencia lingüística. Llame al [phone].    We comply with applicable federal civil rights laws and [place] laws. We do not  discriminate on the basis of race, color, national origin, age, disability, sex, sexual orientation, or gender identity.            Thank you!     Thank you for choosing [facility]  for your care. Our goal is always to provide you with excellent care. Hearing back from our patients is one way we can continue to improve our services. Please take a few minutes to complete the written survey that you may receive in the mail after your visit with us. Thank you!             Your Updated Medication List - Protect others around you: Learn how to safely use, store and throw away your medicines at www.disposemymeds.org.          This list is accurate as of 7/31/18  5:42 PM.  Always use your most recent med list.                   Brand Name Dispense Instructions for use Diagnosis    estradiol 0.1 MG/24HR BIW patch    VIVELLE-DOT    16 patch    Apply 2 patches twice a week    Gender dysphoria in adult       Melatonin 10 MG Tabs tablet      Take 10 mg by mouth nightly as needed for sleep        progesterone 200 MG capsule    PROMETRIUM    30 capsule    Take 1 capsule (200 mg) by mouth daily    Gender dysphoria in adult       spironolactone 100 MG tablet    ALDACTONE    60 tablet    Take 2 tablets (200 mg) daily    Gender dysphoria in adult

## 2018-08-02 NOTE — PROGRESS NOTES
Results discussed directly with patient while patient was present. Any further details documented in the note.   Bridgette Olguin MD

## 2018-08-07 ENCOUNTER — TELEPHONE (OUTPATIENT)
Dept: FAMILY MEDICINE | Facility: CLINIC | Age: 23
End: 2018-08-07

## 2018-08-07 NOTE — TELEPHONE ENCOUNTER
Mary called from pts surgeons office.  They are asking for a letter to be written indicating if the patient needs special dvt prophylaxis.  Dr Olguin typed up letter and I faxed to [phone]

## 2018-08-13 DIAGNOSIS — F64.0 GENDER DYSPHORIA IN ADULT: ICD-10-CM

## 2018-08-15 ENCOUNTER — OFFICE VISIT (OUTPATIENT)
Dept: FAMILY MEDICINE | Facility: CLINIC | Age: 23
End: 2018-08-15
Payer: COMMERCIAL

## 2018-08-15 VITALS
WEIGHT: 184.2 LBS | OXYGEN SATURATION: 99 % | TEMPERATURE: 98.4 F | DIASTOLIC BLOOD PRESSURE: 77 MMHG | BODY MASS INDEX: 24.98 KG/M2 | SYSTOLIC BLOOD PRESSURE: 128 MMHG | HEART RATE: 76 BPM

## 2018-08-15 DIAGNOSIS — F64.0 GENDER DYSPHORIA IN ADULT: ICD-10-CM

## 2018-08-15 DIAGNOSIS — Z86.718 PERSONAL HISTORY OF DVT (DEEP VEIN THROMBOSIS): Primary | ICD-10-CM

## 2018-08-15 NOTE — MR AVS SNAPSHOT
After Visit Summary   8/15/2018    Janey Givens    MRN: [id number]           Patient Information     Date Of Birth          1995        Visit Information        Provider Department      8/15/2018 1:40 PM Bridgette Olguin MD [facility]        Today's Diagnoses     Personal history of DVT (deep vein thrombosis)    -  1    Gender dysphoria in adult          Care Instructions    Here is the plan from today's visit    1. Personal history of DVT (deep vein thrombosis)  I am giving you the prescription for the Lovenox.  However, if you are walking around, you do not need it.  If in bed, not ambulating, take Lovenox.   - enoxaparin (LOVENOX) 40 MG/0.4ML injection; Inject 0.4 mLs (40 mg) Subcutaneous daily  Dispense: 4 Syringe; Refill: 0    2. Gender dysphoria in adult  Ok to continue prometrium.  Call clinic if having symptoms of depression or stop taking prometrium.   - progesterone (PROMETRIUM) 200 MG capsule; Take 1 capsule (200 mg) by mouth daily  Dispense: 30 capsule; Refill: 0    Follow up plan  Follow up with your new provider in 3 months.     Thank you for coming to [facility] today.  Lab Testing:  **If you had lab testing today and your results are reassuring or normal they will be mailed to you or sent through [employer] within 7 days.   **If the lab tests need quick action we will call you with the results.  The phone number we will call with results is # [phone] (home) . If this is not the best number please call our clinic and change the number.  Medication Refills:  If you need any refills please call your pharmacy and they will contact us.   If you need to  your refill at a new pharmacy, please contact the new pharmacy directly. The new pharmacy will help you get your medications transferred faster.   Scheduling:  If you have any concerns about today's visit or wish to schedule another appointment please call our office during normal business  hours [phone] (8-5:00 M-F)  If a referral was made to a [facility] and you don't get a call from central scheduling please call [phone].  If a Mammogram was ordered for you at [facility] call [phone] to schedule or change your appointment.  If you had an XRay/CT/Ultrasound/MRI ordered the number is [phone] to schedule or change your radiology appointment.   Medical Concerns:  If you have urgent medical concerns please call [phone] at any time of the day.    Bridgette Olguin MD            Follow-ups after your visit        Who to contact     Please call your clinic at [phone] to:    Ask questions about your health    Make or cancel appointments    Discuss your medicines    Learn about your test results    Speak to your doctor            Additional Information About Your Visit        Care EveryWhere ID     This is your Care EveryWhere ID. This could be used by other organizations to access your [place] medical records  [id number]        Your Vitals Were     Pulse Temperature Pulse Oximetry BMI (Body Mass Index)          76 98.4  F (36.9  C) (Oral) 99% 24.98 kg/m2         Blood Pressure from Last 3 Encounters:   08/15/18 128/77   07/31/18 113/74   07/11/18 125/83    Weight from Last 3 Encounters:   08/15/18 184 lb 3.2 oz (83.6 kg)   07/31/18 185 lb 12.8 oz (84.3 kg)   07/11/18 188 lb 3.2 oz (85.4 kg)              Today, you had the following     No orders found for display         Today's Medication Changes          These changes are accurate as of 8/15/18  2:29 PM.  If you have any questions, ask your nurse or doctor.               Start taking these medicines.        Dose/Directions    enoxaparin 40 MG/0.4ML injection   Commonly known as:  LOVENOX   Used for:  Personal history of DVT (deep vein thrombosis)   Started by:  Bridgette Olguin MD        Dose:  40 mg   Inject 0.4 mLs (40 mg) Subcutaneous daily   Quantity:  4 Syringe   Refills:  0          These medicines have changed or have updated prescriptions.        Dose/Directions    * progesterone 200 MG capsule   Commonly known as:  PROMETRIUM   This may have changed:  Another medication with the same name was added. Make sure you understand how and when to take each.   Used for:  Gender dysphoria in adult   Changed by:  Bridgette Olguin MD        Dose:  200 mg   Take 1 capsule (200 mg) by mouth daily   Quantity:  30 capsule   Refills:  0       * progesterone 200 MG capsule   Commonly known as:  PROMETRIUM   This may have changed:  You were already taking a medication with the same name, and this prescription was added. Make sure you understand how and when to take each.   Used for:  Gender dysphoria in adult   Changed by:  Bridgette Olguin MD        Dose:  200 mg   Take 1 capsule (200 mg) by mouth daily   Quantity:  30 capsule   Refills:  0       * Notice:  This list has 2 medication(s) that are the same as other medications prescribed for you. Read the directions carefully, and ask your doctor or other care provider to review them with you.         Where to get your medicines      These medications were sent to [facility] [place], [place] - [address] [place] [address]     Phone:  [phone]     progesterone 200 MG capsule         Some of these will need a paper prescription and others can be bought over the counter.  Ask your nurse if you have questions.     Bring a paper prescription for each of these medications     enoxaparin 40 MG/0.4ML injection                Primary Care Provider Office Phone # Fax #    Bridgette Olguin [phone]       [address] [facility] [address]        Equal Access to Services     [facility] AH: Hadii elizabeth cisneroso Soeloise, waaxda luqadaha, qaybta kaalmada uri, emani adair. So [facility] [phone].    ATENCIÓN: Si elsala espkanika, tiene a alarcon disposición  servicios gratuitos de asistencia lingüística. Chelsea syed [phone].    We comply with applicable federal civil rights laws and [place] laws. We do not discriminate on the basis of race, color, national origin, age, disability, sex, sexual orientation, or gender identity.            Thank you!     Thank you for choosing [facility]  for your care. Our goal is always to provide you with excellent care. Hearing back from our patients is one way we can continue to improve our services. Please take a few minutes to complete the written survey that you may receive in the mail after your visit with us. Thank you!             Your Updated Medication List - Protect others around you: Learn how to safely use, store and throw away your medicines at www.disposemymeds.org.          This list is accurate as of 8/15/18  2:29 PM.  Always use your most recent med list.                   Brand Name Dispense Instructions for use Diagnosis    enoxaparin 40 MG/0.4ML injection    LOVENOX    4 Syringe    Inject 0.4 mLs (40 mg) Subcutaneous daily    Personal history of DVT (deep vein thrombosis)       estradiol 0.1 MG/24HR BIW patch    VIVELLE-DOT    16 patch    APPLY TWO PATCHES TWICE A WEEK    Gender dysphoria in adult       Melatonin 10 MG Tabs tablet      Take 10 mg by mouth nightly as needed for sleep        * progesterone 200 MG capsule    PROMETRIUM    30 capsule    Take 1 capsule (200 mg) by mouth daily    Gender dysphoria in adult       * progesterone 200 MG capsule    PROMETRIUM    30 capsule    Take 1 capsule (200 mg) by mouth daily    Gender dysphoria in adult       spironolactone 100 MG tablet    ALDACTONE    60 tablet    TAKE TWO TABLETS BY MOUTH EVERY DAY    Gender dysphoria in adult       * Notice:  This list has 2 medication(s) that are the same as other medications prescribed for you. Read the directions carefully, and ask your doctor or other care provider to review them with you.

## 2018-08-15 NOTE — PATIENT INSTRUCTIONS
Here is the plan from today's visit    1. Personal history of DVT (deep vein thrombosis)  I am giving you the prescription for the Lovenox.  However, if you are walking around, you do not need it.  If in bed, not ambulating, take Lovenox.   - enoxaparin (LOVENOX) 40 MG/0.4ML injection; Inject 0.4 mLs (40 mg) Subcutaneous daily  Dispense: 4 Syringe; Refill: 0    2. Gender dysphoria in adult  Ok to continue prometrium.  Call clinic if having symptoms of depression or stop taking prometrium.   - progesterone (PROMETRIUM) 200 MG capsule; Take 1 capsule (200 mg) by mouth daily  Dispense: 30 capsule; Refill: 0    Follow up plan  Follow up with your new provider in 3 months.     Thank you for coming to [facility] today.  Lab Testing:  **If you had lab testing today and your results are reassuring or normal they will be mailed to you or sent through [employer] within 7 days.   **If the lab tests need quick action we will call you with the results.  The phone number we will call with results is # [phone] (home) . If this is not the best number please call our clinic and change the number.  Medication Refills:  If you need any refills please call your pharmacy and they will contact us.   If you need to  your refill at a new pharmacy, please contact the new pharmacy directly. The new pharmacy will help you get your medications transferred faster.   Scheduling:  If you have any concerns about today's visit or wish to schedule another appointment please call our office during normal business hours [phone] (8-5:00 M-F)  If a referral was made to a [facility] and you don't get a call from central scheduling please call [phone].  If a Mammogram was ordered for you at [facility] call [phone] to schedule or change your appointment.  If you had an XRay/CT/Ultrasound/MRI ordered the number is [phone] to schedule or change your radiology appointment.   Medical  Concerns:  If you have urgent medical concerns please call [phone] at any time of the day.    Bridgette Olguin MD

## 2018-08-15 NOTE — PROGRESS NOTES
Preceptor Attestation:   Patient seen, evaluated and discussed with the resident.   I have verified the content of the note, which accurately reflects my assessment of the patient and the plan of care.   Supervising Physician:  Gagan Stover MD

## 2018-08-16 ASSESSMENT — ENCOUNTER SYMPTOMS
NERVOUS/ANXIOUS: 0
DECREASED CONCENTRATION: 0
SHORTNESS OF BREATH: 0
FEVER: 0
CHILLS: 0

## 2018-08-16 NOTE — PROGRESS NOTES
APURVA Givens is a 23 year old  who presents for   Chief Complaint   Patient presents with     RECHECK     needs lovenox forl surgery on 08/21 FFS          Concern: Requesting prescription for Lovenox and Prometrium    Patient is a 23 year old MTF patient that is planning on having feminizing facial reconstruction surgery next week in [place].  The procedure is outpatient and she will then be staying in a hotel for approximately 5 days after the procedure.  She has had a blood clot in the past (provoked by knee surgery) so patient presents today via request of her surgeon asking for a 4 day prescription of Lovenox for DVT prophylaxis after surgery.  She will have been off estrogen patches for 2-3 weeks prior to surgery.    Patient also requests a refill of Prometrium.  She was started on it approximately one month prior.  Not sure if it is working, but states her breasts are more sore and wants to stay on it.       Problem, Medication and Allergy Lists were reviewed and updated if needed..    Patient is an established patient of this clinic..         Review of Systems:   Review of Systems   Constitutional: Negative for chills and fever.   Respiratory: Negative for shortness of breath.    Cardiovascular: Negative for chest pain and leg swelling.   Psychiatric/Behavioral: Negative for decreased concentration. The patient is not nervous/anxious.             Physical Exam:     Vitals:    08/15/18 1400   BP: 128/77   Pulse: 76   Temp: 98.4  F (36.9  C)   TempSrc: Oral   SpO2: 99%   Weight: 184 lb 3.2 oz (83.6 kg)     Body mass index is 24.98 kg/(m^2).  Vitals were reviewed and were normal     Physical Exam   Constitutional: He is oriented to person, place, and time. He appears well-developed. No distress.   Appears feminine in dress and appearance   HENT:   Head: Normocephalic.   Eyes: Conjunctivae are normal.   Neck: Normal range of motion. Neck supple.   Cardiovascular: Normal rate.     Pulmonary/Chest: Effort normal.   Abdominal: Soft.   Musculoskeletal: Normal range of motion. He exhibits no edema or tenderness.   Neurological: He is alert and oriented to person, place, and time.   Skin: Skin is warm and dry.   Psychiatric: He has a normal mood and affect. His behavior is normal. Judgment and thought content normal.   Vitals reviewed.        Results:   No labs ordered.     Assessment and Plan        Janey was seen today for recheck.    Discussed with patient that I do not feel strongly that she take Lovenox if she is going to be mobile/ambulating after surgery, but will prescribe per surgeon's request.  Will also refill Prometrium.  Patient has mental health history, so she was advised if she begins to have problems with mood (mode depression), she should discontinue Prometrium and follow up in clinic.     Diagnoses and all orders for this visit:    Personal history of DVT (deep vein thrombosis)  -     enoxaparin (LOVENOX) 40 MG/0.4ML injection; Inject 0.4 mLs (40 mg) Subcutaneous daily    Gender dysphoria in adult  -     progesterone (PROMETRIUM) 200 MG capsule; Take 1 capsule (200 mg) by mouth daily           There are no discontinued medications.    Options for treatment and follow-up care were reviewed with the patient. Janey Givens  engaged in the decision making process and verbalized understanding of the options discussed and agreed with the final plan.    Bridgette Olguin M.D.  PGY-3, Family Medicine

## 2018-09-19 ENCOUNTER — HOSPITAL ENCOUNTER (EMERGENCY)
Facility: CLINIC | Age: 23
Discharge: HOME | End: 2018-09-19
Attending: EMERGENCY MEDICINE | Admitting: EMERGENCY MEDICINE
Payer: COMMERCIAL

## 2018-09-19 VITALS
HEIGHT: 71 IN | DIASTOLIC BLOOD PRESSURE: 78 MMHG | SYSTOLIC BLOOD PRESSURE: 127 MMHG | OXYGEN SATURATION: 100 % | WEIGHT: 180 LBS | BODY MASS INDEX: 25.2 KG/M2 | TEMPERATURE: 98.3 F | HEART RATE: 64 BPM | RESPIRATION RATE: 16 BRPM

## 2018-09-19 DIAGNOSIS — M79.645 PAIN IN LEFT FINGER(S): ICD-10-CM

## 2018-09-19 PROCEDURE — 99282 EMERGENCY DEPT VISIT SF MDM: CPT

## 2018-09-19 PROCEDURE — 99283 EMERGENCY DEPT VISIT LOW MDM: CPT | Mod: Z6 | Performed by: EMERGENCY MEDICINE

## 2018-09-19 NOTE — ED PROVIDER NOTES
[facility] ([facility])  September 19, 2018    History     Chief Complaint   Patient presents with     Hand Pain     left     HPI  Janey Givens is a 23 year old transgender male to female with previous history of DVT (secondary to L knee surgery 2014) who presents to the ED with left hand pain.  Patient states that yesterday she did cut some jalapenos, and today at 8 AM while showering started to have a burning sensation in the tips of her fingers of the left hand.  She denies any trauma or cooking prior to this.  She states that the burning sensation is improved when she puts her left hand in ice and also has tried taking ibuprofen without relief.      PAST MEDICAL HISTORY  Past Medical History:   Diagnosis Date     Blood clot in vein     Patient had a blood clot after surgery of left knee 4/2014 (occured 4 days after surgery).  Patient took warfarin for 6 months.     Uncomplicated asthma      PAST SURGICAL HISTORY  Past Surgical History:   Procedure Laterality Date     KNEE SURGERY Left 2014     FAMILY HISTORY  Family History   Problem Relation Age of Onset     Lung Cancer Maternal Grandmother      Parkinsonism Paternal Grandfather      Dementia Paternal Grandfather      Autoimmune Disease Sister      SOCIAL HISTORY  Social History   Substance Use Topics     Smoking status: Never Smoker     Smokeless tobacco: Never Used     Alcohol use Yes     MEDICATIONS  No current facility-administered medications for this encounter.      Current Outpatient Prescriptions   Medication     DiazePAM (VALIUM PO)     estradiol (VIVELLE-DOT) 0.1 MG/24HR BIW patch     Melatonin 10 MG TABS tablet     MOTRIN IB PO     progesterone (PROMETRIUM) 200 MG capsule     progesterone (PROMETRIUM) 200 MG capsule     spironolactone (ALDACTONE) 100 MG tablet     ALLERGIES  Allergies   Allergen Reactions     Banana Anaphylaxis     Throat swelling         I have reviewed the Medications, Allergies, Past Medical and  "Surgical History, and Social History in the Epic system.    Review of Systems   Musculoskeletal:        Positive for L hand pain.    All other systems reviewed and are negative.      Physical Exam   BP: 110/73  Pulse: 82  Temp: 98.2  F (36.8  C)  Resp: 14  Height: 180.3 cm (5' 11\")  Weight: 81.6 kg (180 lb)  SpO2: 98 %      Physical Exam   Constitutional:   Male to female transgender.   Musculoskeletal:   Normal motor and sensation in left hand.  Normal just distal capillary refill all 5 fingers in the left hand.  No open wounds.  No erythema of the skin.  No blisters of the skin.   Physical Exam   Constitutional: oriented to person, place, and time. appears well-developed and well-nourished.   HENT:   Head: Normocephalic and atraumatic.   Neck: Normal range of motion.   Pulmonary/Chest: Effort normal. No respiratory distress.   Neurological: alert and oriented to person, place, and time.   Skin: Skin is warm and dry.   Psychiatric:  normal mood and affect.  behavior is normal. Thought content normal.       ED Course     ED Course     Procedures   11:39 AM  The patient was seen and examined by Dr. Richey in [facility]                Critical Care time:  none             Labs Ordered and Resulted from Time of ED Arrival Up to the Time of Departure from the ED - No data to display         Assessments & Plan (with Medical Decision Making)  Ms. Janey evans patient is a 23 year old male to female transgender patient who presented to the ER due to some tingling pain in his left hand and distal segments of his fingers.  Patient states that he cut some jalapenso yesterday ,and then today in the shower, he noticed some burning in his fingertips.  He has no pain when he is putting his fingers and eyes.  Patient here has normal range of motion of all fingers and has normal sensation.  He has normal capillary refill.  Patient has no blistering on the skin.  No signs of second-degree burn. I feel that patient's tingling and " pain is likely from cutting the spicy peppers yesterday.  I recommend he use a soothing cold cream and ice as needed.  No other acute recommendations at this time.  Patient will be given a school note that he is here in the ER and not in class.  Patient stable for discharge    This part of the medical record was transcribed by Vasquez Shelton Medical Scribe, from a dictation done by Ju Richey MD.          I have reviewed the nursing notes.    I have reviewed the findings, diagnosis, plan and need for follow up with the patient.    New Prescriptions    No medications on file       Final diagnoses:   Pain in left finger(s)     IVasquez, am serving as a trained medical scribe to document services personally performed by Ju Richey MD, based on the provider's statements to me.      IJu MD, was physically present and have reviewed and verified the accuracy of this note documented by Vasquez Shelton.     9/19/2018   [facility] [place], EMERGENCY DEPARTMENT     Ju Richey MD  09/19/18 1531

## 2018-09-19 NOTE — ED AVS SNAPSHOT
[facility], [place], Emergency Department    [address] [place] [address]    Phone:  [phone]                                       Janey Givens   MRN: [id number]    Department:  [facility], Emergency Department   Date of Visit:  9/19/2018           After Visit Summary Signature Page     I have received my discharge instructions, and my questions have been answered. I have discussed any challenges I see with this plan with the nurse or doctor.    ..........................................................................................................................................  Patient/Patient Representative Signature      ..........................................................................................................................................  Patient Representative Print Name and Relationship to Patient    ..................................................               ................................................  Date                                   Time    ..........................................................................................................................................  Reviewed by Signature/Title    ...................................................              ..............................................  Date                                               Time          22EPIC Rev 08/18

## 2018-09-19 NOTE — ED AVS SNAPSHOT
[facility], Emergency Department    [address], [place] [address]    Phone:  [phone]                                       Janey Givens   MRN: [id number]    Department:  [facility], Emergency Department   Date of Visit:  9/19/2018           Patient Information     Date Of Birth          1995        Your diagnoses for this visit were:     Pain in left finger(s)        You were seen by Ju Richey MD.        Discharge Instructions       Your finger and hand exam is normal.   Take ibuprofen/tylenol as needed for pain.     Use cold cream or ice on fingers for symptomatic relief.     Please make an appointment to follow up with [facility] (phone: [phone]) in 2-4 days even if entirely better.      Your next 10 appointments already scheduled     Sep 20, 2018  9:40 AM CDT   Return Visit with [facility] ([facility])    [address] [place] [address],  [address]  [place] [address]   [phone]              24 Hour Appointment Hotline       To make an appointment at any [facility], call [phone] ([phone]). If you don't have a family doctor or clinic, we will help you find one. [facility] are conveniently located to serve the needs of you and your family.             Review of your medicines      Our records show that you are taking the medicines listed below. If these are incorrect, please call your family doctor or clinic.        Dose / Directions Last dose taken    estradiol 0.1 MG/24HR BIW patch   Commonly known as:  VIVELLE-DOT   Quantity:  16 patch        APPLY TWO PATCHES TWICE A WEEK   Refills:  3        Melatonin 10 MG Tabs tablet   Dose:  10 mg        Take 10 mg by mouth nightly as needed for sleep   Refills:  0        MOTRIN IB PO        Refills:  0        * progesterone 200 MG capsule   Commonly known as:  PROMETRIUM   Dose:  200 mg   Quantity:  30 capsule        Take 1 capsule (200 mg)  by mouth daily   Refills:  0        * progesterone 200 MG capsule   Commonly known as:  PROMETRIUM   Dose:  200 mg   Quantity:  30 capsule        Take 1 capsule (200 mg) by mouth daily   Refills:  0        spironolactone 100 MG tablet   Commonly known as:  ALDACTONE   Quantity:  60 tablet        TAKE TWO TABLETS BY MOUTH EVERY DAY   Refills:  2        VALIUM PO        Refills:  0        * Notice:  This list has 2 medication(s) that are the same as other medications prescribed for you. Read the directions carefully, and ask your doctor or other care provider to review them with you.            Orders Needing Specimen Collection     None      Pending Results     No orders found from 9/17/2018 to 9/20/2018.            Pending Culture Results     No orders found from 9/17/2018 to 9/20/2018.            Pending Results Instructions     If you had any lab results that were not finalized at the time of your Discharge, you can call the ED Lab Result RN at [phone]. You will be contacted by this team for any positive Lab results or changes in treatment. The nurses are available 7 days a week from 10A to 6:30P.  You can leave a message 24 hours per day and they will return your call.        Thank you for choosing [facility]       Thank you for choosing [facility] for your care. Our goal is always to provide you with excellent care. Hearing back from our patients is one way we can continue to improve our services. Please take a few minutes to complete the written survey that you may receive in the mail after you visit with us. Thank you!        Care EveryWhere ID     This is your Care EveryWhere ID. This could be used by other organizations to access your [facility] medical records  [id number]        Equal Access to Services     [facility] ALFONSO : Stephenie Garcia, waaxda luqadaha, qaybta kaalmada adeshelley, emani adair. So [facility] [phone].    ATENCIÓN: george Qureshi  disposición servicios gratuitos de asistencia lingüística. Chelsea al [phone].    We comply with applicable federal civil rights laws and [place] laws. We do not discriminate on the basis of race, color, national origin, age, disability, sex, sexual orientation, or gender identity.            After Visit Summary       This is your record. Keep this with you and show to your community pharmacist(s) and doctor(s) at your next visit.

## 2018-09-19 NOTE — DISCHARGE INSTRUCTIONS
Your finger and hand exam is normal.   Take ibuprofen/tylenol as needed for pain.     Use cold cream or ice on fingers for symptomatic relief.     Please make an appointment to follow up with [facility] (phone: [phone]) in 2-4 days even if entirely better.

## 2018-09-20 ENCOUNTER — OFFICE VISIT (OUTPATIENT)
Dept: FAMILY MEDICINE | Facility: CLINIC | Age: 23
End: 2018-09-20
Payer: COMMERCIAL

## 2018-09-20 VITALS
BODY MASS INDEX: 24.52 KG/M2 | WEIGHT: 175.8 LBS | DIASTOLIC BLOOD PRESSURE: 75 MMHG | RESPIRATION RATE: 16 BRPM | HEART RATE: 71 BPM | SYSTOLIC BLOOD PRESSURE: 125 MMHG | TEMPERATURE: 97.9 F | OXYGEN SATURATION: 98 %

## 2018-09-20 DIAGNOSIS — G47.00 INSOMNIA, UNSPECIFIED TYPE: Primary | ICD-10-CM

## 2018-09-20 RX ORDER — HYDROXYZINE HYDROCHLORIDE 25 MG/1
25-50 TABLET, FILM COATED ORAL
Qty: 20 TABLET | Refills: 0 | Status: ON HOLD | OUTPATIENT
Start: 2018-09-20 | End: 2018-10-15

## 2018-09-20 ASSESSMENT — ENCOUNTER SYMPTOMS
DIZZINESS: 0
HEADACHES: 0
SLEEP DISTURBANCE: 1

## 2018-09-20 NOTE — PATIENT INSTRUCTIONS
Here is the plan from today's visit    1. Insomnia, unspecified type  Use as needed. Follow up in 2 weeks to see if this is working well or if we should try another medication.   - hydrOXYzine (ATARAX) 25 MG tablet; Take 1-2 tablets (25-50 mg) by mouth every evening as needed (Insomnia)  Dispense: 20 tablet; Refill: 0    Please call or return to clinic if your symptoms don't go away.    Follow up plan: as above     Thank you for coming to [facility] today.  Lab Testing:  **If you had lab testing today and your results are reassuring or normal they will be mailed to you or sent through [facility] within 7 days.   **If the lab tests need quick action we will call you with the results.  The phone number we will call with results is # [phone] (home) . If this is not the best number please call our clinic and change the number.  Medication Refills:  If you need any refills please call your pharmacy and they will contact us.   If you need to  your refill at a new pharmacy, please contact the new pharmacy directly. The new pharmacy will help you get your medications transferred faster.   Scheduling:  If you have any concerns about today's visit or wish to schedule another appointment please call our office during normal business hours [phone] (8-5:00 M-F)  If a referral was made to a [facility] and you don't get a call from central scheduling please call [phone].  If a Mammogram was ordered for you at [facility] call [phone] to schedule or change your appointment.  If you had an XRay/CT/Ultrasound/MRI ordered the number is [phone] to schedule or change your radiology appointment.   Medical Concerns:  If you have urgent medical concerns please call [phone] at any time of the day.    Ariana Andrea MD

## 2018-09-20 NOTE — PROGRESS NOTES
APURVA Givens is a 23 year old  who presents for   Chief Complaint   Patient presents with     Sleep Problem     x 5-6 yrs     Insomnia  Onset: Since high school. Patient has self medicated for several years including sleep hygiene, marijuana, alcohol, benadryl and melatonin with some relief. Most recently she has been doing 25-50 mg of benadryl plus 10 mg melatonin every night. She recently is back at school for chemical engineering. Denies any other life stressors.      Description:   Time to fall asleep (sleep latency): >1 hour   Middle of night awakening:  YES   Early morning awakening:    Yes    Progression of Symptoms:  waxing and waning    Accompanying Signs & Symptoms:  Daytime sleepiness/napping:   YES- maybe 1 nap a day 30 minutes to 4 hours   Excessive snoring/apnea:   no  Restless legs:   no  Frequent urination:   Yes, but for other reasons   Chronic pain:    no    History:  Prior Insomnia:  no    Precipitating factors:      Depression or anxiety?  YES- sees a therapist   New stressful situation:  no  Caffeine intake:   no  OTC decongestants:   no  Any new medications:   no    Alleviating factors:           Alcohol use:    no  Self medicating :    Benadryl and melatonin, every night, dosing above  Therapies Tried and outcome:   Alcohol - did help, no longer doing  THC - did help, no longer using   Benadryl 25-50 mg + Melatonin 10 mg, no change or relief   Glass of milk, no relief  Reading being sleep, sleep hygiene, no relief     Problem, Medication and Allergy Lists were reviewed and updated if needed..    Patient is an established patient of this clinic..         Review of Systems:   Review of Systems   Eyes: Negative for visual disturbance.   Neurological: Negative for dizziness and headaches.   Psychiatric/Behavioral: Positive for sleep disturbance.          Physical Exam:     Vitals:    09/20/18 0916   BP: 125/75   Pulse: 71   Resp: 16   Temp: 97.9  F (36.6  C)   TempSrc: Oral    SpO2: 98%   Weight: 175 lb 12.8 oz (79.7 kg)     Body mass index is 24.52 kg/(m^2).  Vitals were reviewed and were normal     Physical Exam   Constitutional: He is oriented to person, place, and time. He appears well-developed and well-nourished. No distress.   HENT:   Head: Normocephalic and atraumatic.   Eyes: Conjunctivae are normal.   Cardiovascular: Normal rate, regular rhythm and intact distal pulses.  Exam reveals no gallop and no friction rub.    No murmur heard.  Pulmonary/Chest: Effort normal and breath sounds normal.   Neurological: He is alert and oriented to person, place, and time.   Skin: Skin is warm and dry.   Psychiatric: He has a normal mood and affect. Thought content normal.       Results:   No testing ordered today    Assessment and Plan        Janey Givens is a 24 y/o transfemale who was seen today for sleep problem.    Insomnia, unspecified type  Unclear etiology of insomnia. Physical exam unremarkable. Patient has tried multiple therapies and behavorial adjustments to aid with insomnia. This has been a chronic issue. No concern for exacerbation of depression. Less concerning for medical etiology, though could consider testing the future such as TSH. Discussed medicinal sleep aids including hypnotics, though counseled about some of these medications can be habit forming. Patient prefers to try other alternatives first. Encouraged to follow up in 2 weeks either by appointment or MyChart to discuss if any relief with pharmaceutical aid.   -     hydrOXYzine (ATARAX) 25 MG tablet; Take 1-2 tablets (25-50 mg) by mouth every evening as needed (Insomnia)       There are no discontinued medications.    Options for treatment and follow-up care were reviewed with the patient. Janey Givens  engaged in the decision making process and verbalized understanding of the options discussed and agreed with the final plan.    Ariana Andrea MD  [facility] Resident PGY-2  [phone]    Those present during this  appointment were: patient and myself

## 2018-09-20 NOTE — MR AVS SNAPSHOT
After Visit Summary   9/20/2018    Janey Givens    MRN: [id number]           Patient Information     Date Of Birth          1995        Visit Information        Provider Department      9/20/2018 9:40 AM Ariana Andrea MD [facility]        Today's Diagnoses     Insomnia, unspecified type    -  1      Care Instructions    Here is the plan from today's visit    1. Insomnia, unspecified type  Use as needed. Follow up in 2 weeks to see if this is working well or if we should try another medication.   - hydrOXYzine (ATARAX) 25 MG tablet; Take 1-2 tablets (25-50 mg) by mouth every evening as needed (Insomnia)  Dispense: 20 tablet; Refill: 0    Please call or return to clinic if your symptoms don't go away.    Follow up plan: as above     Thank you for coming to [facility] today.  Lab Testing:  **If you had lab testing today and your results are reassuring or normal they will be mailed to you or sent through [facility] within 7 days.   **If the lab tests need quick action we will call you with the results.  The phone number we will call with results is # [phone] (home) . If this is not the best number please call our clinic and change the number.  Medication Refills:  If you need any refills please call your pharmacy and they will contact us.   If you need to  your refill at a new pharmacy, please contact the new pharmacy directly. The new pharmacy will help you get your medications transferred faster.   Scheduling:  If you have any concerns about today's visit or wish to schedule another appointment please call our office during normal business hours [phone] (8-5:00 M-F)  If a referral was made to a [facility] and you don't get a call from central scheduling please call [phone].  If a Mammogram was ordered for you at [facility] call [phone] to schedule or change your appointment.  If you had an XRay/CT/Ultrasound/MRI  ordered the number is [phone] to schedule or change your radiology appointment.   Medical Concerns:  If you have urgent medical concerns please call [phone] at any time of the day.    Ariana Andrea MD            Follow-ups after your visit        Your next 10 appointments already scheduled     Sep 20, 2018  9:40 AM CDT   Return Visit with Ariana VINCENT MD   [facility]    [address],  [address]  [place] [address]   [phone]              Who to contact     Please call your clinic at [phone] to:    Ask questions about your health    Make or cancel appointments    Discuss your medicines    Learn about your test results    Speak to your doctor            Additional Information About Your Visit        Care EveryWhere ID     This is your Care EveryWhere ID. This could be used by other organizations to access your [place] medical records  [id number]        Your Vitals Were     Pulse Temperature Respirations Pulse Oximetry BMI (Body Mass Index)       71 97.9  F (36.6  C) (Oral) 16 98% 24.52 kg/m2        Blood Pressure from Last 3 Encounters:   09/20/18 125/75   09/19/18 127/78   08/15/18 128/77    Weight from Last 3 Encounters:   09/20/18 175 lb 12.8 oz (79.7 kg)   09/19/18 180 lb (81.6 kg)   08/15/18 184 lb 3.2 oz (83.6 kg)              Today, you had the following     No orders found for display         Today's Medication Changes          These changes are accurate as of 9/20/18  9:37 AM.  If you have any questions, ask your nurse or doctor.               Start taking these medicines.        Dose/Directions    hydrOXYzine 25 MG tablet   Commonly known as:  ATARAX   Used for:  Insomnia, unspecified type   Started by:  Ariana Andrea MD        Dose:  25-50 mg   Take 1-2 tablets (25-50 mg) by mouth every evening as needed (Insomnia)   Quantity:  20 tablet   Refills:  0            Where to get your medicines      These medications were sent to  [facility] [place], [place] - [address] , [facility] [address]     Phone:  [phone]     hydrOXYzine 25 MG tablet                Primary Care Provider Office Phone # Fax #    Ariana VINCENT [phone]       [facility] [address] [facility] [address]        Equal Access to Services     LETITIA HAMILTON : Hadii aad ku hadasho Soomaali, waaxda luqadaha, qaybta kaalmada adeegyada, waxay idiin hayaan adeeg kharash la'aan ah. So [facility] [phone].    ATENCIÓN: Si habla español, tiene a alarcon disposición servicios gratuitos de asistencia lingüística. Chelsea al [phone].    We comply with applicable federal civil rights laws and [place] laws. We do not discriminate on the basis of race, color, national origin, age, disability, sex, sexual orientation, or gender identity.            Thank you!     Thank you for choosing \[facility]\"" [facility]  for your care. Our goal is always to provide you with excellent care. Hearing back from our patients is one way we can continue to improve our services. Please take a few minutes to complete the written survey that you may receive in the mail after your visit with us. Thank you!             Your Updated Medication List - Protect others around you: Learn how to safely use, store and throw away your medicines at www.disposemymeds.org.          This list is accurate as of 9/20/18  9:37 AM.  Always use your most recent med list.                   Brand Name Dispense Instructions for use Diagnosis    estradiol 0.1 MG/24HR BIW patch    VIVELLE-DOT    16 patch    APPLY TWO PATCHES TWICE A WEEK    Gender dysphoria in adult       hydrOXYzine 25 MG tablet    ATARAX    20 tablet    Take 1-2 tablets (25-50 mg) by mouth every evening as needed (Insomnia)    Insomnia, unspecified type       Melatonin 10 MG Tabs tablet      Take 10 mg by mouth nightly as needed for sleep        MOTRIN IB PO           * progesterone 200 MG  capsule    PROMETRIUM    30 capsule    Take 1 capsule (200 mg) by mouth daily    Gender dysphoria in adult       * progesterone 200 MG capsule    PROMETRIUM    30 capsule    Take 1 capsule (200 mg) by mouth daily    Gender dysphoria in adult       spironolactone 100 MG tablet    ALDACTONE    60 tablet    TAKE TWO TABLETS BY MOUTH EVERY DAY    Gender dysphoria in adult       * Notice:  This list has 2 medication(s) that are the same as other medications prescribed for you. Read the directions carefully, and ask your doctor or other care provider to review them with you.

## 2018-09-20 NOTE — PROGRESS NOTES
Preceptor Attestation:   Patient seen, evaluated and discussed with the resident. I have verified the content of the note, which accurately reflects my assessment of the patient and the plan of care.   Supervising Physician:  Lisbet Arroyo MD

## 2018-09-28 ENCOUNTER — HOSPITAL ENCOUNTER (INPATIENT)
Facility: CLINIC | Age: 23
LOS: 16 days | Discharge: HOME | End: 2018-10-15
Attending: EMERGENCY MEDICINE | Admitting: PSYCHIATRY & NEUROLOGY
Payer: COMMERCIAL

## 2018-09-28 DIAGNOSIS — F20.1 DISORGANIZED SCHIZOPHRENIA (H): ICD-10-CM

## 2018-09-28 DIAGNOSIS — F23 ACUTE PSYCHOSIS (H): ICD-10-CM

## 2018-09-28 DIAGNOSIS — R45.851 SUICIDAL IDEATION: ICD-10-CM

## 2018-09-28 DIAGNOSIS — F41.9 ANXIETY: Primary | ICD-10-CM

## 2018-09-28 PROCEDURE — 99285 EMERGENCY DEPT VISIT HI MDM: CPT | Mod: Z6 | Performed by: EMERGENCY MEDICINE

## 2018-09-28 PROCEDURE — 99285 EMERGENCY DEPT VISIT HI MDM: CPT | Mod: 25 | Performed by: EMERGENCY MEDICINE

## 2018-09-28 PROCEDURE — 80307 DRUG TEST PRSMV CHEM ANLYZR: CPT | Performed by: FAMILY MEDICINE

## 2018-09-28 PROCEDURE — 80320 DRUG SCREEN QUANTALCOHOLS: CPT | Performed by: FAMILY MEDICINE

## 2018-09-28 PROCEDURE — 90791 PSYCH DIAGNOSTIC EVALUATION: CPT

## 2018-09-28 NOTE — LETTER
[facility]  [address]  [facility] [place] [address]  [phone]    October 15, 2018    Re: Janey Givens  [address]  [facility] [address]  [phone] (home)     : 1995      To Whom It May Concern:      Janey Givens was hospitalized from 2018 until 10/15/2018 due to medical illness.  He may return to work and school when cleared at outpatient follow up with full duty.        Sincerely,        Fabian Cutler MD

## 2018-09-28 NOTE — IP AVS SNAPSHOT
MRN:[id number]                      After Visit Summary   9/28/2018    Janey Givens    MRN: [id number]           Thank you!     Thank you for choosing [facility] for your care. Our goal is always to provide you with excellent care.        Patient Information     Date Of Birth          1995        About your hospital stay     You were admitted on:  September 29, 2018 You last received care in the:  [address]    You were discharged on:  October 15, 2018        Reason for your hospital stay       Schizophrenia                  Who to Call     For medical emergencies, please call 911.  For non-urgent questions about your medical care, please call your primary care provider or clinic, [phone]          Attending Provider     Provider Specialty    Vasile Norton MD Emergency Medicine    Desrosier, Fabian Hay MD Psychiatry       Primary Care Provider Office Phone # Fax #    Ariana VINCENT [phone]      After Care Instructions     Activity       Your activity upon discharge: activity as tolerated            Diet       Follow this diet upon discharge: Orders Placed This Encounter      Snacks/Supplements Adult: Boost Plus; With Meals      Regular Diet Adult            Discharge Instructions       1. Please do not harm yourself or others.  2. Please continue to take your medications.  3. Please follow up with your outpatient care team.  4. Please do not take drugs or alcohol as this will worsen your condition.  5. Please do not take more than the prescribed doses of medications as this may make them dangerous.   6. Please follow your safety plan of action.  7. Please call crisis if having trouble.  8. If having thoughts of harming self or others please come in to the emergency department as soon as possible.                  Your next 10 appointments already scheduled     Oct 19, 2018  9:00 AM CDT   Navigate New with VERENICE See   [facility] ([facility])    [address]  [facility] [address]   [phone]            Oct 25, 2018  8:20 AM CDT   Return Visit with Ariana VINCENT MD   [facility] ([facility])    [address],  [address]  [facility] [address]   [phone]              Further instructions from your care team        Behavioral Discharge Planning and Instructions      Summary:  You were admitted on 9/28/2018  due to Psychotic Symptomology.  You were treated by Dr. Fabian Johnson MD and discharged on 10/15/18 from [facility] to Home.     Principal Diagnosis: Schizophrenia     Health Care Follow-up Appointments and Resources:   [facility] First Episode Psychosis Program  [address]   [address] [place] [address]   Office: [phone]  *You have a initial screening for the First Episode Psychosis Programming on Friday, October 19th at 9:00 am.    [facility]   [address] [address]  Phone: [phone]  Attending Provider: Dr. Ariana Andrea MD   *You have a post-hospitalization visit with Dr. Ariana Andrea MD scheduled for Thursday, October 25th at 8:20 am.    Resource:  [facility]   [address]   [address]  [place] [address]  Phone: [phone]    Attend all scheduled appointments with your outpatient providers. Call at least 24 hours in advance if you need to reschedule an appointment to ensure continued access to your outpatient providers.   Major Treatments, Procedures and Findings:  You were provided with: a psychiatric assessment, assessed for medical stability, medication evaluation and/or management, group therapy and milieu management    Symptoms to Report: feeling more aggressive, increased confusion, losing more sleep, mood getting worse or thoughts of suicide    Early warning signs can include: increased depression or  "anxiety sleep disturbances increased thoughts or behaviors of suicide or self-harm  increased unusual thinking, such as paranoia or hearing voices    Safety and Wellness:  Take all medicines as directed.  Make no changes unless your doctor suggests them.      Follow treatment recommendations.  Refrain from alcohol and non-prescribed drugs.  Ask your support system to help you reduce your access to items that could harm yourself or others. If there is a concern for safety, call 911.    Resources:   Crisis Intervention: 998.599.2939 or 303-355-7581 (TTY: 777.265.9502).  Call anytime for help.  National Stopover on Mental Illness (www.[place].diane.org): 545.398.8921 or 669-724-5166.  Suicide Awareness Voices of Education (SAVE) (www.save.org): 619-088-OKPV (1131)  National Suicide Prevention Line (www.mentalThoughtful Moversmn.org): 622-261-VGKQ (3498)  Mental Health Consumer/Survivor Network of [place] (www.mhcsn.net): 123.446.6127 or 762-918-4603  Mental Health Association of [place] (www.mentalhealth.org): 486.895.7252 or 909-061-5617  [facility] Crisis (COPE) Response - Adult [phone]    The treatment team has appreciated the opportunity to work with you.     Please take care and make your recovery a daily recovery.   If you have any questions or concerns our unit number is [phone].  Thank you.         Pending Results     No orders found from 9/26/2018 to 9/29/2018.            Statement of Approval     Ordered          10/15/18 1327  I have reviewed and agree with all the recommendations and orders detailed in this document.  EFFECTIVE NOW     Approved and electronically signed by:  Fabian Johnson MD             Admission Information     Date & Time Provider Department Dept. Phone    9/28/2018 Fabian Johnson MD [facility] [phone]      Your Vitals Were     Blood Pressure Pulse Temperature Respirations Height Weight    142/79 80 96.9  F (36.1  C) (Oral) 16 1.753 m (5' 9\") 75.9 kg (167 lb 4.8 oz)    " "Pulse Oximetry BMI (Body Mass Index)                99% 24.71 kg/m2          [employer] Information     [employer] lets you send messages to your doctor, view your test results, renew your prescriptions, schedule appointments and more. To sign up, go to [email or web address] . Click on \"Log in\" on the left side of the screen, which will take you to the Welcome page. Then click on \"Sign up Now\" on the right side of the page.     You will be asked to enter the access code listed below, as well as some personal information. Please follow the directions to create your username and password.     Your access code is: [email or web address]  Expires: 2019  4:13 PM     Your access code will  in 90 days. If you need help or a new code, please call your [place] clinic or [phone].        Care EveryWhere ID     This is your Care EveryWhere ID. This could be used by other organizations to access your [place] medical records  [id number]        Equal Access to Services     LETITIA HAMILTON : Hadii elizabeth cisneroso Soeloise, waaxda luqadaha, qaybta kaalmaanastasiia adeshelley, emani marie . So [facility] [phone].    ATENCIÓN: Si habla español, tiene a alarcon disposición servicios gratuitos de asistencia lingüística. Llame al [phone].    We comply with applicable federal civil rights laws and [place] laws. We do not discriminate on the basis of race, color, national origin, age, disability, sex, sexual orientation, or gender identity.               Review of your medicines      START taking        Dose / Directions    OLANZapine 15 MG tablet   Commonly known as:  zyPREXA   Used for:  Disorganized schizophrenia (H)        Dose:  15 mg   Take 1 tablet (15 mg) by mouth At Bedtime   Quantity:  30 tablet   Refills:  0         CONTINUE these medicines which may have CHANGED, or have new prescriptions. If we are uncertain of the size of tablets/capsules you have at home, strength may be listed as something that might " have changed.        Dose / Directions    hydrOXYzine 25 MG tablet   Commonly known as:  ATARAX   This may have changed:    - how much to take  - when to take this  - reasons to take this   Used for:  Anxiety        Dose:  25 mg   Take 1 tablet (25 mg) by mouth every 4 hours as needed for anxiety   Quantity:  120 tablet   Refills:  0         CONTINUE these medicines which have NOT CHANGED        Dose / Directions    estradiol 0.1 MG/24HR BIW patch   Commonly known as:  VIVELLE-DOT   Used for:  Gender dysphoria in adult        APPLY TWO PATCHES TWICE A WEEK   Quantity:  16 patch   Refills:  3       ibuprofen 200 MG tablet   Commonly known as:  ADVIL/MOTRIN        Dose:  200-400 mg   Take 200-400 mg by mouth every 4 hours as needed for mild pain   Refills:  0       Melatonin 10 MG Tabs tablet        Dose:  10 mg   Take 10 mg by mouth nightly as needed for sleep   Refills:  0       progesterone 200 MG capsule   Commonly known as:  PROMETRIUM   Used for:  Gender dysphoria in adult        Dose:  200 mg   Take 1 capsule (200 mg) by mouth daily   Quantity:  30 capsule   Refills:  0       spironolactone 100 MG tablet   Commonly known as:  ALDACTONE   Used for:  Gender dysphoria in adult        TAKE TWO TABLETS BY MOUTH EVERY DAY   Quantity:  60 tablet   Refills:  2            Where to get your medicines      These medications were sent to [facility] [place], [place] - [address]  [address] [address]     Phone:  [phone]     hydrOXYzine 25 MG tablet    OLANZapine 15 MG tablet                Protect others around you: Learn how to safely use, store and throw away your medicines at www.disposemymeds.org.             Medication List: This is a list of all your medications and when to take them. Check marks below indicate your daily home schedule. Keep this list as a reference.      Medications           Morning Afternoon Evening Bedtime As Needed    estradiol 0.1 MG/24HR BIW  patch   Commonly known as:  VIVELLE-DOT   APPLY TWO PATCHES TWICE A WEEK   Last time this was given:  2 patches on 10/15/2018  9:01 AM                                hydrOXYzine 25 MG tablet   Commonly known as:  ATARAX   Take 1 tablet (25 mg) by mouth every 4 hours as needed for anxiety   Last time this was given:  25 mg on 10/2/2018  9:02 PM                                ibuprofen 200 MG tablet   Commonly known as:  ADVIL/MOTRIN   Take 200-400 mg by mouth every 4 hours as needed for mild pain                                Melatonin 10 MG Tabs tablet   Take 10 mg by mouth nightly as needed for sleep   Last time this was given:  5 mg on 10/2/2018  9:02 PM                                OLANZapine 15 MG tablet   Commonly known as:  zyPREXA   Take 1 tablet (15 mg) by mouth At Bedtime   Last time this was given:  15 mg on 10/14/2018  9:00 PM                                progesterone 200 MG capsule   Commonly known as:  PROMETRIUM   Take 1 capsule (200 mg) by mouth daily   Last time this was given:  200 mg on 10/15/2018  9:00 AM                                spironolactone 100 MG tablet   Commonly known as:  ALDACTONE   TAKE TWO TABLETS BY MOUTH EVERY DAY   Last time this was given:  200 mg on 10/15/2018  9:00 AM

## 2018-09-28 NOTE — IP AVS SNAPSHOT
[address] [address]    Phone:  [phone]                                       After Visit Summary   9/28/2018    Janey Givens    MRN: [id number]           After Visit Summary Signature Page     I have received my discharge instructions, and my questions have been answered. I have discussed any challenges I see with this plan with the nurse or doctor.    ..........................................................................................................................................  Patient/Patient Representative Signature      ..........................................................................................................................................  Patient Representative Print Name and Relationship to Patient    ..................................................               ................................................  Date                                   Time    ..........................................................................................................................................  Reviewed by Signature/Title    ...................................................              ..............................................  Date                                               Time          22EPIC Rev 08/18

## 2018-09-29 PROBLEM — F23 ACUTE PSYCHOSIS (H): Status: ACTIVE | Noted: 2018-09-29

## 2018-09-29 LAB
AMPHETAMINES UR QL SCN: NEGATIVE
BARBITURATES UR QL: NEGATIVE
BENZODIAZ UR QL: NEGATIVE
CANNABINOIDS UR QL SCN: NEGATIVE
COCAINE UR QL: NEGATIVE
ETHANOL UR QL SCN: NEGATIVE
OPIATES UR QL SCN: NEGATIVE

## 2018-09-29 PROCEDURE — 99207 ZZC CDG-HISTORY COMP: MEETS EXP. PROBLEM FOCUSED-DOWN CODED LACK OF ROS: CPT | Performed by: PSYCHIATRY & NEUROLOGY

## 2018-09-29 PROCEDURE — 25000132 ZZH RX MED GY IP 250 OP 250 PS 637: Performed by: PHYSICIAN ASSISTANT

## 2018-09-29 PROCEDURE — 25000132 ZZH RX MED GY IP 250 OP 250 PS 637: Performed by: EMERGENCY MEDICINE

## 2018-09-29 PROCEDURE — 12400007 ZZH R&B MH INTERMEDIATE UMMC

## 2018-09-29 PROCEDURE — 25000132 ZZH RX MED GY IP 250 OP 250 PS 637: Performed by: PSYCHIATRY & NEUROLOGY

## 2018-09-29 PROCEDURE — 99207 ZZC DOWN CODE DUE TO SUBSEQUENT EXAM: CPT | Performed by: PSYCHIATRY & NEUROLOGY

## 2018-09-29 PROCEDURE — 99221 1ST HOSP IP/OBS SF/LOW 40: CPT | Mod: AI | Performed by: PSYCHIATRY & NEUROLOGY

## 2018-09-29 RX ORDER — IBUPROFEN 200 MG
200-400 TABLET ORAL EVERY 4 HOURS PRN
COMMUNITY
End: 2018-12-23

## 2018-09-29 RX ORDER — OLANZAPINE 5 MG/1
5 TABLET ORAL 2 TIMES DAILY
Status: DISCONTINUED | OUTPATIENT
Start: 2018-09-29 | End: 2018-10-08

## 2018-09-29 RX ORDER — OLANZAPINE 10 MG/1
10 TABLET ORAL
Status: DISCONTINUED | OUTPATIENT
Start: 2018-09-29 | End: 2018-10-15 | Stop reason: HOSPADM

## 2018-09-29 RX ORDER — ACETAMINOPHEN 325 MG/1
650 TABLET ORAL EVERY 4 HOURS PRN
Status: DISCONTINUED | OUTPATIENT
Start: 2018-09-29 | End: 2018-10-15 | Stop reason: HOSPADM

## 2018-09-29 RX ORDER — SPIRONOLACTONE 50 MG/1
200 TABLET, FILM COATED ORAL DAILY
Status: DISCONTINUED | OUTPATIENT
Start: 2018-09-29 | End: 2018-10-15 | Stop reason: HOSPADM

## 2018-09-29 RX ORDER — OLANZAPINE 10 MG/2ML
10 INJECTION, POWDER, FOR SOLUTION INTRAMUSCULAR
Status: DISCONTINUED | OUTPATIENT
Start: 2018-09-29 | End: 2018-10-15 | Stop reason: HOSPADM

## 2018-09-29 RX ORDER — TRAZODONE HYDROCHLORIDE 50 MG/1
50 TABLET, FILM COATED ORAL
Status: DISCONTINUED | OUTPATIENT
Start: 2018-09-29 | End: 2018-10-15 | Stop reason: HOSPADM

## 2018-09-29 RX ORDER — ESTRADIOL 0.1 MG/D
2 FILM, EXTENDED RELEASE TRANSDERMAL
Status: DISCONTINUED | OUTPATIENT
Start: 2018-10-01 | End: 2018-10-15 | Stop reason: HOSPADM

## 2018-09-29 RX ORDER — HYDROXYZINE HYDROCHLORIDE 25 MG/1
25 TABLET, FILM COATED ORAL EVERY 4 HOURS PRN
Status: DISCONTINUED | OUTPATIENT
Start: 2018-09-29 | End: 2018-10-15 | Stop reason: HOSPADM

## 2018-09-29 RX ADMIN — PROGESTERONE 200 MG: 200 CAPSULE ORAL at 11:11

## 2018-09-29 RX ADMIN — SPIRONOLACTONE 200 MG: 50 TABLET ORAL at 11:11

## 2018-09-29 RX ADMIN — OLANZAPINE 5 MG: 5 TABLET, FILM COATED ORAL at 11:11

## 2018-09-29 RX ADMIN — OLANZAPINE 5 MG: 5 TABLET, FILM COATED ORAL at 20:27

## 2018-09-29 RX ADMIN — OLANZAPINE 10 MG: 10 TABLET, FILM COATED ORAL at 04:31

## 2018-09-29 ASSESSMENT — ENCOUNTER SYMPTOMS
FEVER: 0
CHEST TIGHTNESS: 0
BRUISES/BLEEDS EASILY: 0
MYALGIAS: 0
DYSPHORIC MOOD: 1
NERVOUS/ANXIOUS: 1
PALPITATIONS: 0
WOUND: 0
DIZZINESS: 0
HEADACHES: 0
NECK PAIN: 0
ABDOMINAL PAIN: 0
AGITATION: 0
WEAKNESS: 0
RHINORRHEA: 0
SORE THROAT: 0
NAUSEA: 0
HALLUCINATIONS: 0
SHORTNESS OF BREATH: 0
VOMITING: 0
DECREASED CONCENTRATION: 1
ARTHRALGIAS: 0
CHILLS: 0
SEIZURES: 0
CONFUSION: 0
FATIGUE: 1
COUGH: 0
SLEEP DISTURBANCE: 1
LIGHT-HEADEDNESS: 0

## 2018-09-29 ASSESSMENT — ACTIVITIES OF DAILY LIVING (ADL)
DRESS: INDEPENDENT
DRESS: INDEPENDENT
GROOMING: INDEPENDENT
ORAL_HYGIENE: INDEPENDENT
HYGIENE/GROOMING: INDEPENDENT
LAUNDRY: WITH SUPERVISION
ORAL_HYGIENE: INDEPENDENT
LAUNDRY: UNABLE TO COMPLETE

## 2018-09-29 NOTE — CONSULTS
Brief Medicine Note    Appreciate pharmacy performing medication reconciliation. Hormone replacement therapy has been ordered. No other indication for consult at this time. /91 but should improve w/ restart of home spironolactone. Call IM if concerns.     Ynes Ramirez PA-C  Hospitalist Service  Pager [phone]

## 2018-09-29 NOTE — PROGRESS NOTES
"Patient had a good shift. When asked how she would like to be addressed she responded \"Well my legal name is Janey, Katie is ok but I prefer Nissa\"   Patients states she came to the hospital because \"I knew I needed help... Time to recover..... From anxiety\"  Patient at times repeats questions asked to her before responding.   Patient has been out for meals stating her appetite is improving.   Patient endorses feeling anxious 6/10 and depression fluctuates between 2-9/10 depending on if \"people are being mean to her\" She reports this has not happened here.     SI/SIB/HI: denies     Auditory or visual hallucinations: denies but states she at times misinterprets what others say to her.   Hygiene: well groomed    Patients parents visited and she states the visit went well and was \"helpful\"    Patient expresses concern regarding sleep but states the medication she took last night helped. Will continue to monitor.        09/29/18 1400   Behavioral Health   Hallucinations denies / not responding to hallucinations   Thinking distractable   Orientation person: oriented;place: oriented;date: oriented   Memory baseline memory   Insight poor   Judgement impaired   Eye Contact at examiner   Affect/Mood (WDL) Ex.   Affect blunted, flat   Mood mood is calm   ADL Assessment (WDL) WDL   Physical Appearance/Attire attire appropriate to age and situation   Hygiene well groomed   Suicidality (WDL) WDL   Suicidality other (see comments)  (denies)   1. Wish to be Dead No   2. Non-Specific Active Suicidal Thoughts  No   Self Injury other (see comment)  (denies)   Elopement (WDL) WDL   Activity (WDL) WDL   Speech (WDL) WDL   Medication Sensitivity (WDL) WDL   Psychomotor Gait (WDL) WDL   Overt Agression (WDL) WDL   Safety   Suicidality Status 15   Coping/Psychosocial   Verbalized Emotional State anxiety   Activities of Daily Living   Hygiene/Grooming independent   Oral Hygiene independent   Dress independent   Laundry unable to " complete   Room Organization independent   Behavioral Health Interventions   Psychotic Symptoms maintain safety precautions;monitor need to revise level of observation;maintain safe secure environment;reality orientation;simple, clear language;decrease environmental stimulation;redirection of intrusive behaviors;redirection of aggressive behaviors;assist patient in developing safety plan;assist patient in following safety plan;encourage nutrition and hydration;encourage participation / independence with adls;provide emotional support;establish therapeutic relationship;build upon strengths;assist with developing & utilizing healthy coping strategies;assess patient response to medication;assess medication adherance;monitor need for prn medication;monitor confusion, memory loss, decision making ability and reorient / intervent as needed   Social and Therapeutic Interventions (Psychotic Symptoms) encourage socialization with peers;encourage participation in therapeutic groups and milieu activities;encourage effective boundaries with peers

## 2018-09-29 NOTE — ED NOTES
"ED to Behavioral Floor Handoff    SITUATION  Janey Givens is a 23 year old male who speaks English and lives in a home with family members The patient arrived in the ED by private car from home with a complaint of Psychiatric Evaluation (\" I feel like I am having a mental health breakdown, the same sx that I had last year\" Had Mental Health breakdown Dec 2017)  .The patient's current symptoms started/worsened 1 week(s) ago and during this time the symptoms have increased.   In the ED, pt was diagnosed with   Final diagnoses:   Acute psychosis   Suicidal ideation        Initial vitals were: BP: (!) 164/93  Pulse: 115  Temp: 96  F (35.6  C)  Resp: 16  Height: 177.8 cm (5' 10\")  Weight: 78 kg (172 lb)  SpO2: 97 %   --------  Is the patient diabetic? No   If yes, last blood glucose? --     If yes, was this treated in the ED? --  --------  Is the patient inebriated (ETOH) No or Impaired on other substances? No  MSSA done? N/A  Last MSSA score: --    Were withdrawal symptoms treated? No  Does the patient have a seizure history? No. If yes, date of most recent seizure--  --------  Is the patient patient experiencing suicidal ideation? denies current or recent suicidal ideation     Homicidal ideation? denies current or recent homicidal ideation or behaviors.    Self-injurious behavior/urges? denies current or recent self injurious behavior or ideation.  ------  Was pt aggressive in the ED No  Was a code called No  Is the pt now cooperative? Yes  -------  Meds given in ED: Medications - No data to display   Family present during ED course? Yes  Family currently present? Yes    BACKGROUND  Does the patient have a cognitive impairment or developmental disability? No  Allergies:   Allergies   Allergen Reactions     Banana Anaphylaxis     Throat swelling   .   Social demographics are   Social History     Social History     Marital status: Single     Spouse name: N/A     Number of children: N/A     Years of education: N/A " "    Social History Main Topics     Smoking status: Never Smoker     Smokeless tobacco: Never Used     Alcohol use Yes     Drug use: No      Comment: canabis lsd mushrooms      Sexual activity: No     Other Topics Concern     None     Social History Narrative        ASSESSMENT  Labs results   Labs Ordered and Resulted from Time of ED Arrival Up to the Time of Departure from the ED   DRUG ABUSE SCREEN 6 CHEM DEP URINE ([facility])      Imaging Studies: No results found for this or any previous visit (from the past 24 hour(s)).   Most recent vital signs BP (!) 164/93  Pulse 115  Temp 96  F (35.6  C) (Oral)  Resp 16  Ht 1.778 m (5' 10\")  Wt 78 kg (172 lb)  SpO2 97%  BMI 24.68 kg/m2   Abnormal labs/tests/findings requiring intervention:---   Pain control: pt had none  Nausea control: pt had none    RECOMMENDATION  Are any infection precautions needed (MRSA, VRE, etc.)? No If yes, what infection? --  ---  Does the patient have mobility issues? independently. If yes, what device does the pt use? ---  ---  Is patient on 72 hour hold or commitment? No If on 72 hour hold, have hold and rights been given to patient? N/A  Are admitting orders written if after 10 p.m. ?N/A  Tasks needing to be completed:---     Ailyn nicolas-- [phone] [facility]   [phone] [facility] ED  "

## 2018-09-29 NOTE — PLAN OF CARE
Problem: Psychotic Symptoms  Goal: Psychotic Symptoms  Signs and symptoms of listed problems will be absent or manageable.   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 She presently appears psychotic with disorganized thoughts and appears unable to carry a thought to its logical conclusion. She does deny any thought, intent or plan to harm or kill herself. She requested to go to bed prior to completion of the admission profile and was allowed to do so. Admission profile is incomplete

## 2018-09-29 NOTE — PROGRESS NOTES
09/29/18 0340   Patient Belongings   Did you bring any home meds/supplements to the hospital?  No   Patient Belongings clothing;cell phone/electronics;plastic bag;shoes   Disposition of Belongings Locker   Belongings Search Yes   Clothing Search Yes   Second Staff Yolette and Mel   General Info Comment Patient in with a shane pant, pink jacket, gray sweater, a pair of sock, brown belt, pair of shoes, black tank top, and bra. There was no money in belongings   A               Admission:  I am responsible for any personal items that are not sent to the safe or pharmacy.  [facility] is not responsible for loss, theft or damage of any property in my possession.    Signature:  _________________________________ Date: _______  Time: _____                                              Staff Signature:  ____________________________ Date: ________  Time: _____      2nd Staff person, if patient is unable/unwilling to sign:    Signature: ________________________________ Date: ________  Time: _____     Discharge:  [facility] has returned all of my personal belongings:    Signature: _________________________________ Date: ________  Time: _____                                          Staff Signature:  ____________________________ Date: ________  Time: _____

## 2018-09-29 NOTE — PROGRESS NOTES
"Initial Psychosocial Assessment    I have reviewed the chart, met with the patient, and developed Care Plan.  Information for assessment was obtained from: chart review and interview with patient      Presenting Problem:  Pt presented to the ED w/ parents.  Pt is a 23 year old transgender male to female.  Prefers to be called Janey or Katie.  She was having delusional thoughts of \"I created the Universe.\" Pttakes time to respond to questions, eyes dart some. S he seems to be responding to internal stimuli and has disorganized thought process.  Sister called parents after a phone call with the pt because she felt she was having trouble following the conversation with Katie.  Per the DEC assessment, \"Pt has thoughts of dying peacefully around planet Saturn with rings around planet Saturn.  This patient struggles with sleep.       History of Mental Health and Chemical Dependency:  This pt had one hospitalization here at [facility] in .  They petitioned for commitment which  in 2018  Per last assessment   Sees an outpatient psychologist for gender dysphoria.  Pt has taken LSD at least one time in recent months.    Family and friends report he has been using marijuana in various forms lately.    Family Description (Constellation, Family Psychiatric History):  Pt grew up in [place], [place].  Parents are .  She has 3 sisters. She is the 3rd of 4.     Significant Life Events (Illness, Abuse, Trauma, Death):  None reported     Living Situation:  Pt has recently gone back to her own apartment. 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  Stopped school for a short period of time last year after her 1st hospitalization, but has recently returned.     Occupational History:  Pt is not currently working.     Financial Status:  Parents provide financial support along with college loans.    Legal Issues:  Pt was on a MH commitment that  in  " 2018    Ethnic/Cultural Issues:  Pt has self-identified as transgender and has been undergoing hormone therapy.  Prefers to be called Janey or Katie    Spiritual Orientation:  none     Service History:  none    Social Functioning (organization, interests):  None reported    Current Treatment Providers are:  - PCP:  Ariana Andrea MD  [facility]    - no other providers reported     Social Service Assessment/Plan:  Patient will have psychiatric assessment and medication management by the psychiatrist. Medications will be reviewed and adjusted per MD as indicated. The treatment team will continue to assess and stabilize the patient's mental health symptoms with the use of medications and therapeutic programming. Hospital staff will provide a safe environment and a therapeutic milieu. Staff will continue to assess patient as needed. Patient will participate in unit groups and activities. Patient will receive individual and group support on the unit.     CTC will do individual inpatient treatment planning and after care planning. CTC will discuss options for increasing community supports with the patient. CTC will coordinate with outpatient providers and will place referrals to ensure appropriate follow up care is in place.

## 2018-09-29 NOTE — PROGRESS NOTES
Unable to complete admission profile with patient as she is currently a very poor historian and is unable to answer basic questions.

## 2018-09-29 NOTE — PROGRESS NOTES
"Problem: Psychotic Symptoms  Goal: Psychotic Symptoms  Signs and symptoms of listed problems will be absent or manageable.     OT: pt had flat affect throughout group, pt appeared nearly tearful throughout group, though did not become tearful, pt required pause of 3x normative speed to respond and answer questions, pt remained uncertain about simple 1 step questions such as \"pick a color\" or \"what is your name\" to which pt responded \"I don't know what my name is, I have decided yet, I prefer she/her pronouns\" pt appeared confused and uncertain throughout group though with extra time and prompting pt would complete 1 step tasks. Pt problem solving appears impaired, pt focus and attention appears distractable. Pt required prompting to remember it was pt turn throughout entire group. Pt reported emotions feeling \"unsure\" and that \"I want to feel awesome, but don't know.\"       09/29/18 1200   Occupational Therapy   Type of Intervention structured groups   Response Participates with cues/redirection   Hours 1     "

## 2018-09-29 NOTE — ED PROVIDER NOTES
"  History     Chief Complaint   Patient presents with     Psychiatric Evaluation     \" I feel like I am having a mental health breakdown, the same sx that I had last year\" Had Mental Health breakdown Dec 2017     APURVA Givens is a 23 year old male to female transgender student at the [employer] with a history of schizophrenia who presents to the Emergency Department today for psychiatric evaluation. The patient is here with her parents. Per the patient's parents, the patient was on the phone tonight with her sister. Following their phone conversation, the sister called the parents to inform them that the patient was having disorganized speech and inability to follow their conversation. The parents then went to where the patient is living and brought her here after noting the patient's disorganized speech and inability to follow a conversation. No specific stressor is identified. The patient reports that she does have some suicidal thoughts. She states she would want to die peacefully, but does not have a plan. The patient has difficulty with answering questions. She states that she feels like she created the universe and has thought this for years, but knows she is wrong.  It is also noted that the patient took last school year off due to psychotic episode. Patient denies any drug use.   A similar presentation is noted on review of old records.  I have reviewed the Medications, Allergies, Past Medical and Surgical History, and Social History in the [facility] system.    Past Medical History:   Diagnosis Date     Blood clot in vein     Patient had a blood clot after surgery of left knee 4/2014 (occured 4 days after surgery).  Patient took warfarin for 6 months.     Uncomplicated asthma      Past Surgical History:   Procedure Laterality Date     COSMETIC SURGERY      August 2018     KNEE SURGERY Left 2014     Family History   Problem Relation Age of Onset     Lung Cancer Maternal Grandmother      " "Parkinsonism Paternal Grandfather      Dementia Paternal Grandfather      Autoimmune Disease Sister      Social History   Substance Use Topics     Smoking status: Never Smoker     Smokeless tobacco: Never Used     Alcohol use Yes     No current facility-administered medications for this encounter.      Current Outpatient Prescriptions   Medication     hydrOXYzine (ATARAX) 25 MG tablet     Melatonin 10 MG TABS tablet     spironolactone (ALDACTONE) 100 MG tablet     estradiol (VIVELLE-DOT) 0.1 MG/24HR BIW patch     MOTRIN IB PO     progesterone (PROMETRIUM) 200 MG capsule     progesterone (PROMETRIUM) 200 MG capsule     Allergies   Allergen Reactions     Banana Anaphylaxis     Throat swelling      Review of Systems   Constitutional: Positive for fatigue. Negative for chills and fever.   HENT: Negative for congestion, rhinorrhea and sore throat.    Eyes: Negative for visual disturbance.   Respiratory: Negative for cough, chest tightness and shortness of breath.    Cardiovascular: Negative for chest pain, palpitations and leg swelling.   Gastrointestinal: Negative for abdominal pain, nausea and vomiting.   Musculoskeletal: Negative for arthralgias, myalgias and neck pain.   Skin: Negative for rash and wound.   Allergic/Immunologic: Negative for immunocompromised state.   Neurological: Negative for dizziness, seizures, syncope, weakness, light-headedness and headaches.   Hematological: Does not bruise/bleed easily.   Psychiatric/Behavioral: Positive for decreased concentration, dysphoric mood, sleep disturbance and suicidal ideas. Negative for agitation, confusion, hallucinations and self-injury. The patient is nervous/anxious.      Physical Exam   BP: (!) 164/93  Pulse: 115  Temp: 96  F (35.6  C)  Resp: 16  Height: 177.8 cm (5' 10\")  Weight: 78 kg (172 lb)  SpO2: 97 %    Physical Exam   Constitutional: He is oriented to person, place, and time. He appears well-developed and well-nourished. No distress.   HENT:   Head: " Normocephalic and atraumatic.   Mouth/Throat: Oropharynx is clear and moist.   Eyes: Conjunctivae and EOM are normal.   Neck: Normal range of motion. Neck supple.   Cardiovascular: Normal rate, regular rhythm, normal heart sounds and intact distal pulses.    Pulmonary/Chest: Effort normal and breath sounds normal. No respiratory distress.   Musculoskeletal: He exhibits no edema or tenderness.   Neurological: He is alert and oriented to person, place, and time.   Skin: Skin is warm and dry.   Psychiatric: His mood appears anxious. His speech is delayed and tangential. He is withdrawn. He is not agitated, not actively hallucinating and not combative. Thought content is delusional. Thought content is not paranoid. He exhibits a depressed mood. He expresses suicidal ideation. He expresses no homicidal ideation. He expresses no suicidal plans. He is noncommunicative.   Difficult historian. Tangential and disorganized. Slow speech. Guarded. Delusional.   Nursing note and vitals reviewed.      ED Course     ED Course     Procedures             Critical Care time:  none             Labs Ordered and Resulted from Time of ED Arrival Up to the Time of Departure from the ED   DRUG ABUSE SCREEN 6 CHEM DEP URINE ([facility])            Assessments & Plan (with Medical Decision Making)   Emergency admission to psychiatry with acute psychosis and suicidal ideation. See also mental health  note.    I have reviewed the nursing notes.    I have reviewed the findings, diagnosis, plan and need for follow up with the patient.    New Prescriptions    No medications on file       Final diagnoses:   Acute psychosis   Suicidal ideation   Harpal HEBERT, am serving as a trained medical scribe to document services personally performed by Kenneth Norton MD, based on the provider's statements to me.   Kenneth HEBERT MD, was physically present and have reviewed and verified the accuracy of this note documented by Harpal Schmidt.      9/28/2018   [facility], [place], EMERGENCY DEPARTMENT     Vasile Norton MD  09/29/18 0146

## 2018-09-29 NOTE — H&P
Admitted:     09/28/2018      IDENTIFYING INFORMATION:  The patient is a 23-year-old transgender male to female.  She goes by [employer].      HISTORY OF PRESENT ILLNESS:  The patient is an extremely poor historian.  She was brought in because of disorganized thoughts and speech.  She continues to be a very poor historian with long pauses before answering and not able to answer basic questions.  Records reviewed.  The patient has a history of gender dysphoria and psychosis.  She was started on a commitment last time she was here.  She was discharged on olanzapine; however, she stopped taking it and she continued to not do well.  The patient says that she was having suicidal thoughts with the thoughts of dying peacefully around the planet Saturn with the rings.  Her sister called patient and the patient was incoherent and could not follow conversation so parents picked her up from the apartment.  The patient was not taking her medications.  The patient herself is not able to elaborate on a lot of symptoms; however, records indicate that she has disorganized thoughts and speech, not able to answer basic questions.  The patient is seen with Dr. Andrea at [facility], but it is not sure if patient has been seeing.  She has problems with sleeping and needs to use Benadryl and melatonin.      Old records indicate that patient has this theme of paranoia.  The patient had a history of talking about people screaming and following her.      PAST PSYCHIATRIC HISTORY, PSYCHIATRIC HOSPITALIZATIONS:  Once in December.  At that time, they tried to do a civil commitment on her.  The patient also was using marijuana at this time which is not a problem at this time.      PAST MEDICAL HISTORY:  Blood clot in vein after surgery of left knee.  She is transgender, takes medications.      FAMILY HISTORY:  Records indicate there is no family psychiatric history.      SOCIAL HISTORY:  She is studying to be a .  She has 3  sisters.       VITAL SIGNS:  The patient's vitals are as below:   Temperature of 97.1, pulse of 112, respiratory rate of 18, blood pressure of 130/91.      MENTAL STATUS EXAMINATION:  The patient is a 23-year-old  transgender male to female lying in bed, poor grooming, poor eye contact.  Mood is tired.  Affect is congruent.  Speech is long pauses, mumbles answers.  Illogical thinking, delusions of persecution, themes of paranoia noted.  The patient's insight and judgement are limited.  She is alert and oriented, but recent and remote memory, language, fund of knowledge are all less than adequate.      DIAGNOSES:     Axis I:     1.  Unspecified psychosis, rule out delusional disorder.     2.  Rule out schizophrenia.     3.  Transgender male to female with gender dysphoria, gender identity disorder.      PLAN:     1.  The patient is willing to take Zyprexa.  We will go with 5 mg twice a day.     2.  The patient will be seen by case management.  We will order internal medicine consult to reorder her other medications.         SOCO MCGOVERN MD             D: 2018   T: 2018   MT: HANNAH      Name:     MIA DÍAZ   MRN:      [id number]        Account:      [id number]   :      1995        Admitted:     2018                   Document: [id number]

## 2018-09-29 NOTE — PHARMACY-ADMISSION MEDICATION HISTORY
Admission medication history interview status for the 9/28/2018 admission is complete. See Epic admission navigator for allergy information, pharmacy, prior to admission medications and immunization status.     Medication history interview sources:  [facility] ([facility]), [facility] [place] (per pharmacy patient does not fill with them)    Changes made to PTA medication list (reason)  Added: none  Deleted: none  Changed: added standard OTC dose information for ibuprofen    Additional medication history information (including reliability of information, actions taken by pharmacist):  -Verified hydroxyzine, spironolactone, progesterone, and estradiol patches with [facility]. Progesterone last filled 8/15/18. Other 3 medications 9/20/18.   -I did not verify the melatonin or ibuprofen dosing with the patient. It was not filled at the pharmacies.    Prior to Admission medications    Medication Sig Last Dose Taking? Auth Provider   hydrOXYzine (ATARAX) 25 MG tablet Take 1-2 tablets (25-50 mg) by mouth every evening as needed (Insomnia) 9/27/2018 at Unknown time Yes Lisbet Arroyo MD   ibuprofen (ADVIL/MOTRIN) 200 MG tablet Take 200-400 mg by mouth every 4 hours as needed for mild pain 9/27/2018 at Unknown time Yes Unknown, Entered By History   Melatonin 10 MG TABS tablet Take 10 mg by mouth nightly as needed for sleep 9/27/2018 at Unknown time Yes Unknown, Entered By History   spironolactone (ALDACTONE) 100 MG tablet TAKE TWO TABLETS BY MOUTH EVERY DAY 9/27/2018 at Unknown time Yes Oumou Lopez MD   estradiol (VIVELLE-DOT) 0.1 MG/24HR BIW patch APPLY TWO PATCHES TWICE A WEEK 9/26/2018  Oumou Lopez MD   progesterone (PROMETRIUM) 200 MG capsule Take 1 capsule (200 mg) by mouth daily Unknown at Unknown time  Gagan Stover MD     Medication history completed by:   Brenda Zapien, GustaovD, BCPS

## 2018-09-30 PROCEDURE — 25000132 ZZH RX MED GY IP 250 OP 250 PS 637: Performed by: PSYCHIATRY & NEUROLOGY

## 2018-09-30 PROCEDURE — 25000132 ZZH RX MED GY IP 250 OP 250 PS 637: Performed by: PHYSICIAN ASSISTANT

## 2018-09-30 PROCEDURE — 12400007 ZZH R&B MH INTERMEDIATE UMMC

## 2018-09-30 RX ADMIN — OLANZAPINE 5 MG: 5 TABLET, FILM COATED ORAL at 09:21

## 2018-09-30 RX ADMIN — SPIRONOLACTONE 200 MG: 50 TABLET ORAL at 09:21

## 2018-09-30 RX ADMIN — PROGESTERONE 200 MG: 200 CAPSULE ORAL at 09:21

## 2018-09-30 RX ADMIN — OLANZAPINE 5 MG: 5 TABLET, FILM COATED ORAL at 20:14

## 2018-09-30 ASSESSMENT — ACTIVITIES OF DAILY LIVING (ADL)
GROOMING: INDEPENDENT
ORAL_HYGIENE: INDEPENDENT
GROOMING: INDEPENDENT
LAUNDRY: WITH SUPERVISION
LAUNDRY: WITH SUPERVISION
DRESS: INDEPENDENT
ORAL_HYGIENE: INDEPENDENT
DRESS: INDEPENDENT

## 2018-09-30 NOTE — PROGRESS NOTES
"Pt was in milieu, but withdrawn from peer interaction. She appeared to be internally preoccupied and was often observed staring into space. She denied psychotic symptoms and suicidal thinking, but stated she felt self injurious urges. When asked to explain these feelings, she appeared not to understand the question and replied, \"wanting to know more about who I am.\" Then she changed her initial answer and denied SIB thinking. When asked if there was anything she would like me to pass along, she stated, \"Everyone is safe from me, as long as I tell the truth.\" She ate in the milieu and showered later in the evening.        09/29/18 2100   Behavioral Health   Hallucinations denies / not responding to hallucinations   Thinking distractable;confused   Orientation person: oriented;place: oriented   Memory baseline memory   Insight poor   Judgement impaired   Eye Contact at examiner   Affect blunted, flat   Mood mood is calm   Physical Appearance/Attire attire appropriate to age and situation   Hygiene well groomed   Suicidality other (see comments)  (pt denies )   1. Wish to be Dead No   2. Non-Specific Active Suicidal Thoughts  No   Self Injury other (see comment)  (pt denies )   Elopement (no behaviors observed on this shift )   Activity withdrawn   Speech clear;coherent   Psychomotor / Gait balanced;steady   Daily Care   Patient Performed Hygiene shower   Activities of Daily Living   Hygiene/Grooming independent   Oral Hygiene independent   Dress independent   Laundry with supervision   Room Organization independent   Behavioral Health Interventions   Psychotic Symptoms maintain safety precautions;monitor need to revise level of observation;maintain safe secure environment;decrease environmental stimulation;encourage participation / independence with adls;encourage nutrition and hydration;provide emotional support   Social and Therapeutic Interventions (Psychotic Symptoms) encourage participation in therapeutic groups " and milieu activities

## 2018-09-30 NOTE — PLAN OF CARE
"Problem: Psychotic Symptoms  Goal: Psychotic Symptoms  Signs and symptoms of listed problems will be absent or manageable.   Patient will demonstrate the following outcomes:  1. Adherence to safety considerations of self and others  2. Signs and symptoms will be absent or minimizedt:   A.  Cognitive Impairment   B. Sensory Perception Impairment    C. Psychomotor Movement Impairment   D. Mental State/Mood Impairment   E. Gratification/ Engagement Impairment   F. Social/ Functional Impairment   G. Self-Expression Impairment   H. Sleep Impairment  3. Optimization of Coping Skills Related to Life Stressors  4. Development of and participation in therapeutic alliance to support successful transition   Outcome: No Change  Patient has been withdrawn most of the shift. Sits in the lounge with peers but does not speak to anybody unless spoken to first. Patient has shown signs of paranoia and confusion, refused to have blood drawn because \"this is a mental health unit.\" Was hesitant to allow vitals to be taken for the same reason, did agree after encouragement and explanation. Patient is very slow to respond and often repeats the question back before answering. Patient denies hallucinations, unclear whether she is preoccupied with internal stimuli. Affect is flat/blunted and she avoids eye contact, mostly looking at the floor or off into space. She does endorse depression at 6 or 7 out of 10. States she was anxious yesterday but not today and believes this to be because she's done a lot of thinking. Does not believe her medication is working. Writer completed a portion of admission profile with patient, unable to complete at this time.       "

## 2018-10-01 LAB
ALBUMIN SERPL-MCNC: 4.3 G/DL (ref 3.4–5)
ALP SERPL-CCNC: 42 U/L (ref 40–150)
ALT SERPL W P-5'-P-CCNC: 18 U/L (ref 0–70)
ANION GAP SERPL CALCULATED.3IONS-SCNC: 8 MMOL/L (ref 3–14)
AST SERPL W P-5'-P-CCNC: 7 U/L (ref 0–45)
BASOPHILS # BLD AUTO: 0.1 10E9/L (ref 0–0.2)
BASOPHILS NFR BLD AUTO: 0.9 %
BILIRUB SERPL-MCNC: 0.7 MG/DL (ref 0.2–1.3)
BUN SERPL-MCNC: 13 MG/DL (ref 7–30)
CALCIUM SERPL-MCNC: 8.9 MG/DL (ref 8.5–10.1)
CHLORIDE SERPL-SCNC: 104 MMOL/L (ref 94–109)
CO2 SERPL-SCNC: 27 MMOL/L (ref 20–32)
CREAT SERPL-MCNC: 0.77 MG/DL (ref 0.66–1.25)
DIFFERENTIAL METHOD BLD: ABNORMAL
EOSINOPHIL # BLD AUTO: 0.2 10E9/L (ref 0–0.7)
EOSINOPHIL NFR BLD AUTO: 3.1 %
ERYTHROCYTE [DISTWIDTH] IN BLOOD BY AUTOMATED COUNT: 12.5 % (ref 10–15)
GFR SERPL CREATININE-BSD FRML MDRD: >90 ML/MIN/1.7M2
GLUCOSE SERPL-MCNC: 107 MG/DL (ref 70–99)
HCT VFR BLD AUTO: 38.5 % (ref 40–53)
HGB BLD-MCNC: 12.6 G/DL (ref 13.3–17.7)
IMM GRANULOCYTES # BLD: 0 10E9/L (ref 0–0.4)
IMM GRANULOCYTES NFR BLD: 0.1 %
LYMPHOCYTES # BLD AUTO: 2.1 10E9/L (ref 0.8–5.3)
LYMPHOCYTES NFR BLD AUTO: 30.8 %
MCH RBC QN AUTO: 29.6 PG (ref 26.5–33)
MCHC RBC AUTO-ENTMCNC: 32.7 G/DL (ref 31.5–36.5)
MCV RBC AUTO: 91 FL (ref 78–100)
MONOCYTES # BLD AUTO: 0.6 10E9/L (ref 0–1.3)
MONOCYTES NFR BLD AUTO: 9.1 %
NEUTROPHILS # BLD AUTO: 3.8 10E9/L (ref 1.6–8.3)
NEUTROPHILS NFR BLD AUTO: 56 %
NRBC # BLD AUTO: 0 10*3/UL
NRBC BLD AUTO-RTO: 0 /100
PLATELET # BLD AUTO: 237 10E9/L (ref 150–450)
POTASSIUM SERPL-SCNC: 3.5 MMOL/L (ref 3.4–5.3)
PROT SERPL-MCNC: 7.7 G/DL (ref 6.8–8.8)
RBC # BLD AUTO: 4.25 10E12/L (ref 4.4–5.9)
SODIUM SERPL-SCNC: 139 MMOL/L (ref 133–144)
TSH SERPL DL<=0.005 MIU/L-ACNC: 1.44 MU/L (ref 0.4–4)
WBC # BLD AUTO: 6.7 10E9/L (ref 4–11)

## 2018-10-01 PROCEDURE — 99232 SBSQ HOSP IP/OBS MODERATE 35: CPT | Performed by: PSYCHIATRY & NEUROLOGY

## 2018-10-01 PROCEDURE — 12400007 ZZH R&B MH INTERMEDIATE UMMC

## 2018-10-01 PROCEDURE — 25000132 ZZH RX MED GY IP 250 OP 250 PS 637: Performed by: EMERGENCY MEDICINE

## 2018-10-01 PROCEDURE — 80053 COMPREHEN METABOLIC PANEL: CPT | Performed by: EMERGENCY MEDICINE

## 2018-10-01 PROCEDURE — 84443 ASSAY THYROID STIM HORMONE: CPT | Performed by: EMERGENCY MEDICINE

## 2018-10-01 PROCEDURE — 85025 COMPLETE CBC W/AUTO DIFF WBC: CPT | Performed by: EMERGENCY MEDICINE

## 2018-10-01 PROCEDURE — 97127 ZZHC OT THERAPEUTIC INTERVENTION W/FOCUS ON COGNITIVE FUNCTION,EA 15 MIN: CPT | Mod: GO

## 2018-10-01 PROCEDURE — 25000132 ZZH RX MED GY IP 250 OP 250 PS 637: Performed by: PSYCHIATRY & NEUROLOGY

## 2018-10-01 PROCEDURE — 25000132 ZZH RX MED GY IP 250 OP 250 PS 637: Performed by: PHYSICIAN ASSISTANT

## 2018-10-01 PROCEDURE — G0177 OPPS/PHP; TRAIN & EDUC SERV: HCPCS

## 2018-10-01 PROCEDURE — 36415 COLL VENOUS BLD VENIPUNCTURE: CPT | Performed by: EMERGENCY MEDICINE

## 2018-10-01 RX ADMIN — PROGESTERONE 200 MG: 200 CAPSULE ORAL at 08:16

## 2018-10-01 RX ADMIN — OLANZAPINE 5 MG: 5 TABLET, FILM COATED ORAL at 20:57

## 2018-10-01 RX ADMIN — Medication 5 MG: at 21:00

## 2018-10-01 RX ADMIN — HYDROXYZINE HYDROCHLORIDE 25 MG: 25 TABLET ORAL at 21:00

## 2018-10-01 RX ADMIN — SPIRONOLACTONE 200 MG: 50 TABLET ORAL at 08:16

## 2018-10-01 RX ADMIN — ESTRADIOL 2 PATCH: 0.1 PATCH TRANSDERMAL at 08:15

## 2018-10-01 RX ADMIN — OLANZAPINE 5 MG: 5 TABLET, FILM COATED ORAL at 08:16

## 2018-10-01 ASSESSMENT — ACTIVITIES OF DAILY LIVING (ADL)
LAUNDRY: WITH SUPERVISION
DRESS: INDEPENDENT
GROOMING: INDEPENDENT
ORAL_HYGIENE: INDEPENDENT

## 2018-10-01 NOTE — PROGRESS NOTES
Patient had a quiet shift.  When asked how she was doing she stated she's better then yesterday.  Takes a bit for her to answer questions.  Rates depression at 7 and no anx today.  Was heard at one oint asking another patient if they ever felt like they were to devil .  When asked to elaberate she sat quiet.  Denies si sib thoughts.  Appetite is ok but she's not eating much.  No other concerns

## 2018-10-01 NOTE — PROGRESS NOTES
"   10/01/18 1400   Occupational Therapy   Type of Intervention structured groups   Response Participates with cues/redirection   Hours 2.5     Pt attended 2.5 out of 3 occupational therapy groups this date. Pt actively participated in occupational therapy clinic. Pt was able to independently engage in a familiar, creative expression task with moderate assistance provided by writer for task selection, setup, and organization. Pt demonstrated good focus and planning during task completion. Pt appeared comfortable interacting with peers and writer, however was observed to have delayed verbal responses on occasion when asked simple questions by writer/peers. Pt actively participated in a structured occupational therapy group with a focus on a visual-spatial leisure task. Pt was able to follow 2-step directions of the novel task, and demonstrated strategic planning and problem solving throughout task. Pt remained focused for full duration of group and participated at an appropriate pace. Pt required verbal directions by writer to exit group room at the end of OT groups, and presented as slightly withdrawn with a flat/vacant affect this date. Will continue to assess, initial assessment and OT care plan/goal to be completed upon additional group participation.     OT Self-Assessment  Pt was given and completed a written self-assessment form. OT staff reviewed with pt and explained the value of having them involved in their treatment plan, and provided options to meet current needs/self-identified goals.     Pt identified \"trying to think for myself as long as I can remember\" as stressors/events that led to hospitalization.    Pt identified the following symptoms that they are currently dealing with:   Emotions: anxiety or fear, feeling abandoned, feeling helpless, mood swings, feeling depressed, and feeling overwhelmed  Thoughts: negative thoughts, memory problems, trouble making decisions, and obsessive " "thoughts  Behaviors: withdrawal, hostile reactions to those who offer help, problems starting or completing projects, trouble using or finding a support system, and procrastinating     Self-identified coping skills: talking to someone, relaxation, hobbies, setting limits, and spirituality   Self-identified social supports: \"Binh, Tristin, Bhavin Baldwin, Sherrell Penaloza Kelley, Lakisha, day treatment if I started going\"  Self-identified personal strengths: \"knowing I was wrong, and that I'm lovable\"     Goals: \"look at how my self-destructive behavior affects me (or others), improve self-esteem, learn skills to prevent relapse and stay sober, and work on accepting \"myself\"    "

## 2018-10-01 NOTE — PROGRESS NOTES
"Pt was calm with blunted affect, withdrawn from others, confused, stating, \"I don't know why I'm in the hospital\". She takes a long time to respond to questions, thought blocking, reports feeling \"out of touch\" this morning but could not explain further. She endorses having SI thoughts \"a few times today\" but denies plan or intent, contracts for safety in the hospital. When asked if she feels depressed, pt responded, \"I can't tell\". Endorses 2-4/10 anxiety. Appears responding to internal stimuli. Attended groups.     10/01/18 1400   Behavioral Health   Hallucinations appears responding   Thinking confused;distractable;poor concentration   Orientation situation, disoriented;person: oriented;place: oriented   Memory other (see comment)  (AIDA)   Insight poor   Judgement impaired   Eye Contact at floor;at examiner   Affect blunted, flat   Mood anxious;mood is calm   Physical Appearance/Attire attire appropriate to age and situation   Hygiene well groomed   Suicidality thoughts only   1. Wish to be Dead Yes   2. Non-Specific Active Suicidal Thoughts  Yes   Self Injury other (see comment)  (Denies)   Elopement Statements about wanting to leave   Activity withdrawn   Speech clear;coherent;other (see comments)  (thought blocking)   Medication Sensitivity no stated side effects;no observed side effects   Psychomotor / Gait balanced;steady   Occupational Therapy   Type of Intervention structured groups   Response Participates with cues/redirection   Hours 2.5     "

## 2018-10-01 NOTE — PROGRESS NOTES
[facility], [place]   Psychiatric Progress Note  Janey Givens  [id number]  10/01/18    Chief Complaint: Continued medical care          Interim History:   The patient's care was discussed with the treatment team during the daily team meeting and/or staff's chart notes were reviewed.  Staff report patient has been disorganized and delayed in speech distracted and slept about 6 hours.    She was started on olanzapine 5 mg twice a day over the weekend which was a new medication and describes things as going well today.  She does have a significant delay in speech and thought blocking.  She goes on to describe how she is sexist, transphobic, and racist.  She thinks that potentially people are taking things from planets in the universe and building something with them.  Upon me asking her about the markings on her forehead and her nose she says she recently had surgery about 6 weeks ago to appear more feminine.  Is describing memory problems but no hallucinations or guilty feelings or any thoughts of harming self or others.  No hopelessness or helplessness and is planning on going to college.  Denies any recent sleep dysfunction.  She denies any current side effects related to initiation of new medication.         Medications:       estradiol  2 patch Transdermal Once per day on Mon Thu     estradiol biweekly   Transdermal Once per day on Mon Thu     estradiol biweekly   Transdermal Q8H     OLANZapine  5 mg Oral BID     progesterone  200 mg Oral Daily     spironolactone  200 mg Oral Daily          Allergies:     Allergies   Allergen Reactions     Banana Anaphylaxis     Throat swelling          Labs:     Recent Results (from the past 24 hour(s))   CBC with platelets differential    Collection Time: 10/01/18 11:02 AM   Result Value Ref Range    WBC 6.7 4.0 - 11.0 10e9/L    RBC Count 4.25 (L) 4.4 - 5.9 10e12/L    Hemoglobin 12.6 (L) 13.3 - 17.7 g/dL    Hematocrit 38.5 (L) 40.0 - 53.0 %    MCV  "91 78 - 100 fl    MCH 29.6 26.5 - 33.0 pg    MCHC 32.7 31.5 - 36.5 g/dL    RDW 12.5 10.0 - 15.0 %    Platelet Count 237 150 - 450 10e9/L    Diff Method Automated Method     % Neutrophils 56.0 %    % Lymphocytes 30.8 %    % Monocytes 9.1 %    % Eosinophils 3.1 %    % Basophils 0.9 %    % Immature Granulocytes 0.1 %    Nucleated RBCs 0 0 /100    Absolute Neutrophil 3.8 1.6 - 8.3 10e9/L    Absolute Lymphocytes 2.1 0.8 - 5.3 10e9/L    Absolute Monocytes 0.6 0.0 - 1.3 10e9/L    Absolute Eosinophils 0.2 0.0 - 0.7 10e9/L    Absolute Basophils 0.1 0.0 - 0.2 10e9/L    Abs Immature Granulocytes 0.0 0 - 0.4 10e9/L    Absolute Nucleated RBC 0.0    Comprehensive metabolic panel    Collection Time: 10/01/18 11:02 AM   Result Value Ref Range    Sodium 139 133 - 144 mmol/L    Potassium 3.5 3.4 - 5.3 mmol/L    Chloride 104 94 - 109 mmol/L    Carbon Dioxide 27 20 - 32 mmol/L    Anion Gap 8 3 - 14 mmol/L    Glucose 107 (H) 70 - 99 mg/dL    Urea Nitrogen 13 7 - 30 mg/dL    Creatinine 0.77 0.66 - 1.25 mg/dL    GFR Estimate >90 >60 mL/min/1.7m2    GFR Estimate If Black >90 >60 mL/min/1.7m2    Calcium 8.9 8.5 - 10.1 mg/dL    Bilirubin Total 0.7 0.2 - 1.3 mg/dL    Albumin 4.3 3.4 - 5.0 g/dL    Protein Total 7.7 6.8 - 8.8 g/dL    Alkaline Phosphatase 42 40 - 150 U/L    ALT 18 0 - 70 U/L    AST 7 0 - 45 U/L   TSH with free T4 reflex and/or T3 as indicated    Collection Time: 10/01/18 11:02 AM   Result Value Ref Range    TSH 1.44 0.40 - 4.00 mU/L          Psychiatric Examination:     /88  Pulse 85  Temp 99  F (37.2  C) (Tympanic)  Resp 16  Ht 1.753 m (5' 9\")  Wt 76.3 kg (168 lb 3.2 oz)  SpO2 97%  BMI 24.84 kg/m2  Weight is 168 lbs 3.2 oz  Body mass index is 24.84 kg/(m^2).                Sitting Orthostatic BP: 136/84      Sitting Orthostatic Pulse: 79 bpm      Standing Orthostatic BP: 137/83      Standing Orthostatic Pulse: 100 bpm       Weight over time:  Vitals:    09/28/18 2315 09/29/18 0325 09/30/18 0856   Weight: 78 kg (172 " lb) 75.8 kg (167 lb 1.6 oz) 76.3 kg (168 lb 3.2 oz)       Orthostatic Vitals       Most Recent      Sitting Orthostatic /84 10/01 0730    Sitting Orthostatic Pulse (bpm) 79 10/01 0730    Standing Orthostatic /83 10/01 0730    Standing Orthostatic Pulse (bpm) 100 10/01 0730            Janey is a previously male transgender female with markings on her forehead and blond hair.  Her speech is delayed.  Her behavior is somewhat slowed without other abnormal movements.  Her affect is subdued.  Her mood she describes as good.  Her thought content consists of the above without having any thoughts of harming self or others and has delusional content as above.  Thought process is notable for thought blocking and delayed cognition.  She does not have looseness of association.  She may or may not have abnormal perceptions.  Alert and aware of current location and circumstances.  Attention and concentration appears adequate.  Cognition and fund of knowledge appear currently limited.  Long-term/short-term/remote memory are limited.  Insight and judgment are both limited.         Precautions:     Behavioral Orders   Procedures     Code 1 - Restrict to Unit     Routine Programming     As clinically indicated     Status 15     Every 15 minutes.     Suicide precautions     Patients on Suicide Precautions should have a Combination Diet ordered that includes a Diet selection(s) AND a Behavioral Tray selection for Safe Tray - with utensils, or Safe Tray - NO utensils            DIagnoses:     Schizophrenia  Gender dysphoria         Assessment & Plan:     Janey is currently experiencing delusional and psychotic symptoms that lead to delayed speech and thought blocking.  She is aware of some of this problem and has limited insight and is willing to accept medication and evaluation for it.  At this point in time will await stabilization with current dosing of olanzapine and see if further intervention is  warranted.    Currently voluntary    The risks, benefits, alternatives and side effects have been discussed and are understood by the patient and other caregivers.      Fabian Cutler  [facility]      The following document has been created with voice recognition software and may contain unintentional word substitutions.    Non clinically relevant CMS requirements:  Clinical Global Impressions  First:  Considering your total clinical experience with this particular patient population, how severe are the patient's symptoms at this time?: 6 (10/01/18 1621)  Compared to the patient's condition at the START of treatment, this patient's condition is:: 4 (10/01/18 1621)  Most recent:  Considering your total clinical experience with this particular patient population, how severe are the patient's symptoms at this time?: 6 (10/01/18 1621)  Compared to the patient's condition at the START of treatment, this patient's condition is:: 4 (10/01/18 1621)

## 2018-10-01 NOTE — PROGRESS NOTES
Patients mother came into visit tonight. Shortly before her mom left she asked to sign a 12 hour intent to leave. This writer explained it was important for her to see her doctor tomorrow to speak with her and go over her medications. This writer asked her why she wanted to leave and she stated she didn't want to be here. When asked if she was feeling better she opened up and stated she wasn't and she was not sure if she would be able to contract for safety. I explained to patient it would be in her best interest to stay and be well so she wouldn't have to come back in again. She agreed.

## 2018-10-02 PROCEDURE — G0177 OPPS/PHP; TRAIN & EDUC SERV: HCPCS

## 2018-10-02 PROCEDURE — 25000132 ZZH RX MED GY IP 250 OP 250 PS 637: Performed by: EMERGENCY MEDICINE

## 2018-10-02 PROCEDURE — 25000132 ZZH RX MED GY IP 250 OP 250 PS 637: Performed by: PSYCHIATRY & NEUROLOGY

## 2018-10-02 PROCEDURE — 25000132 ZZH RX MED GY IP 250 OP 250 PS 637: Performed by: PHYSICIAN ASSISTANT

## 2018-10-02 PROCEDURE — 99232 SBSQ HOSP IP/OBS MODERATE 35: CPT | Performed by: PSYCHIATRY & NEUROLOGY

## 2018-10-02 PROCEDURE — 12400007 ZZH R&B MH INTERMEDIATE UMMC

## 2018-10-02 PROCEDURE — 97127 ZZHC OT THERAPEUTIC INTERVENTION W/FOCUS ON COGNITIVE FUNCTION,EA 15 MIN: CPT | Mod: GO

## 2018-10-02 RX ADMIN — Medication 5 MG: at 21:02

## 2018-10-02 RX ADMIN — HYDROXYZINE HYDROCHLORIDE 25 MG: 25 TABLET ORAL at 21:02

## 2018-10-02 RX ADMIN — OLANZAPINE 5 MG: 5 TABLET, FILM COATED ORAL at 07:45

## 2018-10-02 RX ADMIN — OLANZAPINE 5 MG: 5 TABLET, FILM COATED ORAL at 21:03

## 2018-10-02 RX ADMIN — PROGESTERONE 200 MG: 200 CAPSULE ORAL at 07:45

## 2018-10-02 RX ADMIN — TRAZODONE HYDROCHLORIDE 50 MG: 50 TABLET ORAL at 21:02

## 2018-10-02 RX ADMIN — SPIRONOLACTONE 200 MG: 50 TABLET ORAL at 07:45

## 2018-10-02 ASSESSMENT — ACTIVITIES OF DAILY LIVING (ADL)
ORAL_HYGIENE: INDEPENDENT;PROMPTS
GROOMING: SHOWER;INDEPENDENT;PROMPTS
DRESS: SCRUBS (BEHAVIORAL HEALTH)
LAUNDRY: WITH SUPERVISION

## 2018-10-02 NOTE — PROGRESS NOTES
Pt refused to wound care treatment. Denies wound pain. No signs of infection noted. Pt requested to get STD testing which was ordered. Pt also requested some type of sexual genetic testing that writer.  Is aware. Pt appears more paranoid today. Medication complaint. Delayed responses.

## 2018-10-02 NOTE — PLAN OF CARE
Problem: Occupational Therapy Goals (Adult)  Goal: Occupational Therapy Goals  Pt will demonstrate in an increase in therapeutic programming participation for insight development and coping skill exploration as evidenced by attending at least 4 schedule OT groups within 1 week.       10/02/18 1400   Occupational Therapy   Type of Intervention structured groups   Response Participates with cues/redirection   Hours 2     Pt attended 2 out of 3 occupational therapy groups this date. Pt actively participated in occupational therapy clinic with consistent verbal cueing/encouragement provided by writer. Pt was able independently engage in and complete a familiar, creative expression task with assistance provided by writer for task setup and organization. Pt demonstrated good focus, planning, and problem solving during task completion and worked at a slow pace. Once task was completed, writer asked Pt if she would like to initiate an additional task for the remainder of group. However, Pt did not verbally respond this is question. Writer then provided Pt with 2 options for tasks to initiate, and Pt was able to select a task and engage for the last x10 minutes of group with assistance to move through steps of task. Pt appeared comfortable interacting with peers and writer with slightly delayed responses when conversation was initiated with her, however generally kept to herself. Pt actively participated in an occupational therapy group with a focus on social and leisure engagement with encouragement from writer and peers to engage in task. Pt was able to follow 3 step directions after demonstration x2 was provided by a peer, and concentrated for the full duration of group. Pt was quiet and appeared withdrawn with a flat affect this date. Writer will continue to encourage Pt to attend scheduled occupational therapy sessions to work towards achieving OT goal of increasing overall therapeutic programming participation while  present on the unit.

## 2018-10-02 NOTE — PROGRESS NOTES
Pt doesn't answer questions readily and takes a long time to answer questions. Pt just stands and stares at people     10/02/18 1400   Behavioral Health   Hallucinations denies / not responding to hallucinations   Thinking distractable;delusional;paranoid;poor concentration   Orientation person: oriented;place: oriented;date: oriented;time: oriented   Memory baseline memory   Insight poor   Judgement impaired   Eye Contact at examiner   Affect blunted, flat;tense;irritable   Mood labile   Physical Appearance/Attire untidy;disheveled   Hygiene neglected grooming - unclean body, hair, teeth   Suicidality other (see comments)  (denies)   Self Injury other (see comment)  (denies)   Elopement (no)   Activity restless   Speech (slow to respond)   Psychomotor / Gait slow   Sleep/Rest/Relaxation   Sleep/Rest/Relaxation no problem identified   Safety   Suicidality Status 15   Coping/Psychosocial   Verbalized Emotional State anxiety;depression;frustration   Occupational Therapy   Type of Intervention structured groups   Response Participates with cues/redirection   Hours 2   Psycho Education   Type of Intervention 1:1 intervention   Response participates with cues/redirection   Hours 0.5   Treatment Detail doesn't really answer   Activities of Daily Living   Hygiene/Grooming shower;independent;prompts   Oral Hygiene independent;prompts   Dress scrubs (behavioral health)   Laundry with supervision   Room Organization independent   Behavioral Health Interventions   Psychotic Symptoms maintain safety precautions;monitor need to revise level of observation;maintain safe secure environment;reality orientation;simple, clear language;assist patient in developing safety plan;assist patient in following safety plan;encourage nutrition and hydration;encourage participation / independence with adls;provide emotional support   Social and Therapeutic Interventions (Psychotic Symptoms) encourage socialization with peers;encourage effective  boundaries with peers;encourage participation in therapeutic groups and milieu activities

## 2018-10-02 NOTE — PLAN OF CARE
"Problem: Psychotic Symptoms  Goal: Psychotic Symptoms  Signs and symptoms of listed problems will be absent or manageable.   Patient will demonstrate the following outcomes:  1. Adherence to safety considerations of self and others  2. Signs and symptoms will be absent or minimizedt:   A.  Cognitive Impairment   B. Sensory Perception Impairment    C. Psychomotor Movement Impairment   D. Mental State/Mood Impairment   E. Gratification/ Engagement Impairment   F. Social/ Functional Impairment   G. Self-Expression Impairment   H. Sleep Impairment  3. Optimization of Coping Skills Related to Life Stressors  4. Development of and participation in therapeutic alliance to support successful transition   Outcome: No Change  48 Hour Assessment    In milieu most of shift, minimal interaction with peers. Thoughts remain disorganized. Delayed responses. Appears to have AH. States she feels she is god, however knows that is not true. \"I felt this way before and it went away with time\" Encouraged to engage in activity, pt did involve herself in coloring pictures. Endorses SI, thoughts only. Family visited and visit went well. Attempted to complete adm profile, however pt unable. Compliant with scheduled Zyprexa 5 mg this evening, also requested PRN hydroxyzine 25 mg and melatonin 5 mg.      "

## 2018-10-02 NOTE — PROGRESS NOTES
"[facility], [place]   Psychiatric Progress Note  Janey Givens  [id number]  10/02/18    Chief Complaint: Continued medical care          Interim History:   The patient's care was discussed with the treatment team during the daily team meeting and/or staff's chart notes were reviewed.  Staff report patient has been disorganized has delayed speech feels as though she might be God and has suicidal thoughts at times and slept about 6 hours.    Upon visiting with her she says that she is good she does not remember taking her medication this morning but feels as though it might be helping.  She has some memory deficits no paranoia and says that she thinks too often and too much.  She is highly focused on not harming anyone and is worried that this might happen.  She also thinks that may be she will accidentally harm herself but is not attending to harm herself or die.  Mentions possibly auditory hallucinations that tell her someone might be harmed or that she might harm people.  Denies any side effects from the current medications and is not hopeless or helpless and is anxious about the above information.  Describes sleeping well overnight.         Medications:       estradiol  2 patch Transdermal Once per day on Mon Thu     estradiol biweekly   Transdermal Once per day on Mon Thu     estradiol biweekly   Transdermal Q8H     [START ON 10/3/2018] influenza quadrivalent (PF) vacc  0.5 mL Intramuscular Prior to discharge     OLANZapine  5 mg Oral BID     progesterone  200 mg Oral Daily     spironolactone  200 mg Oral Daily          Allergies:     Allergies   Allergen Reactions     Banana Anaphylaxis     Throat swelling          Labs:   No results found for this or any previous visit (from the past 24 hour(s)).       Psychiatric Examination:     /88  Pulse 85  Temp 96  F (35.6  C) (Oral)  Resp 16  Ht 1.753 m (5' 9\")  Wt 76.8 kg (169 lb 4.8 oz)  SpO2 97%  BMI 25 kg/m2  Weight is 169 " lbs 4.8 oz  Body mass index is 25 kg/(m^2).                Sitting Orthostatic BP: 135/71      Sitting Orthostatic Pulse: 79 bpm      Standing Orthostatic BP: 131/78      Standing Orthostatic Pulse: 93 bpm       Weight over time:  Vitals:    09/28/18 2315 09/29/18 0325 09/30/18 0856 10/02/18 0734   Weight: 78 kg (172 lb) 75.8 kg (167 lb 1.6 oz) 76.3 kg (168 lb 3.2 oz) 76.8 kg (169 lb 4.8 oz)       Orthostatic Vitals       Most Recent      Sitting Orthostatic /71 10/02 0734    Sitting Orthostatic Pulse (bpm) 79 10/02 0734    Standing Orthostatic /78 10/02 0734    Standing Orthostatic Pulse (bpm) 93 10/02 0734            Janey is a previously male transgender female with markings on her forehead and blond hair.  Her speech is delayed.  Her behavior is somewhat slowed without other abnormal movements.  Her affect is subdued.  Her mood she describes as good.  Her thought content consists of the above without having any thoughts of harming self or others and has delusional content as above.  Thought process is notable for thought blocking and delayed cognition.  She does not have looseness of association.  She may have abnormal perceptions.  Alert and aware of current location and circumstances.  Attention and concentration appears adequate.  Cognition and fund of knowledge appear currently limited.  Long-term/short-term/remote memory are limited.  Insight and judgment are both limited.         Precautions:     Behavioral Orders   Procedures     Code 1 - Restrict to Unit     Routine Programming     As clinically indicated     Status 15     Every 15 minutes.     Suicide precautions     Patients on Suicide Precautions should have a Combination Diet ordered that includes a Diet selection(s) AND a Behavioral Tray selection for Safe Tray - with utensils, or Safe Tray - NO utensils            DIagnoses:     Schizophrenia  Gender dysphoria         Assessment & Plan:     Janey still has delusional and  possible psychotic content.  Her speech and thought blocking are slightly improved from previous potentially.  She is highly focused on current gender problems and requests some type of genetic testing about her gender from nursing staff and was worried about the sticker on her paperwork saying she was biologically male.  We will continue to await stabilization with the medication.    Requested STD screening  Continue voluntary hospitalization    The risks, benefits, alternatives and side effects have been discussed and are understood by the patient and other caregivers.      Fabian Cutler  [facility]      The following document has been created with voice recognition software and may contain unintentional word substitutions.    Non clinically relevant CMS requirements:  Clinical Global Impressions  First:  Considering your total clinical experience with this particular patient population, how severe are the patient's symptoms at this time?: 6 (10/01/18 1621)  Compared to the patient's condition at the START of treatment, this patient's condition is:: 4 (10/01/18 1621)  Most recent:  Considering your total clinical experience with this particular patient population, how severe are the patient's symptoms at this time?: 6 (10/01/18 1621)  Compared to the patient's condition at the START of treatment, this patient's condition is:: 4 (10/01/18 1621)

## 2018-10-03 ENCOUNTER — TELEPHONE (OUTPATIENT)
Dept: PSYCHIATRY | Facility: CLINIC | Age: 23
End: 2018-10-03

## 2018-10-03 LAB
HCV AB SERPL QL IA: NONREACTIVE
HIV 1+2 AB+HIV1 P24 AG SERPL QL IA: NONREACTIVE

## 2018-10-03 PROCEDURE — 87591 N.GONORRHOEAE DNA AMP PROB: CPT | Performed by: PSYCHIATRY & NEUROLOGY

## 2018-10-03 PROCEDURE — 12400007 ZZH R&B MH INTERMEDIATE UMMC

## 2018-10-03 PROCEDURE — 87491 CHLMYD TRACH DNA AMP PROBE: CPT | Performed by: PSYCHIATRY & NEUROLOGY

## 2018-10-03 PROCEDURE — 25000128 H RX IP 250 OP 636: Performed by: PSYCHIATRY & NEUROLOGY

## 2018-10-03 PROCEDURE — 25000132 ZZH RX MED GY IP 250 OP 250 PS 637: Performed by: PHYSICIAN ASSISTANT

## 2018-10-03 PROCEDURE — G0177 OPPS/PHP; TRAIN & EDUC SERV: HCPCS

## 2018-10-03 PROCEDURE — 86803 HEPATITIS C AB TEST: CPT | Performed by: PSYCHIATRY & NEUROLOGY

## 2018-10-03 PROCEDURE — 90853 GROUP PSYCHOTHERAPY: CPT

## 2018-10-03 PROCEDURE — 36415 COLL VENOUS BLD VENIPUNCTURE: CPT | Performed by: PSYCHIATRY & NEUROLOGY

## 2018-10-03 PROCEDURE — 90686 IIV4 VACC NO PRSV 0.5 ML IM: CPT | Performed by: PSYCHIATRY & NEUROLOGY

## 2018-10-03 PROCEDURE — H2032 ACTIVITY THERAPY, PER 15 MIN: HCPCS

## 2018-10-03 PROCEDURE — 99232 SBSQ HOSP IP/OBS MODERATE 35: CPT | Performed by: PSYCHIATRY & NEUROLOGY

## 2018-10-03 PROCEDURE — 25000132 ZZH RX MED GY IP 250 OP 250 PS 637: Performed by: PSYCHIATRY & NEUROLOGY

## 2018-10-03 PROCEDURE — 87389 HIV-1 AG W/HIV-1&-2 AB AG IA: CPT | Performed by: PSYCHIATRY & NEUROLOGY

## 2018-10-03 PROCEDURE — 86780 TREPONEMA PALLIDUM: CPT | Performed by: PSYCHIATRY & NEUROLOGY

## 2018-10-03 RX ADMIN — OLANZAPINE 5 MG: 5 TABLET, FILM COATED ORAL at 09:09

## 2018-10-03 RX ADMIN — SPIRONOLACTONE 200 MG: 50 TABLET ORAL at 09:09

## 2018-10-03 RX ADMIN — INFLUENZA A VIRUS A/MICHIGAN/45/2015 X-275 (H1N1) ANTIGEN (FORMALDEHYDE INACTIVATED), INFLUENZA A VIRUS A/SINGAPORE/INFIMH-16-0019/2016 IVR-186 (H3N2) ANTIGEN (FORMALDEHYDE INACTIVATED), INFLUENZA B VIRUS B/PHUKET/3073/2013 ANTIGEN (FORMALDEHYDE INACTIVATED), AND INFLUENZA B VIRUS B/MARYLAND/15/2016 BX-69A ANTIGEN (FORMALDEHYDE INACTIVATED) 0.5 ML: 15; 15; 15; 15 INJECTION, SUSPENSION INTRAMUSCULAR at 12:35

## 2018-10-03 RX ADMIN — PROGESTERONE 200 MG: 200 CAPSULE ORAL at 09:09

## 2018-10-03 RX ADMIN — OLANZAPINE 5 MG: 5 TABLET, FILM COATED ORAL at 21:53

## 2018-10-03 ASSESSMENT — ACTIVITIES OF DAILY LIVING (ADL)
GROOMING: INDEPENDENT
DRESS: INDEPENDENT
ORAL_HYGIENE: INDEPENDENT
GROOMING: INDEPENDENT
LAUNDRY: WITH SUPERVISION
ORAL_HYGIENE: INDEPENDENT
DRESS: INDEPENDENT

## 2018-10-03 NOTE — PROGRESS NOTES
Writer spoke with Pt regarding discharge planning, she was receptive to seeing an outpatient therapist and psychiatrist.     Writer spoke with Pt's father Gaurav (PETER on file) who would like to have a family meeting scheduled for Friday, October 5th at 11:00 am. Information was added to brain board and conference room has been reserved.     Informed treatment team of family meeting.

## 2018-10-03 NOTE — PROGRESS NOTES
"[facility], [place]   Psychiatric Progress Note  Janey Givens  [id number]  10/03/18    Chief Complaint: Continued medical care          Interim History:   The patient's care was discussed with the treatment team during the daily team meeting and/or staff's chart notes were reviewed.  Staff report patient has had limited communication eating well drinking well delayed speech and slept about 6 hours.    Upon meeting with her she reports feeling average and does not believe she is having any side effects related to the medication.  Denies any hallucinations or paranoia or attention or concentration issues or memory problems.  Says that she is staring at my crotch and apologizes for doing so.  Denies any hypersexuality but does mention problems with getting aroused.  Denies any current sleep problems any thoughts of harming herself or others and is not is worried about other people being harmed and is not hearing voices telling her that people are going to be harmed.  She is focused on her possibly disappearing for some reason but cannot elaborate further.  Is feeling less anxious today and has some slight paranoia about her family members does not trust them at all times.  No grandiose ideas or thoughts about being God today.         Medications:       estradiol  2 patch Transdermal Once per day on Mon Thu     estradiol biweekly   Transdermal Once per day on Mon Thu     estradiol biweekly   Transdermal Q8H     OLANZapine  5 mg Oral BID     progesterone  200 mg Oral Daily     spironolactone  200 mg Oral Daily          Allergies:     Allergies   Allergen Reactions     Banana Anaphylaxis     Throat swelling          Labs:   No results found for this or any previous visit (from the past 24 hour(s)).       Psychiatric Examination:     /88  Pulse 85  Temp 97.2  F (36.2  C) (Oral)  Resp 16  Ht 1.753 m (5' 9\")  Wt 76.8 kg (169 lb 4.8 oz)  SpO2 97%  BMI 25 kg/m2  Weight is 169 lbs " 4.8 oz  Body mass index is 25 kg/(m^2).                Sitting Orthostatic BP: 128/72      Sitting Orthostatic Pulse: 83 bpm      Standing Orthostatic BP: 121/76      Standing Orthostatic Pulse: 104 bpm       Weight over time:  Vitals:    09/28/18 2315 09/29/18 0325 09/30/18 0856 10/02/18 0734   Weight: 78 kg (172 lb) 75.8 kg (167 lb 1.6 oz) 76.3 kg (168 lb 3.2 oz) 76.8 kg (169 lb 4.8 oz)       Orthostatic Vitals       Most Recent      Sitting Orthostatic /72 10/03 0846    Sitting Orthostatic Pulse (bpm) 83 10/03 0846    Standing Orthostatic /76 10/03 0846    Standing Orthostatic Pulse (bpm) 104 10/03 0846            Janey is a previously male transgender female with markings on her forehead and blond hair.  Her speech is less delayed.  Her behavior is somewhat slowed without other abnormal movements.  Her affect is subdued.  Her mood she describes as average.  Her thought content consists of the above without having any thoughts of harming self or others and has delusional content as above.  Thought process is notable for thought blocking and delayed cognition.  She does not have looseness of association.  She may have abnormal perceptions.  Alert and aware of current location and circumstances.  Attention and concentration appears adequate.  Cognition and fund of knowledge appear currently limited.  Long-term/short-term/remote memory are limited.  Insight and judgment are both limited.         Precautions:     Behavioral Orders   Procedures     Code 1 - Restrict to Unit     Routine Programming     As clinically indicated     Status 15     Every 15 minutes.     Suicide precautions     Patients on Suicide Precautions should have a Combination Diet ordered that includes a Diet selection(s) AND a Behavioral Tray selection for Safe Tray - with utensils, or Safe Tray - NO utensils            DIagnoses:     Schizophrenia  Gender dysphoria         Assessment & Plan:     Janey has been improving on the  unit has less delayed speech is more coherent in her communication and is speaking more.  Still having some paranoia about her family members and is less anxious feels as though her thoughts are slowing down and more clear.  She does bring up potential concerning issue of sexual dysfunction as being a possible issue though she is on hormone medications.  Unclear if this is leading to possible delusional content related to sexual function.    Continue voluntary hospitalization  Referring for navigate program    The risks, benefits, alternatives and side effects have been discussed and are understood by the patient and other caregivers.      Fabian Cutler  [facility]iat[facility]      The following document has been created with voice recognition software and may contain unintentional word substitutions.    Non clinically relevant CMS requirements:  Clinical Global Impressions  First:  Considering your total clinical experience with this particular patient population, how severe are the patient's symptoms at this time?: 6 (10/01/18 1621)  Compared to the patient's condition at the START of treatment, this patient's condition is:: 4 (10/01/18 1621)  Most recent:  Considering your total clinical experience with this particular patient population, how severe are the patient's symptoms at this time?: 6 (10/01/18 1621)  Compared to the patient's condition at the START of treatment, this patient's condition is:: 4 (10/01/18 1621)

## 2018-10-03 NOTE — PROGRESS NOTES
Pt appeared to be moving, thinking and expressing in a slow fog, but kind and supportive, offering assistance to the group during 3/3 sessions offered: dance/movement therapy (DMT), verbal topic and mental health management.  Pt clarity of communication improved with time and social interaction.  Body expression introverted and small.  Increased expression noted with support.

## 2018-10-03 NOTE — PROGRESS NOTES
10/03/18 1500   Art Therapy   Type of Intervention structured groups   Response participates with cues/redirection   Hours 1   Treatment Detail (Art Therapy)   Problem-DIAGNOSES:     Axis I:     1.  Unspecified psychosis, rule out delusional disorder.     2.  Rule out schizophrenia.     3.  Transgender male to female with gender dysphoria, gender identity disorder.       Goal-[place], express and emotional regulation through art therapy  Outcome- pt had a very slow and confused affect. With cues pt made a zentangle design that mirrored a drawing writer was doing. Pt talked about going to school and her mental health getting worse. She remembered writer's name and groups from a 4A admission in the past.

## 2018-10-03 NOTE — PROGRESS NOTES
LM with Navigate program staff ([phone]) for Ame BECKER who conducts intake and schedules intake assessments asked for a call back to refer Pt to programming.     Writer spoke with Ame BECKER scheduled the following intake for Pt, information also located on Pt's AVS. Writer also scheduled post-hospitalization visit with PCP, see below:     [facility] First Episode Psychosis Program  [facility], [address]   [address] [place] [address]   Office: [phone]  *You have a initial screening for First Episode Psychosis Programming on Monday, October 15th at 2:00 pm     [facility]   [address] [place] [address]  Phone: [phone]  Attending Provider: Dr. Ariana Andrea MD   *You have a post-hospitalization visit with your PCP scheduled for Thursday, October 11th at 10:40 am.

## 2018-10-03 NOTE — TELEPHONE ENCOUNTER
Connie Courtney Melanie A, RN                   Caller: Eula     Relationship to Patient:     Call Back Number: [phone]     Reason for Call: Referring pt to the Navigate program

## 2018-10-03 NOTE — PLAN OF CARE
Problem: Mood Impairment (Depressive Signs/Symptoms) (Adult)  Goal: Improved Mood Symptoms (Depressive Signs/Symptoms)    Pt was visible in the lounge for most of the shift. Attended and participated in the unit activities. Spent some time coloring with the nursing students from the . Her affect is blunted and her mood depressed. Pt denies SI/ SIB and AH. Continues to have delayed responses during check-in. Reports feeling safe and hopeful. Feels that her medications are working. Reports her appetite and sleep as adequate. No physical concerns reported or observed.  No medication side effects.   Plan: Status 15s; Build trust with pt. Continue to build on strengths. Encourage healthy coping.

## 2018-10-03 NOTE — PROGRESS NOTES
Patient had a good evening, did take a shower at the beginning of the shift. Ate half of her dinner tray, visited with her family, and sat in her room for the rest of the evening. Patient sat in her room and stared into space for a long time this evening, would only answer questions minimally.

## 2018-10-03 NOTE — TELEPHONE ENCOUNTER
-Writer returned call to Eula.  Discussed case with her.    -Janey is a 23 year old transgender male to female.  This is her second hospital stay.  In first hospital stat patient was diagnosed with substance induced psychosis.  This hospital stay utox came back negative.  Per Eula patient is now diagnosised with schizophrenia.    -Symptoms per Eula are delusions-patient thinking she is God, some thought blocking, and disorganization.  Eula reports that patient took medications for about a month back in December and has not been on anything since then.      Has patient ever had a significant head injury?  no  History of a diagnosis of autism spectrum disorder?  no  History of a diagnosis of borderline personality disorder? no   History of a developmental delay?  no  If yes to developmental surinder, do you know their IQ? No    -Writer transferred Eula to scheduling to make appointment for FEP screening.

## 2018-10-04 LAB
C TRACH DNA SPEC QL NAA+PROBE: NEGATIVE
N GONORRHOEA DNA SPEC QL NAA+PROBE: NEGATIVE
SPECIMEN SOURCE: NORMAL
SPECIMEN SOURCE: NORMAL
T PALLIDUM AB SER QL: NONREACTIVE

## 2018-10-04 PROCEDURE — 25000132 ZZH RX MED GY IP 250 OP 250 PS 637: Performed by: PSYCHIATRY & NEUROLOGY

## 2018-10-04 PROCEDURE — 25000132 ZZH RX MED GY IP 250 OP 250 PS 637: Performed by: PHYSICIAN ASSISTANT

## 2018-10-04 PROCEDURE — 25000132 ZZH RX MED GY IP 250 OP 250 PS 637: Performed by: EMERGENCY MEDICINE

## 2018-10-04 PROCEDURE — G0177 OPPS/PHP; TRAIN & EDUC SERV: HCPCS

## 2018-10-04 PROCEDURE — 12400007 ZZH R&B MH INTERMEDIATE UMMC

## 2018-10-04 PROCEDURE — 97127 ZZHC OT THERAPEUTIC INTERVENTION W/FOCUS ON COGNITIVE FUNCTION,EA 15 MIN: CPT | Mod: GO

## 2018-10-04 RX ADMIN — OLANZAPINE 10 MG: 10 TABLET, FILM COATED ORAL at 02:20

## 2018-10-04 RX ADMIN — OLANZAPINE 5 MG: 5 TABLET, FILM COATED ORAL at 20:34

## 2018-10-04 RX ADMIN — OLANZAPINE 10 MG: 10 TABLET, FILM COATED ORAL at 13:46

## 2018-10-04 RX ADMIN — ESTRADIOL 2 PATCH: 0.1 PATCH TRANSDERMAL at 09:26

## 2018-10-04 RX ADMIN — SPIRONOLACTONE 200 MG: 50 TABLET ORAL at 09:25

## 2018-10-04 RX ADMIN — PROGESTERONE 200 MG: 200 CAPSULE ORAL at 09:25

## 2018-10-04 RX ADMIN — TRAZODONE HYDROCHLORIDE 50 MG: 50 TABLET ORAL at 01:13

## 2018-10-04 RX ADMIN — OLANZAPINE 5 MG: 5 TABLET, FILM COATED ORAL at 09:26

## 2018-10-04 ASSESSMENT — ACTIVITIES OF DAILY LIVING (ADL)
GROOMING: INDEPENDENT
ORAL_HYGIENE: INDEPENDENT
DRESS: INDEPENDENT
GROOMING: INDEPENDENT
ORAL_HYGIENE: INDEPENDENT
DRESS: SCRUBS (BEHAVIORAL HEALTH)

## 2018-10-04 NOTE — PROGRESS NOTES
"Pt has been in the milieu.  Pt has been attending groups but has minimal interaction with peers.  Pt remains slow to respond.  She reports that she is having \"protruding thoughts\" but is unwilling to elaborate on them.  Pt stated that she is having trouble \"retaining the information\" from the groups.  Pt reports her depression level is 8/10 and her anxiety level is 7/10 with 10 the most.  Pt says she needs help \"figuring out who I am\".  Pt denies any SI or SIB thinking at this time.     10/04/18 1400   Behavioral Health   Hallucinations denies / not responding to hallucinations   Thinking distractable;poor concentration  (describes \"protruding thoughts\")   Orientation person: oriented;place: oriented   Insight poor   Judgement impaired   Eye Contact at floor;into space   Affect blunted, flat   Mood mood is calm   Physical Appearance/Attire attire appropriate to age and situation   Hygiene (adequate)   Suicidality (denies SI)   1. Wish to be Dead No   2. Non-Specific Active Suicidal Thoughts  No   Self Injury (denies SIB thinking)   Elopement (none observed)   Activity (in the milieu/some interaction/attending group)   Speech clear  (delayed/slow to respond)   Psychomotor / Gait balanced;steady   Activities of Daily Living   Hygiene/Grooming independent   Oral Hygiene independent   Dress scrubs (behavioral health)   Room Organization independent     "

## 2018-10-04 NOTE — PLAN OF CARE
"Problem: Occupational Therapy Goals (Adult)  Goal: Occupational Therapy Goals  Pt will demonstrate in an increase in therapeutic programming participation for insight development and coping skill exploration as evidenced by attending at least 4 schedule OT groups within 1 week.       10/04/18 1400   Occupational Therapy   Type of Intervention structured groups   Response Participates with cues/redirection   Hours 2     Pt attended 2 out of 3 occupational therapy groups this date. Pt actively participated in a mental health management group involving identification of coping skills beginning with every letter of the alphabet facilitated through group discussion. Pt occasionally contributed ideas of positive coping skills, and was receptive and respectful of ideas contributed by peers. Pt opted out of sharing her top 5 coping skills with the group, however verbalized to writer at the end of group that her list includes \"parents, living in the moment, music, quitting bad habits, and tuning in/out\". Pt actively participated in occupational therapy clinic. Pt was able to ask for assistance as needed, and return to a novel, goal-directed task with assistance provided by writer for task setup, organization, and sequencing. Pt demonstrated good focus, planning, and problem solving during task completion. Pt appeared comfortable interacting with peers and writer when conversation was initiated with her, however generally kept to herself and appeared focused on her project. Pt continues to require verbal cueing/prompting to initiate tasks/discussion during OT groups, and to exit group room at the end of group. Pt was quiet with a flat affect this date. Writer will continue to encourage Pt to attend scheduled occupational therapy sessions to work towards achieving OT goal of increasing overall therapeutic programming participation while present on the unit.           "

## 2018-10-04 NOTE — PROGRESS NOTES
"Patient unable to sleep at beginning of shift.  Was offered and given trazodone at 0113.  Patient later came to nurses' station requesting to talk.  She was also given Zyprexa at this time, 0220.  During the conversation, patient stated that she \"just wanted to die.\"  Writer sat in the lounge with patient and she stated that these were thoughts with no intent to act.  Patient also stated that she believed she was \"an A.I.\" when she first came into the hospital and still feels this way at times, that she is in a \"non-human\" body.  She later stated that she thinks she is the \"devil.\"  A large part of the conversation was focused on talking about memories from childhood and her frustration with not remembering everything.  She didn't think she could \"trust her memories\" and this seemed to be a source of great distress for her.  Around 0315, patient stated that she was feeling better and returned to her room to try and get some sleep.     Pt finally fell asleep around 0415.  "

## 2018-10-04 NOTE — PROGRESS NOTES
Pt had a good shift. Was active in the milieu. Spent time coloring in the lounge which she uses a coping. Denied SI/SIB and AH. Her delayed response as well as her mood/ affect seem to be improving. Was medication compliant. She took a shower. Appetite good. Denied physical issues. Denied medication side effects.  Plan: Status 15s; Build trust with pt. Continue to build on strengths. Encourage healthy coping.

## 2018-10-04 NOTE — PROGRESS NOTES
Writer spoke with Pt about family meeting tomorrow, Pt expressed understanding and is glad there will be a meeting. Explained our plan is for Pt to screen with [facility] First Psychosis program, and Pt appeared receptive to this recommendation.

## 2018-10-05 PROCEDURE — G0177 OPPS/PHP; TRAIN & EDUC SERV: HCPCS

## 2018-10-05 PROCEDURE — 97127 ZZHC OT THERAPEUTIC INTERVENTION W/FOCUS ON COGNITIVE FUNCTION,EA 15 MIN: CPT | Mod: GO

## 2018-10-05 PROCEDURE — 99232 SBSQ HOSP IP/OBS MODERATE 35: CPT | Performed by: PSYCHIATRY & NEUROLOGY

## 2018-10-05 PROCEDURE — 12400007 ZZH R&B MH INTERMEDIATE UMMC

## 2018-10-05 PROCEDURE — 25000132 ZZH RX MED GY IP 250 OP 250 PS 637: Performed by: PHYSICIAN ASSISTANT

## 2018-10-05 PROCEDURE — 25000132 ZZH RX MED GY IP 250 OP 250 PS 637: Performed by: PSYCHIATRY & NEUROLOGY

## 2018-10-05 RX ADMIN — SPIRONOLACTONE 200 MG: 50 TABLET ORAL at 09:11

## 2018-10-05 RX ADMIN — OLANZAPINE 5 MG: 5 TABLET, FILM COATED ORAL at 09:10

## 2018-10-05 RX ADMIN — PROGESTERONE 200 MG: 200 CAPSULE ORAL at 09:10

## 2018-10-05 RX ADMIN — OLANZAPINE 5 MG: 5 TABLET, FILM COATED ORAL at 21:48

## 2018-10-05 ASSESSMENT — ACTIVITIES OF DAILY LIVING (ADL)
DRESS: INDEPENDENT
ORAL_HYGIENE: INDEPENDENT
HYGIENE/GROOMING: INDEPENDENT
LAUNDRY: WITH SUPERVISION

## 2018-10-05 NOTE — PLAN OF CARE
"Problem: Mood Impairment (Depressive Signs/Symptoms) (Adult)  Goal: Improved Mood Symptoms (Depressive Signs/Symptoms)    Pt was present in the milieu for most of the shift. She continued to have a depressed mood and a blunted affect but was cooperative and polite. She rated her depression 7/10 and anxiety 4/10. Pt reported feeling hopeless and having passive/fleeing SI thought. Pt said that she will not hurt herself in the hospital. Pt reported intrusive voices and was given Zyprexa 10 mg with relief. Pt slept only 1.75 hours last night due to \" Intrusive voices.\"  No physical concerns reported or observed. Pt stated, \" I have done a lot of bad things.\"  Appears to be dealing with a lot of guilt and struggling with self image issues. No medication side effects.  Plan: Status 15s; Build trust with pt. Continue to build on strengths. Encourage healthy coping.                   "

## 2018-10-05 NOTE — PROGRESS NOTES
Discussed with Dr Johnson family's request to wear a ireland type apparatus at night while sleeping to minimize facial swelling after recent facial surgery.  This has ties and ties around neck.  Due to patient's ongoing suicidal thoughts Dr Johnson is unwilling to allow this at this time.  Will reassess next week.

## 2018-10-05 NOTE — PLAN OF CARE
"Problem: Occupational Therapy Goals (Adult)  Goal: Occupational Therapy Goals  Pt will demonstrate in an increase in therapeutic programming participation for insight development and coping skill exploration as evidenced by attending at least 4 schedule OT groups within 1 week.       10/05/18 9224   Occupational Therapy   Type of Intervention structured groups   Response Participates with cues/redirection   Hours 2     Pt actively participated in a mental health management group focused on learning to cope and self soothe through the 5 senses. After initial sensory education was provided by writer, Pt was able to independently identify her preferred methods of using sight, hearing, taste, touch, and smell to calm self as needed. Pt completed approximately 90% of activity, and identified \"seeing family, music, warm cookies, movement, and hugs\" as her favorite 5 methods of sensory input. Pt actively participated in occupational therapy clinic. Pt was able to ask for assistance as needed, and independently return to a novel, goal-directed task without difficulty. Pt demonstrated good focus, planning, and problem solving during task completion. Pt appeared comfortable interacting with peers and writer on occasion with minimal response, however generally kept to herself and appeared focused on her project. Writer will continue to encourage Pt to attend scheduled occupational therapy sessions to work towards achieving OT goal of increasing overall therapeutic programming participation while present on the unit.     GOAL REVIEW: care plan reviewed, continue with current plan for further progress and additional opportunities for skill acquisition.             "

## 2018-10-05 NOTE — PROGRESS NOTES
[facility], [place]   Psychiatric Progress Note  Janey Givens  [id number]  10/05/18    Chief Complaint: Continued medical care  Time: 38 minutes on encounter, >50% of which was spent in counseling and/or coordination of care consisting of: communication and education with the patient/family, lab/image/study evaluation, support staff communication, and other sources pertinent to excellent patient care.          Interim History:   The patient's care was discussed with the treatment team during the daily team meeting and/or staff's chart notes were reviewed.  Staff report patient has been going to group activities had sexually transmitted disease screen that was negative, taking a shower mention some suicidal thoughts.  She also mentioned not being human feeling as though she was the devil and slept about 8 hours.    Upon meeting with her reporting that she is okay feeling better on the medication denies currently any thoughts of harming self or others any hallucinations or paranoia.  Does mention some unusual thoughts about being bad and bad things happening.  Denies any side effects from the medication feels as though the medication is beneficial.  Does have attention concentration limitations denies any sadness guilty feelings or any grandiose ideas about herself.    During the family meeting parents described improvement on diazepam in the outpatient setting and felt as though she was getting better here.  We discussed the outpatient plan of increasing support and going to the first episode psychosis programming.         Medications:       estradiol  2 patch Transdermal Once per day on Mon Thu     estradiol biweekly   Transdermal Once per day on Mon Thu     estradiol biweekly   Transdermal Q8H     OLANZapine  5 mg Oral BID     progesterone  200 mg Oral Daily     spironolactone  200 mg Oral Daily          Allergies:     Allergies   Allergen Reactions     Banana Anaphylaxis     Throat  "swelling          Labs:   No results found for this or any previous visit (from the past 24 hour(s)).       Psychiatric Examination:     /88  Pulse 85  Temp 96.9  F (36.1  C)  Resp 16  Ht 1.753 m (5' 9\")  Wt 74.7 kg (164 lb 9.6 oz)  SpO2 97%  BMI 24.31 kg/m2  Weight is 164 lbs 9.6 oz  Body mass index is 24.31 kg/(m^2).                Sitting Orthostatic BP: 122/71      Sitting Orthostatic Pulse: 103 bpm      Standing Orthostatic BP: 104/71      Standing Orthostatic Pulse: 144 bpm       Weight over time:  Vitals:    09/28/18 2315 09/29/18 0325 09/30/18 0856 10/02/18 0734   Weight: 78 kg (172 lb) 75.8 kg (167 lb 1.6 oz) 76.3 kg (168 lb 3.2 oz) 76.8 kg (169 lb 4.8 oz)    10/04/18 0918   Weight: 74.7 kg (164 lb 9.6 oz)       Orthostatic Vitals       Most Recent      Sitting Orthostatic /71 10/05 0930    Sitting Orthostatic Pulse (bpm) 103 10/05 1347    Standing Orthostatic /71 10/05 0930    Standing Orthostatic Pulse (bpm) 144 10/05 1347            Janey is a previously male transgender female with markings on her forehead and blond hair.  Her speech is less delayed.  Her behavior is somewhat slowed without other abnormal movements.  Her affect is subdued.  Her mood she describes as average.  Her thought content consists of the above without having any thoughts of harming self or others and has delusional content as above.  Thought process is notable for thought blocking and delayed cognition.  She does not have looseness of association.  She may have abnormal perceptions.  Alert and aware of current location and circumstances.  Attention and concentration appears adequate.  Cognition and fund of knowledge appear currently limited.  Long-term/short-term/remote memory are limited.  Insight and judgment are both limited.         Precautions:     Behavioral Orders   Procedures     Code 1 - Restrict to Unit     Routine Programming     As clinically indicated     Status 15     Every 15 minutes. "     Suicide precautions     Patients on Suicide Precautions should have a Combination Diet ordered that includes a Diet selection(s) AND a Behavioral Tray selection for Safe Tray - with utensils, or Safe Tray - NO utensils            DIagnoses:        Schizophrenia  Gender dysphoria         Assessment & Plan:     Janey has been improving each day and is now even more improved with almost complete resolution of delayed speech pattern.  Is more coherent though still somewhat confused at times.  May be still has some paranoia and anxiety.  At this point in time we will continue current medications and await stability possibly by next week.    Continue voluntary hospitalization    The risks, benefits, alternatives and side effects have been discussed and are understood by the patient and other caregivers.      Fabian Cutler  [facility] Psychiat[facility]      The following document has been created with voice recognition software and may contain unintentional word substitutions.    Non clinically relevant CMS requirements:  Clinical Global Impressions  First:  Considering your total clinical experience with this particular patient population, how severe are the patient's symptoms at this time?: 6 (10/01/18 1621)  Compared to the patient's condition at the START of treatment, this patient's condition is:: 4 (10/01/18 1621)  Most recent:  Considering your total clinical experience with this particular patient population, how severe are the patient's symptoms at this time?: 6 (10/01/18 1621)  Compared to the patient's condition at the START of treatment, this patient's condition is:: 4 (10/01/18 1621)

## 2018-10-06 PROCEDURE — 25000132 ZZH RX MED GY IP 250 OP 250 PS 637: Performed by: PHYSICIAN ASSISTANT

## 2018-10-06 PROCEDURE — 12400007 ZZH R&B MH INTERMEDIATE UMMC

## 2018-10-06 PROCEDURE — 25000132 ZZH RX MED GY IP 250 OP 250 PS 637: Performed by: PSYCHIATRY & NEUROLOGY

## 2018-10-06 RX ADMIN — SPIRONOLACTONE 200 MG: 50 TABLET ORAL at 08:43

## 2018-10-06 RX ADMIN — OLANZAPINE 5 MG: 5 TABLET, FILM COATED ORAL at 08:43

## 2018-10-06 RX ADMIN — PROGESTERONE 200 MG: 200 CAPSULE ORAL at 08:43

## 2018-10-06 RX ADMIN — OLANZAPINE 5 MG: 5 TABLET, FILM COATED ORAL at 20:39

## 2018-10-06 ASSESSMENT — ACTIVITIES OF DAILY LIVING (ADL)
DRESS: INDEPENDENT
ORAL_HYGIENE: INDEPENDENT
LAUNDRY: WITH SUPERVISION
GROOMING: INDEPENDENT

## 2018-10-06 NOTE — PLAN OF CARE
Problem: Patient Care Overview  Goal: Individualization & Mutuality  Illness Management Recovery model:  Ways to [place].    Patient identified the following specific strategies to cope with their symptoms:    1. Will use deep breathing  2. Uses humming  3. Use a quiet place to think

## 2018-10-06 NOTE — PROGRESS NOTES
"Pt was visible in milieu throughout evening shift but was withdrawn from peers. Upon check in pt presented a blunt affect. Pt rates depression 6/10 and anxiety 7/10. Pt stated she was depressed because of \"the bad thoughts.\" Writer asked pt what these bad thoughts were and if she could provide an example, pt stated \"I'm racist and I dont want to be racist.\" Pt was later found in her room crying in her bathroom w/all the lights off around 20:30. Writer asked pt if she wanted to talk, pt was able to and she was crying because she thinks she is a racist. Writer offered reassurance to pt, and pt was able to stop crying. Pt denies psychotic symptoms but stated upon check in \"sometimes I feel like these arent my thoughts, like theyre somebody elses and theyre putting them in my head.\" Pt once again mentioned being racist when she made this statement. Pt appears to be preoccupied with this particular topic. Pt denies SI and SIB. Pt's father came to visit her tonight, pt reported the visit went well and stated \"we actually talked this time, we talked about bees.\" Pt reports good appetite and sleep, pt stated \"I wake up feeling well rested.\" Pt approached writer during shift and asked that writer pass along the message that \"I'm interested in starting antidepressants.\"      10/05/18 2000   Behavioral Health   Hallucinations denies / not responding to hallucinations   Thinking delusional;poor concentration;distractable   Orientation person: oriented;place: oriented;date: oriented;time: oriented   Memory baseline memory   Insight poor   Judgement impaired   Eye Contact at examiner   Affect blunted, flat;sad   Mood mood is calm;depressed;anxious   Physical Appearance/Attire attire appropriate to age and situation   Hygiene well groomed   Suicidality (Denies SI)   1. Wish to be Dead No   2. Non-Specific Active Suicidal Thoughts  No   Self Injury (Denies SIB)   Elopement (No elopment concerns)   Activity withdrawn  (visible in " milieu)   Speech clear;coherent   Medication Sensitivity no stated side effects;no observed side effects   Psychomotor / Gait balanced;steady   Activities of Daily Living   Hygiene/Grooming independent   Oral Hygiene independent   Dress independent   Laundry with supervision   Room Organization independent

## 2018-10-06 NOTE — PLAN OF CARE
"Problem: Psychotic Symptoms  Goal: Psychotic Symptoms  Signs and symptoms of listed problems will be absent or manageable.   Patient will demonstrate the following outcomes:  1. Adherence to safety considerations of self and others  2. Signs and symptoms will be absent or minimizedt:   A.  Cognitive Impairment   B. Sensory Perception Impairment    C. Psychomotor Movement Impairment   D. Mental State/Mood Impairment   E. Gratification/ Engagement Impairment   F. Social/ Functional Impairment   G. Self-Expression Impairment   H. Sleep Impairment  3. Optimization of Coping Skills Related to Life Stressors  4. Development of and participation in therapeutic alliance to support successful transition   Outcome: Improving  \"Part of me feeling scared that I am going to harm the world.\"  Comment made during one to one time with staff.  When questioned further states is afraid will harm someone, no one on the unit, just people in general.  Has no plan to do this or intent.  States when first came to hospital thought was a god, doesn't think this now.  \"I don't trust my memories.\" Feels concentration and focus have improved.  Indicated wishes would die, denied would do anything to cause this or harm self.  \"I would let someone else kill me.\"  Denies wish to be dead or self injurious thoughts.  Admits to hearing voices, \"they are different now.  They want to help me where as before they wanted to hurt me.\"  Was asked if feels depressed and responded \"my thoughts make me depressed\".  Indicates feels anxious, but not all the time.  Agrees that thoughts are better organized.  Was asked what has been beneficial states being in a safe environment, feels safe here.  Agreed that medications are helping as \"they are helping me sleep\". Denies medication side effects.  Continues to experience some delay in responding and will frequently repeat the question that has just been asked.  Left community meeting prior to the start, sitting by " self in dinning area.  Took shower and is presently playing a video game with a peer.

## 2018-10-07 PROCEDURE — 12400007 ZZH R&B MH INTERMEDIATE UMMC

## 2018-10-07 PROCEDURE — 25000132 ZZH RX MED GY IP 250 OP 250 PS 637: Performed by: PHYSICIAN ASSISTANT

## 2018-10-07 PROCEDURE — 25000132 ZZH RX MED GY IP 250 OP 250 PS 637: Performed by: PSYCHIATRY & NEUROLOGY

## 2018-10-07 RX ADMIN — SPIRONOLACTONE 200 MG: 50 TABLET ORAL at 10:33

## 2018-10-07 RX ADMIN — OLANZAPINE 5 MG: 5 TABLET, FILM COATED ORAL at 08:13

## 2018-10-07 RX ADMIN — OLANZAPINE 5 MG: 5 TABLET, FILM COATED ORAL at 20:16

## 2018-10-07 RX ADMIN — PROGESTERONE 200 MG: 200 CAPSULE ORAL at 10:13

## 2018-10-07 ASSESSMENT — ACTIVITIES OF DAILY LIVING (ADL)
ORAL_HYGIENE: INDEPENDENT
LAUNDRY: UNABLE TO COMPLETE
HYGIENE/GROOMING: INDEPENDENT
DRESS: INDEPENDENT

## 2018-10-07 NOTE — PROGRESS NOTES
"Pt started off the shift ok, stayed busy with peer drawing, then watched TV in the lounge. Ate dinner with 2 peers. During a 1:1 around 1630 pt asked this writer, \"am I dead?\" Pt also said she felt like \"an alien\". Pt reported some depression (a 5-6 out of 10) and anxiety but did not rate it. Pt was vague about her thoughts of death but she said, \"If I'm dead I won't wake up anyway\". At apprx 2100 pt was sitting on the floor in the dark in her room crying. This writer showed compassion for her and asked her what she needed. A little while later she approached the desk and asked for duck tape to wrap her hands with. She was told we don't have that item. Pt perseverated about it and kept on asking the same question. At apprx 2130 pt accepted the weighted shoulder wrap for her hands.      10/06/18 2154   Behavioral Health   Hallucinations auditory   Thinking poor concentration;distractable   Orientation person: oriented;place: oriented;date: oriented;time: oriented   Memory baseline memory   Insight poor   Judgement impaired   Eye Contact at examiner   Affect blunted, flat   Mood depressed;anxious   Physical Appearance/Attire appears stated age;attire appropriate to age and situation   Hygiene (adequate)   Suicidality (wants to be dead (2030))   1. Wish to be Dead Yes   2. Non-Specific Active Suicidal Thoughts  No   Self Injury (denies)   Elopement (WDL) WDL   Activity (WDL) WDL   Speech clear;coherent   Medication Sensitivity no stated side effects;no observed side effects   Psychomotor / Gait balanced;steady     "

## 2018-10-07 NOTE — PROGRESS NOTES
"Patient requested all medication be crushed this morning.  Nurse declined to do this and encouraged to take Zyprexa only.  Patient chewed Zyprexa and attempted to take crumbs out of mouth and snort this stating \"I haven't snorted anything for a long time\".  Was redirected and patient walked away from staff.  "

## 2018-10-07 NOTE — PROGRESS NOTES
"During a 1:1 pt said she has more depressive thoughts today. When this writer talked to her about last evening when she was sitting on her floor crying, pt looked perplexed and didn't respond. Pt's affect remains flat and her mood appears sad. Not social today and somewhat more withdrawn. Pt does not have active SI or SIB urges but is apathetic about death.      10/07/18 1415   Behavioral Health   Hallucinations appears responding   Thinking poor concentration;distractable;confused   Orientation person: oriented;place: oriented;date: oriented;time: oriented   Memory baseline memory   Insight poor   Judgement impaired   Eye Contact at examiner   Affect blunted, flat   Mood depressed;anxious   Physical Appearance/Attire appears stated age;attire appropriate to age and situation   Hygiene (adequate)   Suicidality (\"I'd be ok if I weren't here\")   1. Wish to be Dead (wouldn't answer inquirey )   2. Non-Specific Active Suicidal Thoughts  No   Self Injury (denies)   Elopement (WDL) WDL   Activity withdrawn   Speech clear;coherent   Medication Sensitivity no stated side effects;no observed side effects   Psychomotor / Gait balanced;steady     "

## 2018-10-08 PROCEDURE — 99232 SBSQ HOSP IP/OBS MODERATE 35: CPT | Performed by: PSYCHIATRY & NEUROLOGY

## 2018-10-08 PROCEDURE — 25000132 ZZH RX MED GY IP 250 OP 250 PS 637: Performed by: PSYCHIATRY & NEUROLOGY

## 2018-10-08 PROCEDURE — 25000132 ZZH RX MED GY IP 250 OP 250 PS 637: Performed by: PHYSICIAN ASSISTANT

## 2018-10-08 PROCEDURE — 97127 ZZHC OT THERAPEUTIC INTERVENTION W/FOCUS ON COGNITIVE FUNCTION,EA 15 MIN: CPT | Mod: GO

## 2018-10-08 PROCEDURE — G0177 OPPS/PHP; TRAIN & EDUC SERV: HCPCS

## 2018-10-08 PROCEDURE — 12400007 ZZH R&B MH INTERMEDIATE UMMC

## 2018-10-08 RX ORDER — OLANZAPINE 10 MG/1
10 TABLET ORAL AT BEDTIME
Status: DISCONTINUED | OUTPATIENT
Start: 2018-10-09 | End: 2018-10-09

## 2018-10-08 RX ORDER — OLANZAPINE 5 MG/1
5 TABLET ORAL 2 TIMES DAILY
Status: COMPLETED | OUTPATIENT
Start: 2018-10-08 | End: 2018-10-08

## 2018-10-08 RX ADMIN — SPIRONOLACTONE 200 MG: 50 TABLET ORAL at 09:20

## 2018-10-08 RX ADMIN — OLANZAPINE 5 MG: 5 TABLET, FILM COATED ORAL at 20:12

## 2018-10-08 RX ADMIN — OLANZAPINE 5 MG: 5 TABLET, FILM COATED ORAL at 09:20

## 2018-10-08 RX ADMIN — PROGESTERONE 200 MG: 200 CAPSULE ORAL at 09:20

## 2018-10-08 RX ADMIN — ESTRADIOL 2 PATCH: 0.1 PATCH TRANSDERMAL at 09:20

## 2018-10-08 ASSESSMENT — ACTIVITIES OF DAILY LIVING (ADL)
HYGIENE/GROOMING: INDEPENDENT
LAUNDRY: WITH SUPERVISION
ORAL_HYGIENE: INDEPENDENT
GROOMING: INDEPENDENT
DRESS: INDEPENDENT
DRESS: INDEPENDENT
ORAL_HYGIENE: INDEPENDENT
LAUNDRY: WITH SUPERVISION

## 2018-10-08 NOTE — PLAN OF CARE
Problem: Occupational Therapy Goals (Adult)  Goal: Occupational Therapy Goals  Pt will demonstrate in an increase in therapeutic programming participation for insight development and coping skill exploration as evidenced by attending at least 4 schedule OT groups within 1 week.       10/08/18 1400   Occupational Therapy   Type of Intervention structured groups   Response Quiet but attentive   Hours 2     Pt attended 2 out of 3 occupational therapy groups this date. Pt actively participated in occupational therapy clinic. Pt was able to ask for assistance as needed, and independently return to and complete a novel, goal-directed task without difficulty. Pt demonstrated good focus, planning, and problem solving during task completion. Pt appeared comfortable interacting with peers and writer when conversation was initiated with her, however was generally quiet and appeared focused on her selected task. Pt independently initiated cleanup of task materials once she completed her task. Pt actively participated in a mental health management group with a focus on coping through movement to facilitate relaxation and stress management via chair yoga. Pt followed and engaged in the yoga poses, and verbalized feeling calmer at the end of group. Pt indicated to writer that she would like to start practicing yoga outside of a hospital setting and writer provided resources for Pt. Of note, Pt demonstrated an improvement this date in her ability to initiate tasks, independently sequence through steps of tasks, and verbalize her needs/thoughts. Pt was pleasant and engaged with a flat affect this date.

## 2018-10-08 NOTE — PROGRESS NOTES
Pt was visible in the milieu most of the shift, social with some peers. Family visited this evening, pt reported it went well.  Pt reports feeling depressed, anxious, melancholy, with passive SI 'I don't deserve to live'. Pt stated she has disorganized and 'scrambled' thoughts but zyprexa has helped her think more clearly and slow her down.        10/07/18 2047   Behavioral Health   Hallucinations denies / not responding to hallucinations   Thinking poor concentration;distractable   Orientation person: oriented;place: oriented;date: oriented;time: oriented   Memory baseline memory   Insight admits / accepts   Judgement impaired   Eye Contact at examiner   Affect blunted, flat   Mood mood is calm;depressed   Physical Appearance/Attire attire appropriate to age and situation   Hygiene (fair)   Suicidality thoughts only   1. Wish to be Dead Yes   2. Non-Specific Active Suicidal Thoughts  No   Self Injury other (see comment)  (denies)   Elopement (denies)   Activity withdrawn   Speech clear;coherent   Psychomotor / Gait balanced;steady   Psycho Education   Type of Intervention 1:1 intervention   Response participates, initiates socially appropriate   Hours 0.5   Treatment Detail check in   Activities of Daily Living   Hygiene/Grooming independent   Oral Hygiene independent   Dress independent   Laundry unable to complete   Room Organization independent

## 2018-10-08 NOTE — PLAN OF CARE
Problem: Patient Care Overview  Goal: Team Discussion  Team Plan:   BEHAVIORAL TEAM DISCUSSION    Participants: Fabian Johnson MD, Eula UMAÑA and Aneta SMITH RN   Progress: Pt has been slowly progressing. Pt attends therapeutic group programming, and has been present in milieu activities.   Continued Stay Criteria/Rationale: Pt continues to make delusional statements and can be slow to respond.   Medical/Physical: See medical consult.   Precautions:   Behavioral Orders   Procedures     Code 1 - Restrict to Unit     Routine Programming     As clinically indicated     Status 15     Every 15 minutes.     Suicide precautions     Patients on Suicide Precautions should have a Combination Diet ordered that includes a Diet selection(s) AND a Behavioral Tray selection for Safe Tray - with utensils, or Safe Tray - NO utensils       Plan: Pt will discharge with screening for First Episode Psychosis outpatient programming once we know of a discharge date.   Rationale for change in precautions or plan: No change, continue with plan of care.

## 2018-10-08 NOTE — PLAN OF CARE
"Problem: Mood Impairment (Depressive Signs/Symptoms) (Adult)  Goal: Improved Mood Symptoms (Depressive Signs/Symptoms)  Outcome: Improving  Patient has been visible in the milieu, minimally social with peers and staff. Participates in groups. States she would not want to kill herself but sometimes believes she should not be alive. She is worried about other people hurting her. States she is not worried about anybody in particular, it is just the hospital environment that makes her feel that way. Endorses depression and anxiety, stating that anxiety makes her want to \"scrunch up her body and try to disappear.\" States zyprexa helps relieve her anxiety.       "

## 2018-10-08 NOTE — PROGRESS NOTES
"[facility], [place]   Psychiatric Progress Note  Janey Givens  [id number]  10/08/18    Chief Complaint: Continued medical care          Interim History:   The patient's care was discussed with the treatment team during the daily team meeting and/or staff's chart notes were reviewed.  Staff report patient has had some passive suicidality feels as though her thoughts have improved thinks that she is dead and attempted to snort some of her pills and slept about 7 hours.    Upon meeting with her she says that she is okay and was not hungry this morning for some reason she feels somewhat tired and describes a manic type feeling that seems to be bad thoughts or thoughts that are going too fast.  Denies any hallucinations or paranoia or any thoughts that she is currently dead.  Does have passive suicidal thinking and no thoughts of harming others or paranoia.  Denies any side effects related to the medication feels as though she is sleeping well and does not have any attention or concentration issues or anxiety.  She appears to be more spontaneous in her production of speech.         Medications:       estradiol  2 patch Transdermal Once per day on Mon Thu     estradiol biweekly   Transdermal Once per day on Mon Thu     estradiol biweekly   Transdermal Q8H     [START ON 10/9/2018] OLANZapine  10 mg Oral At Bedtime     OLANZapine  5 mg Oral BID     progesterone  200 mg Oral Daily     spironolactone  200 mg Oral Daily          Allergies:     Allergies   Allergen Reactions     Banana Anaphylaxis     Throat swelling          Labs:   No results found for this or any previous visit (from the past 24 hour(s)).       Psychiatric Examination:     /88  Pulse 85  Temp 96.4  F (35.8  C) (Oral)  Resp 16  Ht 1.753 m (5' 9\")  Wt 74.1 kg (163 lb 4.8 oz)  SpO2 97%  BMI 24.12 kg/m2  Weight is 163 lbs 4.8 oz  Body mass index is 24.12 kg/(m^2).                Sitting Orthostatic BP: 134/78    "   Sitting Orthostatic Pulse: 88 bpm      Standing Orthostatic BP: 119/82      Standing Orthostatic Pulse: 122 bpm       Weight over time:  Vitals:    09/28/18 2315 09/29/18 0325 09/30/18 0856 10/02/18 0734   Weight: 78 kg (172 lb) 75.8 kg (167 lb 1.6 oz) 76.3 kg (168 lb 3.2 oz) 76.8 kg (169 lb 4.8 oz)    10/04/18 0918 10/07/18 0818   Weight: 74.7 kg (164 lb 9.6 oz) 74.1 kg (163 lb 4.8 oz)       Orthostatic Vitals       Most Recent      Sitting Orthostatic /78 10/08 0844    Sitting Orthostatic Pulse (bpm) 88 10/08 0844    Standing Orthostatic /82 10/08 0844    Standing Orthostatic Pulse (bpm) 122 10/08 0844            Janey is a previously male transgender female with markings on her forehead and blond hair.  Her speech is less delayed.  Her behavior is somewhat slowed without other abnormal movements.  Her affect is smiling.  Her mood she describes as tired.  Her thought content consists of the above with having thoughts of harming self or not others and has delusional content as above.  Thought process is notable for thought blocking and delayed cognition.  She does not have looseness of association.  She may have abnormal perceptions.  Alert and aware of current location and circumstances.  Attention and concentration appears adequate.  Cognition and fund of knowledge appear currently limited.  Long-term/short-term/remote memory are limited.  Insight and judgment are both limited.         Precautions:     Behavioral Orders   Procedures     Code 1 - Restrict to Unit     Routine Programming     As clinically indicated     Status 15     Every 15 minutes.     Suicide precautions     Patients on Suicide Precautions should have a Combination Diet ordered that includes a Diet selection(s) AND a Behavioral Tray selection for Safe Tray - with utensils, or Safe Tray - NO utensils            DIagnoses:     Schizophrenia  Gender dysphoria         Assessment & Plan:     Janey continues to improve her speech  appears to be more spontaneous and is more coherent and her description of her thoughts.  She does have odd behaviors at times as above and we will need more time with the medication.  Her anxiety and paranoia have seemed to also improve and we will know in a few days if she needs more medication than current dosage.    Continue voluntary hospitalization    The risks, benefits, alternatives and side effects have been discussed and are understood by the patient and other caregivers.      Fabian Cutler  [facility]      The following document has been created with voice recognition software and may contain unintentional word substitutions.    Non clinically relevant CMS requirements:  Clinical Global Impressions  First:  Considering your total clinical experience with this particular patient population, how severe are the patient's symptoms at this time?: 6 (10/01/18 1621)  Compared to the patient's condition at the START of treatment, this patient's condition is:: 4 (10/01/18 1621)  Most recent:  Considering your total clinical experience with this particular patient population, how severe are the patient's symptoms at this time?: 6 (10/01/18 1621)  Compared to the patient's condition at the START of treatment, this patient's condition is:: 4 (10/01/18 1621)

## 2018-10-09 PROCEDURE — 25000132 ZZH RX MED GY IP 250 OP 250 PS 637: Performed by: PSYCHIATRY & NEUROLOGY

## 2018-10-09 PROCEDURE — 12400007 ZZH R&B MH INTERMEDIATE UMMC

## 2018-10-09 PROCEDURE — 99232 SBSQ HOSP IP/OBS MODERATE 35: CPT | Performed by: PSYCHIATRY & NEUROLOGY

## 2018-10-09 PROCEDURE — 25000132 ZZH RX MED GY IP 250 OP 250 PS 637: Performed by: PHYSICIAN ASSISTANT

## 2018-10-09 PROCEDURE — 97127 ZZHC OT THERAPEUTIC INTERVENTION W/FOCUS ON COGNITIVE FUNCTION,EA 15 MIN: CPT | Mod: GO

## 2018-10-09 PROCEDURE — G0177 OPPS/PHP; TRAIN & EDUC SERV: HCPCS

## 2018-10-09 RX ORDER — OLANZAPINE 15 MG/1
15 TABLET ORAL AT BEDTIME
Status: DISCONTINUED | OUTPATIENT
Start: 2018-10-09 | End: 2018-10-15 | Stop reason: HOSPADM

## 2018-10-09 RX ADMIN — PROGESTERONE 200 MG: 200 CAPSULE ORAL at 08:39

## 2018-10-09 RX ADMIN — SPIRONOLACTONE 200 MG: 50 TABLET ORAL at 08:39

## 2018-10-09 RX ADMIN — OLANZAPINE 15 MG: 15 TABLET, FILM COATED ORAL at 21:13

## 2018-10-09 ASSESSMENT — ACTIVITIES OF DAILY LIVING (ADL)
ORAL_HYGIENE: INDEPENDENT
GROOMING: INDEPENDENT
LAUNDRY: WITH SUPERVISION
DRESS: INDEPENDENT;STREET CLOTHES

## 2018-10-09 NOTE — PROGRESS NOTES
"CLINICAL NUTRITION SERVICES - ASSESSMENT NOTE     Nutrition Prescription    RECOMMENDATIONS FOR MDs/PROVIDERS TO ORDER:  None today    Malnutrition Status:    -At least non-severe malnutrition in the context of environmental or social circumstances.    Recommendations already ordered by Registered Dietitian (RD):  -Ordered Boost Chocolate with breakfast and supper daily per pt agreement.    Future/Additional Recommendations:  -Encourage intakes of tid meals and snacks between as desired.  -Monitor po intakes and weight trends.  Follow up for supplement acceptance and adjust as indicated.       REASON FOR ASSESSMENT  Janey Givens is a/an 23 year old male assessed by the dietitian for RN Consult - Patient would like education on foods high in sodium.    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    Per chart, pt is a poor historian.  Per pt generally eats 2-3 meals per day plus some snacks.  When asked about weight loss since February (intentional vs. unintentional) unable to obtain a clear answer.      CURRENT NUTRITION ORDERS  Diet: Regular  Food Allergy to Bananas    Intake/Tolerance:  Pt said her appetite has been decreased since surgery ~ 6 weeks ago.  Today pt said she has been eating meals and did not expect to be losing weight since here.  She states food is better at home.  When asked about diet education request she reported MD told her to increase her sodium intakes.    LABS  Labs reviewed    MEDICATIONS  Medications reviewed  Spironolactone    ANTHROPOMETRICS  Height: 175.3 cm (5' 9\")  Most Recent Weight: 74.7 kg (164 lb 11.2 oz)    IBW:   160 lbs  BMI: Normal BMI  Weight History:  Per chart review, admission weight (9/28) 172 lbs.  Weight has decreased 7 lbs or 4% since admission.  Per hx below, overall loss of 34 lbs or 17% in 7.5 months.   Wt Readings from Last 10 Encounters:   10/09/18 74.7 kg (164 " lb 11.2 oz)   09/20/18 79.7 kg (175 lb 12.8 oz)   09/19/18 81.6 kg (180 lb)   08/15/18 83.6 kg (184 lb 3.2 oz)   07/31/18 84.3 kg (185 lb 12.8 oz)   07/11/18 85.4 kg (188 lb 3.2 oz)   04/27/18 85.5 kg (188 lb 9.6 oz)   02/22/18 90.4 kg (199 lb 3.2 oz)   12/12/17 79.1 kg (174 lb 6.1 oz)   10/17/17 83 kg (183 lb)       Dosing Weight:  75 kg (current weight)    ASSESSED NUTRITION NEEDS  Estimated Energy Needs: 3913-7787 kcals/day (25 - 30 kcals/kg)  Justification: Maintenance  Estimated Protein Needs: 75-90 grams protein/day (1 - 1.2 grams of pro/kg)  Justification: Repletion  Estimated Fluid Needs: (1 mL/kcal)   Justification: Per provider pending fluid status    PHYSICAL FINDINGS  See malnutrition section below.    MALNUTRITION  % Intake: < 75% for > 7 days (non-severe)  % Weight Loss: > 10% in 6 months (severe)  Subcutaneous Fat Loss: None observed  Muscle Loss: None observed  Fluid Accumulation/Edema: None noted  Malnutrition Diagnosis: At least non-severe malnutrition in the context of environmental or social circumstances.    NUTRITION DIAGNOSIS  Inadequate oral intake related to decreased appetite and food preferences as evidenced by pt report and weight loss of 7 lbs or 4% since admission (overall  34 lbs or 17% in 7.5 months).    INTERVENTIONS  Implementation  Nutrition Education:  Encouraged intakes of  meals with snacks between as desired and discussed supplements.   Provided pt with handout:  Low Sodium Nutrition Therapy.  Briefly discussed.    Discussed with RN:   education consult and information provided, pt's weight loss hx and plan for supplements.      Goals  Patient to consume % of nutritionally adequate meal trays TID, or the equivalent with supplements/snacks.     Monitoring/Evaluation  Progress toward goals will be monitored and evaluated per protocol.    Sandra Del Rosario RD, LD

## 2018-10-09 NOTE — PROGRESS NOTES
"Pt has been withdrawn but has attended all groups. Pt sat with peers but did not say anything. When asked about a goal today pt said, \"What?\" twice. Pt's affect appears flat and perplexed. Pt denies SI and SIB thoughts but still is apathetic about dying.     10/09/18 1453   Behavioral Health   Hallucinations denies / not responding to hallucinations   Thinking poor concentration;distractable   Orientation person: oriented;place: oriented;date: oriented;time: oriented   Memory baseline memory   Insight poor   Judgement impaired   Eye Contact at examiner   Affect blunted, flat   Mood depressed   Physical Appearance/Attire appears stated age;attire appropriate to age and situation   Hygiene (adequate)   Suicidality (denies)   1. Wish to be Dead Yes   2. Non-Specific Active Suicidal Thoughts  No   Self Injury (denies)   Elopement (WDL) WDL   Activity (WDL) WDL   Speech coherent;clear   Medication Sensitivity no stated side effects;no observed side effects   Psychomotor / Gait balanced;steady     "

## 2018-10-09 NOTE — PROGRESS NOTES
Patient asked again about facial gear she is supposed to wear post surgery to help with swelling. Per nursing note from 10/5/18 Dr. Johnson is unwilling to approve due to ties and patients suicidal thoughts. Patient states they are Velcro. Writer encouraged patient to further discuss with her provider tomorrow morning.

## 2018-10-09 NOTE — PROGRESS NOTES
[facility], [place]   Psychiatric Progress Note  Janey Givens  [id number]  10/09/18    Chief Complaint: Continued medical care          Interim History:   The patient's care was discussed with the treatment team during the daily team meeting and/or staff's chart notes were reviewed.  Staff report patient has been wanting the device that she is supposed to be wearing after the surgery with the straps and worried that we are going to keep her in excess on the inpatient hospital.  Still has some anxiety and slept about 8 hours.    Upon attempting to meet with her says that she is currently very anxious and panicky about something bad happening to the universe.  Denies any side effects and cannot elaborate further as to why she would be worried about the numbers.  Denies any thoughts of harming self or others any hallucinations but is somewhat paranoid about bad things happening.  Denies any attention or concentration issues or energy problems or sleep dysfunction.  Denies any hopelessness or helplessness and is future oriented hoping to get out of the hospital.  Has questions about if God exists and reassured her that most people asked themselves this question.  Discussed increasing the olanzapine medication and effort to assist her with stabilizing quicker in getting out of the hospital sooner potentially.         Medications:       estradiol  2 patch Transdermal Once per day on Mon Thu     estradiol biweekly   Transdermal Once per day on Mon Thu     estradiol biweekly   Transdermal Q8H     OLANZapine  15 mg Oral At Bedtime     progesterone  200 mg Oral Daily     spironolactone  200 mg Oral Daily          Allergies:     Allergies   Allergen Reactions     Banana Anaphylaxis     Throat swelling          Labs:   No results found for this or any previous visit (from the past 24 hour(s)).       Psychiatric Examination:     /88  Pulse 85  Temp 96.4  F (35.8  C) (Oral)  Resp 16  Ht  "1.753 m (5' 9\")  Wt 74.7 kg (164 lb 11.2 oz)  SpO2 97%  BMI 24.32 kg/m2  Weight is 164 lbs 11.2 oz  Body mass index is 24.32 kg/(m^2).                Sitting Orthostatic BP: 121/80      Sitting Orthostatic Pulse: 80 bpm      Standing Orthostatic BP: 127/81      Standing Orthostatic Pulse: 122 bpm       Weight over time:  Vitals:    09/28/18 2315 09/29/18 0325 09/30/18 0856 10/02/18 0734   Weight: 78 kg (172 lb) 75.8 kg (167 lb 1.6 oz) 76.3 kg (168 lb 3.2 oz) 76.8 kg (169 lb 4.8 oz)    10/04/18 0918 10/07/18 0818 10/09/18 0843   Weight: 74.7 kg (164 lb 9.6 oz) 74.1 kg (163 lb 4.8 oz) 74.7 kg (164 lb 11.2 oz)       Orthostatic Vitals       Most Recent      Sitting Orthostatic /80 10/09 0843    Sitting Orthostatic Pulse (bpm) 80 10/09 0843    Standing Orthostatic /81 10/09 0843    Standing Orthostatic Pulse (bpm) 122 10/09 0843            Janey is a previously male transgender female with markings on her forehead and blond hair.  Her speech is less delayed.  Her behavior is somewhat slowed without other abnormal movements.  Her affect is smiling.  Her mood she describes as anxious.  Her thought content consists of the above with having thoughts of harming self or not others and has delusional content as above.  Thought process is notable for thought blocking and delayed cognition.  She does not have looseness of association.  She may have abnormal perceptions.  Alert and aware of current location and circumstances.  Attention and concentration appears adequate.  Cognition and fund of knowledge appear currently limited.  Long-term/short-term/remote memory are limited.  Insight and judgment are both limited.         Precautions:     Behavioral Orders   Procedures     Code 1 - Restrict to Unit     Routine Programming     As clinically indicated     Status 15     Every 15 minutes.     Suicide precautions     Patients on Suicide Precautions should have a Combination Diet ordered that includes a Diet " selection(s) AND a Behavioral Tray selection for Safe Tray - with utensils, or Safe Tray - NO utensils            DIagnoses:     Schizophrenia  Gender dysphoria         Assessment & Plan:     Janey still has some odd behaviors and thoughts as above but still has improvement in her speech and disorganization.  Appears to be able to pay attention to things and do things for herself on the unit.  Hopefully she will continue to improve and we are planning on increasing her medication today in an effort to stabilize quicker.  She is concerned we will keep her in the hospital in excess.    Increasing olanzapine to 15 mg at night  Continue voluntary hospitalization    The risks, benefits, alternatives and side effects have been discussed and are understood by the patient and other caregivers.      Fabian Cutler  [facility] Psychiatry      The following document has been created with voice recognition software and may contain unintentional word substitutions.    Non clinically relevant CMS requirements:  Clinical Global Impressions  First:  Considering your total clinical experience with this particular patient population, how severe are the patient's symptoms at this time?: 6 (10/01/18 1621)  Compared to the patient's condition at the START of treatment, this patient's condition is:: 4 (10/01/18 1621)  Most recent:  Considering your total clinical experience with this particular patient population, how severe are the patient's symptoms at this time?: 4 (10/09/18 1352)  Compared to the patient's condition at the START of treatment, this patient's condition is:: 3 (10/09/18 1352)

## 2018-10-09 NOTE — PROGRESS NOTES
"Pt reported her goal for the evening was to \"listen to others.\" Pt reported that she hopes to be discharged within the next week or so once she \"stablizes on her new medication.\" Pt was visible in milieu throughout evening and social w/peers. Pt rated depression 4/10 and stated that she was \"anxious at certain points throughout the day\" but upon check in was not. Pt denies psychotic symptoms. Pt reports her concentration is improving. Pt reports an increase in appetite, and continues to experience poor sleep. When asked about SI pt stated \"sometimes I wish I would just disappear\" pt denies plan or intent and attributed this statement more to \"my paranoia, sometimes think people here are out to sell me or manipulate my parents into having me stay here.\" Pt denies SIB. Pt's mom visited during visiting hours, visit went well. Pt was visible and social w/peers throughout evening. Pt was calm and cooperative w/staff. Pt's insight appears to be improving although, judgement continues to be impaired. Pt had an uneventful shift.     10/08/18 2000   Behavioral Health   Hallucinations denies / not responding to hallucinations   Thinking poor concentration   Orientation person: oriented;place: oriented;date: oriented;time: oriented   Memory baseline memory   Insight (improving)   Judgement impaired   Eye Contact at examiner   Affect blunted, flat   Mood mood is calm;depressed;anxious   Physical Appearance/Attire attire appropriate to age and situation   Hygiene well groomed   Suicidality chronic thoughts with no stated plan   1. Wish to be Dead Yes   Wish to be Dead Description (\"sometimes I wish I would just dissapear\")   2. Non-Specific Active Suicidal Thoughts  No   Self Injury (Denies SIB)   Elopement (No elopment concerns)   Activity (visible in milieu, social w/peers)   Speech clear;coherent   Medication Sensitivity no stated side effects;no observed side effects   Psychomotor / Gait balanced;steady   Activities of Daily " Living   Hygiene/Grooming independent   Oral Hygiene independent   Dress independent   Laundry with supervision   Room Organization independent

## 2018-10-09 NOTE — PLAN OF CARE
Problem: Occupational Therapy Goals (Adult)  Goal: Occupational Therapy Goals  Pt will independently identify several coping skills to utilize to increase self-management upon discharge within 1 week.        10/09/18 1400   Occupational Therapy   Type of Intervention structured groups   Response Participates with cues/redirection   Hours 2     Pt attended 2 out of 3 occupational therapy groups this date. Pt actively participated in occupational therapy clinic. Pt was able to ask for assistance as needed, and independently initiate a novel, goal-directed task without difficulty. Pt demonstrated good focus, planning, and problem solving during task completion and worked a slow pace. Pt appeared comfortable interacting with peers and writer, however generally kept to herself and appeared focused on her task. Pt required verbal prompting x2 to initiate cleanup of task materials. Pt actively participated in a structured occupational therapy group with a focus on a visual-spatial leisure task. Pt was able to follow 2-step directions of the familiar task, and demonstrated strategic planning and problem solving throughout task. Pt appeared to be having a difficult time concentrating as evidenced by requiring verbal prompting each time it was her turn to engage in the task. Pt was pleasant and cooperative with a flat affect this date.

## 2018-10-10 PROCEDURE — H2032 ACTIVITY THERAPY, PER 15 MIN: HCPCS

## 2018-10-10 PROCEDURE — 12400007 ZZH R&B MH INTERMEDIATE UMMC

## 2018-10-10 PROCEDURE — 25000132 ZZH RX MED GY IP 250 OP 250 PS 637: Performed by: PSYCHIATRY & NEUROLOGY

## 2018-10-10 PROCEDURE — G0177 OPPS/PHP; TRAIN & EDUC SERV: HCPCS

## 2018-10-10 PROCEDURE — 25000132 ZZH RX MED GY IP 250 OP 250 PS 637: Performed by: PHYSICIAN ASSISTANT

## 2018-10-10 PROCEDURE — 99232 SBSQ HOSP IP/OBS MODERATE 35: CPT | Performed by: PSYCHIATRY & NEUROLOGY

## 2018-10-10 PROCEDURE — 90853 GROUP PSYCHOTHERAPY: CPT

## 2018-10-10 RX ADMIN — OLANZAPINE 15 MG: 15 TABLET, FILM COATED ORAL at 21:06

## 2018-10-10 RX ADMIN — SPIRONOLACTONE 200 MG: 50 TABLET ORAL at 08:42

## 2018-10-10 RX ADMIN — PROGESTERONE 200 MG: 200 CAPSULE ORAL at 08:41

## 2018-10-10 ASSESSMENT — ACTIVITIES OF DAILY LIVING (ADL)
ORAL_HYGIENE: INDEPENDENT
GROOMING: INDEPENDENT
DRESS: INDEPENDENT
LAUNDRY: WITH SUPERVISION
HYGIENE/GROOMING: INDEPENDENT
ORAL_HYGIENE: INDEPENDENT
DRESS: INDEPENDENT
LAUNDRY: WITH SUPERVISION

## 2018-10-10 NOTE — PROGRESS NOTES
"   10/09/18 7345   Behavioral Health   Hallucinations denies / not responding to hallucinations;other (see comment);visual  (pt. states she cannot determine if she is experiencing visua)   Thinking poor concentration;distractable   Orientation person: oriented;place: oriented;date: oriented;time: oriented   Memory baseline memory   Insight poor;admits / accepts   Judgement impaired   Eye Contact at examiner   Affect blunted, flat;other (see comments)  (expressed sense of humor)   Mood depressed;mood is calm   Physical Appearance/Attire attire appropriate to age and situation   Hygiene other (see comment)  (adequate)   Suicidality other (see comments)  (denies)   1. Wish to be Dead No   Wish to be Dead Description pt. denies at this time.   2. Non-Specific Active Suicidal Thoughts  No   Non-Specific Active Suicidal Thought Description pt. denies at this time.   Psycho Education   Type of Intervention 1:1 intervention   Response participates, initiates socially appropriate   Hours 0.5   Treatment Detail check-in   Activities of Daily Living   Hygiene/Grooming independent   Oral Hygiene independent   Dress independent;street clothes   Laundry with supervision   Room Organization independent     Pt. Was out in the milieu most of the shift - kept to herself, mainly, but colored and communicated well when conversation was initiated. This writer had a longer conversation with the pt. Post-check-in regarding possible coping mechanisms when she feels like isolating or has other negative thought. Pt. Seems to understand they need to accept outside guidance in regards to what is best for them and most effective. Pt. Expressed concerns with possible experiencing visual hallucinations - \"how do you know if you are hallucinating?\" Pt. Denies SI/SIB at this time.   "

## 2018-10-10 NOTE — PLAN OF CARE
"Problem: Patient Care Overview  Goal: Individualization & Mutuality  Illness Management Recovery model:  Warning Signs.    Patient identified the following early warning signs which may indicate that a relapse of their illness is startin. \"When I think God has talked to me.\"  2. \"When I don't believe in God.\"  3. \"When I isolate.\"        "

## 2018-10-10 NOTE — PROGRESS NOTES
"[facility], [place]   Psychiatric Progress Note  Janey Givens  [id number]  10/10/18    Chief Complaint: Continued medical care          Interim History:   The patient's care was discussed with the treatment team during the daily team meeting and/or staff's chart notes were reviewed.  Staff report patient had some confusion and has been attending group slept about 8 hours and sometimes feels as though she has too many thoughts.    Upon meeting with her says that she is uncomfortable with another patient on the unit and that she has been feeling tired.  She has been sleeping well and her thoughts have slowed down.  Not having any thoughts of harming herself or others or any sleep problems and feels rested throughout the day.  No side effects from the increase in medication.  Not currently thinking something bad will happen to anyone and not hopeless or helpless.  No energy problems or attention or concentration issues and feels as though her thoughts are improving.  Is asking if she could potentially go home soon and feels as though she might be able to be safe at home.         Medications:       estradiol  2 patch Transdermal Once per day on Mon Thu     estradiol biweekly   Transdermal Once per day on Mon Thu     estradiol biweekly   Transdermal Q8H     OLANZapine  15 mg Oral At Bedtime     progesterone  200 mg Oral Daily     spironolactone  200 mg Oral Daily          Allergies:     Allergies   Allergen Reactions     Banana Anaphylaxis     Throat swelling          Labs:   No results found for this or any previous visit (from the past 24 hour(s)).       Psychiatric Examination:     /71  Pulse 85  Temp 97.1  F (36.2  C) (Tympanic)  Resp 16  Ht 1.753 m (5' 9\")  Wt 74.7 kg (164 lb 11.2 oz)  SpO2 97%  BMI 24.32 kg/m2  Weight is 164 lbs 11.2 oz  Body mass index is 24.32 kg/(m^2).                Sitting Orthostatic BP: 121/80      Sitting Orthostatic Pulse: 80 bpm      Standing " Orthostatic BP: 114/74      Standing Orthostatic Pulse: 120 bpm       Weight over time:  Vitals:    09/28/18 2315 09/29/18 0325 09/30/18 0856 10/02/18 0734   Weight: 78 kg (172 lb) 75.8 kg (167 lb 1.6 oz) 76.3 kg (168 lb 3.2 oz) 76.8 kg (169 lb 4.8 oz)    10/04/18 0918 10/07/18 0818 10/09/18 0843   Weight: 74.7 kg (164 lb 9.6 oz) 74.1 kg (163 lb 4.8 oz) 74.7 kg (164 lb 11.2 oz)       Orthostatic Vitals       Most Recent      Sitting Orthostatic /80 10/09 0843    Sitting Orthostatic Pulse (bpm) 80 10/09 0843    Standing Orthostatic /74 10/10 0826    Standing Orthostatic Pulse (bpm) 120 10/10 0826            Janey is a previously male transgender female with markings on her forehead and blond hair.  Her speech is less delayed.  Her behavior is somewhat slowed without other abnormal movements.  Her affect is smiling.  Her mood she describes as tired.  Her thought content consists of the above with having thoughts of harming self or not others and has delusional content as above.  Thought process is notable for thought blocking and delayed cognition.  She does not have looseness of association.  She may have abnormal perceptions.  Alert and aware of current location and circumstances.  Attention and concentration appears adequate.  Cognition and fund of knowledge appear currently limited.  Long-term/short-term/remote memory are limited.  Insight and judgment are both limited.         Precautions:     Behavioral Orders   Procedures     Code 1 - Restrict to Unit     Routine Programming     As clinically indicated     Status 15     Every 15 minutes.     Suicide precautions     Patients on Suicide Precautions should have a Combination Diet ordered that includes a Diet selection(s) AND a Behavioral Tray selection for Safe Tray - with utensils, or Safe Tray - NO utensils            DIagnoses:     Schizophrenia  Gender dysphoria         Assessment & Plan:     Janey still had some strange behaviors and  possible some confusion recently.  Seems to be tolerating the medication and less delayed and more coherent each day.  We will continue to improve and monitor her.  She is feeling as though she might be ready to discharge to the outpatient setting possibly at the end of this week or the beginning of next week.    Continue voluntary hospitalization potential discharge on Friday or early next week    The risks, benefits, alternatives and side effects have been discussed and are understood by the patient and other caregivers.      Fabian Cutler  [facility] Psychiatry      The following document has been created with voice recognition software and may contain unintentional word substitutions.    Non clinically relevant CMS requirements:  Clinical Global Impressions  First:  Considering your total clinical experience with this particular patient population, how severe are the patient's symptoms at this time?: 6 (10/01/18 1621)  Compared to the patient's condition at the START of treatment, this patient's condition is:: 4 (10/01/18 1621)  Most recent:  Considering your total clinical experience with this particular patient population, how severe are the patient's symptoms at this time?: 4 (10/09/18 1352)  Compared to the patient's condition at the START of treatment, this patient's condition is:: 3 (10/09/18 1352)

## 2018-10-11 PROCEDURE — G0177 OPPS/PHP; TRAIN & EDUC SERV: HCPCS

## 2018-10-11 PROCEDURE — 97127 ZZHC OT THERAPEUTIC INTERVENTION W/FOCUS ON COGNITIVE FUNCTION,EA 15 MIN: CPT | Mod: GO

## 2018-10-11 PROCEDURE — 25000132 ZZH RX MED GY IP 250 OP 250 PS 637: Performed by: PHYSICIAN ASSISTANT

## 2018-10-11 PROCEDURE — 12400007 ZZH R&B MH INTERMEDIATE UMMC

## 2018-10-11 PROCEDURE — 25000132 ZZH RX MED GY IP 250 OP 250 PS 637: Performed by: PSYCHIATRY & NEUROLOGY

## 2018-10-11 RX ADMIN — OLANZAPINE 15 MG: 15 TABLET, FILM COATED ORAL at 21:01

## 2018-10-11 RX ADMIN — ESTRADIOL 2 PATCH: 0.1 PATCH TRANSDERMAL at 09:36

## 2018-10-11 RX ADMIN — PROGESTERONE 200 MG: 200 CAPSULE ORAL at 09:35

## 2018-10-11 RX ADMIN — SPIRONOLACTONE 200 MG: 50 TABLET ORAL at 09:35

## 2018-10-11 ASSESSMENT — ACTIVITIES OF DAILY LIVING (ADL)
ORAL_HYGIENE: INDEPENDENT
LAUNDRY: WITH SUPERVISION
DRESS: INDEPENDENT
HYGIENE/GROOMING: INDEPENDENT

## 2018-10-11 NOTE — PROGRESS NOTES
"Pt reported her goal for the evening was to \"to stop thinking so much.\" Pt was visible in milieu throughout evening shift but remained withdrawn from peers. Upon check in pt presented a blunt, sad, affect. Pt continues to experience paranoia and delusional thoughts, for example, pt stated \"I feel disconnected from reality\" and \"I feel like things are happening to me that really arent. Like the T.V is making fun of me.\" Pt rated depression 5/10 and denied anxiety at time of check in but stated that throughout the day there were times when it was 8/10. Pt continues to endorse thoughts of SI stating \"it would just be easier if I wasn't here.\" Pt states that she doesn't have any plan or intent. Pt denies SIB. Pt reports poor appetite, stating \"I dont eat sometimes because Im not happy with my body.\" Pt reports poor sleep. Pt's mom and sister visited during visiting hours, visit went well.      10/10/18 2000   Behavioral Health   Hallucinations denies / not responding to hallucinations   Thinking delusional;paranoid;poor concentration   Orientation person: oriented;place: oriented;date: oriented;time: oriented   Memory baseline memory   Insight poor   Judgement impaired   Eye Contact at examiner   Affect sad;irritable;blunted, flat   Mood mood is calm;irritable;anxious;depressed;shame/guilt   Physical Appearance/Attire attire appropriate to age and situation   Hygiene well groomed   Suicidality thoughts only   1. Wish to be Dead Yes   Wish to be Dead Description (\"would just be easier if i wasnt here\")   2. Non-Specific Active Suicidal Thoughts  Yes   Self Injury (Denies SIB)   Elopement (No elopment concerns)   Activity withdrawn  (visible in milieu)   Speech coherent;clear   Medication Sensitivity no stated side effects;no observed side effects   Psychomotor / Gait steady;balanced   Activities of Daily Living   Hygiene/Grooming independent   Oral Hygiene independent   Dress independent   Laundry with supervision   Room " Organization independent

## 2018-10-11 NOTE — PLAN OF CARE
"Problem: Occupational Therapy Goals (Adult)  Goal: Occupational Therapy Goals  Pt will independently identify several coping skills to utilize to increase self-management upon discharge within 1 week.         10/11/18 1400   Occupational Therapy   Type of Intervention structured groups   Response Participates with cues/redirection   Hours 3     Pt attended 3 out of 3 occupational therapy groups this date. Pt attended a structured OT group with a focus on self-awareness and socialization. Pt appeared comfortable sharing positive personal information, however asked writer if she could \"pass\" on a few of the question prompts. Pt was respectful in listening and responding to peers. Pt identified \"I'm a calm person\" as a positive personal strength. Pt actively participated in occupational therapy clinic. Pt was able to ask for assistance as needed, and independently return to a novel, goal-directed task without difficulty. Pt demonstrated good focus, planning, and problem solving during task completion. Pt appeared comfortable interacting with peers and writer when conversation was initiated with her, however generally kept to herself. Writer initiated conversation with Pt regarding a similar interest on one occasion, and Pt appeared to be having difficulty tracking the conversation and required increased time to verbalize responses. Pt appeared to be preoccupied internally upon interaction at times. Pt actively participated in a structured occupational therapy group with a focus on positive affirmations to work towards increasing overall self-esteem. Pt completed about 20% of positive affirmation task independently, which involved filling in prompts with positive personal qualities. After attempting to fill in question prompts, Pt indicated to writer \"I need to leave now, but I'll keep working on these\". Writer observed Pt working on self-esteem task independently in the [facility], and provided Pt with an additional " written task that is focused on self-esteem building. Pt thanked writer and indicated she will work on completing both tasks. Pt presented as withdrawn and preoccupied with a flat affect this date.     GOAL REVIEW: care plan reviewed, plan/goal adjustment made to allow for further progress and additional opportunities for skill acquisition.

## 2018-10-11 NOTE — PROGRESS NOTES
"Patient has been declining wound care of applying Hydrogen Peroxide to suture lines on face BID.  \"The doctor told me I didn't need to do it.  "

## 2018-10-11 NOTE — PROGRESS NOTES
"   10/11/18 1100   Behavioral Health   Hallucinations denies / not responding to hallucinations   Thinking confused;distractable;delusional;poor concentration   Orientation person: oriented;place: oriented   Memory baseline memory   Insight poor   Judgement impaired   Eye Contact into space;out of corner of eyes;at examiner   Affect blunted, flat   Mood mood is calm   Physical Appearance/Attire attire appropriate to age and situation   Hygiene well groomed   Suicidality other (see comments)  (Pt denies SI)   1. Wish to be Dead No   2. Non-Specific Active Suicidal Thoughts  No   Self Injury other (see comment)  (Pt denies SIB)   Elopement (none observed)   Activity other (see comment)  (visible, social)   Speech clear;coherent   Medication Sensitivity no stated side effects   Psychomotor / Gait balanced;steady     Pt was observed in the milieu eating breakfast and lunch, sitting in the lounge, participating in groups, and socializing with peers.  Pt stated she would like to increase her medication because she is very afraid that her thoughts might harm others.  Pt was informed that the other Pts and staff are safe from her thoughts on the unit.  Pt looked relieved to hear this.  Pt stated that she feels like she \"needs to grow up a bit\".  Pt denies SI/SIB.  Pt denies AH/VH.  Pt rates depression at 0, anxiety at 3.  "

## 2018-10-12 PROCEDURE — 25000132 ZZH RX MED GY IP 250 OP 250 PS 637: Performed by: PSYCHIATRY & NEUROLOGY

## 2018-10-12 PROCEDURE — 25000132 ZZH RX MED GY IP 250 OP 250 PS 637: Performed by: PHYSICIAN ASSISTANT

## 2018-10-12 PROCEDURE — 99232 SBSQ HOSP IP/OBS MODERATE 35: CPT | Performed by: PSYCHIATRY & NEUROLOGY

## 2018-10-12 PROCEDURE — 12400007 ZZH R&B MH INTERMEDIATE UMMC

## 2018-10-12 PROCEDURE — G0177 OPPS/PHP; TRAIN & EDUC SERV: HCPCS

## 2018-10-12 PROCEDURE — 97127 ZZHC OT THERAPEUTIC INTERVENTION W/FOCUS ON COGNITIVE FUNCTION,EA 15 MIN: CPT | Mod: GO

## 2018-10-12 RX ADMIN — OLANZAPINE 15 MG: 15 TABLET, FILM COATED ORAL at 20:49

## 2018-10-12 RX ADMIN — SPIRONOLACTONE 200 MG: 50 TABLET ORAL at 09:46

## 2018-10-12 RX ADMIN — PROGESTERONE 200 MG: 200 CAPSULE ORAL at 09:46

## 2018-10-12 ASSESSMENT — ACTIVITIES OF DAILY LIVING (ADL)
DRESS: STREET CLOTHES;INDEPENDENT
GROOMING: INDEPENDENT
ORAL_HYGIENE: INDEPENDENT
LAUNDRY: WITH SUPERVISION
LAUNDRY: UNABLE TO COMPLETE
ORAL_HYGIENE: INDEPENDENT
GROOMING: INDEPENDENT
DRESS: INDEPENDENT

## 2018-10-12 NOTE — PROGRESS NOTES
"[facility], [place]   Psychiatric Progress Note  Janey Givens  [id number]  10/12/18    Chief Complaint: Continued medical care          Interim History:   The patient's care was discussed with the treatment team during the daily team meeting and/or staff's chart notes were reviewed.  Staff report patient has been positive meeting with family and going well and going to group activities and slept about 6 hours.    On meeting with her says that she is good going to groups and asking good questions about the outpatient programming she is going to go to.  Does have concerns that possibly the spirits or God and questioning of how that whole process works.  Does not believe that she is got currently and has not been hearing other people's thoughts recently.  She believes that she is a bad person for some reason and has not done any bad things 21.  Denies any sleep problems or side effects from the medications or any thoughts of harming herself or others or any hopelessness or helplessness attention or concentration issues or energy problems.  Denies any sadness and is somewhat anxious about her thoughts.  Believes her thoughts have improved greatly wants to continue the current medication dosage.         Medications:       estradiol  2 patch Transdermal Once per day on Mon Thu     estradiol biweekly   Transdermal Once per day on Mon Thu     estradiol biweekly   Transdermal Q8H     OLANZapine  15 mg Oral At Bedtime     progesterone  200 mg Oral Daily     spironolactone  200 mg Oral Daily          Allergies:     Allergies   Allergen Reactions     Banana Anaphylaxis     Throat swelling          Labs:   No results found for this or any previous visit (from the past 24 hour(s)).       Psychiatric Examination:     /71  Pulse 85  Temp 96.8  F (36  C) (Oral)  Resp 16  Ht 1.753 m (5' 9\")  Wt 75.5 kg (166 lb 6.4 oz)  SpO2 97%  BMI 24.57 kg/m2  Weight is 166 lbs 6.4 oz  Body mass index is " 24.57 kg/(m^2).                Sitting Orthostatic BP: 118/79      Sitting Orthostatic Pulse: 86 bpm      Standing Orthostatic BP: 116/76      Standing Orthostatic Pulse: 114 bpm       Weight over time:  Vitals:    09/28/18 2315 09/29/18 0325 09/30/18 0856 10/02/18 0734   Weight: 78 kg (172 lb) 75.8 kg (167 lb 1.6 oz) 76.3 kg (168 lb 3.2 oz) 76.8 kg (169 lb 4.8 oz)    10/04/18 0918 10/07/18 0818 10/09/18 0843 10/11/18 0923   Weight: 74.7 kg (164 lb 9.6 oz) 74.1 kg (163 lb 4.8 oz) 74.7 kg (164 lb 11.2 oz) 75.5 kg (166 lb 6.4 oz)       Orthostatic Vitals       Most Recent      Sitting Orthostatic /79 10/12 0837    Sitting Orthostatic Pulse (bpm) 86 10/12 0837    Standing Orthostatic /76 10/12 0837    Standing Orthostatic Pulse (bpm) 114 10/12 0837            Janey is a previously male transgender female with markings on her forehead and blond hair.  Her speech is less delayed.  Her behavior is somewhat slowed without other abnormal movements.  Her affect is smiling.  Her mood she describes as tired.  Her thought content consists of the above with having thoughts of harming self or not others and has delusional content as above.  Thought process is notable for thought blocking and delayed cognition.  She does not have looseness of association.  She may have abnormal perceptions.  Alert and aware of current location and circumstances.  Attention and concentration appears adequate.  Cognition and fund of knowledge appear currently limited.  Long-term/short-term/remote memory are limited.  Insight and judgment are both limited.         Precautions:     Behavioral Orders   Procedures     Code 1 - Restrict to Unit     Routine Programming     As clinically indicated     Status 15     Every 15 minutes.     Suicide precautions     Patients on Suicide Precautions should have a Combination Diet ordered that includes a Diet selection(s) AND a Behavioral Tray selection for Safe Tray - with utensils, or Safe Tray -  NO utensils            DIagnoses:     Schizophrenia  Gender dysphoria         Assessment & Plan:     Janey continues to have strange ideas and possible confusion.  She does repeat questions when asked them.  She seems to be tolerating the medications and was previously on a higher dose of olanzapine.  Potentially she needs 20 mg but might be requesting discharge sometime next week.  We will continue to monitor and hope for improved stability prior to discharge.    Continue voluntary hospitalization potential discharge next week    The risks, benefits, alternatives and side effects have been discussed and are understood by the patient and other caregivers.      Fabian Cutler  [facility]      The following document has been created with voice recognition software and may contain unintentional word substitutions.    Non clinically relevant CMS requirements:  Clinical Global Impressions  First:  Considering your total clinical experience with this particular patient population, how severe are the patient's symptoms at this time?: 6 (10/01/18 1621)  Compared to the patient's condition at the START of treatment, this patient's condition is:: 4 (10/01/18 1621)  Most recent:  Considering your total clinical experience with this particular patient population, how severe are the patient's symptoms at this time?: 4 (10/09/18 1352)  Compared to the patient's condition at the START of treatment, this patient's condition is:: 3 (10/09/18 1352)

## 2018-10-12 NOTE — PLAN OF CARE
"Problem: Psychotic Symptoms  Goal: Psychotic Symptoms  Signs and symptoms of listed problems will be absent or manageable.   Patient will demonstrate the following outcomes:  1. Adherence to safety considerations of self and others  2. Signs and symptoms will be absent or minimizedt:   A.  Cognitive Impairment   B. Sensory Perception Impairment    C. Psychomotor Movement Impairment   D. Mental State/Mood Impairment   E. Gratification/ Engagement Impairment   F. Social/ Functional Impairment   G. Self-Expression Impairment   H. Sleep Impairment  3. Optimization of Coping Skills Related to Life Stressors  4. Development of and participation in therapeutic alliance to support successful transition    10/12/18 1433   Psychotic Symptoms   Psychotic Symptoms Assessed all   Psychotic Symptoms Present none     Pt said, \" I think I'm ready for discharge.\" Pt has been active in the milieu for most of the shift.  Attended and participated in all unit activities. Her affect is flat, mood calm, but brightens upon approach. Pt rates her depression 3/10 and anxiety 5/10. Pt denies SI/ SIB and AH, and said \" I want to live.\"  She reports feeling safe and hopeful. Feels that her medications are working. Reports her concentration as ' pretty good \", her appetite and sleep as \" fair.\"  Took a shower this morning. No physical concerns reported or observed. For coping pt said  \" reminding myself I'm a human.\"  No medication side effects.   Plan: Status 15s; Build trust with pt. Continue to build on strengths. Encourage healthy coping.                     "

## 2018-10-12 NOTE — PROGRESS NOTES
Pt spent evening doing puzzles in the dining area. She dinner ate with peers. Her mom and sister visited, pt reported a positive visit. Pt stated she is feeling hopeful. She rated depression at a 5 and anxiety at a 2 (scale 1-10).         10/11/18 2100   Behavioral Health   Hallucinations denies / not responding to hallucinations   Thinking confused;distractable   Orientation person: oriented;place: oriented;date: oriented   Memory baseline memory   Insight poor   Judgement impaired   Eye Contact at examiner;into space   Affect blunted, flat   Mood mood is calm   Physical Appearance/Attire attire appropriate to age and situation   Hygiene well groomed   Suicidality other (see comments)  (pt denies)   1. Wish to be Dead No   2. Non-Specific Active Suicidal Thoughts  No   Self Injury other (see comment)  (pt denies )   Elopement (no behaviors observed on this shift )   Activity other (see comment)  (visible in milieu, social w/ peers )   Speech clear;coherent   Psychomotor / Gait balanced;steady   Activities of Daily Living   Hygiene/Grooming independent   Oral Hygiene independent   Dress independent   Laundry with supervision   Room Organization independent

## 2018-10-12 NOTE — PLAN OF CARE
"Problem: Occupational Therapy Goals (Adult)  Goal: Occupational Therapy Goals  Pt will independently identify several coping skills to utilize to increase self-management upon discharge within 1 week.         10/12/18 1400   Occupational Therapy   Type of Intervention structured groups   Response Participates   Hours 3     Pt attended 3 out of 3 occupational therapy groups this date. Pt actively participated in a structured occupational therapy group involving writing a personal progress note for the week. Pt identified \"Thinking I was a God. Buying into my Violet Complex\" as symptoms and events leading to hospitalization. Pt reported feeling \"Better/same. I know that I'm not in charge of the universe anymore\". Pt identified \"Honestly with yourself, knowing you're not in charge of anything, still trying to be a person who believes in God. Not trust my own thoughts/memories. Knowing my reality is the one with the most love\" as interventions that have worked to manage their symptoms. Pt wrote the following goals for discharge: \"Stop thinking my thoughts will have any change on the world\". Pt actively participated in a mental health management group with a focus on coping through movement to facilitate relaxation and stress management via chair yoga. Pt followed and engaged in the yoga poses, and verbalized feeling calmer at the end of group. At the end of group, Pt asked writer \"do you believe in history?\" and began to describe her reality of how the universe was created (this explantation was disorganized and difficult for writer to follow). Pt also expressed feelings of guilt regarding other people worrying about her and indicated she feels that other people can read her thoughts, but that she knows this is not accurate. Pt reported feeling that she thinks she might be the devil, and wishes she could be a \"normal person\" without all of these stressors. Writer encouraged Pt to journal/write down her thoughts to " assist her in organizing/expressing them to others. Pt actively participated in occupational therapy clinic. Pt was able to ask for assistance as needed, and independently returned to a novel, goal-directed task without diffiuclty. Pt demonstrated good focus, planning, and problem solving during task completion. Pt appeared comfortable interacting with peers and writer, however generally kept to herself and appeared focused on her selected task. Pt demonstrated an increase in her ability to communicate her thoughts/needs to writer this date, and was pleasant and engaged. Pt generally had a flat affect, however was observed to smile on occasion upon interaction.

## 2018-10-13 PROCEDURE — 25000132 ZZH RX MED GY IP 250 OP 250 PS 637: Performed by: PSYCHIATRY & NEUROLOGY

## 2018-10-13 PROCEDURE — 25000132 ZZH RX MED GY IP 250 OP 250 PS 637: Performed by: PHYSICIAN ASSISTANT

## 2018-10-13 PROCEDURE — 12400007 ZZH R&B MH INTERMEDIATE UMMC

## 2018-10-13 PROCEDURE — G0177 OPPS/PHP; TRAIN & EDUC SERV: HCPCS

## 2018-10-13 RX ADMIN — PROGESTERONE 200 MG: 200 CAPSULE ORAL at 08:52

## 2018-10-13 RX ADMIN — OLANZAPINE 15 MG: 15 TABLET, FILM COATED ORAL at 21:03

## 2018-10-13 RX ADMIN — SPIRONOLACTONE 200 MG: 50 TABLET ORAL at 08:52

## 2018-10-13 ASSESSMENT — ACTIVITIES OF DAILY LIVING (ADL)
ORAL_HYGIENE: INDEPENDENT
GROOMING: INDEPENDENT
DRESS: INDEPENDENT

## 2018-10-13 NOTE — PROGRESS NOTES
Pt was in the milieu.  Kept to herself.  Mom + sister came to visit.  Pt was pleasant upon approach.  Pt denies anx, dep, SI, SIB.  Is not aware of current tx plan but did voice desire to move on.  Would prefer to go home.

## 2018-10-13 NOTE — PLAN OF CARE
Problem: Occupational Therapy Goals (Adult)  Goal: Occupational Therapy Goals  Pt will demonstrate in an increase in therapeutic programming participation for insight development and coping skill exploration as evidenced by attending at least 4 schedule OT groups within 1 week.     Occupational Therapy Daily Progress Note     Date of Groups: 10/13/18    Groups Attended: OT leisure, weekend    Affect/Hygiene Presentation:  Flat affect, disheveled appearance, post-op hand wrap initiated by staff    Patient Performance/Response: Patient attended with encouragement from peer. Therapeutic intervention provided structured group task to promote leisure and coping on the unit while eliciting cognitive, emotional, and social interaction skills. Patient required some prompting to track turns but demonstrated comprehension of task concept. She verbalized monotone statements of positivity when she did well.     Goal Progress: Attended unit programming for duration of group.     Plan: Patient will be encouraged to maintain group attendance for continued ongoing assessment and support in completion of occupational therapy treatment goals.

## 2018-10-13 NOTE — PLAN OF CARE
Problem: Overarching Goals (Adult)  Goal: Optimized Coping Skills in Response to Life Stressors  Outcome: Therapy, progress toward functional goals is gradual  Patient has been up and visible in the milieu.  Affect is flat.  Attended unit programming.  Wearing post-op head band.  Minimally conversant during 1:1.  Quite hesitant when answering questions--eventually stated she was not suicidal this morning.  She stated she would let staff know if she started to have SI thoughts.  Rates her depression as 7 (10 worst).  Is not sure if her mood is improving since admit.    States she sometimes feels afraid that people know she is a woman.   Denies hallucinations--thinking is linear and organized.  Denies concerns regarding sleep, appetite, medication side effects.  Continuing SIO.

## 2018-10-13 NOTE — PROVIDER NOTIFICATION
Pt is male to female transgender who had a facial feminization surgery in her face. Pt has a head device which she uses at bed time. Such device poses a ligature risk per policy, but pt is adamant that she must use it at bed time. Since pt was admitted for suicidal ideation, a 24-SIO order obtained so pt can wear her head device at bed time.

## 2018-10-13 NOTE — PROGRESS NOTES
"Pt asked staff to write a note that she has been having paranoid thoughts about people reading her mind, thoughts that she is god or the antichrist, and trouble connecting with things happening on the unit. Pt says she doesn't feel like herself and would like to talk with provider about her official diagnosis. Pt says she has trouble remembering to tell doctor these things, staff encouraged her to talk to assigned staff and write down issues in a journal so she doesn't forget. Pt said she will document when she has paranoid or delusional thoughts during the day in her journal because she says it changes often. Pt is concerned medication will make her feel \"slow\" or tired like past medication doses. Pt would like to wear facial surgery head band at night.  "

## 2018-10-14 PROCEDURE — 12400007 ZZH R&B MH INTERMEDIATE UMMC

## 2018-10-14 PROCEDURE — 25000132 ZZH RX MED GY IP 250 OP 250 PS 637: Performed by: PSYCHIATRY & NEUROLOGY

## 2018-10-14 PROCEDURE — G0177 OPPS/PHP; TRAIN & EDUC SERV: HCPCS

## 2018-10-14 PROCEDURE — 25000132 ZZH RX MED GY IP 250 OP 250 PS 637: Performed by: PHYSICIAN ASSISTANT

## 2018-10-14 RX ADMIN — PROGESTERONE 200 MG: 200 CAPSULE ORAL at 09:24

## 2018-10-14 RX ADMIN — OLANZAPINE 15 MG: 15 TABLET, FILM COATED ORAL at 21:00

## 2018-10-14 RX ADMIN — SPIRONOLACTONE 200 MG: 50 TABLET ORAL at 09:23

## 2018-10-14 ASSESSMENT — ACTIVITIES OF DAILY LIVING (ADL)
GROOMING: INDEPENDENT
ORAL_HYGIENE: INDEPENDENT
GROOMING: INDEPENDENT
ORAL_HYGIENE: INDEPENDENT
DRESS: SCRUBS (BEHAVIORAL HEALTH)
DRESS: SCRUBS (BEHAVIORAL HEALTH);INDEPENDENT
LAUNDRY: WITH SUPERVISION

## 2018-10-14 NOTE — PROGRESS NOTES
Patient denies DEP/ANX/SI/SIB. States she has been having some paranoid thought but less then earlier.  Eating and sleeping fine.  Calm and cooperative.  No Side effects from meds.

## 2018-10-14 NOTE — PROGRESS NOTES
10/14/18 1448   Behavioral Health   Hallucinations denies / not responding to hallucinations   Thinking poor concentration;distractable   Orientation person: oriented;place: oriented   Memory baseline memory   Insight poor   Judgement impaired   Eye Contact at examiner   Affect blunted, flat   Mood mood is calm   Physical Appearance/Attire other (see comment)  (fair)   Hygiene (fair)   Suicidality (pt denies)   1. Wish to be Dead No  (pt denies)   2. Non-Specific Active Suicidal Thoughts  No  (pt denies)   Self Injury (pt denies)   Elopement (none observed.)   Activity withdrawn;isolative   Speech clear   Medication Sensitivity no stated side effects;no observed side effects   Psychomotor / Gait balanced;steady   Psycho Education   Type of Intervention 1:1 intervention   Response participates with encouragement   Hours 0.5   Treatment Detail (1:1 check in)   Activities of Daily Living   Hygiene/Grooming independent   Oral Hygiene independent   Dress scrubs (behavioral health);independent   Room Organization independent   Behavioral Health Interventions   Psychotic Symptoms maintain safety precautions;monitor need to revise level of observation;maintain safe secure environment;reality orientation;simple, clear language;decrease environmental stimulation;redirection of intrusive behaviors;assist patient in following safety plan;assist patient in developing safety plan;redirection of aggressive behaviors;encourage nutrition and hydration;encourage participation / independence with adls;build upon strengths;assist with developing & utilizing healthy coping strategies;establish therapeutic relationship;provide emotional support   Social and Therapeutic Interventions (Psychotic Symptoms) encourage socialization with peers;encourage effective boundaries with peers;encourage participation in therapeutic groups and milieu activities   Pt denies SI/SIB. During the check in, she reported that she is not god and that she  should not think she is god. During the day shift, she was present in the milieu and had visitors. Pt denies self harm and rates depression as a 3/10 and anxiety is 4/10. She also denies a wish to die.  Day shift was otherwise unremarkable.

## 2018-10-14 NOTE — PROVIDER NOTIFICATION
Pt and her father say that Janey has an appointment on 12/15/18 at 2:00 pm [facility] and wonder they would like this appointment rescheduled in the event she is still here.

## 2018-10-14 NOTE — PLAN OF CARE
Problem: Occupational Therapy Goals (Adult)  Goal: Occupational Therapy Goals  Pt will independently identify several coping skills to utilize to increase self-management upon discharge within 1 week.      Outcome: Therapy, progress toward functional goals as expected  OT pt maintains flat affect, though engaged in complex problem solving and multi-step task with focus and attention which appeared difficult in past OT groups, however pt demonstrated strong working memory though remains quiet.  this group utilizes a familiar format and functional activity to engage and facilitate patients to participate in discussion through a game activity. This group utilizes a discussion regarding coping skills and tools available to manage mental health symptoms and pursue positive mental health. During the group, patients

## 2018-10-14 NOTE — PROGRESS NOTES
Spironolactone meds were 2 different manufactures--patient expressed some concern after she took them, but was reassured the meds were the same medication.

## 2018-10-15 VITALS
DIASTOLIC BLOOD PRESSURE: 79 MMHG | HEIGHT: 69 IN | HEART RATE: 80 BPM | BODY MASS INDEX: 24.78 KG/M2 | OXYGEN SATURATION: 99 % | TEMPERATURE: 96.9 F | WEIGHT: 167.3 LBS | SYSTOLIC BLOOD PRESSURE: 142 MMHG | RESPIRATION RATE: 16 BRPM

## 2018-10-15 PROCEDURE — 25000132 ZZH RX MED GY IP 250 OP 250 PS 637: Performed by: PHYSICIAN ASSISTANT

## 2018-10-15 PROCEDURE — 97127 ZZHC OT THERAPEUTIC INTERVENTION W/FOCUS ON COGNITIVE FUNCTION,EA 15 MIN: CPT | Mod: GO

## 2018-10-15 PROCEDURE — 99239 HOSP IP/OBS DSCHRG MGMT >30: CPT | Performed by: PSYCHIATRY & NEUROLOGY

## 2018-10-15 PROCEDURE — G0177 OPPS/PHP; TRAIN & EDUC SERV: HCPCS

## 2018-10-15 RX ORDER — HYDROXYZINE HYDROCHLORIDE 25 MG/1
25 TABLET, FILM COATED ORAL EVERY 4 HOURS PRN
Qty: 120 TABLET | Refills: 0 | Status: SHIPPED | OUTPATIENT
Start: 2018-10-15 | End: 2019-01-02

## 2018-10-15 RX ORDER — OLANZAPINE 15 MG/1
15 TABLET ORAL AT BEDTIME
Qty: 30 TABLET | Refills: 0 | Status: SHIPPED | OUTPATIENT
Start: 2018-10-15 | End: 2018-11-08

## 2018-10-15 RX ADMIN — PROGESTERONE 200 MG: 200 CAPSULE ORAL at 09:00

## 2018-10-15 RX ADMIN — SPIRONOLACTONE 200 MG: 50 TABLET ORAL at 09:00

## 2018-10-15 RX ADMIN — ESTRADIOL 2 PATCH: 0.1 PATCH TRANSDERMAL at 09:01

## 2018-10-15 ASSESSMENT — ACTIVITIES OF DAILY LIVING (ADL)
ORAL_HYGIENE: INDEPENDENT
LAUNDRY: WITH SUPERVISION
DRESS: INDEPENDENT
GROOMING: INDEPENDENT

## 2018-10-15 NOTE — DISCHARGE INSTRUCTIONS
Behavioral Discharge Planning and Instructions      Summary:  You were admitted on 9/28/2018  due to Psychotic Symptomology.  You were treated by Dr. Fabian Johnson MD and discharged on 10/15/18 from [facility].     Principal Diagnosis: Schizophrenia     Health Care Follow-up Appointments and Resources:   [facility]  [facility], [address]   [address] [place] [address]   Office: [phone]  *You have a initial screening for the First Episode Psychosis Programming on Friday, October 19th at 9:00 am.    [facility]   [address] [place] [address]  Phone: [phone]  Attending Provider: Dr. Ariana Andrea MD   *You have a post-hospitalization visit with Dr. Ariana Andrea MD scheduled for Thursday, October 25th at 8:20 am.    Resource:  [facility]   [address]   [address]  [place] [address]  Phone: [phone]    Attend all scheduled appointments with your outpatient providers. Call at least 24 hours in advance if you need to reschedule an appointment to ensure continued access to your outpatient providers.   Major Treatments, Procedures and Findings:  You were provided with: a psychiatric assessment, assessed for medical stability, medication evaluation and/or management, group therapy and milieu management    Symptoms to Report: feeling more aggressive, increased confusion, losing more sleep, mood getting worse or thoughts of suicide    Early warning signs can include: increased depression or anxiety sleep disturbances increased thoughts or behaviors of suicide or self-harm  increased unusual thinking, such as paranoia or hearing voices    Safety and Wellness:  Take all medicines as directed.  Make no changes unless your doctor suggests them.      Follow treatment recommendations.  Refrain from alcohol and non-prescribed drugs.  Ask your  support system to help you reduce your access to items that could harm yourself or others. If there is a concern for safety, call 911.    Resources:   Crisis Intervention: 241.212.2005 or 368-538-5898 (TTY: 655.752.1826).  Call anytime for help.  National [employer] on Mental Illness (www.[place].diane.org): 534.146.4242 or 598-384-3250.  Suicide Awareness Voices of Education (SAVE) (www.save.org): 387-525-VPGG (4015)  National Suicide Prevention Line (www.mentalhealthmn.org): 060-348-EZVK (8457)  Mental Health Consumer/Survivor Network of [place] (www.mhcsn.net): 173.646.7122 or 596-533-8169  Mental Health Association of [place] (www.mentalhealth.org): 776.180.1791 or 570-619-4990  [facility] (COPE) Response - Adult [phone]    The treatment team has appreciated the opportunity to work with you.     Please take care and make your recovery a daily recovery.   If you have any questions or concerns our unit number is [phone].  Thank you.

## 2018-10-15 NOTE — DISCHARGE SUMMARY
[facility], [place]   Psychiatric Discharge Summary  Time: 37 minutes on encounter.    Janey Givens MRN# [id number]   Age: 23 year old YOB: 1995     Date of Admission:  9/28/2018  Date of Discharge:  10/15/18  Admitting Physician:  Fabian Johnson  Discharge Physician:  Fabian Johnson         Event Leading to Hospitalization and Hospital Course:   Janey Givens was admitted for psychotic symptoms and catatonia.       See Admission note by Kendall on 9/29 for additional details.     Janey Givens was hospitalized voluntarily for psychotic symptoms and catatonia.  Previous medication that was helpful was started called olanzapine and titrated up to 15 mg at night.  She may need dosages higher than this as she continues to have some psychotic and delusional symptoms though is requesting discharge from the hospital.  Is not felt to be holdable at this point in time and not felt to need commitment.  Has been accepting of intervention going to group activities and taking medication appropriately.  Has improved significantly during hospitalization no more catatonia symptoms improved speech and articulation have been seen and less disorganized thoughts.  On day of discharge she feels good still having some believes that she is got at times but usually dismisses them.  Denies any sleep problems side effects from the medications any hallucinations or paranoia and would like to stay at 15 mg if possible.  Denies any attention or concentration issues thoughts of harming herself or others or any hopelessness or helplessness and is future oriented planning to go to the outpatient programming.  We discussed safety planning in detail as well as possibility of increasing the olanzapine in about 1-1/2 weeks.    She will be starting the first episode psychosis program.           DIagnoses:   Schizophrenia  Gender dysphoria         Labs:   No results found for this or any  previous visit (from the past 24 hour(s)).         Consults:   No consultations were requested during this admission           Discharge Medications:        Review of your medicines      START taking       Dose / Directions    OLANZapine 15 MG tablet   Commonly known as:  zyPREXA   Used for:  Disorganized schizophrenia (H)        Dose:  15 mg   Take 1 tablet (15 mg) by mouth At Bedtime   Quantity:  30 tablet   Refills:  0         CONTINUE these medicines which may have CHANGED, or have new prescriptions. If we are uncertain of the size of tablets/capsules you have at home, strength may be listed as something that might have changed.       Dose / Directions    hydrOXYzine 25 MG tablet   Commonly known as:  ATARAX   This may have changed:    - how much to take  - when to take this  - reasons to take this   Used for:  Anxiety        Dose:  25 mg   Take 1 tablet (25 mg) by mouth every 4 hours as needed for anxiety   Quantity:  120 tablet   Refills:  0         CONTINUE these medicines which have NOT CHANGED       Dose / Directions    estradiol 0.1 MG/24HR BIW patch   Commonly known as:  VIVELLE-DOT   Used for:  Gender dysphoria in adult        APPLY TWO PATCHES TWICE A WEEK   Quantity:  16 patch   Refills:  3       ibuprofen 200 MG tablet   Commonly known as:  ADVIL/MOTRIN        Dose:  200-400 mg   Take 200-400 mg by mouth every 4 hours as needed for mild pain   Refills:  0       Melatonin 10 MG Tabs tablet        Dose:  10 mg   Take 10 mg by mouth nightly as needed for sleep   Refills:  0       progesterone 200 MG capsule   Commonly known as:  PROMETRIUM   Used for:  Gender dysphoria in adult        Dose:  200 mg   Take 1 capsule (200 mg) by mouth daily   Quantity:  30 capsule   Refills:  0       spironolactone 100 MG tablet   Commonly known as:  ALDACTONE   Used for:  Gender dysphoria in adult        TAKE TWO TABLETS BY MOUTH EVERY DAY   Quantity:  60 tablet   Refills:  2            Where to get your medicines       These medications were sent to [place], [place] - [address]  [address], [facility] [address]     Phone:  [phone]      hydrOXYzine 25 MG tablet     OLANZapine 15 MG tablet                  Mental Status Examination:   Janey is a previously male transgender female with markings on her forehead and blond hair.  Her speech is improved without delay but she will sometimes repeat the question.  Her behavior is somewhat slowed without other abnormal movements.  Her affect is smiling.  Her mood she describes as good.  Her thought content consists of the above without having thoughts of harming self or not others and has delusional content as above.  Thought process is notable for less thought blocking and delayed cognition.  She does not have looseness of association.  She may have abnormal perceptions.  Alert and aware of current location and circumstances.  Attention and concentration appears adequate.  Cognition and fund of knowledge appear currently limited.  Long-term/short-term/remote memory are limited.  Insight and judgment are both limited.         Discharge Plan:     Reason for your hospital stay   Schizophrenia     Activity   Your activity upon discharge: activity as tolerated     Discharge Instructions   1. Please do not harm yourself or others.  2. Please continue to take your medications.  3. Please follow up with your outpatient care team.  4. Please do not take drugs or alcohol as this will worsen your condition.  5. Please do not take more than the prescribed doses of medications as this may make them dangerous.   6. Please follow your safety plan of action.  7. Please call crisis if having trouble.  8. If having thoughts of harming self or others please come in to the emergency department as soon as possible.     Full Code     Diet   Follow this diet upon discharge: Orders Placed This Encounter     Snacks/Supplements Adult: Boost Plus; With Meals      Regular Diet Adult             Further instructions from your care team        Behavioral Discharge Planning and Instructions      Summary:  You were admitted on 9/28/2018  due to Psychotic Symptomology.  You were treated by Dr. Fabian Johnson MD and discharged on 10/15/18 from [facility] Home.     Principal Diagnosis: Schizophrenia     Health Care Follow-up Appointments and Resources:   [facility] First Episode Psychosis Program  [facility], [address]   [address] [place] [address]   Office: [phone]  *You have a initial screening for the First Episode Psychosis Programming on Friday, October 19th at 9:00 am.    [facility]   [address] [place] [address]  Phone: [phone]  Attending Provider: Dr. Ariana Andrea MD   *You have a post-hospitalization visit with Dr. Ariana Andrea MD scheduled for Thursday, October 25th at 8:20 am.    Resource:  [facility]   [address]   [address]  [place] [address]  Phone: [phone]    Attend all scheduled appointments with your outpatient providers. Call at least 24 hours in advance if you need to reschedule an appointment to ensure continued access to your outpatient providers.   Major Treatments, Procedures and Findings:  You were provided with: a psychiatric assessment, assessed for medical stability, medication evaluation and/or management, group therapy and milieu management    Symptoms to Report: feeling more aggressive, increased confusion, losing more sleep, mood getting worse or thoughts of suicide    Early warning signs can include: increased depression or anxiety sleep disturbances increased thoughts or behaviors of suicide or self-harm  increased unusual thinking, such as paranoia or hearing voices    Safety and Wellness:  Take all medicines as directed.  Make no changes unless your doctor suggests them.      Follow treatment  recommendations.  Refrain from alcohol and non-prescribed drugs.  Ask your support system to help you reduce your access to items that could harm yourself or others. If there is a concern for safety, call 911.    Resources:   Crisis Intervention: 830.348.2397 or 286-108-4013 (TTY: 841.281.6847).  Call anytime for help.  National [place] on Mental Illness (www.[place].diane.org): 133.383.8197 or 874-260-0510.  Suicide Awareness Voices of Education (SAVE) (www.save.org): 259-322-MFIV (4073)  National Suicide Prevention Line (www.mentalhealthmn.org): 074-636-BZRQ (2200)  Mental Health Consumer/Survivor Network of [place] (www.mhcsn.net): 343.299.9042 or 640-989-6914  Mental Health Association of [place] (www.mentalhealth.org): 141.349.1711 or 281-060-6220  [facility] Crisis (COPE) Response - Adult [phone]    The treatment team has appreciated the opportunity to work with you.     Please take care and make your recovery a daily recovery.   If you have any questions or concerns our unit number is [phone].  Thank you.               Please send copy to Ariana Andrea    During hospitalizations patients have perpetuating, complicating, situational, acute, and chronic conditions that lead to risk for suicidality or homicidality. During hospitalizations we mitigate any modifiable risk factors that may have been identified in the history and physical examination and throughout the hospitalization. Chronic non-modifiable risk factors are not able to be changed with acute hospitalization. As a patient that is discharging these risk factors have been modified as much as possible and a supportive network and safety plan has been put in place. Further modifications and assistance will have to be obtained in the outpatient setting and the inpatient hospitalization team is no longer responsible for outcomes.    Fabian Cutler  [facility]      The following document has been created with voice recognition  software and may contain unintentional word substitutions.      Non clinically relevant CMS requirements:  Clinical Global Impressions  First:  Considering your total clinical experience with this particular patient population, how severe are the patient's symptoms at this time?: 6 (10/01/18 1621)  Compared to the patient's condition at the START of treatment, this patient's condition is:: 4 (10/01/18 1621)  Most recent:  Considering your total clinical experience with this particular patient population, how severe are the patient's symptoms at this time?: 3 (10/15/18 1458)  Compared to the patient's condition at the START of treatment, this patient's condition is:: 3 (10/15/18 1458)

## 2018-10-15 NOTE — PROGRESS NOTES
"Pt remains on SIO this shift. Pt has been visible and social. Noted a full range affect when engaged in conversation, otherwise presents with a flat and blunted affect. Pt continues to endorse some delusional thinking as reports still have few  instances during the day when she feels like she's \"God.\" Pt also endorses some AH, describing them as \"feelings that turn into words, almost like people are trying to talk to me.\" Rates depression and anxiety both at a 3/10. Reports mood and thought process is getting better. Still presents some what distractible at times. Speech and responses much spontaneous. Denies current SI/SIB. Behavior have been appropriate. Reports good sleep and appetite. No adverse e/e from medications.     10/14/18 2143   Behavioral Health   Hallucinations auditory;other (see comment)  (c/o feelings that turns into words as if people are trying t)   Thinking distractable;poor concentration   Orientation person: oriented;place: oriented;date: oriented   Memory baseline memory   Insight poor   Judgement impaired   Eye Contact at examiner   Affect blunted, flat;other (see comments)  (Brightens when engaged in conversation)   Mood mood is calm   Physical Appearance/Attire neat;other (see comment)   Hygiene other (see comment)  (adequate)   1. Wish to be Dead No   2. Non-Specific Active Suicidal Thoughts  No   3. Active Sucidal Ideation with any Methods (Not Plan) Without Intent to Act  No   4. Active Suicidal Ideation with Some Intent to Act, Without Specific Plan  No   5. Active Suicidal Ideation with Specific Plan and Intent  No   Activity isolative;withdrawn   Speech clear;coherent   Medication Sensitivity no observed side effects;no stated side effects   Psychomotor / Gait balanced;steady   Safety   Suicidality Status 15;SIO (Status Individual Observation)  (NOTE - order will specify distance;Behavioral scrubs (pajamas)   Coping/Psychosocial   Verbalized Emotional State " anxiety;depression;hopelessness;other (see comments)   Plan Of Care Reviewed With patient   Activities of Daily Living   Hygiene/Grooming independent   Oral Hygiene independent   Dress scrubs (behavioral health)   Laundry with supervision   Room Organization independent   Activity   Activity Assistance Provided independent

## 2018-10-15 NOTE — PROGRESS NOTES
Writer met with Pt to discuss discharge planning. Explained Pt will meet with RN to discuss discharge instructions/AVS. Pt appeared somewhat confused about the concept of discharging, Writer attempted to explain. Pt expressed understanding after some explanation. Pt reported she would like a post-hospitalization visit with PCP scheduled, but needs it to be on Thursdays. Writer reported Pt's intake for First Episode Psychosis programming, and explained what services may be offered to Pt at this intake appointment.   Writer scheduled the following appointment for Pt, information also located on AVS:     [facility]   [address] [place] [address]  Phone: [phone]  Attending Provider: Dr. Ariana Andrea MD   *You have a post-hospitalization visit with Dr. Ariana Andrea MD scheduled for Thursday, October 25th at 8:20 am.

## 2018-10-15 NOTE — PLAN OF CARE
Problem: Psychotic Symptoms  Goal: Psychotic Symptoms  Signs and symptoms of listed problems will be absent or manageable.   Patient will demonstrate the following outcomes:  1. Adherence to safety considerations of self and others  2. Signs and symptoms will be absent or minimizedt:   A.  Cognitive Impairment   B. Sensory Perception Impairment    C. Psychomotor Movement Impairment   D. Mental State/Mood Impairment   E. Gratification/ Engagement Impairment   F. Social/ Functional Impairment   G. Self-Expression Impairment   H. Sleep Impairment  3. Optimization of Coping Skills Related to Life Stressors  4. Development of and participation in therapeutic alliance to support successful transition   Outcome: Adequate for Discharge Date Met: 10/15/18  Patient discharged at 1700. Reviewed and verbalized understanding of discharge instructions. Pt discharged with copy of AVS, 30 day supply of medication, and all her belongings.

## 2018-10-15 NOTE — PLAN OF CARE
Problem: Occupational Therapy Goals (Adult)  Goal: Occupational Therapy Goals  Pt will independently identify several coping skills to utilize to increase self-management upon discharge within 1 week.         10/15/18 [facility]   Occupational Therapy   Type of Intervention structured groups   Response Initiates, socially acceptable   Hours 3     Pt attended 3 out of 3 occupational therapy groups this date. Pt actively participated in occupational therapy clinic. Pt was able to ask for assistance as needed, and independently return to a novel, goal-directed task without difficulty. Pt demonstrated good focus, planning, and problem solving during task completion. Pt appeared comfortable interacting with peers and writer, and initiated conversation on multiple occasions during clinic. Pt actively participated in a mental health management group with a focus on coping through movement to facilitate relaxation and stress management via chair yoga. Pt followed and engaged in the yoga poses, and verbalized feeling calmer at the end of group. Pt was pleasant and engaged with a congruent affect this date.

## 2018-10-17 ENCOUNTER — PATIENT OUTREACH (OUTPATIENT)
Dept: FAMILY MEDICINE | Facility: CLINIC | Age: 23
End: 2018-10-17

## 2018-10-17 NOTE — PROGRESS NOTES
Attempted to reach patient to follow up after recent hospitalization, unable to reach. Left VM with name and call back number.    Patient was admitted to [facility] for psychosis and catatonia, discharged 10/15/2018.    FD: If patient calls back, please schedule hospital follow up visit, then transfer to RN.    Aleah Nguyễn RN

## 2018-10-19 ENCOUNTER — OFFICE VISIT (OUTPATIENT)
Dept: PSYCHIATRY | Facility: CLINIC | Age: 23
End: 2018-10-19
Payer: COMMERCIAL

## 2018-10-19 DIAGNOSIS — F29 PSYCHOSIS, UNSPECIFIED PSYCHOSIS TYPE (H): Primary | ICD-10-CM

## 2018-10-19 NOTE — MR AVS SNAPSHOT
After Visit Summary   10/19/2018    Janey Givens    MRN: [id number]           Patient Information     Date Of Birth          1995        Visit Information        Provider Department      10/19/2018 9:00 AM Nia Fernandez LGSW [facility]         Follow-ups after your visit        Your next 10 appointments already scheduled     Oct 25, 2018  8:20 AM CDT   Return Visit with [facility] ([facility])    [address],  [address]  [place] [address]   [phone]              Who to contact     Please call your clinic at [phone] to:    Ask questions about your health    Make or cancel appointments    Discuss your medicines    Learn about your test results    Speak to your doctor            Additional Information About Your Visit        MyChart Information     ArmedZillat is an electronic gateway that provides easy, online access to your medical records. With Code42, you can request a clinic appointment, read your test results, renew a prescription or communicate with your care team.     To sign up for ArmedZillat visit the website at [email or web address]   You will be asked to enter the access code listed below, as well as some personal information. Please follow the directions to create your username and password.     Your access code is: [email or web address]  Expires: 2019  4:13 PM     Your access code will  in 90 days. If you need help or a new code, please contact your [facility] or call [phone] for assistance.        Care EveryWhere ID     This is your Care EveryWhere ID. This could be used by other organizations to access your [place] medical records  [id number]         Blood Pressure from Last 3 Encounters:   10/14/18 142/79   18 125/75   18 127/78    Weight from Last 3 Encounters:   10/14/18 167 lb 4.8 oz (75.9 kg)   18 175 lb 12.8 oz (79.7 kg)   18 180 lb  (81.6 kg)              Today, you had the following     No orders found for display       Primary Care Provider Office Phone # Fax #    Ariana VINCENT [phone]       [address] [address]        Equal Access to Services     MARIALUISASAGE ALFONSO : Hadii aad ku hadchiquiso Soomaali, waaxda luqadaha, qaybta kaalmada adeegyada, waxay idiin hayaan adechloe quiroz lamathieu adair. So [facility] [phone].    ATENCIÓN: Si habla español, tiene a alarcon disposición servicios gratuitos de asistencia lingüística. Llame al [phone].    We comply with applicable federal civil rights laws and [place] laws. We do not discriminate on the basis of race, color, national origin, age, disability, sex, sexual orientation, or gender identity.            Thank you!     Thank you for choosing [facility]  for your care. Our goal is always to provide you with excellent care. Hearing back from our patients is one way we can continue to improve our services. Please take a few minutes to complete the written survey that you may receive in the mail after your visit with us. Thank you!             Your Updated Medication List - Protect others around you: Learn how to safely use, store and throw away your medicines at www.disposemymeds.org.          This list is accurate as of 10/19/18 11:28 AM.  Always use your most recent med list.                   Brand Name Dispense Instructions for use Diagnosis    estradiol 0.1 MG/24HR BIW patch    VIVELLE-DOT    16 patch    APPLY TWO PATCHES TWICE A WEEK    Gender dysphoria in adult       hydrOXYzine 25 MG tablet    ATARAX    120 tablet    Take 1 tablet (25 mg) by mouth every 4 hours as needed for anxiety    Anxiety       ibuprofen 200 MG tablet    ADVIL/MOTRIN     Take 200-400 mg by mouth every 4 hours as needed for mild pain        Melatonin 10 MG Tabs tablet      Take 10 mg by mouth nightly as needed for sleep        OLANZapine 15 MG tablet    zyPREXA    30 tablet     Take 1 tablet (15 mg) by mouth At Bedtime    Disorganized schizophrenia (H)       progesterone 200 MG capsule    PROMETRIUM    30 capsule    Take 1 capsule (200 mg) by mouth daily    Gender dysphoria in adult       spironolactone 100 MG tablet    ALDACTONE    60 tablet    TAKE TWO TABLETS BY MOUTH EVERY DAY    Gender dysphoria in adult

## 2018-10-23 NOTE — PROGRESS NOTES
Second attempt to reach. Patient has upcoming appointment scheduled with Dr. Andrea on 10/25/18. Left VM confirming upcoming appointment as well as name and call back number.    Aleah Nguyễn RN

## 2018-10-25 ENCOUNTER — OFFICE VISIT (OUTPATIENT)
Dept: FAMILY MEDICINE | Facility: CLINIC | Age: 23
End: 2018-10-25
Payer: COMMERCIAL

## 2018-10-25 VITALS
WEIGHT: 173.6 LBS | RESPIRATION RATE: 16 BRPM | BODY MASS INDEX: 25.64 KG/M2 | DIASTOLIC BLOOD PRESSURE: 81 MMHG | OXYGEN SATURATION: 96 % | SYSTOLIC BLOOD PRESSURE: 126 MMHG | HEART RATE: 104 BPM

## 2018-10-25 DIAGNOSIS — H61.21 IMPACTED CERUMEN OF RIGHT EAR: ICD-10-CM

## 2018-10-25 DIAGNOSIS — Z09 HOSPITAL DISCHARGE FOLLOW-UP: Primary | ICD-10-CM

## 2018-10-25 DIAGNOSIS — F20.1 DISORGANIZED SCHIZOPHRENIA (H): ICD-10-CM

## 2018-10-25 DIAGNOSIS — F64.0 GENDER DYSPHORIA IN ADULT: ICD-10-CM

## 2018-10-25 LAB
ALBUMIN SERPL-MCNC: 4.8 MG/DL (ref 3.8–5)
ALP SERPL-CCNC: 43.4 U/L (ref 31.7–110.7)
ALT SERPL-CCNC: 12.2 U/L (ref 0–45)
AST SERPL-CCNC: 8.5 U/L (ref 0–55)
BILIRUB SERPL-MCNC: 0.6 MG/DL (ref 0.2–1.3)
BUN SERPL-MCNC: 17.1 MG/DL (ref 7–21)
CALCIUM SERPL-MCNC: 9.3 MG/DL (ref 8.5–10.1)
CHLORIDE SERPLBLD-SCNC: 100.2 MMOL/L (ref 98–110)
CHOLEST SERPL-MCNC: 143.6 MG/DL (ref 0–200)
CHOLEST/HDLC SERPL: 2.8 {RATIO} (ref 0–5)
CO2 SERPL-SCNC: 26.7 MMOL/L (ref 20–32)
CREAT SERPL-MCNC: 0.8 MG/DL (ref 0.7–1.3)
GFR SERPL CREATININE-BSD FRML MDRD: >90 ML/MIN/1.7 M2
GLUCOSE SERPL-MCNC: 91.9 MG'DL (ref 70–99)
HDLC SERPL-MCNC: 51 MG/DL
HEMOGLOBIN: 13.5 G/DL (ref 13.3–17.7)
LDLC SERPL CALC-MCNC: 79 MG/DL (ref 0–129)
POTASSIUM SERPL-SCNC: 4.2 MMOL/DL (ref 3.3–4.5)
PROT SERPL-MCNC: 7.2 G/DL (ref 6.8–8.8)
SODIUM SERPL-SCNC: 133 MMOL/L (ref 132.6–141.4)
TRIGL SERPL-MCNC: 67.1 MG/DL (ref 0–150)
VLDL CHOLESTEROL: 13.4 MG/DL (ref 7–32)

## 2018-10-25 RX ORDER — SPIRONOLACTONE 100 MG/1
200 TABLET, FILM COATED ORAL DAILY
Qty: 60 TABLET | Refills: 2 | Status: SHIPPED | OUTPATIENT
Start: 2018-10-25 | End: 2018-12-20

## 2018-10-25 RX ORDER — OLANZAPINE 15 MG/1
15 TABLET ORAL AT BEDTIME
Qty: 30 TABLET | Refills: 0 | Status: CANCELLED | OUTPATIENT
Start: 2018-10-25

## 2018-10-25 NOTE — PROGRESS NOTES
Preceptor Attestation:   Patient seen and discussed with the resident. Assessment and plan reviewed with resident and agreed upon.   Supervising Physician:  [facility]

## 2018-10-25 NOTE — MR AVS SNAPSHOT
After Visit Summary   10/25/2018    Janey Givens    MRN: [id number]           Patient Information     Date Of Birth          1995        Visit Information        Provider Department      10/25/2018 8:20 AM Rajat Andrea MD [facility]        Today's Diagnoses     Disorganized schizophrenia (H)    -  1    Gender dysphoria in adult        Impacted cerumen of right ear          Care Instructions    Here is the plan from today's visit    1. Gender dysphoria in adult  If lab results are normal, you will receive a letter. If there anything is abnormal or needs further work up or discussion, you will be called.   - spironolactone (ALDACTONE) 100 MG tablet; Take 2 tablets (200 mg) by mouth daily  Dispense: 60 tablet; Refill: 2  - Testosterone Total  - Comprehensive Metabolic Panel  - Hemoglobin (HGB)  - Lipid Cascade  - COMPREHENSIVE GENDER CARE REFERRAL - INTERNAL (REMA, coordinator)      Orchiectomy (removal of testes), vaginoplasty (creation of a nico-vagina)     2. Disorganized schizophrenia (H)  [phone], psychiatry outpatient/clinic number  Please schedule to see one of their providers.     3. Impacted cerumen of right ear  Can use daily as needed.   - carbamide peroxide (DEBROX) 6.5 % otic solution; Place 5-10 drops into the right ear daily as needed for other (Ear wax build up)  Dispense: 30 mL; Refill: 0    Please call or return to clinic if your symptoms don't go away.    Follow up plan  Please make a clinic appointment for follow up with me (RAJAT ANDREA) in 2-3  months for regular follow up. Sooner if anything comes up.     Thank you for coming to [facility] today.  Lab Testing:  **If you had lab testing today and your results are reassuring or normal they will be mailed to you or sent through [employer] within 7 days.   **If the lab tests need quick action we will call you with the results.  The phone number we will call with results is # [phone] (home) .  If this is not the best number please call our clinic and change the number.  Medication Refills:  If you need any refills please call your pharmacy and they will contact us.   If you need to  your refill at a new pharmacy, please contact the new pharmacy directly. The new pharmacy will help you get your medications transferred faster.   Scheduling:  If you have any concerns about today's visit or wish to schedule another appointment please call our office during normal business hours [phone] (8-5:00 M-F)  If a referral was made to a [facility] and you don't get a call from central scheduling please call [phone].  If a Mammogram was ordered for you at [facility] call [phone] to schedule or change your appointment.  If you had an XRay/CT/Ultrasound/MRI ordered the number is [phone] to schedule or change your radiology appointment.   Medical Concerns:  If you have urgent medical concerns please call [phone] at any time of the day.    Ariana Andrea MD            Follow-ups after your visit        Additional Services     COMPREHENSIVE GENDER CARE REFERRAL - INTERNAL       Your provider has referred you for Comprehensive Gender Care:  Urology and Plastics to further discuss gender alignment surgery     Additional details/notes for coordinator: n/a    Please be aware that coverage of these services is subject to the terms and limitations of your health insurance plan.  Call member services at your health plan with any benefit or coverage questions.      Please bring the following with you to your appointment:      (1) List of current medications   (2) This referral request   (3) Any documents/labs given to you for this referral                  Who to contact     Please call your clinic at [phone] to:    Ask questions about your health    Make or cancel appointments    Discuss your medicines    Learn about your test results    Speak to your doctor             Additional Information About Your Visit        [employer] Information     [employer] is an electronic gateway that provides easy, online access to your medical records. With [employer], you can request a clinic appointment, read your test results, renew a prescription or communicate with your care team.     To sign up for [employer] visit the website at [email or web address]   You will be asked to enter the access code listed below, as well as some personal information. Please follow the directions to create your username and password.     Your access code is: [email or web address]  Expires: 2019  4:13 PM     Your access code will  in 90 days. If you need help or a new code, please contact your [facility] or call [phone] for assistance.        Care EveryWhere ID     This is your Care EveryWhere ID. This could be used by other organizations to access your [facility] medical records  [id number]        Your Vitals Were     Pulse Respirations Pulse Oximetry BMI (Body Mass Index)          104 16 96% 25.64 kg/m2         Blood Pressure from Last 3 Encounters:   10/25/18 126/81   10/14/18 142/79   18 125/75    Weight from Last 3 Encounters:   10/25/18 173 lb 9.6 oz (78.7 kg)   10/14/18 167 lb 4.8 oz (75.9 kg)   18 175 lb 12.8 oz (79.7 kg)              We Performed the Following     COMPREHENSIVE GENDER CARE REFERRAL - INTERNAL     Comprehensive Metabolic Panel     Hemoglobin (HGB)     Lipid Cascade     Testosterone Total          Today's Medication Changes          These changes are accurate as of 10/25/18  8:59 AM.  If you have any questions, ask your nurse or doctor.               Start taking these medicines.        Dose/Directions    carbamide peroxide 6.5 % otic solution   Commonly known as:  DEBROX   Used for:  Impacted cerumen of right ear   Started by:  Ariana Andrea MD        Dose:  5-10 drop   Place 5-10 drops into the right ear daily as needed for other (Ear wax  build up)   Quantity:  30 mL   Refills:  0         These medicines have changed or have updated prescriptions.        Dose/Directions    spironolactone 100 MG tablet   Commonly known as:  ALDACTONE   This may have changed:  See the new instructions.   Used for:  Gender dysphoria in adult   Changed by:  Ariana Andrea MD        Dose:  200 mg   Take 2 tablets (200 mg) by mouth daily   Quantity:  60 tablet   Refills:  2            Where to get your medicines      These medications were sent to [facility] [place], [place] - [address] [facility] [address]     Phone:  [phone]     carbamide peroxide 6.5 % otic solution    spironolactone 100 MG tablet                Primary Care Provider Office Phone # Fax #    Ariana VINCENT [phone]       [address] [address]        Equal Access to Services     LETITIA HAMILTON : Hadii elizabeth barajas hadasho Soomaali, waaxda luqadaha, qaybta kaalmada adeegyada, emani mraie . So [facility] [phone].    ATENCIÓN: Si habla español, tiene a alarcon disposición servicios gratuitos de asistencia lingüística. Llame al [phone].    We comply with applicable federal civil rights laws and [place] laws. We do not discriminate on the basis of race, color, national origin, age, disability, sex, sexual orientation, or gender identity.            Thank you!     Thank you for choosing [facility]  for your care. Our goal is always to provide you with excellent care. Hearing back from our patients is one way we can continue to improve our services. Please take a few minutes to complete the written survey that you may receive in the mail after your visit with us. Thank you!             Your Updated Medication List - Protect others around you: Learn how to safely use, store and throw away your medicines at www.disposemymeds.org.          This list is accurate as of 10/25/18   8:59 AM.  Always use your most recent med list.                   Brand Name Dispense Instructions for use Diagnosis    carbamide peroxide 6.5 % otic solution    DEBROX    30 mL    Place 5-10 drops into the right ear daily as needed for other (Ear wax build up)    Impacted cerumen of right ear       estradiol 0.1 MG/24HR BIW patch    VIVELLE-DOT    16 patch    APPLY TWO PATCHES TWICE A WEEK    Gender dysphoria in adult       hydrOXYzine 25 MG tablet    ATARAX    120 tablet    Take 1 tablet (25 mg) by mouth every 4 hours as needed for anxiety    Anxiety       ibuprofen 200 MG tablet    ADVIL/MOTRIN     Take 200-400 mg by mouth every 4 hours as needed for mild pain        Melatonin 10 MG Tabs tablet      Take 10 mg by mouth nightly as needed for sleep        OLANZapine 15 MG tablet    zyPREXA    30 tablet    Take 1 tablet (15 mg) by mouth At Bedtime    Disorganized schizophrenia (H)       progesterone 200 MG capsule    PROMETRIUM    30 capsule    Take 1 capsule (200 mg) by mouth daily    Gender dysphoria in adult       spironolactone 100 MG tablet    ALDACTONE    60 tablet    Take 2 tablets (200 mg) by mouth daily    Gender dysphoria in adult

## 2018-10-25 NOTE — LETTER
October 29, 2018      Janey Givens  [address]  [place] [address]        Dear Janey,    Thank you for getting your care at [facility]. Please see below for your test results. Your testosterone continues to be appropriately low. All your other labs are otherwise normal. Next time for HRT follow up would be in April/May of 2019 for follow up and labs. Additionally Urology and Plastics have requested that the surgery discussion and referral be delayed for about a year until you have been further established with a therapist as you would need a letter of support from them usually before seeing them.      Resulted Orders   Testosterone Total   Result Value Ref Range    Testosterone Total 3 (L) 240 - 950 ng/dL      Comment:      This test was developed and its performance characteristics determined by the   [facility],  Special Chemistry Laboratory. It has   not been cleared or approved by the FDA. The laboratory is regulated under   CLIA as qualified to perform high-complexity testing. This test is used for   clinical purposes. It should not be regarded as investigational or for   research.     Comprehensive Metabolic Panel   Result Value Ref Range    Albumin 4.8 3.8 - 5.0 mg/dL    Alkaline Phosphatase 43.4 31.7 - 110.7 U/L    ALT 12.2 0.0 - 45.0 U/L    AST 8.5 0.0 - 55.0 U/L    Bilirubin Total 0.6 0.2 - 1.3 mg/dL    Urea Nitrogen 17.1 7.0 - 21.0 mg/dL    Calcium 9.3 8.5 - 10.1 mg/dL    Chloride 100.2 98.0 - 110.0 mmol/L    Carbon Dioxide 26.7 20.0 - 32.0 mmol/L    Creatinine 0.8 0.7 - 1.3 mg/dL    Glucose 91.9 70.0 - 99.0 mg'dL    Potassium 4.2 3.3 - 4.5 mmol/dL    Sodium 133.0 132.6 - 141.4 mmol/L    Protein Total 7.2 6.8 - 8.8 g/dL    GFR Estimate >90 >60.0 mL/min/1.7 m2    GFR Estimate If Black >90 >60.0 mL/min/1.7 m2   Hemoglobin (HGB)   Result Value Ref Range    Hemoglobin 13.5 13.3 - 17.7 g/dL   Lipid Cascade   Result Value Ref Range    Cholesterol 143.6 0.0 - 200.0  mg/dL    Cholesterol/HDL Ratio 2.8 0.0 - 5.0    HDL Cholesterol 51.0 >40.0 mg/dL    LDL Cholesterol Calculated 79 0 - 129 mg/dL    Triglycerides 67.1 0.0 - 150.0 mg/dL    VLDL Cholesterol 13.4 7.0 - 32.0 mg/dL       If you have any questions, please call the clinic to make an appointment with me for a clinic visit.    Sincerely,    Ariana Andrea MD

## 2018-10-25 NOTE — PATIENT INSTRUCTIONS
Here is the plan from today's visit    1. Gender dysphoria in adult  If lab results are normal, you will receive a letter. If there anything is abnormal or needs further work up or discussion, you will be called.   - spironolactone (ALDACTONE) 100 MG tablet; Take 2 tablets (200 mg) by mouth daily  Dispense: 60 tablet; Refill: 2  - Testosterone Total  - Comprehensive Metabolic Panel  - Hemoglobin (HGB)  - Lipid Cascade  - COMPREHENSIVE GENDER CARE REFERRAL - INTERNAL (REMA, coordinator)      Orchiectomy (removal of testes), vaginoplasty (creation of a nico-vagina)     2. Disorganized schizophrenia (H)  [phone], psychiatry outpatient/clinic number  Please schedule to see one of their providers.     3. Impacted cerumen of right ear  Can use daily as needed.   - carbamide peroxide (DEBROX) 6.5 % otic solution; Place 5-10 drops into the right ear daily as needed for other (Ear wax build up)  Dispense: 30 mL; Refill: 0    Please call or return to clinic if your symptoms don't go away.    Follow up plan  Please make a clinic appointment for follow up with me (RAJAT GAMBOA) in 2-3  months for regular follow up. Sooner if anything comes up.     Thank you for coming to [facility] today.  Lab Testing:  **If you had lab testing today and your results are reassuring or normal they will be mailed to you or sent through "[employer] within 7 days.   **If the lab tests need quick action we will call you with the results.  The phone number we will call with results is # [phone] (home) . If this is not the best number please call our clinic and change the number.  Medication Refills:  If you need any refills please call your pharmacy and they will contact us.   If you need to  your refill at a new pharmacy, please contact the new pharmacy directly. The new pharmacy will help you get your medications transferred faster.   Scheduling:  If you have any concerns about today's visit or wish to schedule another  appointment please call our office during normal business hours [phone] (8-5:00 M-F)  If a referral was made to a [facility] and you don't get a call from central scheduling please call [phone].  If a Mammogram was ordered for you at [facility] call [phone] to schedule or change your appointment.  If you had an XRay/CT/Ultrasound/MRI ordered the number is [phone] to schedule or change your radiology appointment.   Medical Concerns:  If you have urgent medical concerns please call [phone] at any time of the day.    Ariana Andrea MD

## 2018-10-25 NOTE — PROGRESS NOTES
Hospitalization Follow-up Visit         [facility]       Hospital Follow-up Visit:    Hospital:  [facility]   Date of Admission: 9/28/2018  Date of Discharge: 10/15/2018  Reason(s) for Admission: psychotic symptoms and catatonia             Problems taking medications regularly:  None       Post Discharge Medication Reconciliation: discharge medications reconciled, continue medications without change.       Problems adhering to non-medication therapy:  None       Medications reviewed by: by myself.     Summary of hospitalization:  [facility] discharge summary reviewed  Diagnostic Tests/Treatments reviewed.  Follow up needed: Psychiatric with further evaluation   Other Healthcare Providers Involved in Patient s Care:         Specialist appointment - psychiatry   Update since discharge: improved.   Plan of care communicated with patient     Patient states she is still taking the melatonin as she does not feel fully rested if she hasn't taken that medication. She does note increased appetite while being on olanzapine. She feels like she is still unsure of what her diagnosis is and her needed follow up. She denies any SI/HI. She does report auditory hallucinations that are self deprecating. She denies any voices requesting her to harm herself or others. Denies any visual hallucinations.        HRT  Patient is due for follow up labs. She denies any concerns with her current dosage. She had multiple questions in regards to gender alignment surgery and various forms of estrogen. Patient quite upset that chart is still labeled as male. She states that her gender markers have been change on her identification.          Review of Systems:   CONSTITUTIONAL: no fatigue  SKIN: no worrisome rashes, no worrisome moles, no worrisome lesions  EYES: no acute vision problems or changes  ENT: no ear problems, no mouth problems, no throat problems  RESP: no significant cough; intermittent SOB  CV: no chest pain, no  "new or worsening peripheral edema; intermittent palpitations   GI: no nausea, no vomiting, no constipation, no diarrhea            Physical Exam:     Vitals:    10/25/18 0819   BP: 126/81   Pulse: 104   Resp: 16   SpO2: 96%   Weight: 173 lb 9.6 oz (78.7 kg)     Body mass index is 25.64 kg/(m^2).    GENERAL: healthy, alert, well nourished, well hydrated, no distress, soft spoken  HENT: ear canals- normal, partial cerumen impaction of right ear; TMs- angel  RESP: lungs clear to auscultation - no rales, no rhonchi, no wheezes  CV: regular rates and rhythm, normal S1 S2, no S3 or S4 and no murmur, no click or rub -    MENTAL STATUS EXAM  Appearance: appropriate, femininely dressed  Attitude: cooperative  Behavior: normal  Eye Contact: normal  Speech: slowed, but appropriate  Orientation: oreinted to person and situation   Mood:  \"fine\"  Affect: Mood Congruent  Thought Process: clear  Suicidal Ideation: reports no recent thoughts, no intention  Hallucination: auditory, no visual            Results:     Results from the last 24 hours   Results for orders placed or performed in visit on 10/25/18 (from the past 24 hour(s))   Hemoglobin (HGB)   Result Value Ref Range    Hemoglobin 13.5 13.3 - 17.7 g/dL     HRT labs pending    Assessment and Plan      Janey Givens is a 22 y/o transfemale who was seen today for hospital f/u and refill request.    Hospital discharge follow- up  Disorganized schizophrenia (H)   Patient presents after psychosis and catatonia. She reports overall feeling improved, but still unsure of her psychiatry follow up for treatment. She is taking her medication with remarkable adverse reaction of increased appetite as to be expected with olanzapine. Mental status appears appropriate with reported auditory hallucinations. Discussed with patient that as a primary care provider we do no manage schizophrenic medication. Will do a 1 time refill if patient were to rule out of medication before being seen. " Provided number to previous psychiatric clinic patient is being evaluated at. Recommended follow up in 2-3 months or sooner for follow up.     Gender dysphoria in adult  Labs were drawn today. Patient had many questions in regards to estrogen implementation along with gender alignment surgery including orchiectomy and vaginoplasty. Patient is not acutely appropriate for surgery and will need further stabilization of mental health, but she would like to discuss with urology and plastic surgery about her options. I additionally recommended patient talk and speak with her insurance about coverage. Referral was made to Comprehensive gender support clinic. Spironolactone adjustment pending labs.    -     spironolactone (ALDACTONE) 100 MG tablet;       Options for treatment and follow-up care were reviewed with the patient  Janey Givens engaged in the decision making process and verbalized understanding of the options discussed and agreed with the final plan.    Ariana Andrea MD  [facility] Resident PGY-2  [phone]    Those present during this appointment were: patient and myself

## 2018-10-29 LAB — TESTOST SERPL-MCNC: 3 NG/DL (ref 240–950)

## 2018-11-01 ENCOUNTER — OFFICE VISIT (OUTPATIENT)
Dept: PSYCHIATRY | Facility: CLINIC | Age: 23
End: 2018-11-01
Payer: COMMERCIAL

## 2018-11-01 DIAGNOSIS — F20.9 SCHIZOPHRENIA, UNSPECIFIED TYPE (H): Primary | ICD-10-CM

## 2018-11-01 NOTE — MR AVS SNAPSHOT
After Visit Summary   11/1/2018    Janey Givens    MRN: [id number]           Patient Information     Date Of Birth          1995        Visit Information        Provider Department      11/1/2018 10:00 AM Nia Fernandez [facility] Psychiatry         Follow-ups after your visit        Your next 10 appointments already scheduled     Nov 08, 2018  1:00 PM CST   Nav[facility] ([facility])    [address]  [facility] [address]   [phone]            Nov 08, 2018  3:00 PM CST   Nurse Visit with [facility] PSYCHIATRY NURSE   [facility] Psychiatry ([facility])    [address]  [facility] [address]   [phone]            Nov 15, 2018  1:00 PM CST   Navigate Psychotherapy with Aleah Cerda [facility] Psychiatry ([facility])    [address]  [facility] [address]   [phone]            Nov 26, 2018  4:00 PM CST   Navigate New with WILY Ly [facility] Psychiatry ([facility])    [address]  [facility] [address] [phone]            Nov 29, 2018  1:00 PM CST   [facility] ([facility])    [address]  [facility] [address] [phone]              Who to contact     Please call your clinic at [phone] to:    Ask questions about your health    Make or cancel appointments    Discuss your medicines    Learn about your test results    Speak to your doctor            Additional Information About Your Visit        TechniScanhart Information     Sverh[employer] is an electronic gateway that provides easy, online access to your medical records. With Sverh[employer], you can request a clinic appointment, read your test results, renew a prescription or communicate with your care team.     To sign up for Sverhmarket visit the website at  [email or web address]   You will be asked to enter the access code listed below, as well as some personal information. Please follow the directions to create your username and password.     Your access code is: [email or web address]  Expires: 2019  4:13 PM     Your access code will  in 90 days. If you need help or a new code, please contact your [facility] or call [phone] for assistance.        Care EveryWhere ID     This is your Care EveryWhere ID. This could be used by other organizations to access your [place] medical records  [id number]         Blood Pressure from Last 3 Encounters:   10/25/18 126/81   10/14/18 142/79   18 125/75    Weight from Last 3 Encounters:   10/25/18 173 lb 9.6 oz (78.7 kg)   10/14/18 167 lb 4.8 oz (75.9 kg)   18 175 lb 12.8 oz (79.7 kg)              Today, you had the following     No orders found for display       Primary Care Provider Office Phone # Fax #    Ariana VINCENT [phone]       [address] [address]        Equal Access to Services     LETITIA HAMILTON AH: Hadii aad ku hadasho Soomaali, waaxda luqadaha, qaybta kaalmada adeegyada, waxay paulin haylylyn danilo adair. So [facility] [phone].    ATENCIÓN: Si habla español, tiene a alarcon disposición servicios gratuitos de asistencia lingüística. Chelsea al [phone].    We comply with applicable federal civil rights laws and [place] laws. We do not discriminate on the basis of race, color, national origin, age, disability, sex, sexual orientation, or gender identity.            Thank you!     Thank you for choosing [facility] PSYCHIATRY  for your care. Our goal is always to provide you with excellent care. Hearing back from our patients is one way we can continue to improve our services. Please take a few minutes to complete the written survey that you may receive in the mail after your visit with us. Thank you!             Your  Updated Medication List - Protect others around you: Learn how to safely use, store and throw away your medicines at www.disposemymeds.org.          This list is accurate as of 11/1/18 12:04 PM.  Always use your most recent med list.                   Brand Name Dispense Instructions for use Diagnosis    carbamide peroxide 6.5 % otic solution    DEBROX    30 mL    Place 5-10 drops into the right ear daily as needed for other (Ear wax build up)    Impacted cerumen of right ear       estradiol 0.1 MG/24HR BIW patch    VIVELLE-DOT    16 patch    APPLY TWO PATCHES TWICE A WEEK    Gender dysphoria in adult       hydrOXYzine 25 MG tablet    ATARAX    120 tablet    Take 1 tablet (25 mg) by mouth every 4 hours as needed for anxiety    Anxiety       ibuprofen 200 MG tablet    ADVIL/MOTRIN     Take 200-400 mg by mouth every 4 hours as needed for mild pain        Melatonin 10 MG Tabs tablet      Take 10 mg by mouth nightly as needed for sleep        OLANZapine 15 MG tablet    zyPREXA    30 tablet    Take 1 tablet (15 mg) by mouth At Bedtime    Disorganized schizophrenia (H)       progesterone 200 MG capsule    PROMETRIUM    30 capsule    Take 1 capsule (200 mg) by mouth daily    Gender dysphoria in adult       spironolactone 100 MG tablet    ALDACTONE    60 tablet    Take 2 tablets (200 mg) by mouth daily    Gender dysphoria in adult

## 2018-11-07 ENCOUNTER — OFFICE VISIT (OUTPATIENT)
Dept: PSYCHIATRY | Facility: CLINIC | Age: 23
End: 2018-11-07
Payer: COMMERCIAL

## 2018-11-07 DIAGNOSIS — F29 PSYCHOSIS, UNSPECIFIED PSYCHOSIS TYPE (H): Primary | ICD-10-CM

## 2018-11-07 NOTE — MR AVS SNAPSHOT
After Visit Summary   2018    Janey Givens    MRN: [id number]           Patient Information     Date Of Birth          1995        Visit Information        Provider Department      2018 5:00 PM Nia Fernandez [facility] Psychiatry        Today's Diagnoses     Psychosis, unspecified psychosis type (H)    -  1       Follow-ups after your visit        Your next 10 appointments already scheduled     Nov 15, 2018  1:00 PM CST   Navigate Psychotherapy with VERENICE See   [facility] Psychiatry ([facility])    [address] [place] [address]   [phone]            2018  4:00 PM CST   Navigate New with WILY Ly CNP   [facility] Psychiatry ([facility])    [address] [place] [address]   [phone]            2018  1:00 PM CST   Navigate Psychotherapy with Aleah Cerda [facility] Psychiatry ([facility])    [address] [place] [address]   [phone]              Who to contact     Please call your clinic at [phone] to:    Ask questions about your health    Make or cancel appointments    Discuss your medicines    Learn about your test results    Speak to your doctor            Additional Information About Your Visit        MyChart Information     Apptive is an electronic gateway that provides easy, online access to your medical records. With Apptive, you can request a clinic appointment, read your test results, renew a prescription or communicate with your care team.     To sign up for Sypher Labst visit the website at [email or web address]   You will be asked to enter the access code listed below, as well as some personal information. Please follow the directions to create your username and password.     Your access code is: [email or web address]  Expires: 2019  3:13 PM     Your access code will  in 90 days. If you need help or a new code, please  contact your [facility] or call [phone] for assistance.        Care EveryWhere ID     This is your Care EveryWhere ID. This could be used by other organizations to access your [place] medical records  [id number]         Blood Pressure from Last 3 Encounters:   11/08/18 124/76   10/25/18 126/81   10/14/18 142/79    Weight from Last 3 Encounters:   10/25/18 78.7 kg (173 lb 9.6 oz)   10/14/18 75.9 kg (167 lb 4.8 oz)   09/20/18 79.7 kg (175 lb 12.8 oz)              Today, you had the following     No orders found for display       Primary Care Provider Office Phone # Fax #    Ariana Emre VINCENT [phone]       [address] [address]        Equal Access to Services     LETITIA HAMILTON : Hadii elizabeth ku hadasho Soomaali, waaxda luqadaha, qaybta kaalmada adeegyada, emani sagastume hayjoe marie . So [facility] [phone].    ATENCIÓN: Si habla español, tiene a alarcon disposición servicios gratuitos de asistencia lingüística. Llame al [phone].    We comply with applicable federal civil rights laws and [place] laws. We do not discriminate on the basis of race, color, national origin, age, disability, sex, sexual orientation, or gender identity.            Thank you!     Thank you for choosing [facility] PSYCHIATRY  for your care. Our goal is always to provide you with excellent care. Hearing back from our patients is one way we can continue to improve our services. Please take a few minutes to complete the written survey that you may receive in the mail after your visit with us. Thank you!             Your Updated Medication List - Protect others around you: Learn how to safely use, store and throw away your medicines at www.disposemymeds.org.          This list is accurate as of 11/7/18 11:59 PM.  Always use your most recent med list.                   Brand Name Dispense Instructions for use Diagnosis    carbamide peroxide 6.5 % otic  solution    DEBROX    30 mL    Place 5-10 drops into the right ear daily as needed for other (Ear wax build up)    Impacted cerumen of right ear       estradiol 0.1 MG/24HR BIW patch    VIVELLE-DOT    16 patch    APPLY TWO PATCHES TWICE A WEEK    Gender dysphoria in adult       hydrOXYzine 25 MG tablet    ATARAX    120 tablet    Take 1 tablet (25 mg) by mouth every 4 hours as needed for anxiety    Anxiety       ibuprofen 200 MG tablet    ADVIL/MOTRIN     Take 200-400 mg by mouth every 4 hours as needed for mild pain        Melatonin 10 MG Tabs tablet      Take 10 mg by mouth nightly as needed for sleep        progesterone 200 MG capsule    PROMETRIUM    30 capsule    Take 1 capsule (200 mg) by mouth daily    Gender dysphoria in adult       spironolactone 100 MG tablet    ALDACTONE    60 tablet    Take 2 tablets (200 mg) by mouth daily    Gender dysphoria in adult

## 2018-11-08 ENCOUNTER — OFFICE VISIT (OUTPATIENT)
Dept: PSYCHIATRY | Facility: CLINIC | Age: 23
End: 2018-11-08
Payer: COMMERCIAL

## 2018-11-08 ENCOUNTER — BEH TREATMENT PLAN (OUTPATIENT)
Dept: PSYCHIATRY | Facility: CLINIC | Age: 23
End: 2018-11-08

## 2018-11-08 ENCOUNTER — ALLIED HEALTH/NURSE VISIT (OUTPATIENT)
Dept: PSYCHIATRY | Facility: CLINIC | Age: 23
End: 2018-11-08
Payer: COMMERCIAL

## 2018-11-08 VITALS — SYSTOLIC BLOOD PRESSURE: 124 MMHG | HEART RATE: 76 BPM | DIASTOLIC BLOOD PRESSURE: 76 MMHG

## 2018-11-08 DIAGNOSIS — F20.1 DISORGANIZED SCHIZOPHRENIA (H): Primary | ICD-10-CM

## 2018-11-08 DIAGNOSIS — F20.1 DISORGANIZED SCHIZOPHRENIA (H): ICD-10-CM

## 2018-11-08 DIAGNOSIS — F29 PSYCHOSIS, UNSPECIFIED PSYCHOSIS TYPE (H): Primary | ICD-10-CM

## 2018-11-08 RX ORDER — OLANZAPINE 15 MG/1
15 TABLET ORAL AT BEDTIME
Qty: 20 TABLET | Refills: 0 | Status: SHIPPED | OUTPATIENT
Start: 2018-11-08 | End: 2018-11-26 | Stop reason: DRUGHIGH

## 2018-11-08 ASSESSMENT — ANXIETY QUESTIONNAIRES
6. BECOMING EASILY ANNOYED OR IRRITABLE: NOT AT ALL
1. FEELING NERVOUS, ANXIOUS, OR ON EDGE: SEVERAL DAYS
7. FEELING AFRAID AS IF SOMETHING AWFUL MIGHT HAPPEN: SEVERAL DAYS
5. BEING SO RESTLESS THAT IT IS HARD TO SIT STILL: NOT AT ALL
2. NOT BEING ABLE TO STOP OR CONTROL WORRYING: SEVERAL DAYS
GAD7 TOTAL SCORE: 4
3. WORRYING TOO MUCH ABOUT DIFFERENT THINGS: SEVERAL DAYS

## 2018-11-08 ASSESSMENT — PATIENT HEALTH QUESTIONNAIRE - PHQ9: 5. POOR APPETITE OR OVEREATING: NOT AT ALL

## 2018-11-08 NOTE — MR AVS SNAPSHOT
After Visit Summary   2018    Janey Givens    MRN: [id number]           Patient Information     Date Of Birth          1995        Visit Information        Provider Department      2018 1:00 PM Aleah Cerda [facility] Psychiatry        Today's Diagnoses     Disorganized schizophrenia (H)    -  1       Follow-ups after your visit        Your next 10 appointments already scheduled     Nov 15, 2018  1:00 PM CST   Navigate Psychotherapy with VERENICE See   [facility] Psychiatry ([facility])    [address] [place] [address]   [phone]            2018  4:00 PM CST   Navigate New with WILY Ly CNP   [facility] Psychiatry ([facility])    [address] [place] [address] [phone]            2018  1:00 PM CST   Navigate Psychotherapy with VERENICE See   [facility] Psychiatry ([facility])    [address] [place] [address] [phone]              Who to contact     Please call your clinic at [phone] to:    Ask questions about your health    Make or cancel appointments    Discuss your medicines    Learn about your test results    Speak to your doctor            Additional Information About Your Visit        MyChart Information     [employer] is an electronic gateway that provides easy, online access to your medical records. With [employer], you can request a clinic appointment, read your test results, renew a prescription or communicate with your care team.     To sign up for Beyond Verbalt visit the website at [email or web address]   You will be asked to enter the access code listed below, as well as some personal information. Please follow the directions to create your username and password.     Your access code is: [email or web address]  Expires: 2019  3:13 PM     Your access code will  in 90 days. If you need help or a new code, please contact  your [facility] or call [phone] for assistance.        Care EveryWhere ID     This is your Care EveryWhere ID. This could be used by other organizations to access your [place] medical records  [id number]         Blood Pressure from Last 3 Encounters:   11/08/18 124/76   10/25/18 126/81   10/14/18 142/79    Weight from Last 3 Encounters:   10/25/18 78.7 kg (173 lb 9.6 oz)   10/14/18 75.9 kg (167 lb 4.8 oz)   09/20/18 79.7 kg (175 lb 12.8 oz)              Today, you had the following     No orders found for display         Where to get your medicines      These medications were sent to [facility] [address] [address]     Phone:  [phone]     OLANZapine 15 MG tablet          Primary Care Provider Office Phone # Fax #    Ariana VINCENT [phone]       [address] [address]        Equal Access to Services     LETITIA HAMILTON : Hadii aad ku hadasho Soomaali, waaxda luqadaha, qaybta kaalmada adeegyada, emani marie . So [facility] [phone].    ATENCIÓN: Si habla español, tiene a alarcon disposición servicios gratuitos de asistencia lingüística. Llame al [phone].    We comply with applicable federal civil rights laws and [place] laws. We do not discriminate on the basis of race, color, national origin, age, disability, sex, sexual orientation, or gender identity.            Thank you!     Thank you for choosing [facility] PSYCHIATRY  for your care. Our goal is always to provide you with excellent care. Hearing back from our patients is one way we can continue to improve our services. Please take a few minutes to complete the written survey that you may receive in the mail after your visit with us. Thank you!             Your Updated Medication List - Protect others around you: Learn how to safely use, store and throw  away your medicines at www.disposemymeds.org.          This list is accurate as of 11/8/18 11:59 PM.  Always use your most recent med list.                   Brand Name Dispense Instructions for use Diagnosis    carbamide peroxide 6.5 % otic solution    DEBROX    30 mL    Place 5-10 drops into the right ear daily as needed for other (Ear wax build up)    Impacted cerumen of right ear       estradiol 0.1 MG/24HR BIW patch    VIVELLE-DOT    16 patch    APPLY TWO PATCHES TWICE A WEEK    Gender dysphoria in adult       hydrOXYzine 25 MG tablet    ATARAX    120 tablet    Take 1 tablet (25 mg) by mouth every 4 hours as needed for anxiety    Anxiety       ibuprofen 200 MG tablet    ADVIL/MOTRIN     Take 200-400 mg by mouth every 4 hours as needed for mild pain        Melatonin 10 MG Tabs tablet      Take 10 mg by mouth nightly as needed for sleep        OLANZapine 15 MG tablet    zyPREXA    20 tablet    Take 1 tablet (15 mg) by mouth At Bedtime    Disorganized schizophrenia (H)       progesterone 200 MG capsule    PROMETRIUM    30 capsule    Take 1 capsule (200 mg) by mouth daily    Gender dysphoria in adult       spironolactone 100 MG tablet    ALDACTONE    60 tablet    Take 2 tablets (200 mg) by mouth daily    Gender dysphoria in adult

## 2018-11-08 NOTE — PROGRESS NOTES
"[facility]: Nurse (RN) Visit Note  SITUATION/BACKGROUND                                                      Janey Givens is a 23 year old female, who presents to clinic for nurse check in as provider was booked.    Current Status:  Onset: First episode of psychosis in 12/2017, was recently discharged from [facility] 10/15/2018  Intensity: moderate  Progression of Symptoms:  same  Accompanying Signs & Symptoms: Patient reports that having paranoia mostly, states she feels that someone has said something, but they inform her that they have not.  States this causes her distress, as she feels her reality is real  Previous history of similar problem: Similar symptoms in 12/201   Alleviating factors: Good support, and has structure during her day.        ASSESSMENT      Vital Signs:  /76 (BP Location: Right arm, Patient Position: Sitting, Cuff Size: Adult Regular)  Pulse 76     Exam:  PSYCH: mentation appears normal and affect normal/bright.      Patient reports that she has been taking Zyprexa daily since discharged from hospital.  Reports she has 4 days left of it including tonight.  Patient believes that this medication has not been helpful for her.  Reports ASE of hypersomnia, and increased appetite.  Reports that she took this medication in December also, but was taken off of it.  Patient described a fainting sensation; but called them \"mini seizures.\"  Patient states that it has happened three times since she has been discharged from the hospital, where she stands up and feels faint, she falls to the ground and feels like she is shaking.  No one has seen this.  Writer did a standing and sitting BP.  Sitting was 118/75 and standing was 102/75.  Writer and patient also discussed different types of medication options, that can be further discussed at Med visit with provider on 11/26/18.            RECOMMENDATION/PLAN                                                      Medical Plan:  FUTURE " APPOINTMENTS:       - Follow up visit on 11/26/18  with Leidy Guzman, VIPIN, APRN, PMHNP-BC     -Writer encouraged patient to stand up slowly from a seated position slowly when sitting for long periods of time.        Today's visit was:  Completed within the RN scope of practice

## 2018-11-08 NOTE — MR AVS SNAPSHOT
After Visit Summary   2018    Janey Givens    MRN: [id number]           Patient Information     Date Of Birth          1995        Visit Information        Provider Department      2018 3:00 PM [facility] PSYCHIATRY NURSE [facility] Psychiatry        Today's Diagnoses     Psychosis, unspecified psychosis type (H)    -  1    Disorganized schizophrenia (H)           Follow-ups after your visit        Your next 10 appointments already scheduled     Nov 15, 2018  1:00 PM CST   Navigate Psychotherapy with VERENICE See   [facility] Psychiatry [facility])    [address] [place] [address]   [phone]            2018  4:00 PM CST   Navigate New with WILY Ly CNP   [facility] Psychiatry ([facility])    [address] [place] [address]   [phone]            2018  1:00 PM CST   Navigate Psychotherapy with VERENICE See   [facility] Psychiatry ([facility])    [address] [place] [address]   [phone]              Who to contact     Please call your clinic at [phone] to:    Ask questions about your health    Make or cancel appointments    Discuss your medicines    Learn about your test results    Speak to your doctor            Additional Information About Your Visit        MyChart Information     [employer] is an electronic gateway that provides easy, online access to your medical records. With [employer]X Software, you can request a clinic appointment, read your test results, renew a prescription or communicate with your care team.     To sign up for Pillars4Lifet visit the website at [email or web address]   You will be asked to enter the access code listed below, as well as some personal information. Please follow the directions to create your username and password.     Your access code is: [email or web address]  Expires: 2019  3:13 PM     Your access code will  in 90  days. If you need help or a new code, please contact your [facility] or call [phone] for assistance.        Care EveryWhere ID     This is your Care EveryWhere ID. This could be used by other organizations to access your [facility] medical records  [id number]        Your Vitals Were     Pulse                   76            Blood Pressure from Last 3 Encounters:   11/08/18 124/76   10/25/18 126/81   10/14/18 142/79    Weight from Last 3 Encounters:   10/25/18 78.7 kg (173 lb 9.6 oz)   10/14/18 75.9 kg (167 lb 4.8 oz)   09/20/18 79.7 kg (175 lb 12.8 oz)              Today, you had the following     No orders found for display         Where to get your medicines      These medications were sent to [facility] [address] [facility], [address] [address]     Phone:  [phone]     OLANZapine 15 MG tablet          Primary Care Provider Office Phone # Fax #    Ariana VINCENT [phone]       [address] [address]        Equal Access to Services     LETITIA HAMILTON AH: Hadii elizabeth cisneroso Soeloise, waaxda luqadaha, qaybta kaalmada adeegyada, emani adair. So [facility] [phone].    ATENCIÓN: Si habla español, tiene a alarcon disposición servicios gratuitos de asistencia lingüística. Chelsea al [phone].    We comply with applicable federal civil rights laws and [place] laws. We do not discriminate on the basis of race, color, national origin, age, disability, sex, sexual orientation, or gender identity.            Thank you!     Thank you for choosing [facility] PSYCHIATRY  for your care. Our goal is always to provide you with excellent care. Hearing back from our patients is one way we can continue to improve our services. Please take a few minutes to complete the written survey that you may receive in the mail after your visit with us. Thank you!              Your Updated Medication List - Protect others around you: Learn how to safely use, store and throw away your medicines at www.disposemymeds.org.          This list is accurate as of 11/8/18  3:30 PM.  Always use your most recent med list.                   Brand Name Dispense Instructions for use Diagnosis    carbamide peroxide 6.5 % otic solution    DEBROX    30 mL    Place 5-10 drops into the right ear daily as needed for other (Ear wax build up)    Impacted cerumen of right ear       estradiol 0.1 MG/24HR BIW patch    VIVELLE-DOT    16 patch    APPLY TWO PATCHES TWICE A WEEK    Gender dysphoria in adult       hydrOXYzine 25 MG tablet    ATARAX    120 tablet    Take 1 tablet (25 mg) by mouth every 4 hours as needed for anxiety    Anxiety       ibuprofen 200 MG tablet    ADVIL/MOTRIN     Take 200-400 mg by mouth every 4 hours as needed for mild pain        Melatonin 10 MG Tabs tablet      Take 10 mg by mouth nightly as needed for sleep        OLANZapine 15 MG tablet    zyPREXA    20 tablet    Take 1 tablet (15 mg) by mouth At Bedtime    Disorganized schizophrenia (H)       progesterone 200 MG capsule    PROMETRIUM    30 capsule    Take 1 capsule (200 mg) by mouth daily    Gender dysphoria in adult       spironolactone 100 MG tablet    ALDACTONE    60 tablet    Take 2 tablets (200 mg) by mouth daily    Gender dysphoria in adult

## 2018-11-09 ENCOUNTER — TELEPHONE (OUTPATIENT)
Dept: PSYCHIATRY | Facility: CLINIC | Age: 23
End: 2018-11-09

## 2018-11-09 NOTE — PROGRESS NOTES
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 Type of contact: (majority of time spent)  IRT Session    2. People present:   Writer  Client: Janey Givens    3. Total number of persons who participated in contact: 2, including writer    4. Length of Actual Contact: Start Time: 1:00; End Time: 1:48   Traveled?    No     5. Location of contact:  Psychiatry Clinic, [place]    6. Did the client complete the home practice option(s) from the previous session: Not Applicable    7. Motivational Teaching Strategies:  Promote hope and positive expectations  Explore pros and cons of change    8. Educational Teaching Strategies:  Relate information to client's experience  Ask questions to check comprehension  Adopt client's language     9. CBT Teaching Strategies:  Reinforcement and shaping    10. IRT Module(s) Addressed:  Module 1 - Orientation    11. Techniques utilized:   [place] announced at beginning of session  Present new material  Problem-solving practice  Summarize progress made in current session    12. Mental Status Exam:    Alertness: alert  and oriented  Appearance: casually groomed  Behavior/Demeanor: pleasant, calm and passive, with good  eye contact   Speech: normal and regular rate and rhythm  Language: intact. Preferred language identified as English.  Psychomotor: normal or unremarkable  Mood: depressed  Affect: appropriate; was congruent to mood; was congruent to content  Thought Process/Associations: perseverative  Thought Content:  Reports delusions and preoccupations;  Denies suicidal and violent ideation  Perception:  Reports auditory hallucinations;  Denies visual hallucinations  Insight:  "good  Judgment: good  Cognition: does  appear grossly intact; formal cognitive testing was not done  Suicidal ideation: denies SI, denies intent,  and denies plan  Homicidal Ideation: denies    13. Assessment/Progress Note:     This writer met with Katie for an orientation to IRT services. She stated she is almost out of Zyprexa and will need a refill within 2 days. She also mentioned feeling light headed and then falling to the ground with sudden upper body movements. This writer spoke with Leidy Can, who was agreeable to assessing further following the IRT appointment.  This writer described the NAVIGATE Program, including all providers and services. Katie said she is currently not interested in pursuing SEE services, as she does not feel stressed or concerned about school and is not looking for a job at this time. She expressed frustration that her parents had family therapy earlier this week and Katie was unaware. She is concerned that she has dyslexia and would like to be further assessed for this. She shared she occasionally has auditory hallucinations where she believes she hears other people calling her \"mister\" or other negative statements. Due to this, she has started to question if her friendships are genuine. Katie denied other positive symptoms of psychosis at this time. Katie said she occasionally has passive suicidal images, such as a \"flashback of a hanging.\" However, she denied any current ideation, plans, or intent, and stated she has not acted on these thoughts. She mentioned these thoughts typically last a few seconds and she is able to distract easily. This writer and Katie completed her initial treatment plan. Her goals include: gain more friendships, parents gain awareness into what caused episode/how to be supportive, eat healthier (long-term goal), exercise more (long-term goal), understand more about the causes and prevention of psychosis. She listed her strengths as: creative, inventive, hard " "worker, and calm.       14. Plan/Referrals:     This writer will continue with Module 2: Goal setting and assessment.    Billing for \"Interactive Complexity\"?    No      VERENICE See    NAVIGATE Individual Resiliency Trainer    Attestation:    I did not see this patient directly. This patient is discussed with me in individual clinical social work supervision, and I agree with the plan as documented.     MARCELL Wong, LICSW, November 27, 2018      "

## 2018-11-09 NOTE — TELEPHONE ENCOUNTER
NAVIGATE Outreach  A Part of the [facility] First Episode of Psychosis Program     Patient Name: Janey Givens  /Age:  1995 (23 year old)    Contact: Writer called Dawit regarding scheduling session for next week. Only time writer has available is 1pm Tuesday, which doesn't work for Bill's schedule. Writer offered to meet with Billie one-on-one or follow-up Monday if Tuesday afternoon schedule opens. Billie expressed preference for writer to follow-up Monday.     Plan: Will follow-up Monday to schedule appointment either for next week Tuesday or for time when writer is back in clinic after 18.    Nia Fernandez AM, LGSW  NAVIGATE Individual Resiliency  & Family Clinician

## 2018-11-09 NOTE — PROGRESS NOTES
NAVIGATE Clinician Contact & Progress Note   For Family Education Program    NAVIGATE Enrollee: Janey Givens (1995)     MRN: [id number]  Date:  11/07/18  Diagnosis(es):   Psychosis, unspecified type F29  Clinician: CARMEN Individual Resiliency  & Family Clinician, VERENICE Dash     1. Type of contact: (majority of time spent)  Family Session    2. People present:   Writer  Client: No  Significant Other/Family/Friend:  Mother-Billie and Father-Gaurav    3. Total number of persons who participated in contact: 3, including writer    4. Length of Actual Contact: Start Time: 5pm; End Time: 6pm   Traveled?    No     5. Location of contact:  Psychiatry Clinic, [place]    6. Did the client complete the home practice option(s) from the previous session: Not Applicable    7. Motivational Teaching Strategies:  Connect info and skills with personal goals  Promote hope and positive expectations  Explore pros and cons of change  Re-frame experiences in positive light    8. Educational Teaching Strategies:  Review of written material/education  Relate information to client's experience  Ask questions to check comprehension  Break down information into small chunks  Adopt client's language     9. CBT Teaching Strategies:  Reinforcement and shaping (positive feedback for steps towards goals, gains in knowledge & skills and follow-through on home assignments)  Relapse prevention planning (review of stressors and early warning signs)  Coping skills training (review current coping skills, increase currently used skills and feedback)    10. Psychoeducational Topic(s) Addressed:  Family Education Orientation & Tip Sheet    11. Techniques utilized:   [place] announced at beginning of session  Review of goal  Present new material  Problem-solving practice  Help client choose a home practice option  Summarize progress made in current session    12. Assessment/Progress Note:     Met with client's Mom-Billie and  Veronica-Gaurav on this date for first family session. Family presented as engaged throughout the appointment. Introduced self, role within NAVIGATE team an announced general agenda to collaboratively review family program orientation material and answer any questions/concerns that arise.     Reviewed NAVIGATE treatment components and associated team members. Provided an introduction to the NAVIGATE Family Program. Both Billie and Gaurav shared their perspective of Katie's experience of psychosis since last December. Gaurav voiced frustration after discharge from hospital December 2017 that services seemed uncoordinated and communication between Katie's therapist and psychiatrist seemed minimal. Writer validated his frustration and highlighted the benefit of a coordinated care model like the Navigate Program for that very reason. Gaurva expressed concerns regarding ongoing medication adherence as Katie previously stopped taking her medications after her hospitalization Dec 2017; however, both Gaurav and Billie reflected Katie's insight and awareness post-hospitalization this time seem far greater. Writer offered validation and identified discussions surrounding medication compliance as an ongoing aspect to the program as a whole. They confirmed Katie is back in class right now and identified academics as important to her. They described it as a source of accomplishment and mastery for her as school has historically come naturally. They wonder the impact her experience of psychosis in the context of the school environment last December had on Katie. Additionally described Katie's job this past summer as potentially stressful for various reasons. Briefly discussed the stress-vulnerability model and the impact stress can have on individuals. Emphasized the importance of ongoing support and the identification and use of healthy coping strategies to optimize symptom management and recovery.     Offered hope for recovery. Praised family for their  involvement and seeking services. Informed them of evidence suggesting recovery rates tend to be higher with family involvement. Emphasized the importance of self care.     Home practice identified as: review Tip Sheet and continue reading I'm Not Sick I Don't Need Help! to discuss further next session.     Overall family seemed very engaged in conversation. They did express interest in continuing to meet for family therapy and psychoeducation. As of today's appt their insight into Yeimi mental illness appears fair. They seem they would benefit from continued clinical intervention aimed at assisting them implement helpful strategies at home and increase their understanding of psychosis.    13. Plan/Referrals:     Will meet with family weekly as schedule allows for evidence based family psychoeducation and therapeutic support aimed at maximizing Janey's opportunity for recovery from psychosis.      Plan to check schedules and writer will follow-up to discuss options for scheduling next week. Family hoping Tuesday after 2pm could work.     Nia Fernandez RENAE   NAVIGATE Individual Resiliency  & Family Clinician     Attestation:    I did not see this patient directly. This patient is discussed with me in individual clinical social work supervision, and I agree with the plan as documented.     MARCELL Wong, LICSW, November 27, 2018

## 2018-11-12 ENCOUNTER — OFFICE VISIT (OUTPATIENT)
Dept: PSYCHIATRY | Facility: CLINIC | Age: 23
End: 2018-11-12
Payer: COMMERCIAL

## 2018-11-12 DIAGNOSIS — F29 PSYCHOSIS, UNSPECIFIED PSYCHOSIS TYPE (H): Primary | ICD-10-CM

## 2018-11-12 ASSESSMENT — PATIENT HEALTH QUESTIONNAIRE - PHQ9: SUM OF ALL RESPONSES TO PHQ QUESTIONS 1-9: 7

## 2018-11-12 NOTE — MR AVS SNAPSHOT
After Visit Summary   11/12/2018    Janey Givens    MRN: [id number]           Patient Information     Date Of Birth          1995        Visit Information        Provider Department      11/12/2018 4:00 PM Nia Fernandez [facility] Psychiatry        Today's Diagnoses     Psychosis, unspecified psychosis type (H)    -  1       Follow-ups after your visit        Your next 10 appointments already scheduled     Nov 29, 2018 12:00 PM CST   Navigate See with Ame Gissell   [email or web address] Psychiatry ([facility])    [address] [place] [address]   [phone]            Nov 29, 2018  1:00 PM CST   Navigate Psychotherapy with Aleah Cerda [facility] Psychiatry ([facility])    [address] [place] [address]   [phone]            Dec 06, 2018 11:00 AM CST   Navigate Psychotherapy with Aleah Cerda [facility] Psychiatry ([facility])    [address] [place] [address] [phone]            Dec 13, 2018 11:00 AM CST   Navigate Psychotherapy with VERENICE See   [email or web address] Psychiatry ([facility])    [address] [place] [address] [phone]              Who to contact     Please call your clinic at [phone] to:    Ask questions about your health    Make or cancel appointments    Discuss your medicines    Learn about your test results    Speak to your doctor            Additional Information About Your Visit        Care EveryWhere ID     This is your Care EveryWhere ID. This could be used by other organizations to access your [place] medical records  [id number]         Blood Pressure from Last 3 Encounters:   11/26/18 123/82   11/08/18 124/76   10/25/18 126/81    Weight from Last 3 Encounters:   11/26/18 81.2 kg (179 lb)   10/25/18 78.7 kg (173 lb 9.6 oz)   10/14/18 75.9 kg (167 lb 4.8 oz)              Today, you had the following     No orders  found for display       Primary Care Provider Office Phone # Fax #    Ariana Emre VINCENT [phone]       [address] [address]        Equal Access to Services     LETITIA HAMILTON : Hadii aad ku hadchiquiso Soparulali, waaxda luqadaha, qaybta kaalmada adevalentínda, emani beebe kaushalchloe quiroz frank adair. So [facility] [phone].    ATENCIÓN: Si habla español, tiene a alarcon disposición servicios gratuitos de asistencia lingüística. Llame al [phone].    We comply with applicable federal civil rights laws and [place] laws. We do not discriminate on the basis of race, color, national origin, age, disability, sex, sexual orientation, or gender identity.            Thank you!     Thank you for choosing [facility]  for your care. Our goal is always to provide you with excellent care. Hearing back from our patients is one way we can continue to improve our services. Please take a few minutes to complete the written survey that you may receive in the mail after your visit with us. Thank you!             Your Updated Medication List - Protect others around you: Learn how to safely use, store and throw away your medicines at www.disposemymeds.org.          This list is accurate as of 11/12/18 11:59 PM.  Always use your most recent med list.                   Brand Name Dispense Instructions for use Diagnosis    carbamide peroxide 6.5 % otic solution    DEBROX    30 mL    Place 5-10 drops into the right ear daily as needed for other (Ear wax build up)    Impacted cerumen of right ear       estradiol 0.1 MG/24HR BIW patch    VIVELLE-DOT    16 patch    APPLY TWO PATCHES TWICE A WEEK    Gender dysphoria in adult       hydrOXYzine 25 MG tablet    ATARAX    120 tablet    Take 1 tablet (25 mg) by mouth every 4 hours as needed for anxiety    Anxiety       ibuprofen 200 MG tablet    ADVIL/MOTRIN     Take 200-400 mg by mouth every 4 hours as needed for mild pain        Melatonin 10 MG Tabs  tablet      Take 10 mg by mouth nightly as needed for sleep        OLANZapine 15 MG tablet    zyPREXA    20 tablet    Take 1 tablet (15 mg) by mouth At Bedtime    Disorganized schizophrenia (H)       spironolactone 100 MG tablet    ALDACTONE    60 tablet    Take 2 tablets (200 mg) by mouth daily    Gender dysphoria in adult

## 2018-11-13 ASSESSMENT — ANXIETY QUESTIONNAIRES: GAD7 TOTAL SCORE: 4

## 2018-11-15 ENCOUNTER — OFFICE VISIT (OUTPATIENT)
Dept: PSYCHIATRY | Facility: CLINIC | Age: 23
End: 2018-11-15
Payer: COMMERCIAL

## 2018-11-15 DIAGNOSIS — F20.1 DISORGANIZED SCHIZOPHRENIA (H): Primary | ICD-10-CM

## 2018-11-15 DIAGNOSIS — F29 PSYCHOSIS, UNSPECIFIED PSYCHOSIS TYPE (H): Primary | ICD-10-CM

## 2018-11-15 NOTE — MR AVS SNAPSHOT
After Visit Summary   11/15/2018    Janey Givens    MRN: [id number]           Patient Information     Date Of Birth          1995        Visit Information        Provider Department      11/15/2018 1:00 PM Aleah Cerda [facility] Psychiatry        Today's Diagnoses     Psychosis, unspecified psychosis type (H)    -  1       Follow-ups after your visit        Your next 10 appointments already scheduled     Dec 06, 2018 11:00 AM CST   Navigate Psychotherapy with Aleah Cerda [facility] Psychiatry ([facility])    [address] [place] [address] [phone]            Dec 07, 2018 11:00 AM CST   Navigate Family Therapy with Nia Fernandez [facility] Psychiatry ([facility])    [address] [place] [address] [phone]            Dec 13, 2018 11:00 AM CST   Navigate Psychotherapy with Aleah Cerda [facility] Psychiatry ([facility])    [address] [place] [address] [phone]              Who to contact     Please call your clinic at [phone] to:    Ask questions about your health    Make or cancel appointments    Discuss your medicines    Learn about your test results    Speak to your doctor            Additional Information About Your Visit        Care EveryWhere ID     This is your Care EveryWhere ID. This could be used by other organizations to access your [place] medical records  [id number]         Blood Pressure from Last 3 Encounters:   11/26/18 123/82   11/08/18 124/76   10/25/18 126/81    Weight from Last 3 Encounters:   11/26/18 81.2 kg (179 lb)   10/25/18 78.7 kg (173 lb 9.6 oz)   10/14/18 75.9 kg (167 lb 4.8 oz)              Today, you had the following     No orders found for display       Primary Care Provider Office Phone # Fax #    Arinaa VINCENT [phone]       [address] [address]        Equal  Access to Services     [facility]: Hadii elizabeth barajas clive Garcia, waaxda luqadaha, qaybta kaalmada kaushalvalentínanastasiia, emani carringtonberenicealthea adair. So [facility] [phone].    ATENCIÓN: Si habla sindi, tiene a alarcon disposición servicios gratuitos de asistencia lingüística. Llame al [phone].    We comply with applicable federal civil rights laws and [place] laws. We do not discriminate on the basis of race, color, national origin, age, disability, sex, sexual orientation, or gender identity.            Thank you!     Thank you for choosing [facility]  for your care. Our goal is always to provide you with excellent care. Hearing back from our patients is one way we can continue to improve our services. Please take a few minutes to complete the written survey that you may receive in the mail after your visit with us. Thank you!             Your Updated Medication List - Protect others around you: Learn how to safely use, store and throw away your medicines at www.disposemymeds.org.          This list is accurate as of 11/15/18 11:59 PM.  Always use your most recent med list.                   Brand Name Dispense Instructions for use Diagnosis    carbamide peroxide 6.5 % otic solution    DEBROX    30 mL    Place 5-10 drops into the right ear daily as needed for other (Ear wax build up)    Impacted cerumen of right ear       estradiol 0.1 MG/24HR bi-weekly patch    VIVELLE-DOT    16 patch    APPLY TWO PATCHES TWICE A WEEK    Gender dysphoria in adult       hydrOXYzine 25 MG tablet    ATARAX    120 tablet    Take 1 tablet (25 mg) by mouth every 4 hours as needed for anxiety    Anxiety       ibuprofen 200 MG tablet    ADVIL/MOTRIN     Take 200-400 mg by mouth every 4 hours as needed for mild pain        Melatonin 10 MG Tabs tablet      Take 10 mg by mouth nightly as needed for sleep        OLANZapine 15 MG tablet    zyPREXA    20 tablet    Take 1 tablet (15 mg) by mouth At Bedtime    Disorganized  schizophrenia (H)       spironolactone 100 MG tablet    ALDACTONE    60 tablet    Take 2 tablets (200 mg) by mouth daily    Gender dysphoria in adult

## 2018-11-15 NOTE — PROGRESS NOTES
CARMEN Clinician Contact & Progress Note  For Individual Resiliency Training (IRT)  A Part of the [employer] First Episode of Psychosis Program    NAVIGATE Enrollee: Janey Givens (1995)     MRN: [id number]  Date:  11/15/18  Diagnosis: Psychosis, unspecified (F29)  Clinician: CARMEN Individual Resiliency Trainer, VERENICE See     1. Type of contact: (majority of time spent)  IRT Session    2. People present:   Writer  Client: Janey Givens    3. Total number of persons who participated in contact: 2, including writer    4. Length of Actual Contact: Start Time: 1:00; End Time: 1:50   Traveled?    No     5. Location of contact:  Psychiatry Clinic, [place]    6. Did the client complete the home practice option(s) from the previous session: Not Applicable    7. Motivational Teaching Strategies:  Connect info and skills with personal goals  Promote hope and positive expectations  Explore pros and cons of change    8. Educational Teaching Strategies:  Review of written material/education  Relate information to client's experience  Ask questions to check comprehension    9. CBT Teaching Strategies:  Reinforcement and shaping (gains in knowledge & skills)  Relapse prevention planning (review of stressors)    10. IRT Module(s) Addressed:  Module 3 - Education about Psychosis  Module 4 - Relapse Prevention Planning    11. Techniques utilized:   [place] announced at beginning of session  Review of previous meeting  Present new material  Problem-solving practice  Help client choose a home practice option  Summarize progress made in current session    12. Mental Status Exam:    Alertness: oriented and drowsy  Appearance: casually groomed  Behavior/Demeanor: cooperative and pleasant, with good  eye contact   Speech: slowed  Language: intact. Preferred language identified as English.  Psychomotor: normal or unremarkable  Mood: depressed  Affect: restricted and tearful; was congruent to mood; wasn't congruent to  "content  Thought Process/Associations: perseverative and response delay  Thought Content:  Reports preoccupations;  Denies suicidal and violent ideation  Perception:  Reports auditory hallucinations;  Denies visual hallucinations  Insight: fair  Judgment: adequate for safety  Cognition: does  appear grossly intact; formal cognitive testing was not done  Suicidal ideation: denies SI, denies intent,  and denies plan  Homicidal Ideation: denies    13. Assessment/Progress Note:     This writer met with Katie for a follow-up IRT Session. Katie was observed to have difficulties concentrating during this appointment and asked this writer to repeat questions several times. Katie shared that she used marijuana over the weekend and intermittently throughout this week. She indicated her appetite has decreased and energy levels are low. This writer utilized motivational interviewing to assist Katie in thinking about how marijuana is influencing her. She noted she uses marijuana as a relaxant and does not want to lose money, as she has marijuana at her home. She noticed marijuana has caused her to be more irritable. She became tearful when describing that some symptoms have returned and she's \"not doing well.\" She stated she has thoughts that she is a bad person about 1-2 times per day. These thoughts have decreased since the hospitalization. She was able to indicate the triggers which caused her stress, including homework and school expectations. This writer provided education regarding the impact of marijuana on psychosis symptoms. Katie was observed to have inappropriate laughter once during the session and she said, \"It seems like you're on top of things and knowing where the conversation will go.\" This writer assessed previous coping mechanisms, which include creativity and art. This writer and Katie brainstormed possible activities to utilize her creativity over the week. She agreed to paint an inspirational quote. Katie then was " "agreeable to meeting with Ame (SEE) for orientation.     14. Plan/Referrals:     This writer will continue to provide motivational interviewing regarding substances and their influence on psychosis.    Billing for \"Interactive Complexity\"?    No      VERENICE See    NAVIGATE Individual Resiliency Trainer    Attestation:    I did not see this patient directly. This patient is discussed with me in individual clinical social work supervision, and I agree with the plan as documented.     MARCELL Wong, LICSW, November 15, 2018  "

## 2018-11-15 NOTE — MR AVS SNAPSHOT
After Visit Summary   11/15/2018    Janey Givens    MRN: [id number]           Patient Information     Date Of Birth          1995        Visit Information        Provider Department      11/15/2018 2:00 PM Ame Borrero [facility] Psychiatry        Today's Diagnoses     Disorganized schizophrenia (H)    -  1       Follow-ups after your visit        Your next 10 appointments already scheduled     Nov 26, 2018  4:00 PM CST   Navigate New with WILY Ly CNP   [facility] Psychiatry ([facility])    [address]  [facility] [address]   [phone]            Nov 29, 2018  1:00 PM CST   Navigate Psych[facility] with [facility]    [address] [place] [address]   [phone]            Dec 06, 2018 11:00 AM CST   Navigate Psychotherapy with VERENICE See   [facility] ([facility])    [address] [place] [id number] [phone]            Dec 13, 2018 11:00 AM CST   Navigate Psychotherapy with VERENICE See   [facility]iat[facility] ([facility])    [address] [place] [id number] [phone]              Who to contact     Please call your clinic at [phone] to:    Ask questions about your health    Make or cancel appointments    Discuss your medicines    Learn about your test results    Speak to your doctor            Additional Information About Your Visit        Care EveryWhere ID     This is your Care EveryWhere ID. This could be used by other organizations to access your [facility] medical records  [id number]         Blood Pressure from Last 3 Encounters:   11/08/18 124/76   10/25/18 126/81   10/14/18 142/79    Weight from Last 3 Encounters:   10/25/18 78.7 kg (173 lb 9.6 oz)   10/14/18 75.9 kg (167 lb 4.8 oz)   09/20/18 79.7 kg (175 lb 12.8 oz)              Today, you had the following     No  orders found for display       Primary Care Provider Office Phone # Fax #    Ariana Emre VINCENT [phone]       [address] [address]        Equal Access to Services     LETITIA HAMILTON : Hadii elizabeth ku blayneo Soparulali, waaxda luqadaha, qaybta kaalmada adeshelley, emani beebe kaushalchloe quiroz frank adair. So [facility] [phone].    ATENCIÓN: Si habla español, tiene a alarcon disposición servicios gratuitos de asistencia lingüística. Llame al [phone].    We comply with applicable federal civil rights laws and [place] laws. We do not discriminate on the basis of race, color, national origin, age, disability, sex, sexual orientation, or gender identity.            Thank you!     Thank you for choosing [facility]  for your care. Our goal is always to provide you with excellent care. Hearing back from our patients is one way we can continue to improve our services. Please take a few minutes to complete the written survey that you may receive in the mail after your visit with us. Thank you!             Your Updated Medication List - Protect others around you: Learn how to safely use, store and throw away your medicines at www.disposemymeds.org.          This list is accurate as of 11/15/18 11:59 PM.  Always use your most recent med list.                   Brand Name Dispense Instructions for use Diagnosis    carbamide peroxide 6.5 % otic solution    DEBROX    30 mL    Place 5-10 drops into the right ear daily as needed for other (Ear wax build up)    Impacted cerumen of right ear       estradiol 0.1 MG/24HR BIW patch    VIVELLE-DOT    16 patch    APPLY TWO PATCHES TWICE A WEEK    Gender dysphoria in adult       hydrOXYzine 25 MG tablet    ATARAX    120 tablet    Take 1 tablet (25 mg) by mouth every 4 hours as needed for anxiety    Anxiety       ibuprofen 200 MG tablet    ADVIL/MOTRIN     Take 200-400 mg by mouth every 4 hours as needed for mild pain        Melatonin 10 MG  Tabs tablet      Take 10 mg by mouth nightly as needed for sleep        OLANZapine 15 MG tablet    zyPREXA    20 tablet    Take 1 tablet (15 mg) by mouth At Bedtime    Disorganized schizophrenia (H)       progesterone 200 MG capsule    PROMETRIUM    30 capsule    Take 1 capsule (200 mg) by mouth daily    Gender dysphoria in adult       spironolactone 100 MG tablet    ALDACTONE    60 tablet    Take 2 tablets (200 mg) by mouth daily    Gender dysphoria in adult

## 2018-11-16 ASSESSMENT — ANXIETY QUESTIONNAIRES
1. FEELING NERVOUS, ANXIOUS, OR ON EDGE: SEVERAL DAYS
2. NOT BEING ABLE TO STOP OR CONTROL WORRYING: SEVERAL DAYS
3. WORRYING TOO MUCH ABOUT DIFFERENT THINGS: SEVERAL DAYS
5. BEING SO RESTLESS THAT IT IS HARD TO SIT STILL: NOT AT ALL
7. FEELING AFRAID AS IF SOMETHING AWFUL MIGHT HAPPEN: NOT AT ALL
GAD7 TOTAL SCORE: 5
6. BECOMING EASILY ANNOYED OR IRRITABLE: SEVERAL DAYS

## 2018-11-16 ASSESSMENT — PATIENT HEALTH QUESTIONNAIRE - PHQ9
SUM OF ALL RESPONSES TO PHQ QUESTIONS 1-9: 4
5. POOR APPETITE OR OVEREATING: SEVERAL DAYS

## 2018-11-16 NOTE — PROGRESS NOTES
CARMEN SEE Progress Note   For Supported Employment & Education    CARMEN Enrollee: Janey Givens (1995)     MRN: [id number]  Date:  11/15/2018  Clinician: CARMEN Supported Employment & , Ame Borrero    SEE met with Janey Givens to introduce services and explain what that SEE will conduct orientation and begin completing the Career and Education Inventory. Also explained that SEE will work with person to create goals and work with them to achieve these goals.     Orientation appointment set for 11/29/18 @ 11.    Ame Borrero Supported Employment and CARMEN

## 2018-11-17 ASSESSMENT — ANXIETY QUESTIONNAIRES: GAD7 TOTAL SCORE: 5

## 2018-11-26 ENCOUNTER — OFFICE VISIT (OUTPATIENT)
Dept: PSYCHIATRY | Facility: CLINIC | Age: 23
End: 2018-11-26
Payer: COMMERCIAL

## 2018-11-26 VITALS
TEMPERATURE: 97.4 F | HEART RATE: 75 BPM | DIASTOLIC BLOOD PRESSURE: 82 MMHG | BODY MASS INDEX: 26.43 KG/M2 | WEIGHT: 179 LBS | SYSTOLIC BLOOD PRESSURE: 123 MMHG

## 2018-11-26 DIAGNOSIS — F20.9 SCHIZOPHRENIA, UNSPECIFIED TYPE (H): Primary | ICD-10-CM

## 2018-11-26 RX ORDER — OLANZAPINE 10 MG/1
10 TABLET ORAL AT BEDTIME
Qty: 30 TABLET | Refills: 0 | Status: SHIPPED | OUTPATIENT
Start: 2018-11-26 | End: 2018-12-17 | Stop reason: ALTCHOICE

## 2018-11-26 ASSESSMENT — PAIN SCALES - GENERAL: PAINLEVEL: NO PAIN (0)

## 2018-11-26 NOTE — PROGRESS NOTES
"  NAVIGATE New Medication Management Visit            Janey Givens is a 23 year old male to female transgendered person who prefers the name Katie and she/her pronouns.  Therapist: None  PCP: Ariana Andrea  Other Providers: None  Referred by [facility] for evaluation of psychosis [hallucinations- no, delusions- yes].      History was provided by patient who was a vague historian. Collateral information also obtained through record review.      Chief Complaint                                                                                                             \" I am here for treatment for psychosis\"     History of Present Illness                                                                                 4, 4      Pertinent Background:  This patient first experienced psychosis in Nov 2017 manifested as disorganized thinking, behavior and communication and paranoid and grandiose delusions and persecutory ideation. Pt endorsed use of LSD and cannabis in the fall leading up to her episode. Patient was hospitalized at [facility] from 12/6/2017 - 12/22/2017 where she was described as labile, disorganized, grandiose and agitated upon admission. The patient reportedly demonstrated impulsive behavior as well and poor boundaries. Concern for impaired cognition r/t a concussion in 2014 prompted a neuropsych eval which did not reveal any problems with cognition or memory fx although working memory and processing speed were not consistent with someone of the patient's cognitive ability, likely attributed to the psychotic episode. 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   During hospitalization course, the patient was titrated to 20mg of olanzapine and a petition for commitment was filed but not supported. Pt was referred for a Rule 25, individual therapy and psychiatry after discharge. Pt reports that they met with psych provider one time (Dr. Andrea at [facility]) and expressed strong desire to stop medication " "due to ASE hypersomnolence, increased appetite and weight gain. They made a taper plan and pt didn't return for follow up. Pt denies sx of psychosis occurring after discontinuing medications in Feb 2018. Pt did complete a 28 day program at [facility]. She does not feel like she has a JAYANT, but found [facility] to be a helpful program.   Sx relapsed in Sept 2018 and patient was hospitalized again at [facility] from 9/28 to 10/15/18 for thought blocking, disorganized behavior and communication and grandiose delusional thoughts. Pt was fixated on a concern that she would just disappear, or cease to exist.  Pt was titrated to olanzapine 15mg during this hospital stay due to wanting to discharge before sx were stabilized. Of note, pt was noted to have catatonic sx, but there is not documentation of specific sx in notes during her course.       Psych critical item history includes catatonia, psychosis [sxs include delusions, disorganized], psych hosp (<3) and SUBSTANCE USE: cannabis,LSD   .   Most recent history  - Pt is in their rome year of the  program at the [employer]. Pt denies academic impairment now or at any point during their enrollment.   - Pt describes their psychotic sx as delusions of grandeur, they think they are a god, and reportedly will get stuck in \"thought loops\" described as intrusive thoughts, thought insertion, grandiose ideation.    - Pt reports that they had insomnia for the most of her adolescent years- typically sleep initiation. She has experienced sleep disturbances prior to her psychotic episodes as well. Pt reports that she has used hydroxyzine, melatonin, benadryl, EtOH, cannabis for sleep initiation with some success with all of these.   - Pt is unsure if she needs medication, but if she continues medication she does not want to continue olanzapine at 15mg. She continues to experience ASE hypersomnolence, increased appetite and weight gain.  - Reports that she has anxiety " around having another episode. She missed finals for fall semester with the first episode and took spring semester off while completing the [employer] program. She does not want to get further behind. Despite reporting stress around staying healthy and enrolled in school, she enies academic stressors.  - Pt also reports ASE lightheadedness, mode when coming from sitting to standing. Likens it to how she felt during wrestling season when restricting to make weight- she apparently experienced multiple episodes of syncope during this time.  - Pt reports ongoing sx of persecutory and paranoid thoughts but is able to do some reality testing around these thoughts now.   - Pt does seem somewhat guarded. It is unclear if she is minimizing sx or if this is typical presentation for her. She has motivation to minimize sx due to concern for rehospitalization. Given her current report of improved sx, however, and concerns with ASE, we discussed decreasing olanzapine dose to 10mg and the possibility to cross-titrate to aripiprazole in hopes somnolence and metabolic ASE would improve. Pt agrees to this plan.     Recent Symptoms:   Depression:  insomnia and poor concentration /memory  Elevated:  none; Reports periods of time in the past that she has periods of a couple of hours where she has increased energy  Psychosis:  delusions- . [details in Interim History]  Anxiety:  none  Panic Attack:  none  Trauma Related:  none       Recent Substance Use:  Cannabis- reports using once a week, reports reduced use compared to previously       Substance Use History                                                                 Past Use- Alcohol- social use , Cannabis- previous daily use  and Other Illicit Drugs-acid and Adderall; occa tobacco use   Treatment [#, most recent]- completed MI/CD program at [facility]     Denies medical or legal consequences of use         Psychiatric History     Suicidal ideation- None   Suicide Attempt:  #- 0     SIB-  None   Eating Disorder- None       Psychiatric Medication Trials     None    Social/ Family History               [per patient report]                                                  1ea, 1ea     FINANCIAL SUPPORT- family or friend       CHILDREN- None       LIVING SITUATION- living on campus       LEGAL- None  EARLY HISTORY/ EDUCATION- provided support reading since 6th grade   SOCIAL/ SPIRITUAL SUPPORT- support groups; family and friends        CULTURAL INFLUENCES/ IMPACT- transgender MTF       TRAUMA HISTORY (self-report)- none  FEELS SAFE AT HOME- Yes  FAMILY HISTORY-  Sister has been dx with BPAD, possibly mom?     Medical / Surgical History                                                                                                                     Patient Active Problem List   Diagnosis     Blood clot in vein     Psychiatric disorder     Gender dysphoria in adult     Acute psychosis (H)       Past Surgical History:   Procedure Laterality Date     COSMETIC SURGERY      August 2018     KNEE SURGERY Left 2014        Medical Review of Systems                                                                                                     2, 10     GENERAL: Negative for malaise, significant weight loss and fever  HEENT: No changes in hearing or vision, no nose bleeds or other nasal problems  NECK: Negative for lumps, goiter, pain and significant neck swelling  RESPIRATORY: Negative for cough, wheezing and shortness of breath  CARDIOVASCULAR: Negative for chest pain, leg swelling and palpitations  GI: Negative for abdominal discomfort, blood in stools or black stools  : Negative for dysuria, frequency and incontinence  MUSCULOSKELETAL: Negative for joint pain or swelling, back pain, and muscle pain.  SKIN: Negative for lesions, rash, and itching.  HEMATOLOGY/LYMPHOLOGY Negative for prolonged bleeding, bruising easily, and swollen nodes.  ENDOCRINE: Negative for cold or heat intolerance, polyuria,  polydipsia and goiter.  NEURO:  negative  The remainder of the review of systems is noncontributory    Allergy                                Banana  Current Medications                                                                                                         Current Outpatient Prescriptions   Medication Sig Dispense Refill     carbamide peroxide (DEBROX) 6.5 % otic solution Place 5-10 drops into the right ear daily as needed for other (Ear wax build up) 30 mL 0     estradiol (VIVELLE-DOT) 0.1 MG/24HR BIW patch APPLY TWO PATCHES TWICE A WEEK 16 patch 3     hydrOXYzine (ATARAX) 25 MG tablet Take 1 tablet (25 mg) by mouth every 4 hours as needed for anxiety 120 tablet 0     ibuprofen (ADVIL/MOTRIN) 200 MG tablet Take 200-400 mg by mouth every 4 hours as needed for mild pain       Melatonin 10 MG TABS tablet Take 10 mg by mouth nightly as needed for sleep       OLANZapine (ZYPREXA) 15 MG tablet Take 1 tablet (15 mg) by mouth At Bedtime 20 tablet 0     progesterone (PROMETRIUM) 200 MG capsule Take 1 capsule (200 mg) by mouth daily 30 capsule 2     spironolactone (ALDACTONE) 100 MG tablet Take 2 tablets (200 mg) by mouth daily 60 tablet 2     Vitals                                                                                                                         3, 3     /82 (BP Location: Left arm, Patient Position: Chair, Cuff Size: Adult Regular)  Pulse 75  Temp 97.4  F (36.3  C)  Wt 81.2 kg (179 lb)  BMI 26.43 kg/m2     Mental Status Exam                                                                                      9, 14 cog gs     Alertness: alert   Appearance: casually groomed  Behavior/Demeanor: guarded, with good  eye contact   Speech: regular rate and rhythm and with some response delay  Language: intact  Psychomotor: normal or unremarkable  Mood: description consistent with euthymia  Affect: restricted; was congruent to mood; was congruent to content  Thought  Process/Associations: thought blocking  Thought Content:  Reports delusions;  Denies suicidal and violent ideation  Perception:  Reports none;  Denies auditory hallucinations, visual hallucinations [details in Interim History] and visual hallucinations  Insight: good  Judgment: good  Cognition: (6) does  appear grossly intact; formal cognitive testing was not done  oriented: time, person, and place  attention span: intact  concentration: intact  recent memory: intact  remote memory: intact  fund of knowledge: appropriate  Gait and Station: unremarkable    Labs and Data                                                                                                                     Rating Scales:   PHQ9, AIMS: determine next due and COMPASS    PHQ9 Today:  2  PHQ-9 SCORE 4/27/2018 11/8/2018 11/16/2018   Total Score 8 7 4         Recent Labs   Lab Test  10/25/18   0903  10/01/18   1102  07/11/18   1630   CR  0.8  0.77  0.8   GFRESTIMATED  >90  >90  >90     Recent Labs   Lab Test  10/25/18   0903  10/01/18   1102   AST  8.5  7   ALT  12.2  18   ALKPHOS  43.4  42       Diagnosis and Assessment                                                                             m2, h3     Today the following issues were addressed:    Schizophrenia  R/OSchizoaffective disorder  Cannabis use disorder, moderate     Catatonia, by history  Gender dysphoria, by history    [place] Prescription Monitoring Program [] review was not needed today.    PSYCHOTROPIC DRUG INTERACTIONS: none clinically relevant    Plan                                                                                                                     m2, h3     1) Reduce olanzapine to 10mg. Will discuss plan to reduce further or cross-titrate to aripiprazole at RTC    RTC: 2 weeks     CRISIS NUMBERS:   Provided routinely in AVS.    Treatment Risk Statement:  The patient understands the risks, benefits, adverse effects and alternatives. Agrees to treatment with  the capacity to do so. No medical contraindications to treatment. Agrees to call clinic for any problems. The patient understands to call 911 or go to the nearest ED if life threatening or urgent symptoms occur.        PROVIDER:  WILY Ly CNP

## 2018-11-26 NOTE — PROGRESS NOTES
NAVIGATE Clinician Contact & Progress Note   For Family Education Program    NAVIGATE Enrollee: Janey Givens (1995)     MRN: [id number]  Date:  11/12/18  Diagnosis(es):   Psychosis, unspecified psychosis type; F29  Clinician: CARMEN Individual Resiliency  & Family Clinician, VERENICE Dash     1. Type of contact: (majority of time spent)  Family Session    2. People present:   Writer  Client: No  Significant Other/Family/Friend:  Mother and Father    3. Total number of persons who participated in contact: 3, including writer    4. Length of Actual Contact: Start Time: 4pm; End Time: 5pm   Traveled?    No     5. Location of contact:  Psychiatry Clinic, [place]    6. Did the client complete the home practice option(s) from the previous session: Not Applicable    7. Motivational Teaching Strategies:  Connect info and skills with personal goals  Promote hope and positive expectations  Explore pros and cons of change  Re-frame experiences in positive light    8. Educational Teaching Strategies:  Review of written material/education  Relate information to client's experience  Ask questions to check comprehension  Break down information into small chunks  Adopt client's language     9. CBT Teaching Strategies:  Reinforcement and shaping (positive feedback for steps towards goals and gains in knowledge & skills)  Coping skills training (review current coping skills)  Relapse Prevention Planning    10. Psychoeducational Topic(s) Addressed:  Family Education Orientation & Tip Sheet  Family Member Interview    11. Techniques utilized:   [place] announced at beginning of session  Review of goal  Review of previous meeting  Summarize progress made in current session    12. Assessment/Progress Note:     Met with client's parents- Burton on this date for family session. Writer set agenda to check-in, review material covered in previous session, collaboratively discuss items in Family Member  "Interview, and discuss and practice new skills together when applicable.    Both Billie and Gaurav were open and actively engaged throughout the session. During check-in, they shared they feel Katie is doing well overall. Billie commented that she seems \"more like herself\". Began family member interview designed to illicit knowledge of illness, diagnosis, medication prescribed, patient and family goals, relationship with patient, strengths, prognosis, and barriers to successful engagement and treatment related \"hoped for\" outcomes. Billie and Gaurav continued discussion from previous session surrounding the events leading up to Katie's experience of symptoms including a break-up in 2016, coming out as transgender in May 2016, the rigor of chemical engineering program at the , substance use, and Katie's process of transitioning including facial feminization surgery in August 2018. Both reflected that post-surgery in August they observed Katie to seem to be \"back to herself\" and were encouraged by this. They discussed observed useful coping strategies for Katie including cross stitch, focusing on a manageable task as well as academics. Also discussed potential detrimental factors for her including fear of failure, criticism, making mistakes and not succeeding. They wonder how these detrimental factors combined with the rigor of the Chem Engineering Program impacted Katie's experience of symptoms during either or both episodes.     They expressed hope that Katie will be able to manage symptoms and experience recovery with sustained self-awareness and insight. Dad reiterated hope that she continues to be open to treatment and adhere to medication recommendations. Mom described challenges for her and related coping strategies for them as a couple including doing projects at the cabin and attending PFLAG meetings. Writer provided validation, support and encouragement as well as communicated hope for recovery.     13. Plan/Referrals: "     Will meet with family weekly as schedule allows for evidence based family psychoeducation and therapeutic support aimed at maximizing Janey's opportunity for recovery from psychosis.     Family aware writer is out of clinic starting Wednesday 11/14 and will return 11/26. Will follow-up to schedule next family session then if family hasn't already scheduled prior to writer's return. Client and family aware they can reach out to NAVIGATE IRT - MARCELL See, LGRENAE and/or NAVIGATE team should concerns or needs arise prior to next scheduled appointment.       VERENICE Dash   NAVIGATE Individual Resiliency [facility] & Family Clinician       Attestation:    I did not see this patient directly. This patient is discussed with me in individual clinical social work supervision, and I agree with the plan as documented.     MARCELL Wong, LICSW, November 27, 2018

## 2018-11-28 ASSESSMENT — PATIENT HEALTH QUESTIONNAIRE - PHQ9: SUM OF ALL RESPONSES TO PHQ QUESTIONS 1-9: 2

## 2018-11-29 ENCOUNTER — OFFICE VISIT (OUTPATIENT)
Dept: PSYCHIATRY | Facility: CLINIC | Age: 23
End: 2018-11-29
Payer: COMMERCIAL

## 2018-11-29 DIAGNOSIS — F29 PSYCHOSIS, UNSPECIFIED PSYCHOSIS TYPE (H): Primary | ICD-10-CM

## 2018-11-29 NOTE — MR AVS SNAPSHOT
After Visit Summary   11/29/2018    Janey Givens    MRN: [id number]           Patient Information     Date Of Birth          1995        Visit Information        Provider Department      11/29/2018 1:00 PM Aleah Cerda [facility] Psychiatry        Today's Diagnoses     Psychosis, unspecified psychosis type (H)    -  1      Care Instructions    well          Follow-ups after your visit        Your next 10 appointments already scheduled     Dec 06, 2018 11:00 AM CST   Navigate Psychotherapy with Aleahmady Cerda [facility] Psychiatry ([facility])    [address] [facility] [address] [place] [id number]   [phone]            Dec 07, 2018 11:00 AM CST   Navigate Family Therapy with Nia Rebecca [facility] Psychiatry ([facility])    [address] [facility] [address] [place] [id number] [phone]            Dec 13, 2018 11:00 AM CST   Navigate Psychotherapy with Aleah Cerda [facility] Psychiatry ([facility])    [address] [facility] [address] [place] [id number] [phone]              Who to contact     Please call your clinic at [phone] to:    Ask questions about your health    Make or cancel appointments    Discuss your medicines    Learn about your test results    Speak to your doctor            Additional Information About Your Visit        Care EveryWhere ID     This is your Care EveryWhere ID. This could be used by other organizations to access your [facility] medical records  [id number]         Blood Pressure from Last 3 Encounters:   11/26/18 123/82   11/08/18 124/76   10/25/18 126/81    Weight from Last 3 Encounters:   11/26/18 81.2 kg (179 lb)   10/25/18 78.7 kg (173 lb 9.6 oz)   10/14/18 75.9 kg (167 lb 4.8 oz)              Today, you had the following     No orders found for display       Primary Care Provider Office Phone # Fax #    Ariana VINCENT [phone]       [address]  [address]  [facility] [address]        Equal Access to Services     [facility] : Hadii elizabeth ku hadchiquiso Soparulali, waaxda luqadaha, qaybta kaalmada uri, emani carringtonberenicealthea adair. So [facility] [phone].    ATENCIÓN: Si habla español, tiene a alarcon disposición servicios gratuitos de asistencia lingüística. Chelsea al [phone].    We comply with applicable federal civil rights laws and [place] laws. We do not discriminate on the basis of race, color, national origin, age, disability, sex, sexual orientation, or gender identity.            Thank you!     Thank you for choosing [facility] PSYCHIATRY  for your care. Our goal is always to provide you with excellent care. Hearing back from our patients is one way we can continue to improve our services. Please take a few minutes to complete the written survey that you may receive in the mail after your visit with us. Thank you!             Your Updated Medication List - Protect others around you: Learn how to safely use, store and throw away your medicines at www.disposemymeds.org.          This list is accurate as of 11/29/18 11:59 PM.  Always use your most recent med list.                   Brand Name Dispense Instructions for use Diagnosis    carbamide peroxide 6.5 % otic solution    DEBROX    30 mL    Place 5-10 drops into the right ear daily as needed for other (Ear wax build up)    Impacted cerumen of right ear       estradiol 0.1 MG/24HR bi-weekly patch    VIVELLE-DOT    16 patch    APPLY TWO PATCHES TWICE A WEEK    Gender dysphoria in adult       hydrOXYzine 25 MG tablet    ATARAX    120 tablet    Take 1 tablet (25 mg) by mouth every 4 hours as needed for anxiety    Anxiety       ibuprofen 200 MG tablet    ADVIL/MOTRIN     Take 200-400 mg by mouth every 4 hours as needed for mild pain        Melatonin 10 MG Tabs tablet      Take 10 mg by mouth nightly as needed for sleep        OLANZapine 10 MG tablet    zyPREXA    30 tablet    Take 1 tablet (10 mg) by mouth  At Bedtime    Psychosis, unspecified psychosis type (H)       progesterone 200 MG capsule    PROMETRIUM    30 capsule    Take 1 capsule (200 mg) by mouth daily    Gender dysphoria in adult       spironolactone 100 MG tablet    ALDACTONE    60 tablet    Take 2 tablets (200 mg) by mouth daily    Gender dysphoria in adult

## 2018-11-29 NOTE — MR AVS SNAPSHOT
After Visit Summary   11/29/2018    Janey Givens    MRN: [id number]           Patient Information     Date Of Birth          1995        Visit Information        Provider Department      11/29/2018 12:00 PM Ame Borrero [facility] Psychiatry        Today's Diagnoses     Psychosis, unspecified psychosis type (H)    -  1       Follow-ups after your visit        Your next 10 appointments already scheduled     Dec 06, 2018 11:00 AM CST   Navigate Psychotherapy with Aleah Cerda [facility] Psychiatry ([facility])    [address] [facility] [address] [place] [id number] [phone]            Dec 07, 2018 11:00 AM CST   Navigate Family Therapy with Nia Fernandez [facility] Psychiatry ([facility])    [address] [facility] [address] [place] [id number] [phone]            Dec 13, 2018 11:00 AM CST   Navigate Psychotherapy with Aleah Cerda [facility] Psychiatry ([facility])    [address] [facility] [address] [place] [id number] [phone]              Who to contact     Please call your clinic at [phone] to:    Ask questions about your health    Make or cancel appointments    Discuss your medicines    Learn about your test results    Speak to your doctor            Additional Information About Your Visit        Care EveryWhere ID     This is your Care EveryWhere ID. This could be used by other organizations to access your [facility] medical records  [id number]         Blood Pressure from Last 3 Encounters:   11/26/18 123/82   11/08/18 124/76   10/25/18 126/81    Weight from Last 3 Encounters:   11/26/18 81.2 kg (179 lb)   10/25/18 78.7 kg (173 lb 9.6 oz)   10/14/18 75.9 kg (167 lb 4.8 oz)              Today, you had the following     No orders found for display       Primary Care Provider Office Phone # Fax #    Ariana VINCENT [phone]       [address] [address]        Equal  Access to Services     [facility]: Hadii elizabeth barajas clive Garcia, waaxda luqadaha, qaybta kaalmada kaushalvalentínanastasiia, waxajay tanika carringtonberenicealthea adair. So [facility] [phone].    ATENCIÓN: Si elsala sindi, tiene a alarcon disposición servicios gratuitos de asistencia lingüística. Llame al [phone].    We comply with applicable federal civil rights laws and [place] laws. We do not discriminate on the basis of race, color, national origin, age, disability, sex, sexual orientation, or gender identity.            Thank you!     Thank you for choosing [facility] PSYCHIATRY  for your care. Our goal is always to provide you with excellent care. Hearing back from our patients is one way we can continue to improve our services. Please take a few minutes to complete the written survey that you may receive in the mail after your visit with us. Thank you!             Your Updated Medication List - Protect others around you: Learn how to safely use, store and throw away your medicines at www.disposemymeds.org.          This list is accurate as of 11/29/18 11:59 PM.  Always use your most recent med list.                   Brand Name Dispense Instructions for use Diagnosis    carbamide peroxide 6.5 % otic solution    DEBROX    30 mL    Place 5-10 drops into the right ear daily as needed for other (Ear wax build up)    Impacted cerumen of right ear       estradiol 0.1 MG/24HR bi-weekly patch    VIVELLE-DOT    16 patch    APPLY TWO PATCHES TWICE A WEEK    Gender dysphoria in adult       hydrOXYzine 25 MG tablet    ATARAX    120 tablet    Take 1 tablet (25 mg) by mouth every 4 hours as needed for anxiety    Anxiety       ibuprofen 200 MG tablet    ADVIL/MOTRIN     Take 200-400 mg by mouth every 4 hours as needed for mild pain        Melatonin 10 MG Tabs tablet      Take 10 mg by mouth nightly as needed for sleep        OLANZapine 10 MG tablet    zyPREXA    30 tablet    Take 1 tablet (10 mg) by mouth At Bedtime    Psychosis, unspecified  psychosis type (H)       progesterone 200 MG capsule    PROMETRIUM    30 capsule    Take 1 capsule (200 mg) by mouth daily    Gender dysphoria in adult       spironolactone 100 MG tablet    ALDACTONE    60 tablet    Take 2 tablets (200 mg) by mouth daily    Gender dysphoria in adult

## 2018-11-30 ASSESSMENT — PATIENT HEALTH QUESTIONNAIRE - PHQ9
5. POOR APPETITE OR OVEREATING: NOT AT ALL
SUM OF ALL RESPONSES TO PHQ QUESTIONS 1-9: 4

## 2018-11-30 ASSESSMENT — ANXIETY QUESTIONNAIRES
6. BECOMING EASILY ANNOYED OR IRRITABLE: NOT AT ALL
7. FEELING AFRAID AS IF SOMETHING AWFUL MIGHT HAPPEN: NOT AT ALL
2. NOT BEING ABLE TO STOP OR CONTROL WORRYING: NOT AT ALL
3. WORRYING TOO MUCH ABOUT DIFFERENT THINGS: NOT AT ALL
GAD7 TOTAL SCORE: 0
1. FEELING NERVOUS, ANXIOUS, OR ON EDGE: NOT AT ALL
5. BEING SO RESTLESS THAT IT IS HARD TO SIT STILL: NOT AT ALL

## 2018-12-01 ASSESSMENT — ANXIETY QUESTIONNAIRES: GAD7 TOTAL SCORE: 0

## 2018-12-03 NOTE — PROGRESS NOTES
NAVIGATE SEE Progress Note   For Supported Employment & Education    NAVIGATE Enrollee: Janey Givens (1995)     MRN: [id number]  Date:  11/29/18  Clinician: CARMEN Supported Employment & , Ame Borrero    1. Client Status Update:   Janey Givens is interested in education (Client working on completing Career Profile)    2. People present:   SEE/Writer  Client: Janey Givens    3. Total number of persons who participated in contact: (do not count yourself/SEE) 1    4. Length of Actual Contact: 60 minutes   Traveled? No    5. Location of contact:  Psychiatry Clinic, [place]    6. Brief description of session, contact, or client status (include: strategies, interventions, client reaction to contact, next steps, etc)     and Katie met to complete the orientation to SEE services and start the career and education inventory. 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 Katie attended high school in [place] and currently lives on campus, denying any concerns about where she lives or her roommates. She reports her interests as: learning and school, knitting (just completed her first headband), interior design, hanging out with friends, watching Netflix, acrylic pouring, music, DIY art and science.    Katie identifies as 'having psychotic episodes' and that she 'fell off a cathleen' before her hospitalizations. She reports she is 'still climbing up to the top of the cathleen right now' and experiences the sensation that others can read her mind and that she can read other people's minds or certain actions by others have a deeper meaning then intended (i.e someone will cough in the waiting room and she will think it means that they  don't like her). She reports this as her most distressing symptom but is able to 'talk herself out of it being real' to cope. She also reports short term memory loss. She reports losing things easily, getting distracted at home and forgetting others' names. She denies any concerns with school as she has a high level of interest in the topics, so doesn't have any concerns with schoolwork at this time. We discussed potential accommodations that she could consider or other supports on campus in the future.     Katie was appreciative of the appointment but said she didn't think SEE services were necessary at this time. Writer offered to meet anytime and that if she had concerns about school, obtaining accomodations or applying to grad school to let me know. Writer said I would occasionally text her to check in, which she was open to.    7. Completion of mutually agreed upon client task from previous meeting:  Not Applicable    8. Orientation and Treatment Planning:  SEE orientation meeting    9. Assessment:  Gathering SEE information/inventory regarding work and/or education history, skills, goals, and preferences with client    10. Placement:  Not Applicable    11. Follow Along Supports: (for clients who are working or attending school)   Not Applicable    12. Mutually agreed upon client task for next meeting:     na    13. Next Meeting Scheduled for: claudia Borrero  NAVIGATE Supported Employment &

## 2018-12-03 NOTE — PROGRESS NOTES
CARMEN Clinician Contact & Progress Note  For Individual Resiliency Training (IRT)  A Part of the [facility] First Episode of Psychosis Program    NAVIGATE Enrollee: Janey Givens (1995)     MRN: [id number]  Date:  11/29/18  Diagnosis: Psychosis, unspecified (F29)  Clinician: CARMEN Individual Resiliency Trainer, VERENICE See     1. Type of contact: (majority of time spent)  IRT Session    2. People present:   Writer  Client: Janey Givens    3. Total number of persons who participated in contact: 2, including writer    4. Length of Actual Contact: Start Time: 1:00; End Time: 2:00   Traveled?    No     5. Location of contact:  Psychiatry Clinic, [place]    6. Did the client complete the home practice option(s) from the previous session: Partially Completed    7. Motivational Teaching Strategies:  Connect info and skills with personal goals  Promote hope and positive expectations  Explore pros and cons of change    8. Educational Teaching Strategies:  Review of written material/education  Relate information to client's experience  Adopt client's language     9. CBT Teaching Strategies:  Reinforcement and shaping (gains in knowledge & skills)  Relapse prevention planning  Coping skills training (review current coping skills, increase currently used skills, model new skill, role play new skill and plan home practice)    10. IRT Module(s) Addressed:  Module 2 - Assessment/Initial Goal Setting  Module 3 - Education about Psychosis  Module 9 - Coping with Symptoms    11. Techniques utilized:   [place] announced at beginning of session  Review of homework  Review of previous meeting  Present new material  Problem-solving practice  Help client choose a home practice option  Summarize progress made in current session    12. Mental Status Exam:    Alertness: alert  and oriented  Appearance: casually groomed  Behavior/Demeanor: cooperative, pleasant and calm, with good  eye contact   Speech: normal and regular  rate and rhythm  Language: intact. Preferred language identified as English.  Psychomotor: normal or unremarkable  Mood: depressed  Affect: appropriate; was congruent to mood; was congruent to content  Thought Process/Associations: unremarkable  Thought Content:  Reports delusions and preoccupations;  Denies suicidal and violent ideation  Perception:  Reports none;  Denies auditory hallucinations and visual hallucinations  Insight: adequate  Judgment: adequate for safety  Cognition: does  appear grossly intact; formal cognitive testing was not done  Suicidal ideation: denies SI, denies intent,  and denies plan  Homicidal Ideation: denies    13. Assessment/Progress Note:     This writer met with Katie for a follow-up IRT Session. She stated she has been sleeping about 13 hours per day, with decreased energy. She reported appetite changes daily, with little consistency. She noted she stopped using marijuana on the 19th of November. She said she has low motivation, disorganization, and memory concerns following marijuana use. However, her roommates use marijuana and she utilizes it for relaxation. Katie plans to continue using marijuana once per week until finals are complete. This writer provided education regarding how psychosis can be impacted by marijuana. She had significant insight and awareness into this. Katie reported a delusion of telepathy. She feels others send her messages via their thoughts, which are typically negative. She also stated she has been experiencing thought broadcasting about once per day. She mentioned this does not happen as frequently as when she was hospitalized and she is able to reality test it. She said she would like to be able to have other relaxation methods besides substances. She has previously enjoyed programming video games, but feels this is intellectually taxing when also in school. She agreed to restart creative outlets during free time. This writer also introduced and guided a  "body scan meditation. Katie was agreeable to trying this throughout the upcoming week.     14. Plan/Referrals:     This writer will continue with IRT goal setting and will provide an additional coping mechanism during each session.     Billing for \"Interactive Complexity\"?    No      VERENICE See    NAVIGATE Individual Resiliency Trainer    Attestation:    I did not see this patient directly. This patient is discussed with me in individual clinical social work supervision, and I agree with the plan as documented.     MARCELL Wong, LICSW, November 29, 2018  "

## 2018-12-07 ENCOUNTER — OFFICE VISIT (OUTPATIENT)
Dept: PSYCHIATRY | Facility: CLINIC | Age: 23
End: 2018-12-07
Payer: COMMERCIAL

## 2018-12-07 DIAGNOSIS — F20.9 SCHIZOPHRENIA, UNSPECIFIED TYPE (H): Primary | ICD-10-CM

## 2018-12-10 NOTE — PROGRESS NOTES
NAVIGATE Clinician Contact & Progress Note   For Family Education Program    NAVIGATE Enrollee: Janey Givens (1995)     MRN: [id number]  Date:  12/07/18  Diagnosis(es):   Schizophrenia, unspecified type F20.9  Clinician: CARMEN Individual Resiliency [place] & Family Clinician, VERENICE Dash     1. Type of contact: (majority of time spent)  Family Session    2. People present:   Writer  Client: No  Significant Other/Family/Friend:  Mother    3. Total number of persons who participated in contact: 2, including writer    4. Length of Actual Contact: Start Time: 11am; End Time: 12pm   Traveled?    No     [facility]. [facility]    6. Did the client complete the home practice option(s) from the previous session: Completed    7. Motivational Teaching Strategies:  Connect info and skills with personal goals  Promote hope and positive expectations  Explore pros and cons of change  Re-frame experiences in positive light    8. Educational Teaching Strategies:  Review of written material/education  Relate information to client's experience  Ask questions to check comprehension  Break down information into small chunks  Adopt client's language     9. CBT Teaching Strategies:  Reinforcement and shaping (positive feedback for steps towards goals and gains in knowledge & skills)  Coping skills training (review current coping skills, increase currently used skills and feedback)  Cognitive restructuring (identify thoughts related to negative feelings, examine the evidence and change though or form action plan)    10. Psychoeducational Topic(s) Addressed:  Just the Facts - Psychosis  Just the Facts - Coping with Stress    11. Techniques utilized:   [place] announced at beginning of session  Review of goal  Review of previous meeting  Present new material  Problem-solving practice  Help client choose a home practice option  Summarize progress made in current session    12.  "Assessment/Progress Note:     Met with Mom-Billie for family session on this date. Writer set agenda to check-in, review home practice and material covered in previous session, collaboratively explore new material in Family Program Manual, discuss and practice new skills together when applicable, and identify a home practice for next session.    During check-in, Billie described experiencing heightened anxiety this past week and wonders if it is related to her -Gaurav being out of town. She described a feeling of \"waiting for the shoe to drop,\" and worry about having to respond on her own if Katie has an increase in symptoms while Gaurav is out of town. She described communication via text with Katie on Sunday where she was reporting conflict with roommates. Mom shared this is unusual for Katie and identified this as source of her worry that Katie may be struggling. Writer explored possible strategies to mitigate her anxiety including reaching out and talking with Katie. Explored a plan for this conversation including communicating support, identifying spaces for agreement and partnering when able. Referenced LEAP Model, which mom is familiar with after reading I'm Not Sick, I Don't Need Help! by Mino Jarvis. Additionally reviewed some CBT techniques including cognitive restructuring, which mom appeared to find helpful. Explored other information Billie received suggesting that Katie is in fact doing well, including Katie's younger sister spending time with her on Saturday and reporting she seemed to be doing well, and Gaurav connecting with Katie via phone early in the week and also sharing she seemed good. Additionally reminded Billie of Katie's resiliency and strengths.    Offered support, encouragement and praise for Billie's participation in Katie's treatment and recovery.     13. Plan/Referrals:     Will meet with family weekly as schedule allows for evidence based family psychoeducation and therapeutic support aimed at " maximizing Janey's opportunity for recovery from psychosis.     Billie plans to connect with family regarding scheduling as she hopes Katie's sisters may be able to join. Family aware they can reach out to writer directly and/or NAVIGATE team should concerns or needs arise prior to next scheduled appointment.     Nia Fernandez RENAE   NAVIGATE Individual Resiliency  & Family Clinician     Attestation:    I did not see this patient directly. This patient is discussed with me in individual clinical social work supervision, and I agree with the plan as documented.     MARCELL Wong, LICSW, December 7, 2018

## 2018-12-11 NOTE — PROGRESS NOTES
[facility] NAVIGATE Program Treatment Plan Summary  A Part of the [facility] First Episode of Psychosis Program     NAVIGATE Enrollee: Janey Givens  /Age:  1995 (24 year old)  MRN: [id number]    Date of Initial Service: 18  Date of INTIAL Treatment Plan: 18   Last Review/Update Date:  18  90-Day Review Date:  19    The following represents initial treatment plan.    1. DSM-V Diagnosis (include numeric code)  Schizophrenia, 295.90 (F20.9)    2. Current symptoms and circumstances that substantiate the diagnosis    Janey has a history of psychosis (delusions, thought broadcasting, odd beliefs per family/friends, auditory hallucinations and negative symptoms (diminished emotional expression and anhedonia)). She presents with current symptoms of psychosis (delusions, thought broadcasting and auditory hallucinations).     3. How symptoms and/or behaviors are affecting level of function    Janey s systems are impacting functioning with respect to social relationships and academics.    4. Risk Assessment:  Suicide:  Assessed Level of Immediate Risk: Medium  Ideation: No  Plan:  No  Means: No  Intent: No    Homicide/Violence:  Assessed Level of Immediate Risk: Low  Ideation: No  Plan: No  Means: No  Intent: No    5. Medications    Current Outpatient Medications   Medication     ARIPiprazole (ABILIFY) 10 MG tablet     citalopram (CELEXA) 10 MG tablet     estradiol (VIVELLE-DOT) 0.1 MG/24HR bi-weekly patch     Melatonin 10 MG TABS tablet     progesterone (PROMETRIUM) 200 MG capsule     spironolactone (ALDACTONE) 100 MG tablet     zolpidem (AMBIEN) 5 MG tablet     No current facility-administered medications for this visit.        6. Strengths     Medication adherence  Supportive friends, family, recovery environment  Bravery & Valor     Capacity to love and be loved    Caution, Prudence, & Discretion    Creativity, Ingenuity, & Originality    Curiosity    Honest, Authentic, Genuine    Humor &  Playfulness   Industry, diligence, & perseverance    Judgment, critical thinking, & open-mindedness    Love of learning       7. Barriers & Suicide Risk Factors     Coping, limited to no coping strategies  Substance use  Grandiose or Persecutory Delusions  Isolation/Reduced Supervision  Symptoms of psychosis, positive (delusions and/or hallucinations)  Symptoms of psychosis, negative (flat affect, avolition, anhedonia, alogia, and/or apathy)    8. Treatment Domains and Goals    Domain 1: Illness Management & Recovery  Identify and engage possible areas of improvement related to medication optimization and psychosis (delusions and auditory hallucinations) and ability to management illness.     Measurable Objectives Interventions Target Dates & Discharge Criteria   Medication Objectives    -Paricipate in a medication evaluation   -Take medications as prescribed and have reduced frequency and severity of symptoms  -Learn and implement strategies for overcoming barriers to taking medication   Medication Management  -Prescribe and monitor medications  -Monitor and treat side effects  -Psychoeducation    IRT/Psychotherapy  -Psychoeducation  -Motivation interviewing  -CBT  -Behavioral activation  -Mindfulness    Family Therapy  -Psychoeducation  -Motivational interviewing  -Behavioral family therapy  -CBT  -Behavioral activation    Case Management  -NA or None   Target date:   12 months from 11/08/18    Discharge criteria:  Marked and sustained symptom improvement          Individual s Objectives    -Complete a safety plan with therapist and share with support system  -Define what recovery means to self  -Identify psychosocial areas of need  -Identify top 5 strengths and use those strengths when working toward goal achievement; simultaneously choose one area for improvement and identify two actionable steps toward improvement  -Create a goal plan consisting of one long-term goal, three short-term goals, and actionable  steps toward short-term goal achievement  -Demonstrate understanding of psychosis (delusions and auditory hallucinations) in the context of self with respect to symptoms, causes, course, medications and the impact of stress  -Learn at least 2 coping strategies to successfully target current symptoms  -Demonstrate understanding for how substance use impacts symptoms, identify stage of change, and experiment with reduced use or abstinence from all illicit substances   -Learn strategies to build positive emotions and facilitate resiliency   -Build client build resiliency through the skills of gratitude, savoring, active/constructive communication, and practicing acts of kindness.  -Develop and implement a relapse prevention plan including identification of warning signs, triggers, coping mechanisms, and how other persons can be supportive if symptoms increase or reemerge   -Process the psychotic episode by demonstrating understanding of how the episode impacted self, identifying positive coping strategies and resiliency used during that time, challenging self-stigmatizing beliefs, and developing a positive attitude towards facing future life challenges  -Identify primary styles of thinking, and demonstrate understanding of and use cognitive restructuring to successfully deal with negative feelings  -Identify persistent symptoms that interfere with activities and/or enjoyment and successfully implement two coping strategies to reduce symptoms severity   Medication Management  -Prescribe and monitor medications  -Monitor and treat side effects  -Psychoeducation    IRT/Psychotherapy  -Psychoeducation  -Motivation interviewing  -CBT  -Behavioral activation  -Mindfulness    Family Therapy  -Psychoeducation  -Motivational interviewing  -Behavioral family therapy  -CBT  -Behavioral activation    Case Management  -NA or None   Target date:   12 months from 11/08/18    Discharge criteria:  Marked and sustained symptom  improvement     Janey demonstrates understanding of mental illness     Janey successfully implements strategies to cope with stressors and/or symptoms to mitigate risk for increase in symptom severity or relapse         Support System Objectives    -Supports, including family members, verbally reinforce the client's active attempts to build self-esteem and rapport   -Supports verbalize increased understanding of an knowledge about the client's illness and treatment   -Identify psychosocial areas of need  -Verbalize understanding of the client's long-term and short-term goals  -Demonstrate understanding of psychosis (delusions and auditory hallucinations) in the context of the client with respect to symptoms, causes, course, medications and the impact of stress  -Learn the client's signs of stress and possible coping skills  -Learn skills that strengthen and support the client's positive behavior change  -Learn strategies to build positive emotions and facilitate resiliency including use of a resiliency story  -Develop and implement a relapse prevention plan including identification of warning signs, triggers, coping mechanisms, and how other persons can be supportive if symptoms increase or reemerge    Medication Management  -Prescribe and monitor medications  -Monitor and treat side effects  -Psychoeducation    IRT/Psychotherapy  -Psychoeducation  -Motivation interviewing  -CBT  -Behavioral activation  -Mindfulness    Family Therapy  -Psychoeducation  -Motivational interviewing  -Behavioral family therapy  -CBT  -Behavioral activation    Case Management  -NA or None   Target date:   12 months from 11/08/18    Discharge criteria:  Support system demonstrates understanding of mental illness     Support system successfully implements strategies to assist Janey cope with stressors and/or symptoms to mitigate risk for increase in symptom severity or relapse          Domain 2: Health & Basic Living  Needs  Identify and engage possible areas of improvement related to basic needs being met and maintaining or improving overall health and well-being     Measurable Objectives Interventions Discharge Criteria   -Learn and implement at least 2 skills to promote health sleep  -Establish and adhere to a plan to increase physical exercise   Medication Management  -Prescribe and monitor medications  -Monitor and treat side effects  -Psychoeducation    IRT/Psychotherapy  -Psychoeducation  -Motivation interviewing  -CBT  -Behavioral activation  -Mindfulness    Family Therapy  -Psychoeducation  -Motivational interviewing  -Behavioral family therapy  -CBT  -Behavioral activation    Supported Education & Employment  -Motivational interviewing  -Individualized placement and support   -Behavioral Activation    Case Management  -NA or None   Target date:   12 months from 11/08/18    Discharge criteria:  Janey, her supports and treatment team report no unmet health and basic living needs         Domain 3: Family & Other Supports  Identify and engage possible areas of improvement related to engaging family, friends and other supports     Measurable Objectives Interventions Discharge Criteria   -Identify support system  -Invite support system to be involved in treatment  -Participate in family therapy  -Participate in group therapy   -Improve the quality of relationships by developing skills to better understand other people, communicate more effectively, manage disclosure, and understand social cues  -Increase communication with the parents, resulting in feeling attended to and understood  -Supports teach and reinforce healthy social skills and attitudes   -Learn and implement problem-solving and/or conflict resolution skills to manage personal and interpersonal problems constructively  -Identify and implement two approaches to how strengths and interests can be used to initiate social contacts and develop peer  friendships  -Learn and implement calming and coping strategies to manage anxiety symptoms and focus attention usefully during moments of social anxiety    -Renew two old fun activities or develop two new fun activity   Medication Management  -Prescribe and monitor medications  -Monitor and treat side effects  -Psychoeducation    IRT/Psychotherapy  -Psychoeducation  -Motivation interviewing  -CBT  -Behavioral activation  -Mindfulness    Family Therapy  -Psychoeducation  -Motivational interviewing  -Behavioral family therapy  -CBT  -Behavioral activation    Supported Education & Employment  -Motivational interviewing  -Individualized placement and support   -Behavioral Activation    Case Management  -NA or None   Target date:   12 months from 11/08/18    Discharge criteria:  Janey and her support system report feeling equipped with the necessary skills to communicate and problem solve during times of disagreement    Conflict with supports and peers are resolved constructively and consistently over time; 6 months         Domain 4: Academic and Employment  Identify and engage possible areas of improvement relates to education and employment     Measurable Objectives Interventions Discharge Criteria   -Stay current with schoolwork, completing assignments and interacting appropriately with peers and teachers   -Utilize accommodations, effective study and test-taking skills on a regular basis to improve academic performance  -Increase participate in school-related activities   -Family members provide praise, encouragement for the client's attempts and successes at school/work   Medication Management  -Prescribe and monitor medications  -Monitor and treat side effects  -Psychoeducation    IRT/Psychotherapy  -Psychoeducation  -Motivation interviewing  -CBT  -Behavioral activation    Family Therapy  -Psychoeducation  -Motivational interviewing  -Behavioral family therapy  -CBT  -Behavioral activation    Supported  Education & Employment  -Motivational interviewing  -Individualized placement and support   -Behavioral Activation    Case Management  -NA or None   Target date:   12 months from 11/08/18    Discharge criteria:  Work and school goals are achieved and maintained without follow along NAVIGATE Supported Education and Employment supports for 6 months         9. Frequency of sessions and expected duration of treatment:   1-4x per month Medication Management with Prescriber ongoing  6 months of weekly IRT/Individual Psychotherapy followed by 12-18 months of biweekly or monthly IRT  2-4x per month Supported Education and Employment Services for 6 months  2-4x per month Family Education and Support Services for 6 months    10. Participants in therapy plan:   Janey Givens    NAVIGATE Team:   -Family Clinician: VERENICE Dash  -IRT Clinician: MARCELL See [facility]  -SEE: MAKSIM Heredia    See scanned document for Acknowledgement of Current Treatment Plan    Regulatory Guidelines for Updating Treatment Plan  [place] Medical Assistance: Reviewed & signed at least every 90 days  Medicare:  Update per policy

## 2018-12-13 ENCOUNTER — OFFICE VISIT (OUTPATIENT)
Dept: PSYCHIATRY | Facility: CLINIC | Age: 23
End: 2018-12-13
Payer: COMMERCIAL

## 2018-12-13 DIAGNOSIS — F20.9 SCHIZOPHRENIA, UNSPECIFIED TYPE (H): Primary | ICD-10-CM

## 2018-12-13 NOTE — PROGRESS NOTES
[facility] Clinician Contact & Progress Note  For Individual Resiliency Training (IRT)  A Part of the [facility] First Episode of Psychosis Program    NAVIGATE Enrollee: Janey Givens (1995)     MRN: [id number]  Date:  12/13/18  Diagnosis: Schizophrenia, unspecified (F20.9)  Clinician: [facility] Individual Resiliency Trainer, VERENICE See     1. Type of contact: (majority of time spent)  IRT Session    2. People present:   Writer  Client: Janey Givens    3. Total number of persons who participated in contact: 2, including writer    4. Length of Actual Contact: Start Time: 11:00; End Time: 12:00   Traveled?    No     5. Location of contact:  [facility], [place]    6. Did the client complete the home practice option(s) from the previous session: Completed    7. Motivational Teaching Strategies:  Connect info and skills with personal goals  Promote hope and positive expectations  Explore pros and cons of change    8. Educational Teaching Strategies:  Review of written material/education  Relate information to client's experience  Ask questions to check comprehension  Adopt client's language     9. CBT Teaching Strategies:  Reinforcement and shaping (positive feedback for steps towards goals and gains in knowledge & skills)  Coping skills training (review current coping skills, increase currently used skills and plan home practice)    10. IRT Module(s) Addressed:  Module 2 - Assessment/Initial Goal Setting  Module 3 - Education about Psychosis  Module 9 - Coping with Symptoms  Module 10 - Substance Use    11. Techniques utilized:   [place] announced at beginning of session  Review of homework  Review of goal  Review of previous meeting  Present new material  Problem-solving practice  Help client choose a home practice option  Summarize progress made in current session    12. Mental Status Exam:    Alertness: alert  and oriented  Appearance: casually groomed  Behavior/Demeanor: cooperative, pleasant and  calm, with good  eye contact   Speech: normal and regular rate and rhythm  Language: intact. Preferred language identified as English.  Psychomotor: normal or unremarkable  Mood: depressed  Affect: appropriate; was congruent to mood; was congruent to content  Thought Process/Associations: unremarkable  Thought Content:  Reports delusions;  Denies suicidal and violent ideation  Perception:  Reports none;  Denies auditory hallucinations and visual hallucinations  Insight: good  Judgment: fair  Cognition: does  appear grossly intact; formal cognitive testing was not done  Suicidal ideation: denies SI, denies intent,  and denies plan  Homicidal Ideation: denies    13. Assessment/Progress Note:     This writer met with Katie for a follow-up IRT Session. Katie stated she has been feeling increased exhaustion and tiredness the past 2 weeks. Katie reported her psychosis symptoms have significantly decreased and she is able to challenge the delusions. However, she stated the symptoms return when she is experiencing a high from marijuana. She is currently using marijuana daily, and attributes this to increased boredom from lack of activities. Katie is aware marijuana use could exacerbate symptoms and interfere with college performance. This writer and Katie discussed harm reduction through a motivational interviewing lens. She plans to stop using marijuana next Tuesday, when she will be around family for [employer]. She then would like to continue sobriety into the next semester, as she has 8 hours a day of intense classes. Katie has insight into the consequences of use, including memory gaps, low motivation, and depressive symptoms. Katie inquired about obtaining an anti-depressant. This writer encouraged her to talk with Dr. Guzman about this. This writer also provided education on how boredom/lack of fulfilling activities and marijuana use could lead to low mood/energy. This writer encouraged Katie to begin engaging in activities she used  "to enjoy, such as drawing or painting. This writer and Katie then created initial IRT goals, which include: psycho-education about psychosis causes, learning relapse prevention strategies, and possibly processing through her episode. Katie mentioned she had a negative experience when beginning to process her episode previously and is hesitant to do so. Katie and this writer then began psycho-education about the common symptoms of psychosis. Katie reported previous olfactory hallucinations, which reinforced the delusional thoughts she had. She mentioned ideas of reference with tv, radio, and books, as well as paranoid thoughts. Katie stated she thought she had a special purpose, but did not want to go into further detail at this time. Katie expressed stress, the loss of a loved one, and Adderall were contributors to her symptoms.     14. Plan/Referrals:     This writer will continue to provide education about psychosis, as well as motivational interviewing regarding substance use.    This writer will route this encounter to Dr. Guzman for further recommendations regarding anti-depressants.    Billing for \"Interactive Complexity\"?    No      Aleah Cerda [facility]    [facility] Individual Resiliency Trainer    Attestation:  I did not see this patient directly. This patient is discussed with me in individual clinical social work supervision, and I agree with the plan as documented.      MARCELL Wong, [facility], December 13, 2018  "

## 2018-12-17 ENCOUNTER — OFFICE VISIT (OUTPATIENT)
Dept: PSYCHIATRY | Facility: CLINIC | Age: 23
End: 2018-12-17
Payer: COMMERCIAL

## 2018-12-17 VITALS
SYSTOLIC BLOOD PRESSURE: 120 MMHG | BODY MASS INDEX: 25.84 KG/M2 | HEART RATE: 90 BPM | DIASTOLIC BLOOD PRESSURE: 63 MMHG | WEIGHT: 175 LBS | TEMPERATURE: 99.4 F

## 2018-12-17 DIAGNOSIS — F20.9 SCHIZOPHRENIA, UNSPECIFIED TYPE (H): Primary | ICD-10-CM

## 2018-12-17 RX ORDER — ARIPIPRAZOLE 10 MG/1
10 TABLET ORAL DAILY
Qty: 30 TABLET | Refills: 11 | Status: SHIPPED | OUTPATIENT
Start: 2018-12-17 | End: 2019-01-02

## 2018-12-17 ASSESSMENT — PAIN SCALES - GENERAL: PAINLEVEL: NO PAIN (0)

## 2018-12-17 NOTE — PATIENT INSTRUCTIONS
Start Abilify 5mg this week (1/2 tab). Continue the Zyprexa at 10mg for a week. After the first week increase Abilify to 10mg and decrease Zyprexa to 5mg (1/2 tab). After one week, stop Zyprexa and continue Abilify at 10mg.     MEd appt: 2:15pm Jan 2nd    Call with problems

## 2018-12-18 ASSESSMENT — PATIENT HEALTH QUESTIONNAIRE - PHQ9: SUM OF ALL RESPONSES TO PHQ QUESTIONS 1-9: 2

## 2018-12-20 ENCOUNTER — OFFICE VISIT (OUTPATIENT)
Dept: PSYCHIATRY | Facility: CLINIC | Age: 23
End: 2018-12-20
Payer: COMMERCIAL

## 2018-12-20 ENCOUNTER — OFFICE VISIT (OUTPATIENT)
Dept: FAMILY MEDICINE | Facility: CLINIC | Age: 23
End: 2018-12-20
Payer: COMMERCIAL

## 2018-12-20 VITALS
SYSTOLIC BLOOD PRESSURE: 113 MMHG | DIASTOLIC BLOOD PRESSURE: 65 MMHG | RESPIRATION RATE: 16 BRPM | OXYGEN SATURATION: 96 % | BODY MASS INDEX: 26.29 KG/M2 | WEIGHT: 178 LBS | TEMPERATURE: 97.8 F | HEART RATE: 84 BPM

## 2018-12-20 DIAGNOSIS — F81.9 LEARNING DIFFICULTY: ICD-10-CM

## 2018-12-20 DIAGNOSIS — F64.0 GENDER DYSPHORIA IN ADULT: Primary | ICD-10-CM

## 2018-12-20 DIAGNOSIS — F20.9 SCHIZOPHRENIA, UNSPECIFIED TYPE (H): Primary | ICD-10-CM

## 2018-12-20 LAB
ALBUMIN SERPL-MCNC: 4.9 MG/DL (ref 3.8–5)
ALP SERPL-CCNC: 40.2 U/L (ref 31.7–110.7)
ALT SERPL-CCNC: 10.2 U/L (ref 0–45)
AST SERPL-CCNC: 13.5 U/L (ref 0–55)
BILIRUB SERPL-MCNC: <0.4 MG/DL (ref 0.2–1.3)
BUN SERPL-MCNC: 11.7 MG/DL (ref 7–21)
CALCIUM SERPL-MCNC: 9.2 MG/DL (ref 8.5–10.1)
CHLORIDE SERPLBLD-SCNC: 103 MMOL/L (ref 98–110)
CHOLEST SERPL-MCNC: 132.2 MG/DL (ref 0–200)
CHOLEST/HDLC SERPL: 2.3 {RATIO} (ref 0–5)
CO2 SERPL-SCNC: 28 MMOL/L (ref 20–32)
CREAT SERPL-MCNC: 0.7 MG/DL (ref 0.7–1.3)
GFR SERPL CREATININE-BSD FRML MDRD: >90 ML/MIN/1.7 M2
GLUCOSE SERPL-MCNC: 103.6 MG'DL (ref 70–99)
HDLC SERPL-MCNC: 57.3 MG/DL
HEMOGLOBIN: 11.9 G/DL (ref 13.3–17.7)
LDLC SERPL CALC-MCNC: 63 MG/DL (ref 0–129)
POTASSIUM SERPL-SCNC: 3.6 MMOL/DL (ref 3.3–4.5)
PROT SERPL-MCNC: 7.2 G/DL (ref 6.8–8.8)
SODIUM SERPL-SCNC: 136.8 MMOL/L (ref 132.6–141.4)
TRIGL SERPL-MCNC: 58.6 MG/DL (ref 0–150)
VLDL CHOLESTEROL: 11.7 MG/DL (ref 7–32)

## 2018-12-20 RX ORDER — SPIRONOLACTONE 100 MG/1
100 TABLET, FILM COATED ORAL DAILY
Qty: 30 TABLET | Refills: 2 | Status: SHIPPED | OUTPATIENT
Start: 2018-12-20 | End: 2019-04-24

## 2018-12-20 RX ORDER — ESTRADIOL 0.1 MG/D
2 FILM, EXTENDED RELEASE TRANSDERMAL
Qty: 16 PATCH | Refills: 2 | Status: SHIPPED | OUTPATIENT
Start: 2018-12-20 | End: 2019-04-24

## 2018-12-20 NOTE — PROGRESS NOTES
APURVA Toth is a 23 year old individual that uses pronouns She/Her/Hers/Herself that presents today for follow up of:  feminizing hormone therapy. Gender identity: feminine     On hormones?  Patches, 2 patches twice a week  200 mg of Spironolactone    Any special concerns today?  No acute HRT concerns    Patient would like to seek out further evaluation for dyslexia. She reports this has been an ongoing concern and issues since elementary school. Today she states that often she will have difficulty with spelling. She may write a number instead of a letter or spell a letter backwards unintentionally with her school work. She denies any previous work up or consideration.    She is currently in therapy after psychiatry hospitalization for psychosis. She reports this is going well.     She states having a challenging course load next semester as she continues with her degree in . States she is an overall good student.     ---    Past Surgical History:   Procedure Laterality Date     COSMETIC SURGERY      August 2018     KNEE SURGERY Left 2014       Patient Active Problem List   Diagnosis     Blood clot in vein     Psychiatric disorder     Gender dysphoria in adult     Acute psychosis (H)       Current Outpatient Medications   Medication Sig Dispense Refill     ARIPiprazole (ABILIFY) 10 MG tablet Take 1 tablet (10 mg) by mouth daily 30 tablet 11     estradiol (VIVELLE-DOT) 0.1 MG/24HR BIW patch APPLY TWO PATCHES TWICE A WEEK 16 patch 3     hydrOXYzine (ATARAX) 25 MG tablet Take 1 tablet (25 mg) by mouth every 4 hours as needed for anxiety 120 tablet 0     Melatonin 10 MG TABS tablet Take 10 mg by mouth nightly as needed for sleep       progesterone (PROMETRIUM) 200 MG capsule Take 1 capsule (200 mg) by mouth daily 30 capsule 2     spironolactone (ALDACTONE) 100 MG tablet Take 2 tablets (200 mg) by mouth daily 60 tablet 2     carbamide peroxide (DEBROX) 6.5 % otic solution Place 5-10 drops into the  "right ear daily as needed for other (Ear wax build up) (Patient not taking: Reported on 12/20/2018) 30 mL 0     ibuprofen (ADVIL/MOTRIN) 200 MG tablet Take 200-400 mg by mouth every 4 hours as needed for mild pain         History   Smoking Status     Never Smoker   Smokeless Tobacco     Never Used          Allergies   Allergen Reactions     Banana Anaphylaxis     Throat swelling       Problem, Medication and Allergy Lists were reviewed and updated if needed..         Review of Systems:      General    Fat redistribution: YES    Weight change: YES HEENT    Voice change: not applicable     Cardiovascular (CV)    Chest Pains: no    Shortness of breath: no Chest    Decreased exercise tolerance:  no    Breast changes/development: YES     Gastrointestinal (GI)    Abdominal pain: no    Change in appetite: no Skin    Softer skin: YES    Change in hair: YES     Genitourinary ()    Abnormal vaginal bleeding: not applicable     Decreased spontaneous erections: YES    Change in libido: YES    New sexual partners: no Musculoskeletal    Leg pain or swelling: no     Psychiatric (Psych)    Depression: Stable    Anxiety/Panic: Stable    Mood:  \"fine\"                    Physical Exam:     Vitals:    12/20/18 1543   BP: 113/65   Pulse: 84   Resp: 16   Temp: 97.8  F (36.6  C)   TempSrc: Oral   SpO2: 96%   Weight: 80.7 kg (178 lb)     BMI= Body mass index is 26.29 kg/m .   Wt Readings from Last 10 Encounters:   12/20/18 80.7 kg (178 lb)   12/17/18 79.4 kg (175 lb)   11/26/18 81.2 kg (179 lb)   10/25/18 78.7 kg (173 lb 9.6 oz)   10/14/18 75.9 kg (167 lb 4.8 oz)   09/20/18 79.7 kg (175 lb 12.8 oz)   09/19/18 81.6 kg (180 lb)   08/15/18 83.6 kg (184 lb 3.2 oz)   07/31/18 84.3 kg (185 lb 12.8 oz)   07/11/18 85.4 kg (188 lb 3.2 oz)     Appearance: Female appearance and dress    GENERAL:: healthy, alert and no distress  EYES: Eyes grossly normal to inspectio  RESP: lungs clear to auscultation - no rales, no rhonchi, no wheezes  BREAST: " deferred breast assessment and measurement  CV: regular rates and rhythm, normal S1 S2, no S3 or S4 and no murmur, no click or rub -  ABDOMEN: soft, no tenderness, no  hepatosplenomegaly, no masses, normal bowel sounds  SKIN: no suspicious lesions, no rashes  Affect: Blunted/Flat           Labs:   Results ordered and pending    Assessment and Plan     Janey Givens is a 22 y/o transgender female who was seen today for HRT follow up.     Gender Dysphoria  Most recent Testosterone was less <2. Will adjust spironolactone at this time as frequent urination has been distressing to patient. Will recheck testosterone in 2 months and adjust if needed. Goal <50.   Contraception:  abstinence    Follow up: Follow up in 2 months for Testosterone check.     Learning difficulty  Patient provided information in regards to dyslexia testing. History provided was overall vague with per patient a history of good academic performance and in a high demanding major.     Ariana Andrea MD  [facility] Resident PGY-2  [phone]    Those present during this appointment were: patient and myself

## 2018-12-20 NOTE — PATIENT INSTRUCTIONS
Also connect with Office of Disability at the  of  for further information   - places for assessment  - as well as modification with a remarkable assessment  - Most often insurance won't cover assessment, but some do.   Recommend going to the [facility], phone number [phone]

## 2018-12-20 NOTE — LETTER
December 24, 2018      Janey Givens  [address]  [place] [facility] [address]        Dear Janey,    Thank you for getting your care at [facility]. Please see below for your test results. As we discussed at your last visit, your testosterone level is very low and that is why we decreased your spironolactone. We will check your T level in 2 months. The rest of your labs are normal, but with a mildly low hemoglobin. We will look at this further at your next visit.     Resulted Orders   Testosterone Total   Result Value Ref Range    Testosterone Total 6 (L) 240 - 950 ng/dL      Comment:      This test was developed and its performance characteristics determined by the   [facility],  [facility]. It has   not been cleared or approved by the FDA. The laboratory is regulated under   CLIA as qualified to perform high-complexity testing. This test is used for   clinical purposes. It should not be regarded as investigational or for   research.     Comprehensive Metabolic Panel   Result Value Ref Range    Albumin 4.9 3.8 - 5.0 mg/dL    Alkaline Phosphatase 40.2 31.7 - 110.7 U/L    ALT 10.2 0.0 - 45.0 U/L    AST 13.5 0.0 - 55.0 U/L    Bilirubin Total <0.4 0.2 - 1.3 mg/dL    Urea Nitrogen 11.7 7.0 - 21.0 mg/dL    Calcium 9.2 8.5 - 10.1 mg/dL    Chloride 103.0 98.0 - 110.0 mmol/L    Carbon Dioxide 28.0 20.0 - 32.0 mmol/L    Creatinine 0.7 0.7 - 1.3 mg/dL    Glucose 103.6 (H) 70.0 - 99.0 mg'dL    Potassium 3.6 3.3 - 4.5 mmol/dL    Sodium 136.8 132.6 - 141.4 mmol/L    Protein Total 7.2 6.8 - 8.8 g/dL    GFR Estimate >90 >60.0 mL/min/1.7 m2    GFR Estimate If Black >90 >60.0 mL/min/1.7 m2   Hemoglobin (HGB)   Result Value Ref Range    Hemoglobin 11.9 (L) 13.3 - 17.7 g/dL   Lipid Cascade   Result Value Ref Range    Cholesterol 132.2 0.0 - 200.0 mg/dL    Cholesterol/HDL Ratio 2.3 0.0 - 5.0    HDL Cholesterol 57.3 >40.0 mg/dL    LDL Cholesterol Calculated 63 0 - 129 mg/dL     Triglycerides 58.6 0.0 - 150.0 mg/dL    VLDL Cholesterol 11.7 7.0 - 32.0 mg/dL       If you have any questions, please call the clinic to make an appointment with me for a clinic visit.    Sincerely,    Ariana Andrea MD

## 2018-12-20 NOTE — PROGRESS NOTES
Preceptor Attestation:   Patient seen, evaluated and discussed with the resident. I have verified the content of the note, which accurately reflects my assessment of the patient and the plan of care.   Supervising Physician:  Nata Dickens MD

## 2018-12-21 ENCOUNTER — OFFICE VISIT (OUTPATIENT)
Dept: PSYCHIATRY | Facility: CLINIC | Age: 23
End: 2018-12-21
Payer: COMMERCIAL

## 2018-12-21 DIAGNOSIS — F20.9 SCHIZOPHRENIA, UNSPECIFIED TYPE (H): Primary | ICD-10-CM

## 2018-12-21 ASSESSMENT — ANXIETY QUESTIONNAIRES
1. FEELING NERVOUS, ANXIOUS, OR ON EDGE: NOT AT ALL
3. WORRYING TOO MUCH ABOUT DIFFERENT THINGS: NOT AT ALL
2. NOT BEING ABLE TO STOP OR CONTROL WORRYING: NOT AT ALL
7. FEELING AFRAID AS IF SOMETHING AWFUL MIGHT HAPPEN: NOT AT ALL
5. BEING SO RESTLESS THAT IT IS HARD TO SIT STILL: NOT AT ALL
GAD7 TOTAL SCORE: 0
6. BECOMING EASILY ANNOYED OR IRRITABLE: NOT AT ALL

## 2018-12-21 ASSESSMENT — PATIENT HEALTH QUESTIONNAIRE - PHQ9
SUM OF ALL RESPONSES TO PHQ QUESTIONS 1-9: 1
5. POOR APPETITE OR OVEREATING: NOT AT ALL

## 2018-12-21 NOTE — PROGRESS NOTES
[facility] Clinician Contact & Progress Note  For Individual Resiliency Training (IRT)  A Part of the [facility] First Episode of Psychosis Program    NAVIGATE Enrollee: Janey Givens (1995)     MRN: [id number]  Date:  12/20/18  Diagnosis: Schizophrenia, unspecified (F20.9)  Clinician: CARMEN Individual Resiliency Trainer, VERENICE See     1. Type of contact: (majority of time spent)  IRT Session    2. People present:   Writer  Client: Janey Givens    3. Total number of persons who participated in contact: 2, including writer    4. Length of Actual Contact: Start Time: 1:00; End Time: 2:00   Traveled?    No     5. Location of contact:  [facility], [place]    6. Did the client complete the home practice option(s) from the previous session: Partially Completed    7. Motivational Teaching Strategies:  Connect info and skills with personal goals  Explore pros and cons of change  Re-frame experiences in positive light    8. Educational Teaching Strategies:  Review of written material/education  Relate information to client's experience  Ask questions to check comprehension    9. CBT Teaching Strategies:  Reinforcement and shaping (positive feedback for steps towards goals)  Relapse prevention planning (review of stressors)    10. IRT Module(s) Addressed:  Module 2 - Assessment/Initial Goal Setting  Module 3 - Education about Psychosis    11. Techniques utilized:   [place] announced at beginning of session  Review of goal  Review of previous meeting  Present new material  Problem-solving practice  Help client choose a home practice option  Summarize progress made in current session    12. Mental Status Exam:    Alertness: alert  and oriented  Appearance: casually groomed  Behavior/Demeanor: cooperative and pleasant, with good  eye contact   Speech: normal and regular rate and rhythm  Language: intact. Preferred language identified as English.  Psychomotor: normal or unremarkable  Mood:  depressed  Affect: restricted; was congruent to mood; was congruent to content  Thought Process/Associations: perseverative  Thought Content:  Reports preoccupations;  Denies suicidal and violent ideation and delusions  Perception:  Reports none;  Denies auditory hallucinations and visual hallucinations  Insight: adequate  Judgment: adequate for safety  Cognition: does  appear grossly intact; formal cognitive testing was not done  Suicidal ideation: denies SI, denies intent,  and denies plan  Homicidal Ideation: denies    13. Assessment/Progress Note:     This writer met with Katie for a follow-up IRT session. Katie mentioned feeling tired and bored most days. She has completed her finals in college and is now on winter break, staying at her parents' home. She denied any marijuana use within the past week. She denied any positive symptoms of psychosis at this time. She reported regular medication adherence and improved motivation/energy. Katie mentioned she lost many friends from her hometown during her transition. She expressed feelings of embarrassment, sadness, and betrayal. This writer and Katie brainstormed initial IRT goals. Within the next year, she would like to apply to graduate school. In the next 6 months, she would like to learn about psychosis, and especially how to prevent relapses. She also would like to learn more about sleep hygiene strategies. This writer and Katie then continued with education about psychosis. This writer provided information regarding common negative and positive psychosis symptoms. Katie asked questions appropriately and was engaged throughout. Katie expressed difficulty in discussing psychotic symptoms, as it triggers negative memories. Katie said the only thing that caused her psychosis was thinking too much about the project in school. She believes if she talks about this event further, it will initiate a relapse. Katie reported feeling as though she doesn't have schizophrenia, as her symptoms  "have disappeared for periods of time in the past. Katie is interested in learning more about the causes of psychosis during the next session. As sleep hygiene is a goal of Katie's, this writer encouraged her to maintain a sleep routine throughout the next few weeks; she agreed.      14. Plan/Referrals:     This writer will continue to provide education on the stress-vulnerability model during the next session.    Billing for \"Interactive Complexity\"?    No      Aleah Cerda RENAE    NAVIGATE Individual Resiliency Trainer    Attestation:  I did not see this patient directly. This patient is discussed with me in individual clinical social work supervision, and I agree with the plan as documented.      MARCELL Wong, MaineGeneral Medical CenterSW, December 20, 2018  "

## 2018-12-22 LAB — TESTOST SERPL-MCNC: 6 NG/DL (ref 240–950)

## 2018-12-22 ASSESSMENT — ANXIETY QUESTIONNAIRES: GAD7 TOTAL SCORE: 0

## 2018-12-28 ENCOUNTER — OFFICE VISIT (OUTPATIENT)
Dept: PSYCHIATRY | Facility: CLINIC | Age: 23
End: 2018-12-28
Payer: COMMERCIAL

## 2018-12-28 DIAGNOSIS — F20.9 SCHIZOPHRENIA, UNSPECIFIED TYPE (H): Primary | ICD-10-CM

## 2019-01-02 ENCOUNTER — OFFICE VISIT (OUTPATIENT)
Dept: PSYCHIATRY | Facility: CLINIC | Age: 24
End: 2019-01-02
Payer: COMMERCIAL

## 2019-01-02 VITALS
DIASTOLIC BLOOD PRESSURE: 78 MMHG | BODY MASS INDEX: 25.4 KG/M2 | WEIGHT: 172 LBS | SYSTOLIC BLOOD PRESSURE: 129 MMHG | HEART RATE: 67 BPM | TEMPERATURE: 97.4 F

## 2019-01-02 DIAGNOSIS — F20.9 SCHIZOPHRENIA, UNSPECIFIED TYPE (H): Primary | ICD-10-CM

## 2019-01-02 DIAGNOSIS — F32.0 CURRENT MILD EPISODE OF MAJOR DEPRESSIVE DISORDER WITHOUT PRIOR EPISODE (H): ICD-10-CM

## 2019-01-02 RX ORDER — CITALOPRAM HYDROBROMIDE 20 MG/1
20 TABLET ORAL DAILY
Qty: 30 TABLET | Refills: 3 | Status: SHIPPED | OUTPATIENT
Start: 2019-01-02 | End: 2019-01-02 | Stop reason: DRUGHIGH

## 2019-01-02 RX ORDER — ARIPIPRAZOLE 10 MG/1
10 TABLET ORAL DAILY
Qty: 30 TABLET | Refills: 3 | Status: SHIPPED | OUTPATIENT
Start: 2019-01-02 | End: 2019-02-28

## 2019-01-02 RX ORDER — HYDROXYZINE HYDROCHLORIDE 25 MG/1
25 TABLET, FILM COATED ORAL EVERY 4 HOURS PRN
Qty: 120 TABLET | Refills: 0 | Status: SHIPPED | OUTPATIENT
Start: 2019-01-02 | End: 2019-02-28

## 2019-01-02 RX ORDER — CITALOPRAM HYDROBROMIDE 10 MG/1
10 TABLET ORAL DAILY
Qty: 30 TABLET | Refills: 3 | Status: SHIPPED | OUTPATIENT
Start: 2019-01-02 | End: 2019-02-28

## 2019-01-02 ASSESSMENT — PAIN SCALES - GENERAL: PAINLEVEL: NO PAIN (0)

## 2019-01-02 NOTE — PATIENT INSTRUCTIONS
The Chemistry of Misty by Chris Hahn     Schedule at [place] for med follow up [phone]    Start Celexa 10mg

## 2019-01-02 NOTE — PROGRESS NOTES
"  NAVIGATE New Medication Management Visit            Janey Givens is a 23 year old male to female transgendered person who prefers the name Katie and she/her pronouns.  Therapist: None  PCP: Airana Andrea  Other Providers: None  Referred by [facility] for evaluation of psychosis [hallucinations- no, delusions- yes].      History was provided by patient who was a vague historian. Collateral information also obtained through record review.      Chief Complaint                                                                                                             \" I am here for treatment for psychosis\"     History of Present Illness                                                                                 4, 4      Pertinent Background:  This patient first experienced psychosis in Nov 2017 manifested as disorganized thinking, behavior and communication and paranoid and grandiose delusions and persecutory ideation. Pt endorsed use of LSD and cannabis in the fall leading up to her episode. Patient was hospitalized at [facility] from 12/6/2017 - 12/22/2017 where she was described as labile, disorganized, grandiose and agitated upon admission. The patient reportedly demonstrated impulsive behavior as well and poor boundaries. Concern for impaired cognition r/t a concussion in 2014 prompted a neuropsych eval which did not reveal any problems with cognition or memory fx although working memory and processing speed were not consistent with someone of the patient's cognitive ability, likely attributed to the psychotic episode. 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   During hospitalization course, the patient was titrated to 20mg of olanzapine and a petition for commitment was filed but not supported. Pt was referred for a Rule 25, individual therapy and psychiatry after discharge. Pt reports that they met with psych provider one time (Dr. Andera at [facility]) and expressed strong desire to stop medication " due to ASE hypersomnolence, increased appetite and weight gain. They made a taper plan and pt didn't return for follow up. Pt denies sx of psychosis occurring after discontinuing medications in Feb 2018. Pt did complete a 28 day program at [facility]. She does not feel like she has a JAYANT, but found [facility] to be a helpful program.   Sx relapsed in Sept 2018 and patient was hospitalized again at [facility] from 9/28 to 10/15/18 for thought blocking, disorganized behavior and communication and grandiose delusional thoughts. Pt was fixated on a concern that she would just disappear, or cease to exist.  Pt was titrated to olanzapine 15mg during this hospital stay due to wanting to discharge before sx were stabilized. Of note, pt was noted to have catatonic sx, but there is not documentation of specific sx in notes during her course.       Psych critical item history includes catatonia, psychosis [sxs include delusions, disorganized], psych hosp (<3) and SUBSTANCE USE: cannabis,LSD   .   Most recent history  - Cross taper to aripiprazole went well, no concerns with increase in sx, ASE somnolence, orthostatic hypotension and increased appetite have improved. Denies ASE with aripiprazole, does continue to experience OH but not as severe as in the past.   - Reports that she has had difficulty with initiating sleep but has a irregular sleep schedule and feels as if this problem is largely related to sleep hygiene. She has been using melatonin at 10mg 4-5 a week and hydroxyzine to help initiate sleep  - Inquires again about starting an antidepressant. She feels as if she has experienced low mood, anhedonia, low motivation since HS. She has not started meds in the past because she was fearful of ASE: sexual SE, flattening of mood. She feels as if the benefits outweigh the risk for ASE now because she is concerned about the anhedonia and just not feeling mel.   - Discussed non-pharmacological interventions to help mood in  addition to the medication  - Pt informed of risk for activation with a serotonin specific reuptake inhibitor and instructed to stop this medication and call the clinic if this occurs.    Recent Symptoms:   Depression:  depressed mood, anhedonia, low energy, hypersomnia and poor concentration /memory  Elevated:  none; Reports periods of time in the past that she has periods of a couple of hours where she has increased energy  Psychosis:  delusions- . [details in Interim History]  Anxiety:  social anxiety  Panic Attack:  none  Trauma Related:  none       Recent Substance Use:  Cannabis- reports using once a week, reports reduced use compared to previously       Substance Use History                                                                 Past Use- Alcohol- social use , Cannabis- previous daily use  and Other Illicit Drugs-acid and Adderall; occa tobacco use   Treatment [#, most recent]- completed MI/CD program at [facility]     Denies medical or legal consequences of use         Psychiatric History     Suicidal ideation- None   Suicide Attempt:  #- 0     SIB- None   Eating Disorder- None       Psychiatric Medication Trials     None    Social/ Family History               [per patient report]                                                  1ea, 1ea     FINANCIAL SUPPORT- family or friend       CHILDREN- None       LIVING SITUATION- living on campus       LEGAL- None  EARLY HISTORY/ EDUCATION- provided support reading since 6th grade   SOCIAL/ SPIRITUAL SUPPORT- support groups; family and friends        CULTURAL INFLUENCES/ IMPACT- transgender MTF       TRAUMA HISTORY (self-report)- none  FEELS SAFE AT HOME- Yes  FAMILY HISTORY-  Sister has been dx with BPAD, possibly mom?     Medical / Surgical History                                                                                                                     Patient Active Problem List   Diagnosis     Blood clot in vein     Psychiatric disorder     Gender  dysphoria in adult     Acute psychosis (H)       Past Surgical History:   Procedure Laterality Date     COSMETIC SURGERY      August 2018     KNEE SURGERY Left 2014        Medical Review of Systems                                                                                                     2, 10     A 10 point review of systems is noncontributory    Allergy                                Banana  Current Medications                                                                                                         Current Outpatient Medications   Medication Sig Dispense Refill     ARIPiprazole (ABILIFY) 10 MG tablet Take 1 tablet (10 mg) by mouth daily 30 tablet 11     estradiol (VIVELLE-DOT) 0.1 MG/24HR bi-weekly patch Place 2 patches onto the skin twice a week 16 patch 2     hydrOXYzine (ATARAX) 25 MG tablet Take 1 tablet (25 mg) by mouth every 4 hours as needed for anxiety 120 tablet 0     Melatonin 10 MG TABS tablet Take 10 mg by mouth nightly as needed for sleep       progesterone (PROMETRIUM) 200 MG capsule Take 1 capsule (200 mg) by mouth daily 30 capsule 2     spironolactone (ALDACTONE) 100 MG tablet Take 1 tablet (100 mg) by mouth daily 30 tablet 2     Vitals                                                                                                                         3, 3     /78 (BP Location: Left arm, Patient Position: Chair, Cuff Size: Adult Regular)   Pulse 67   Temp 97.4  F (36.3  C)   Wt 78 kg (172 lb)   BMI 25.40 kg/m       Mental Status Exam                                                                                      9, 14 cog gs     Alertness: alert   Appearance: casually groomed  Behavior/Demeanor: cooperative, with good  eye contact   Speech: regular rate and rhythm and with some response delay  Language: intact  Psychomotor: normal or unremarkable  Mood: depressed  Affect: restricted; was congruent to mood; was congruent to content  Thought  Process/Associations: thought blocking  Thought Content:  Reports none;  Denies suicidal and violent ideation  Perception:  Reports none;  Denies auditory hallucinations, visual hallucinations [details in Interim History] and visual hallucinations  Insight: good  Judgment: good  Cognition: (6) does  appear grossly intact; formal cognitive testing was not done  oriented: time, person, and place  attention span: intact  concentration: intact  recent memory: intact  remote memory: intact  fund of knowledge: appropriate  Gait and Station: unremarkable    Labs and Data                                                                                                                     Rating Scales:   PHQ9, AIMS: determine next due and COMPASS    PHQ-9 SCORE 11/30/2018 12/18/2018 12/21/2018   PHQ-9 Total Score 4 2 1         Recent Labs   Lab Test 12/20/18  1620 10/25/18  0903 10/01/18  1102   CR 0.7 0.8 0.77   GFRESTIMATED >90 >90 >90     Recent Labs   Lab Test 12/20/18  1620 10/25/18  0903   AST 13.5 8.5   ALT 10.2 12.2   ALKPHOS 40.2 43.4       Diagnosis and Assessment                                                                             m2, h3     Today the following issues were addressed:    Schizophrenia  R/O Schizoaffective disorder  Cannabis use disorder, moderate     Catatonia, by history  Gender dysphoria, by history    [place] Prescription Monitoring Program [] review was not needed today.    PSYCHOTROPIC DRUG INTERACTIONS: none clinically relevant    Plan                                                                                                                     m2, h3     1) Continue aripiprazole 10mg  START: citalopram 10mg     RTC: 4 weeks    CRISIS NUMBERS:   Provided routinely in AVS.    Treatment Risk Statement:  The patient understands the risks, benefits, adverse effects and alternatives. Agrees to treatment with the capacity to do so. No medical contraindications to treatment. Agrees to call  clinic for any problems. The patient understands to call 911 or go to the nearest ED if life threatening or urgent symptoms occur.        PROVIDER:  WILY Ly CNP

## 2019-01-03 ASSESSMENT — ANXIETY QUESTIONNAIRES
3. WORRYING TOO MUCH ABOUT DIFFERENT THINGS: NOT AT ALL
6. BECOMING EASILY ANNOYED OR IRRITABLE: NOT AT ALL
2. NOT BEING ABLE TO STOP OR CONTROL WORRYING: NOT AT ALL
GAD7 TOTAL SCORE: 0
1. FEELING NERVOUS, ANXIOUS, OR ON EDGE: NOT AT ALL
5. BEING SO RESTLESS THAT IT IS HARD TO SIT STILL: NOT AT ALL
7. FEELING AFRAID AS IF SOMETHING AWFUL MIGHT HAPPEN: NOT AT ALL

## 2019-01-03 ASSESSMENT — PATIENT HEALTH QUESTIONNAIRE - PHQ9
5. POOR APPETITE OR OVEREATING: NOT AT ALL
SUM OF ALL RESPONSES TO PHQ QUESTIONS 1-9: 1
SUM OF ALL RESPONSES TO PHQ QUESTIONS 1-9: 6

## 2019-01-04 ENCOUNTER — OFFICE VISIT (OUTPATIENT)
Dept: PSYCHIATRY | Facility: CLINIC | Age: 24
End: 2019-01-04
Payer: COMMERCIAL

## 2019-01-04 DIAGNOSIS — F20.9 SCHIZOPHRENIA, UNSPECIFIED TYPE (H): Primary | ICD-10-CM

## 2019-01-04 ASSESSMENT — ANXIETY QUESTIONNAIRES: GAD7 TOTAL SCORE: 0

## 2019-01-04 NOTE — PROGRESS NOTES
"  NAVIGATE New Medication Management Visit            Janey Givens is a 23 year old male to female transgendered person who prefers the name Katie and she/her pronouns.  Therapist: None  PCP: Ariana Andrea  Other Providers: None  Referred by [facility] for evaluation of psychosis [hallucinations- no, delusions- yes].      History was provided by patient who was a vague historian. Collateral information also obtained through record review.      Chief Complaint                                                                                                             \" I am here for treatment for psychosis\"     History of Present Illness                                                                                 4, 4      Pertinent Background:  This patient first experienced psychosis in Nov 2017 manifested as disorganized thinking, behavior and communication and paranoid and grandiose delusions and persecutory ideation. Pt endorsed use of LSD and cannabis in the fall leading up to her episode. Patient was hospitalized at [facility] from 12/6/2017 - 12/22/2017 where she was described as labile, disorganized, grandiose and agitated upon admission. The patient reportedly demonstrated impulsive behavior as well and poor boundaries. Concern for impaired cognition r/t a concussion in 2014 prompted a neuropsych eval which did not reveal any problems with cognition or memory fx although working memory and processing speed were not consistent with someone of the patient's cognitive ability, likely attributed to the psychotic episode. 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   During hospitalization course, the patient was titrated to 20mg of olanzapine and a petition for commitment was filed but not supported. Pt was referred for a Rule 25, individual therapy and psychiatry after discharge. Pt reports that they met with psych provider one time (Dr. Andrea at [facility]) and expressed strong desire to stop medication " "due to ASE hypersomnolence, increased appetite and weight gain. They made a taper plan and pt didn't return for follow up. Pt denies sx of psychosis occurring after discontinuing medications in Feb 2018. Pt did complete a 28 day program at [facility]. She does not feel like she has a JAYANT, but found [facility] to be a helpful program.   Sx relapsed in Sept 2018 and patient was hospitalized again at [facility] from 9/28 to 10/15/18 for thought blocking, disorganized behavior and communication and grandiose delusional thoughts. Pt was fixated on a concern that she would just disappear, or cease to exist.  Pt was titrated to olanzapine 15mg during this hospital stay due to wanting to discharge before sx were stabilized. Of note, pt was noted to have catatonic sx, but there is not documentation of specific sx in notes during her course.       Psych critical item history includes catatonia, psychosis [sxs include delusions, disorganized], psych hosp (<3) and SUBSTANCE USE: cannabis,LSD   .   Most recent history  Pt is interested in cross-taper to aripiprazole in hopes that hypersomnia and increased appetite will improve, discussed plan for today.  Continues to report sx of delusions of grandeur- thinks she is a god, gets stuck in \"thought loops\"- intrusive thoughts, or delusions, thought insertion. Reports that these have not improved, nor have they become worse. Feels that she is able to tolerate them.   Continues to c/o dizziness/lightheadedness, probably orthostatic hypotension. Denies LOC.  Reports ongoing use of cannabis- does notice that it will increase psychotic sx so she has cut back to using an amt that does not exacerbate sx.       Recent Symptoms:   Depression:  insomnia and poor concentration /memory  Elevated:  none; Reports periods of time in the past that she has periods of a couple of hours where she has increased energy  Psychosis:  delusions- . [details in Interim History]  Anxiety:  none  Panic Attack:  " none  Trauma Related:  none       Recent Substance Use:  Cannabis- reports using once a week, reports reduced use compared to previously       Substance Use History                                                                 Past Use- Alcohol- social use , Cannabis- previous daily use  and Other Illicit Drugs-acid and Adderall; occa tobacco use   Treatment [#, most recent]- completed MI/CD program at [facility]     Denies medical or legal consequences of use         Psychiatric History     Suicidal ideation- None   Suicide Attempt:  #- 0     SIB- None   Eating Disorder- None       Psychiatric Medication Trials     None    Social/ Family History               [per patient report]                                                  1ea, 1ea     FINANCIAL SUPPORT- family or friend       CHILDREN- None       LIVING SITUATION- living on campus       LEGAL- None  EARLY HISTORY/ EDUCATION- provided support reading since 6th grade   SOCIAL/ SPIRITUAL SUPPORT- support groups; family and friends        CULTURAL INFLUENCES/ IMPACT- transgender MTF       TRAUMA HISTORY (self-report)- none  FEELS SAFE AT HOME- Yes  FAMILY HISTORY-  Sister has been dx with BPAD, possibly mom?     Medical / Surgical History                                                                                                                     Patient Active Problem List   Diagnosis     Blood clot in vein     Psychiatric disorder     Gender dysphoria in adult     Acute psychosis (H)       Past Surgical History:   Procedure Laterality Date     COSMETIC SURGERY      August 2018     KNEE SURGERY Left 2014        Medical Review of Systems                                                                                                     2, 10     GENERAL: Negative for malaise, significant weight loss and fever  HEENT: No changes in hearing or vision, no nose bleeds or other nasal problems  NECK: Negative for lumps, goiter, pain and significant neck  swelling  RESPIRATORY: Negative for cough, wheezing and shortness of breath  CARDIOVASCULAR: Negative for chest pain, leg swelling and palpitations  GI: Negative for abdominal discomfort, blood in stools or black stools  : Negative for dysuria, frequency and incontinence  MUSCULOSKELETAL: Negative for joint pain or swelling, back pain, and muscle pain.  SKIN: Negative for lesions, rash, and itching.  HEMATOLOGY/LYMPHOLOGY Negative for prolonged bleeding, bruising easily, and swollen nodes.  ENDOCRINE: Negative for cold or heat intolerance, polyuria, polydipsia and goiter.  NEURO:  negative  The remainder of the review of systems is noncontributory    Allergy                                Banana  Current Medications                                                                                                         Current Outpatient Medications   Medication Sig Dispense Refill     Melatonin 10 MG TABS tablet Take 10 mg by mouth nightly as needed for sleep       progesterone (PROMETRIUM) 200 MG capsule Take 1 capsule (200 mg) by mouth daily 30 capsule 2     ARIPiprazole (ABILIFY) 10 MG tablet Take 1 tablet (10 mg) by mouth daily 30 tablet 3     citalopram (CELEXA) 10 MG tablet Take 1 tablet (10 mg) by mouth daily 30 tablet 3     estradiol (VIVELLE-DOT) 0.1 MG/24HR bi-weekly patch Place 2 patches onto the skin twice a week 16 patch 2     hydrOXYzine (ATARAX) 25 MG tablet Take 1 tablet (25 mg) by mouth every 4 hours as needed for anxiety 120 tablet 0     spironolactone (ALDACTONE) 100 MG tablet Take 1 tablet (100 mg) by mouth daily 30 tablet 2     Vitals                                                                                                                         3, 3     /63 (BP Location: Right arm, Patient Position: Chair, Cuff Size: Adult Regular)   Pulse 90   Temp 99.4  F (37.4  C)   Wt 79.4 kg (175 lb)   BMI 25.84 kg/m       Mental Status Exam                                                                                       9, 14 cog gs     Alertness: alert   Appearance: casually groomed  Behavior/Demeanor: guarded, with good  eye contact   Speech: regular rate and rhythm and with some response delay  Language: intact  Psychomotor: normal or unremarkable  Mood: description consistent with euthymia  Affect: restricted; was congruent to mood; was congruent to content  Thought Process/Associations: thought blocking  Thought Content:  Reports delusions;  Denies suicidal and violent ideation  Perception:  Reports none;  Denies auditory hallucinations, visual hallucinations [details in Interim History] and visual hallucinations  Insight: good  Judgment: good  Cognition: (6) does  appear grossly intact; formal cognitive testing was not done  oriented: time, person, and place  attention span: intact  concentration: intact  recent memory: intact  remote memory: intact  fund of knowledge: appropriate  Gait and Station: unremarkable    Labs and Data                                                                                                                     Rating Scales:   PHQ9, AIMS: determine next due and COMPASS    PHQ9 Today:  2  PHQ-9 SCORE 12/21/2018 1/3/2019 1/3/2019   PHQ-9 Total Score 1 1 6         Recent Labs   Lab Test 12/20/18  1620 10/25/18  0903 10/01/18  1102   CR 0.7 0.8 0.77   GFRESTIMATED >90 >90 >90     Recent Labs   Lab Test 12/20/18  1620 10/25/18  0903   AST 13.5 8.5   ALT 10.2 12.2   ALKPHOS 40.2 43.4       Diagnosis and Assessment                                                                             m2, h3     Today the following issues were addressed:    Schizophrenia  R/OSchizoaffective disorder  Cannabis use disorder, moderate     Catatonia, by history  Gender dysphoria, by history    [place] Prescription Monitoring Program [] review was not needed today.    PSYCHOTROPIC DRUG INTERACTIONS: none clinically relevant    Plan                                                                                                                      m2, h3     1) Start Abilify 5mg this week (1/2 tab). Continue the Zyprexa at 10mg for a week. After the first week increase Abilify to 10mg and decrease Zyprexa to 5mg (1/2 tab). After one week, stop Zyprexa and continue Abilify at 10mg.     RTC: 2 weeks     CRISIS NUMBERS:   Provided routinely in AVS.    Treatment Risk Statement:  The patient understands the risks, benefits, adverse effects and alternatives. Agrees to treatment with the capacity to do so. No medical contraindications to treatment. Agrees to call clinic for any problems. The patient understands to call 911 or go to the nearest ED if life threatening or urgent symptoms occur.        PROVIDER:  WILY Ly CNP

## 2019-01-07 NOTE — PROGRESS NOTES
NAVIGATE Clinician Contact & Progress Note   For Family Education Program    NAVIGATE Enrollee: Janey Givens (1995)     MRN: [id number]  Date:  12/28/18  Diagnosis(es):   Schizophrenia, unspecified (F20.9)  Clinician: CARMEN Individual Resiliency [place] & Family Clinician, VERENICE Dash     1. Type of contact: (majority of time spent)  Family Session    2. People present:   Writer  Client: No  Significant Other/Family/Friend:  Mother and Father    3. Total number of persons who participated in contact: 3, including writer    4. Length of Actual Contact: Start Time: 11am; End Time: 12pm   Traveled?    No     5. Location of contact:  Psychiatry [facility], [place]    6. Did the client complete the home practice option(s) from the previous session: Completed    7. Motivational Teaching Strategies:  Connect info and skills with personal goals  Promote hope and positive expectations  Explore pros and cons of change  Re-frame experiences in positive light    8. Educational Teaching Strategies:  Review of written material/education  Relate information to client's experience  Ask questions to check comprehension  Break down information into small chunks  Adopt client's language     9. CBT Teaching Strategies:  Reinforcement and shaping (positive feedback for steps towards goals, gains in knowledge & skills and follow-through on home assignments)  Relapse prevention planning (review of stressors, early warning signs, written plan to respond to signs and rehearse plan)  Coping skills training (review current coping skills, increase currently used skills, feedback and plan home practice)  Behavioral tailoring (fit taking medication into client's daily routine)    10. Psychoeducational Topic(s) Addressed:  Just the Facts - Psychosis    11. Techniques utilized:   [place] announced at beginning of session  Review of goal  Review of previous meeting  Present new material  Problem-solving practice  Help client  choose a home practice option  Summarize progress made in current session    12. Assessment/Progress Note:     Met with parents for family session on this date. Writer set agenda to check-in, review home practice and material covered in previous session, collaboratively explore new material in Just the Facts - Psychosis, discuss and practice new skills together when applicable, and identify a home practice for next session.    During check-in both Gaurav and Billie shared positively about the holiday and report Katie seems to be doing well. They haven't noticed her experiencing symptoms and also shared that she got all A's this past semester in school. Writer continued exploration of Just the Facts - Psychosis as dad was not able to attend previous session. Reviewed symptoms together and discussed how parents have or have not seen various symptoms with Katie. Dad shared that anxiety seems to stand out to him. He reports with Katie being so academically successful, she sets very high standards and expectations for herself surrounding her academic performance and then notices she experiences anxiety related to course work and her grades. Writer normalized this and emphasized the importance of Katie developing coping strategies in IRT with the upcoming challenging semester. Discussed diagnoses and how a diagnosis is made. Considered the ways in which Katie demonstrates resiliency and highlighted her strengths in treatment adherence and commitment to recovery.     13. Plan/Referrals:     Will meet with family weekly as schedule allows for evidence based family psychoeducation and therapeutic support aimed at maximizing Janey's opportunity for recovery from psychosis.     Next family session scheduled for next week. Client and family aware they can reach out to writer directly and/or [facility] team should concerns or needs arise prior to next scheduled appointment.     Nia Fernandez, RENAE [facility] Individual Resiliency Train[facility]  & Family Clinician     Attestation:    I did not see this patient directly. This patient is discussed with me in individual clinical social work supervision, and I agree with the plan as documented.     MARCELL Wong, [facility], January 11, 2019

## 2019-01-07 NOTE — PROGRESS NOTES
NAVIGATE Clinician Contact & Progress Note   For Family Education Program    NAVIGATE Enrollee: Janey Givens (1995)     MRN: [id number]  Date:  12/21/18  Diagnosis(es):   Schizophrenia, unspecified (F20.9)  Clinician: CARMEN Individual Resiliency [place] & Family Clinician, VERENICE Dash     1. Type of contact: (majority of time spent)  Family Session    2. People present:   Writer  Client: No  Significant Other/Family/Friend:  Mother and Brother or sister (Olimpia)    3. Total number of persons who participated in contact: 3, including writer    4. Length of Actual Contact: Start Time: 11am; End Time: 12pm   Traveled?    No     5. Location of contact:  Psychiatry Clinic, [place]    6. Did the client complete the home practice option(s) from the previous session: Partially Completed    7. Motivational Teaching Strategies:  Connect info and skills with personal goals  Promote hope and positive expectations  Explore pros and cons of change  Re-frame experiences in positive light    8. Educational Teaching Strategies:  Review of written material/education  Relate information to client's experience  Ask questions to check comprehension  Break down information into small chunks  Adopt client's language     9. CBT Teaching Strategies:  Reinforcement and shaping (positive feedback for steps towards goals, gains in knowledge & skills and follow-through on home assignments)  Relapse prevention planning (review of stressors and early warning signs)  Coping skills training (review current coping skills, increase currently used skills, feedback and plan home practice)  Behavioral tailoring (fit taking medication into client's daily routine)    10. Psychoeducational Topic(s) Addressed:  Just the Facts - Psychosis    11. Techniques utilized:   [place] announced at beginning of session  Review of goal  Review of previous meeting  Present new material  Problem-solving practice  Help client choose a home  "practice option  Summarize progress made in current session    12. Assessment/Progress Note:     Met with Katie's Mom-Billie and sister-Olimpia on this date for family session. Writer set agenda to check-in, review home practice and material covered in previous session, collaboratively explore new material in Just the Facts - Psychosis, discuss and practice new skills together when applicable, and identify a home practice for next session.    During check-in, Billie and Olimpia shared about all the family being home and staying at mom and dads. Both report Katie seems to be doing well and has even been spending time with a new friend. Discussed family dynamics and ways in which they have seen these negatively impacting Katie, specifically how family jokes with each other and gives one another a hard time. Identified different ways family can communicate with Katie and provide feedback to her that emphasizes her resiliency and their care and support for her.     Explored Just the Facts - Psychosis. Identified psychosis as different for each individual but consisting of common types of symptoms (hallucinations, delusions, confused thinking). Family members reported Janey has experienced most of those symptoms. Other \"sometimes\" associated symptoms were discussed. Family reported Janey has experienced decline in social functioning and negative symptoms.     Explained how a diagnosis of psychosis and affiliated illnesses are made. Reviewed criteria for schizophreniform, schizophrenia, and schizoaffective disorders. Dialogued about potential causes of psychosis and the Stress-Vulnerability Model. Emphasized that nobody causes psychosis and causes are most often easily identified in hindsight.      Discussed treatment recommendations to include antipsychotic medication, individual, group, and family therapy, taking steps toward school/employment goals, socialization, abstinence from alcohol and drugs, learning " strategies to manage stress and symptoms, exercise and health eating, strong family communication, and ongoing involvement in [facility].     Overall family seemed very engaged in conversation; sister-Olimpia asked a number of questions and plans to bring information back to share with other sisters. They did express interest in continuing to meet for family therapy and psychoeducation.     13. Plan/Referrals:     Will meet with family weekly as schedule allows for evidence based family psychoeducation and therapeutic support aimed at maximizing Janey's opportunity for recovery from psychosis.     Next family session scheduled for 12/28/18. Client and family aware they can reach out to writer directly and/or [facility]IG[facility] team should concerns or needs arise prior to next scheduled appointment.     Nia Fernandez, RENAE   NAVIGATE Individual Resiliency [place] & Family Clinician     Attestation:    I did not see this patient directly. This patient is discussed with me in individual clinical social work supervision, and I agree with the plan as documented.     MARCELL Wong, [facility], January 7, 2019

## 2019-01-11 ENCOUNTER — OFFICE VISIT (OUTPATIENT)
Dept: PSYCHIATRY | Facility: CLINIC | Age: 24
End: 2019-01-11
Payer: COMMERCIAL

## 2019-01-11 DIAGNOSIS — F20.9 SCHIZOPHRENIA, UNSPECIFIED TYPE (H): Primary | ICD-10-CM

## 2019-01-17 NOTE — PROGRESS NOTES
NAVIGATE Clinician Contact & Progress Note   For Family Education Program    NAVIGATE Enrollee: aJney Givens (1995)     MRN: [id number]  Date:  1/11/19  Diagnosis(es):   Schizophrenia, unspecified (F20.9)  Clinician: CARMEN Individual Resiliency  & Family Clinician, VERENICE Dash     1. Type of contact: (majority of time spent)  Family Session    2. People present:   Writer  Client: No  Significant Other/Family/Friend:  Mother and Father    3. Total number of persons who participated in contact: 3, including writer    4. Length of Actual Contact: Start Time: 2pm; End Time: 3pm   Traveled?    No     5. Location of contact:  [facility], [place]    6. Did the client complete the home practice option(s) from the previous session: Completed    7. Motivational Teaching Strategies:  Connect info and skills with personal goals  Promote hope and positive expectations  Explore pros and cons of change  Re-frame experiences in positive light    8. Educational Teaching Strategies:  Review of written material/education  Relate information to client's experience  Ask questions to check comprehension  Break down information into small chunks  Adopt client's language     9. CBT Teaching Strategies:  Reinforcement and shaping (positive feedback for steps towards goals, gains in knowledge & skills and follow-through on home assignments)  Relapse prevention planning (review of stressors, early warning signs, written plan to respond to signs and rehearse plan)  Coping skills training (review current coping skills, increase currently used skills, feedback and plan home practice)  Behavioral tailoring (fit taking medication into client's daily routine)    10. Psychoeducational Topic(s) Addressed:  Just the Facts - Psychosis  Just the Facts - Relapse Prevention Planning    11. Techniques utilized:   [place] announced at beginning of session  Review of goal  Review of previous meeting  Present new  material  Problem-solving practice  Help client choose a home practice option  Summarize progress made in current session    12. Assessment/Progress Note:     Met with Katie's mom and dad-Binh on this date for family session. Writer set agenda to check-in, review home practice and material covered in previous session, collaboratively explore new material in Relapse Prevention Planning, discuss and practice new skills together when applicable, and identify a home practice for next session.    During check-in, both mom and dad shared as far as they know, Katie seems to be doing well. They reported that she was home for a period of time Sunday and was following up regarding her 's license Monday. Mom was offering help regarding this process and Katie was short with her, but since then communication has been alright. Confirmed plan to meet next week Friday at 2pm for joint session to discuss a wellness plan moving forward as Katie is scheduled to begin classes on 1/22.     Began Just the Facts - Relapse Prevention Planning. Defined relapse and discussed how reducing relapses can help people take charge of their recovery. Discussed ways family is helping reduce the risk of relapse:    - Learn as much as possible about psychosis  - Be aware of Janey's specific symptoms  - Be conscious of when Janey is under stress and help support strategies for reducing or coping with stress  - Support participating in treatment  - Help Janey build social supports  - Assist Janey to use medication effectively  - Establish reasonable expectations in times of high stress  - Keep conflict in the family at low levels    Identified goal of creating a relapse prevention plan. Family reported Janey has experienced relapse. Family has noticed changes in the intensity of symptoms including decrease in self-esteem, decreased need for sleep, irritability, suspicion with peers and decline in personal hygiene. Dialogued  about potential early warning signs and those seemingly applicable to Janey. Discussed potential events/situations that can trigger relapse and those that seem to apply to Janey including not getting enough rest or sleep, an increase in stress or academic demands, drinking alcohol or smoking marijuana, a major life change and discontinuing medication. Engaged family in a discussion of what they can do if they become aware of an early warning sign and plan to discuss with Katie during joint session next week.     Overall family seemed very engaged in conversation. They did express interest in continuing to meet for family therapy and psychoeducation. As of today's appt their insight into [place] mental illness appears good. They seem they would benefit from continued clinical intervention aimed at assisting them implement helpful strategies at home and increase their understanding of psychosis.    13. Plan/Referrals:     Will meet with family weekly as schedule allows for evidence based family psychoeducation and therapeutic support aimed at maximizing Janey's opportunity for recovery from psychosis.     Plan to meet with family, Katie and NAVIGATE IRT - MARCELL See, LGSW as a group to discuss a wellness plan with Katie scheduled to return to classes 1/22. Client and family aware they can reach out to writer directly and/or NAVIGATE team should concerns or needs arise prior to next scheduled appointment.     VERENICE Dash   NAVIGATE Individual Resiliency [place] & Family Clinician     Attestation:    I did not see this patient directly. This patient is discussed with me in individual clinical social work supervision, and I agree with the plan as documented.     MARCELL Wong, [facility] In[facility] HospitalSW, January 18, 2019

## 2019-01-17 NOTE — PROGRESS NOTES
NAVIGATE Clinician Contact & Progress Note   For Family Education Program    NAVIGATE Enrollee: Janey Givens (1995)     MRN: [id number]  Date:  1/04/19  Diagnosis(es):   Schizophrenia, unspecified (F20.9)  Clinician: CARMEN Individual Resiliency  & Family Clinician, VERENICE Dash     1. Type of contact: (majority of time spent)  Family Session    2. People present:   Writer  Client: No  Significant Other/Family/Friend:  Mother and Father    3. Total number of persons who participated in contact: 3, including writer    4. Length of Actual Contact: Start Time: 2pm; End Time: 3pm   Traveled?    No     5. Location of contact:  Psychiatry Clinic, [place]    6. Did the client complete the home practice option(s) from the previous session: Completed    7. Motivational Teaching Strategies:  Connect info and skills with personal goals  Promote hope and positive expectations  Explore pros and cons of change  Re-frame experiences in positive light    8. Educational Teaching Strategies:  Review of written material/education  Relate information to client's experience  Ask questions to check comprehension  Break down information into small chunks  Adopt client's language     9. CBT Teaching Strategies:  Reinforcement and shaping (positive feedback for steps towards goals, gains in knowledge & skills and follow-through on home assignments)  Relapse prevention planning (review of stressors, early warning signs, written plan to respond to signs and rehearse plan)  Coping skills training (review current coping skills, increase currently used skills, feedback and plan home practice)  Behavioral tailoring (fit taking medication into client's daily routine)    10. Psychoeducational Topic(s) Addressed:  Just the Facts - Psychosis  Just the Facts - Medications for Psychosis    11. Techniques utilized:   [place] announced at beginning of session  Review of goal  Review of previous meeting  Present new  "material  Problem-solving practice  Help client choose a home practice option  Summarize progress made in current session    12. Assessment/Progress Note:     Met with Katie's parents-Binh on this date for family session. Writer set agenda to check-in, review home practice and material covered in previous session, collaboratively explore new material in JTF-Psychosis, discuss and practice new skills together when applicable, and identify a home practice for next session.    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 Writer discussed option of meeting together as a group to develop and talk through a coping plan or \"wellness plan\" for Katie as she returns to major-specific courses at the . Both mom and dad expressed interest in this. Writer will plan to follow-up with CARMEN GARCIA - MARCELL See, VERENICE to coordinate hopefully the week of 1/14 before Katie starts classes.     Continued exploration of material in Just the Facts - Psychosis. Discussed the stress-vulnerability model and considered ways in which Gaurav and Billie potentially contribute to Katie's stress. Reviewed the phases of FEP to include prodrome, acute and recovery. Collaboratively examined their observations of Katie's experience of these phases. Parents identified a warning sign in looking back at Katie's potential prodromal phase wherein Katie's self-confidence greatly decreases, which is unusual for her. Will plan to keep this in mind when talking about wellness plan moving forward. Discussed treatment recommendations and highlighted all that Katie is doing that supports symptom management and recovery including participating in NAVIGATE/treatment, medication compliance, learning strategies to manage symptoms and stress and communicating with family.     13. Plan/Referrals:     Will meet with family weekly as schedule allows for evidence based " family psychoeducation and therapeutic support aimed at maximizing Janey's opportunity for recovery from psychosis.     Discussed hope to meet all together with NAVIGATE IRT - MARCELL See, LGSW prior to Katie's classes starting. Aleah plans to follow-up with Katie.     Next family session scheduled for next week. Client and family aware they can reach out to writer directly and/or NAVIGATE team should concerns or needs arise prior to next scheduled appointment.     VERENICE Dash Individual Resiliency  & Family Clinician     Attestation:    I did not see this patient directly. This patient is discussed with me in individual clinical social work supervision, and I agree with the plan as documented.     MARCELL Wong, LICSW, January 18, 2019

## 2019-01-18 ENCOUNTER — OFFICE VISIT (OUTPATIENT)
Dept: PSYCHIATRY | Facility: CLINIC | Age: 24
End: 2019-01-18
Payer: COMMERCIAL

## 2019-01-18 DIAGNOSIS — F20.9 SCHIZOPHRENIA, UNSPECIFIED TYPE (H): Primary | ICD-10-CM

## 2019-01-21 ASSESSMENT — ANXIETY QUESTIONNAIRES
2. NOT BEING ABLE TO STOP OR CONTROL WORRYING: NOT AT ALL
6. BECOMING EASILY ANNOYED OR IRRITABLE: NOT AT ALL
7. FEELING AFRAID AS IF SOMETHING AWFUL MIGHT HAPPEN: NOT AT ALL
GAD7 TOTAL SCORE: 0
3. WORRYING TOO MUCH ABOUT DIFFERENT THINGS: NOT AT ALL
5. BEING SO RESTLESS THAT IT IS HARD TO SIT STILL: NOT AT ALL
1. FEELING NERVOUS, ANXIOUS, OR ON EDGE: NOT AT ALL

## 2019-01-21 ASSESSMENT — PATIENT HEALTH QUESTIONNAIRE - PHQ9
SUM OF ALL RESPONSES TO PHQ QUESTIONS 1-9: 3
5. POOR APPETITE OR OVEREATING: NOT AT ALL

## 2019-01-21 NOTE — PROGRESS NOTES
CARMEN Clinician Contact & Progress Note  For Individual Resiliency Training (IRT)  A Part of the [facility] First Episode of Psychosis Program    NAVIGATE Enrollee: Janey Givens (1995)     MRN: [id number]  Date:  1/18/19  Diagnosis: Schizophrenia, unspecified (F20.9)  Clinician: CARMEN Individual Resiliency Trainer, VERENICE See     1. Type of contact: (majority of time spent)  IRT Session  Family Session    2. People present:   Writer  Client: Janey Givens    3. Total number of persons who participated in contact: 2, including writer    4. Length of Actual Contact: Start Time: 2:00; End Time: 2:20   Traveled?    No     5. Location of contact:  Psychiatry Clinic, [place]    6. Did the client complete the home practice option(s) from the previous session: Partially Completed    7. Motivational Teaching Strategies:  Connect info and skills with personal goals  Promote hope and positive expectations  Explore pros and cons of change  Re-frame experiences in positive light    8. Educational Teaching Strategies:  Review of written material/education  Relate information to client's experience  Ask questions to check comprehension  Break down information into small chunks  Adopt client's language     9. CBT Teaching Strategies:  Reinforcement and shaping (positive feedback for steps towards goals and gains in knowledge & skills)  Relapse prevention planning (review of stressors, early warning signs and written plan to respond to signs)    10. IRT Module(s) Addressed:  Module 4 - Relapse Prevention Planning    11. Techniques utilized:   [place] announced at beginning of session  Review of homework  Review of previous meeting  Present new material  Problem-solving practice  Help client choose a home practice option  Summarize progress made in current session    12. Mental Status Exam:    Alertness: alert  and oriented  Appearance: casually groomed  Behavior/Demeanor: cooperative, pleasant and calm, with  good  eye contact   Speech: normal and regular rate and rhythm  Language: intact. Preferred language identified as English.  Psychomotor: normal or unremarkable  Mood: worried  Affect: restricted; was congruent to mood; was congruent to content  Thought Process/Associations: unremarkable  Thought Content:  Reports preoccupations;  Denies suicidal and violent ideation and delusions  Perception:  Reports none;  Denies auditory hallucinations and visual hallucinations  Insight: good  Judgment: adequate for safety  Cognition: does  appear grossly intact; formal cognitive testing was not done  Suicidal ideation: denies SI, denies intent,  and denies plan  Homicidal Ideation: denies    13. Assessment/Progress Note:     This writer met with Katie separately for the first 20 minutes. Katie noted she will be beginning a rigorous college semester shortly. This writer set the agenda of creating a relapse prevention plan separately and then joining to share with family; Katie agreed. She said she has 9 hours of class Monday, Wednesday, and Friday. She has an afternoon of classes on Tuesday and Thursday and plans to do homework those days. She has started to establish a sleep routine, but is finding it difficult to adjust. Katie denied any marijuana use over the winter break, but is unsure if she will remain sober after returning to college. She denied any positive symptoms of psychosis, but did endorse low energy, tiredness, isolation. This writer then discussed concept of relapse, prevention, and role in recovery. This writer discussed early warning signs to symptoms, triggers, and possible ways to address these.   Writer reviewed example of Relapse Prevention Plan in preparation for client to develop own plan.    Katie indicated the following on the plan:  Early warning signs:   Lack of confidence or questioning abilities  Stopping medications  Roommates bringing up concerns    Triggers/stressors:   Not sleeping enough (1-3  "hours/night)  Delusional thoughts (stuck ruminating on a topic)  Loneliness and changes in social support    Coping strategies:   Utilize school accomodations - note taking  Practice art or creative outlets    Who I would like to assist me and what I want them to do:   Parents- ask to visit their home, check in via text once per week on Sunday, check in again within 24 hours if no response     This writer and Katie then joined Nia Fernandez (Family Clinician) and Katie's family to share her relapse prevention plan. Please see Nia's encounter dated 1/18/19 for more information.    14. Plan/Referrals:     This writer will coordinate with Ame (SEE) to further discuss initiating accommodations.     This writer will continue to consult with the team.    This writer will assist Katie in developing additional coping resources.     Billing for \"Interactive Complexity\"?    No      VERENICE See    NAVIGATE Individual Resiliency Trainer    Attestation:    I did not see this patient directly. This patient is discussed with me in individual clinical social work supervision, and I agree with the plan as documented.     MARCELL Wong, LICSW, February 15, 2019      "

## 2019-01-22 ASSESSMENT — ANXIETY QUESTIONNAIRES: GAD7 TOTAL SCORE: 0

## 2019-01-29 ENCOUNTER — OFFICE VISIT (OUTPATIENT)
Dept: PSYCHIATRY | Facility: CLINIC | Age: 24
End: 2019-01-29
Payer: COMMERCIAL

## 2019-01-29 DIAGNOSIS — F20.9 SCHIZOPHRENIA, UNSPECIFIED TYPE (H): Primary | ICD-10-CM

## 2019-01-29 ASSESSMENT — ANXIETY QUESTIONNAIRES
5. BEING SO RESTLESS THAT IT IS HARD TO SIT STILL: NOT AT ALL
GAD7 TOTAL SCORE: 0
1. FEELING NERVOUS, ANXIOUS, OR ON EDGE: NOT AT ALL
7. FEELING AFRAID AS IF SOMETHING AWFUL MIGHT HAPPEN: NOT AT ALL
3. WORRYING TOO MUCH ABOUT DIFFERENT THINGS: NOT AT ALL
2. NOT BEING ABLE TO STOP OR CONTROL WORRYING: NOT AT ALL
6. BECOMING EASILY ANNOYED OR IRRITABLE: NOT AT ALL

## 2019-01-29 ASSESSMENT — PATIENT HEALTH QUESTIONNAIRE - PHQ9
5. POOR APPETITE OR OVEREATING: NOT AT ALL
SUM OF ALL RESPONSES TO PHQ QUESTIONS 1-9: 1

## 2019-01-30 ASSESSMENT — ANXIETY QUESTIONNAIRES: GAD7 TOTAL SCORE: 0

## 2019-02-08 NOTE — PROGRESS NOTES
NAVIGATE Clinician Contact & Progress Note   For Family Education Program    NAVIGATE Enrollee: Janey Givens (1995)     MRN: [id number]  Date:  1/18/19  Diagnosis(es):   Schizophrenia, unspecified (F20.9)  Clinician: CARMEN Individual Resiliency  & Family Clinician, VERENICE Dash     1. Type of contact: (majority of time spent)  Family Session    2. People present:   Writer  Client: Yes - For last 40 minutes  Significant Other/Family/Friend:  Mother and Father  MARCELL Pan LGSW with client for last 40 minutes    3. Total number of persons who participated in contact: 5, including writer    4. Length of Actual Contact: Start Time: 2pm; End Time: 3pm   Traveled?    No     5. Location of contact:  Psychiatry Clinic, [place]    6. Did the client complete the home practice option(s) from the previous session: Completed    7. Motivational Teaching Strategies:  Connect info and skills with personal goals  Promote hope and positive expectations  Explore pros and cons of change  Re-frame experiences in positive light    8. Educational Teaching Strategies:  Review of written material/education  Relate information to client's experience  Ask questions to check comprehension  Break down information into small chunks  Adopt client's language     9. CBT Teaching Strategies:  Reinforcement and shaping (positive feedback for steps towards goals, gains in knowledge & skills and follow-through on home assignments)  Relapse prevention planning (review of stressors, early warning signs, written plan to respond to signs and rehearse plan)  Coping skills training (review current coping skills, increase currently used skills, feedback and plan home practice)  Behavioral tailoring (fit taking medication into client's daily routine)    10. Psychoeducational Topic(s) Addressed:  Just the Facts - Coping with Stress  Just the Facts - Relapse Prevention Planning  Just the Facts - Effective  Communication  Just the Facts - A Relative s Guide to Supporting Recovery from Psychosis    11. Techniques utilized:   [place] announced at beginning of session  Review of goal  Review of previous meeting  Present new material  Problem-solving practice  Help client choose a home practice option  Summarize progress made in current session    12. Assessment/Progress Note:     Met with Katie's parents - Billie and Gaurav for first 20 minutes of the session, then were joined by Katie and ARELYATE IRDANIEL - MARCELL See, VERENICE for relapse prevention planning discussion surrounding Katie's start of the semester.     Checked in with Gaurav and Billie for beginning portion of the appointment. They reported that Katie seems to be doing well and shared that she had a friend over to watch a movie recently, which they considered to be positive. Writer reviewed relapse prevention plan outline from previous session and discussed goals for meeting with Katie and Aleah, which included talk through this plan as a group as well as identify ways in which parents can voice concerns or observed potential warning signs to Katie.    Parents and writer were then joined by Katie and Aleah. Set goal for meeting as outlined above and collaboratively developed the following initial relapse prevention plan:    Early warning signs: Lack of confidence or questioning abilities   Stop taking medications   Roommates bringing up concerns     Triggers/stressors: Not sleeping enough (1-3 hours/night)   Delusional thoughts (stuck ruminating on a topic)   Loneliness and changes in social support     Coping strategies: Utilize school accomodations - note taking   Practice art or creative outlets     Who I would like to assist me and what I want them to do: Parents- ask to visit their home, check in via text once per week on Sunday, check in again within 24 hours if no response    Both Katie and parents contributed to plan and agreed on Dad checking in via text once per week  on Sunday with Katie agreeing to respond within 24-hours. Katie gave permission for Dad to check-in again if he hasn't heard back from her within those 24-hours. Writer additionally offered to meet with roommates if Katie would find that helpful for brief education and conversation surrounding their support this semester. Katie wasn't sure about this but will think about it.     Writer and NAVIGATE IRT - MARCELL See, LGSW continue to remain available for ongoing support.      13. Plan/Referrals:     Will meet with family weekly as schedule allows for evidence based family psychoeducation and therapeutic support aimed at maximizing Janey's opportunity for recovery from psychosis.     Family will scheduled next family session as able. Client and family aware they can reach out to writer directly and/or NAVIGATE team should concerns or needs arise prior to next scheduled appointment.     VERENICE Dash   NAVIGATE Individual Resiliency  & Family Clinician     Attestation:    I did not see this patient directly. This patient is discussed with me in individual clinical social work supervision, and I agree with the plan as documented.     MARCELL Wong, LICSW, February 15, 2019

## 2019-02-19 ENCOUNTER — TELEPHONE (OUTPATIENT)
Dept: PSYCHIATRY | Facility: CLINIC | Age: 24
End: 2019-02-19

## 2019-02-19 ENCOUNTER — OFFICE VISIT (OUTPATIENT)
Dept: PSYCHIATRY | Facility: CLINIC | Age: 24
End: 2019-02-19
Payer: COMMERCIAL

## 2019-02-19 DIAGNOSIS — F20.9 SCHIZOPHRENIA, UNSPECIFIED TYPE (H): Primary | ICD-10-CM

## 2019-02-20 NOTE — TELEPHONE ENCOUNTER
NAVIGATE SEE Outgoing Telephone Call  For Supported Employment & Education    NAVIGATE Enrollee: Janey Givens (1995)     MRN: [id number]  Date of Call: 2/20/2019  Contacted: KJ Lorenzo coordinator    Discussed:   Per Katie's request, this writer called the DRJESSY at the [facility] to request that she be paired with an advisor who has training/knowledge or specializes in working with trans students. Writer DAYANA for return call.    Ame Borrero

## 2019-02-20 NOTE — PROGRESS NOTES
NAVIGATE SEE Progress Note   For Supported Employment & Education    NAVIGATE Enrollee: Janey Givens (1995)     MRN: [id number]  Date:  2/19/19  Clinician: ARELYATE Supported Employment & , Ame Borrero    1. Client Status Update:   Janey Givens is interested in education (Client participated in benefits counseling)    2. People present:   SEE/Writer  Client: Janey Givens      3. Total number of persons who participated in contact: (do not count yourself/SEE) 1    4. Length of Actual Contact: 60 minutes   Traveled? No    5. Location of contact:  Psychiatry Clinic, [place]    6. Brief description of session, contact, or client status (include: strategies, interventions, client reaction to contact, next steps, etc)    Katie and this writer met for a SEE session requested by Katie for assistance with obtaining accomodations in her courses at the [employer]. She is currently enrolled in 13 credits, but is working on average 70-80 hours per week on schoolwork and projects. She is in 4 courses including a lab. She reports sleeping well and being able to take some breaks to eat and to watch tv to relax. She reports AH everyday which can be distracting while in lecture and taking exams. She denies knowing her current grades, but has received good feedback from her instructors.  Katie and this writer reviewed the [facility]'s back to school toolkit for students with psychosis in higher education. We reviewed the list of typically requested accommodations in the classroom and discussed the pros and cons of each. We complied a list of the appropriate requests and discussed the process of obtaining accommodations. Katie did call and schedule an appt for Feb 27th at 2pm and requested that this writer attend. Jesser walked with Katie to schedule a new patient appt with Dr. Lizabeth Nolasco in order to obtain documentation required for the DRC.     7. Completion of mutually agreed upon client  task from previous meeting:  Not Applicable    8. Orientation and Treatment Planning:  Pursuing current SEE goals    9. Assessment:  Assessing client's need for follow-along supports    10. Placement:  Not Applicable    11. Follow Along Supports: (for clients who are working or attending school)   Education (Indicate client's current decision regarding disclosure: Client wants to use disclosure and Assisting with requesting accommodations)    12. Mutually agreed upon client task for next meeting:     na    13. Next Meeting Scheduled for: wed, 2/27 at 2    [facility] Employment &

## 2019-02-27 ENCOUNTER — OFFICE VISIT (OUTPATIENT)
Dept: PSYCHIATRY | Facility: CLINIC | Age: 24
End: 2019-02-27
Payer: COMMERCIAL

## 2019-02-27 DIAGNOSIS — F20.9 SCHIZOPHRENIA, UNSPECIFIED TYPE (H): Primary | ICD-10-CM

## 2019-02-28 ENCOUNTER — OFFICE VISIT (OUTPATIENT)
Dept: PSYCHIATRY | Facility: CLINIC | Age: 24
End: 2019-02-28
Payer: COMMERCIAL

## 2019-02-28 VITALS
SYSTOLIC BLOOD PRESSURE: 114 MMHG | HEART RATE: 73 BPM | WEIGHT: 181.8 LBS | TEMPERATURE: 99 F | DIASTOLIC BLOOD PRESSURE: 73 MMHG | BODY MASS INDEX: 26.85 KG/M2

## 2019-02-28 DIAGNOSIS — F32.0 CURRENT MILD EPISODE OF MAJOR DEPRESSIVE DISORDER WITHOUT PRIOR EPISODE (H): ICD-10-CM

## 2019-02-28 DIAGNOSIS — F20.9 SCHIZOPHRENIA, UNSPECIFIED TYPE (H): Primary | ICD-10-CM

## 2019-02-28 RX ORDER — CITALOPRAM HYDROBROMIDE 10 MG/1
10 TABLET ORAL DAILY
Qty: 30 TABLET | Refills: 3 | Status: SHIPPED | OUTPATIENT
Start: 2019-02-28 | End: 2019-08-01

## 2019-02-28 RX ORDER — HYDROXYZINE HYDROCHLORIDE 25 MG/1
50 TABLET, FILM COATED ORAL EVERY 4 HOURS PRN
Qty: 120 TABLET | Refills: 0 | Status: SHIPPED | OUTPATIENT
Start: 2019-02-28 | End: 2019-04-02

## 2019-02-28 RX ORDER — ARIPIPRAZOLE 2 MG/1
TABLET ORAL
Qty: 30 TABLET | Refills: 3 | Status: SHIPPED | OUTPATIENT
Start: 2019-02-28 | End: 2019-04-02 | Stop reason: SINTOL

## 2019-02-28 RX ORDER — ARIPIPRAZOLE 10 MG/1
TABLET ORAL
Qty: 30 TABLET | Refills: 3 | Status: SHIPPED | OUTPATIENT
Start: 2019-02-28 | End: 2019-06-03

## 2019-02-28 NOTE — PROGRESS NOTES
NAVIGATE SEE Progress Note   For Supported Employment & Education    NAVIGATE Enrollee: Janey Givens (1995)     MRN: [id number]  Date:  2/27/19  Clinician: [employer] Supported Employment & , Ame Borrero    1. Client Status Update:   Janey Givens is interested in education (Client participated in benefits counseling)    2. People present:   SEE/Writer  Client: Janey Givens  Teacher/Instructor: [facility] advisor    3. Total number of persons who participated in contact: (do not count yourself/SEE) 2    4. Length of Actual Contact: 35 minutes   Traveled? Yes  Total Travel Time: 25 minutes    5. Location of contact:  School    6. Brief description of session, contact, or client status (include: strategies, interventions, client reaction to contact, next steps, etc)    Writer met Katie at the [facility] to establish accommodations for her classes and midterms. Writer attended the appointment per Katie's request and to advocate for longer time on exams and to take her exams individually. Katie and her advisor, Scooter Joe discussed other accommodations and he described the process of requesting exams and other supports. This writer will provide the [facility] with documentation of disability/diagnosis/accommodations requested by Katie's new DrJoseph, Lizabeth Nolasco MD. And will send tomorrow.    7. Completion of mutually agreed upon client task from previous meeting:  Completed    8. Orientation and Treatment Planning:  Pursuing current SEE goals    9. Assessment:  Assessing client's need for follow-along supports    10. Placement:  Not Applicable    11. Follow Along Supports: (for clients who are working or attending school)   Education (Assisting with requesting accommodations)    12. Mutually agreed upon client task for next meeting:     na    13. Next Meeting Scheduled for: sweetie MORALES Supported Employment &

## 2019-02-28 NOTE — PROGRESS NOTES
"NAVIGATE Medication Management Progress Note  A Part of the [facility] First Episode of Psychosis Program    NAVIGATE Enrollee: Janey Givens (1995)     MRN: [id number]  Date:  2/28/19     Janey Givens is a 23 year old adult who prefers the name Katie and pronoun she, her.  Therapist(s)/Other Providers: Nia Fernandez for Aleah Cerda for individual resiliency training, Nia Fernandez for family therapy, and Ame Borrero for employment support  PCP: Ariana Andrea    Psych critical item history includes catatonia, psychosis [sxs include delusions, disorganized thinking], psych hosp (<3) and SUBSTANCE USE: cannabis, LSD.          Contributors to the Assessment     Chart Reviewed.   Interview completed with Janey Givens.  Collateral information obtained from EHR.         Chief Complaint      psychosis         Interim History      Janey Givens is a 23 year old adult who was last seen in clinic on 1/2/2019 by Leidy Guzman at which time citalopram 10 mg daily was started. The patient reports good treatment adherence.  History was provided by the patient who was a good historian.    Since the last visit:    Reviewed pt's history. She reports that she has had two psychotic episodes most recently in October of 2018. After the second episode it was recommended she pursue care with the [facility] program. She recalls that stress precipitated her two psychotic episodes. She describes how she had a falling out with a close friend in combination with stress from school. This culminated in her stopping eating and sleeping which ultimately led her to be hospitalized for psychosis. This chain of events occurred with both psychotic episodes. She states that she feels she has been doing well since this fall.    She endorses ongoing AH, on a daily basis. She also has thoughts that other people can read her mind, in addition to other delusions that she declined to go into. She states that she \"tries not to let [the voices and " "delusional thoughts] bother her.\" Will try to remind herself that she is hearing voices, that they are not real and just in her head.    She states that the delusional beliefs have not been significantly affected by medication. Medications have helped with AH. She recalls a period after her first episode that she did not hear them. Believes they are \"ramped up\" since the semester started. Currently is a rome at the  studying chemical engineering. States that she would like to become a  but \"doesn't think she is smart enough\" to do this. 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 Plans to go to graduate school and likely work for a biomedical company after this.    Is currently taking aripirazole 10 mg. Does not feel that it is less effective than olanzapine but makes her less tired. Denies any side effects associated with aripiprazole.     Discussed possibility of increase aripiprazole dose in an effort to better manage AH. She was agreeable to this.    In previous notes, she had mentioned that she has experienced symptoms of depression since early high school. She states that she was always disinterested in taking medications because she felt she should work though this on her own. Was started on citalopram several months ago but is unsure if it has helped. Does endorse some improved ability to regulate emotions around stressors.    She endorses smoking cannabis a couple of times per week to help with sleep. Describes struggling to fall asleep every night. Discussed my recommendation that she discontinue cannabis use given the impact it can have on symptoms of psychosis with long-term negative effect on cognition. She states that she does not feel that it contributes significantly to ongoing symptoms of psychosis and give her an \"opportunity to use my skills.\"    Discussed increasing dose of hydroxyzine " to 50 mg to help with sleep.    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 She thinks that she would enjoy teaching although she also knows that much of this position would be about ta writing.    PSYCH ROS:  Clinician Rating Form in [facility]  1. Depressed Mood: Rating 0  0 Not reported    1 Very mild occasionally feels sad or  down ; of questionable clinical significance   2 Mild occasionally feels moderately depressed or often feels sad or  down    3 Moderate occasionally feels very depressed or often feels moderately depressed   4 Moderately severe often feels very depressed   5 Severe feels very depressed most of the time   6 Very severe constant extremely painful feelings of depression   U Unable to assess      2. Anxiety/Worry: Ratin  0 Not reported    1 Very mild occasionally feels a little anxious; of questionable clinical significance   2 Mild occasionally feels moderately anxious or often feels a little anxious or worried   3 Moderate occasionally feels very anxious or often feels moderately anxious   4 Moderately severe often feels very anxious or often feels moderately anxious   5 Severe feels very anxious or worried most of the time   6 Very severe patient is continually preoccupied with severe anxiety   U Unable to assess      3. Suicidal Ideation/Behavior: Ratin   0 Not reported    1 Very mild occasional thoughts of dying,  I d be better off dead  or  I wish I were dead    2 Mild frequents thoughts of dying or occasional thoughts of killing self, without plan or method   3 Moderate often thinks of suicide or has though of a specific method   4 Moderately severe has mentally rehearsed a specific method of suicide or has made a suicide attempt with questionable intent to die (e.r. takes aspirins and then tells family)   5 Severe has made preparations for a potentially lethal suicide attempt (e.g acquires a gun and bullets for an attempt)   6 Very severe has made a  suicide attempt with an intent to die   U Unable to assess       4. Elevated/Expansive Mood: Ratin  0 Not at all    1 Very mild questionable; more cheerful than most people in his/her circumstances but of only possible clinical significance   2 Mild brief elevated/expansive mood but only somewhat out of proportion to the circumstances   3 Moderate brief/occasional elevation of mood which is clearly out of proportion to the circumstances   4 Moderately severe sustained/frequent elevation of mood which is clearly out of proportion to the circumstances   5 Severe mood is euphoric most of the time   6 Very severe sustained elevation;  everything is wonderful  almost all of the time   U Unable to assess      5. Hostility/Anger/Irritability/Aggressiveness: Ratin  0 Not at all    1 Very mild occasional irritability of doubtful clinical significance   2 Mild occasionally feels angry or mild or indirect expressions of anger, e.g. sarcasm, disrespect or hostile gestures   3 Moderate frequently feels angry, frequent irritability or occasional direct expression of anger, e.g. yelling at others   4 Moderately severe often feels very angry, often yells at others or occasionally threatens to harm others   5 Severe has acted on his anger by becoming physically abusive on one or two occasions or makes frequent threats to harm others or is very angry most of the time   6 Very severe has been physically aggressive and/or required intervention to prevent assaultiveness on several occasions; or any serious assaultive act   U Unable to assess      6. Impulsive Behavior: Ratin  0 Not at all    1 Very mild one instance of impulsive behavior which is of doubtful clinical significance   2 Mild occasional impulsive acts, e.g. making phone calls at odd hours   3 Moderate occasional impulsive acts with some potential negative consequence, e.g. leaving work abruptly; changing plans without thinking   4 Moderately severe impulsive  acts with definite negative consequences, e.g. overspending on non-essentials; repeated reckless sexual behavior   5 Severe impulsive acts with direct negative consequences, e.g. spends entire income on nonessentials without regard for basic needs   6 Very severe impulsive behavior which is potentially life threatening, e.g. jumps from dangerous height (without suicidal intent) or criminal behavior, e.g. impulsive robbery   U Unable to assess      7. Suspiciousness: Ratin  0 Not present    1 Very mild Seems on guard. Reluctant to respond to some  personal  questions. Reports being overly self-conscious in public   2 Mild Describes incidents in which others have harmed or wanted to harm him/her that sound plausible. Patient feels as if others are watching, laughing, or criticizing him/her in public, but this occurs only occasionally or rarely. Little or no preoccupation   3 Moderate Says others are talking about him/her maliciously, have negative intentions, or may harm him/her. Beyond the likelihood of plausibility, but not delusional. Incidents of suspected persecution occur occasionally (less than once per week) with some preoccupation   4 Moderately severe Same as 3, but incidents occur frequently such as more than once a week. Patient is moderately preoccupied with ideas of persecution OR patient reports persecutory delusions expressed with much doubt (e.g. partial delusion)   5 Severe Delusional -- speaks of Mafia plots, the FBI, or others poisoning his/her food, persecution by supernatural forces   6 Extremely severe Same as 5, but the beliefs are bizarre or more preoccupying. Patient tends to disclose or act on persecutory delusions.   U Unable to assess      8. Unusual Thought Content: Ratin  0 Not present    1 Very mild Ideas of reference (people may stare or may laugh at him), ideas of persecution (people may mistreat him). Unusual beliefs in psychic casiano, spirits, UFOs, or unrealistic beliefs in  one's own abilities. Not strongly held. Some doubt   2 Mild Same as 1, but degree of reality distortion is more severe as indicated by highly unusual ideas or greater conviction. Content may be typical of delusions (even bizarre), but without full conviction. The delusion does not seem to have fully formed, but is considered as one possible explanation for an unusual experience   3 Moderate Delusion present but no preoccupation or functional impairment. May be an encapsulated delusion or a firmly endorsed absurd belief about past delusional circumstances   4 Moderately severe Full delusion(s) present with some preoccupation OR some areas of functioning disrupted by delusional thinking   5 Severe Full delusion(s) present with much preoccupation OR many areas of functioning are disrupted by delusional thinking   6 Extremely severe Full delusions present with almost   U Unable to assess      9. Hallucinations: Ratin  0 Not present    1 Very mild While resting or going to sleep, sees visions, smells odors, or hears voices, sounds or whispers in the absence of external stimulation, but no impairment in functioning   2 Mild While in a clear state of consciousness, hears a voice calling the subject s name, experiences non-verbal auditory hallucinations (e.g., sounds or whispers), formless visual hallucinations, or has sensory experiences in the presence of a modality-relevant stimulus (e.g., visual illusions) infrequently (e.g., 1-2 times per week) and with no functional impairment   3 Moderate Occasional verbal, visual, gustatory, olfactory, or tactile hallucinations with no functional impairment OR non-verbal auditory hallucinations/visual illusions more than infrequently or with impairment   4 Moderately severe Experiences daily hallucinations OR some areas of functioning are disrupted by hallucinations   5 Severe Experiences verbal or visual hallucinations several times a day OR many areas of functioning are  disrupted by these hallucinations   6 Extremely severe Persistent verbal or visual hallucinations throughout the day OR most areas of functioning are disrupted by these hallucinations   U Unable to assess      10. Conceptual Disorganization: Ratin  0 Not present    1 Very mild Peculiar use of words or rambling but speech is comprehensible   2 Mild Speech a bit hard to understand or make sense of due to tangentiality, circumstantiality, or sudden topic shifts   3 Moderate Speech difficult to understand due to tangentiality, circumstantiality, idiosyncratic speech, or topic shifts on many occasions OR 1-2 instances of incoherent phrases   4 Moderately severe Speech difficult to understand due to circumstantiality, tangentiality, neologisms, blocking, or topic shifts most of the time OR 3-5 instances of incoherent phrases   5 Severe Speech is incomprehensible due to severe impairments most of the time. Many PSRS items cannot be rated by self-report alone   6 Extremely severe Speech is incomprehensible throughout interview   U Unable to assess      11. Avolition/Apathy: Ratin  0 Not at all    1 Very mild questionable decrease in time spent in goal-directed activities   2 Mild spends less time in goal-directed activities than is appropriate for situation and age   3 Moderate initiates activities at times but does not follow through   4 Moderately severe rarely initiates activity but will passively engage with encouragement   5 Severe almost never initiates activities; requires assistance to accomplish basic activities   6 Very severe does not initiate or persist in any goal-directed activity even with outside assistance   U Unable to assess      12. Asociality/Low Social Drive: Ratin  0 Not at all    1 Very mild questionable   2 Mild slow to initiate social interactions but usually responds to overtures by others   3 Moderate rarely initiates social interactions; sometimes responds to overtures by others   4  Moderately severe does not initiate but sometimes responds to overtures by others; little social interaction outside close family members   5 Severe never initiates and rarely encourages conversations or activities; avoids being with others unless prodded, may have contacts with family   6 Very severe avoids being with others (even family members) whenever possible, extreme social isolation   U Unable to assess      13. Adherence: Days: 0  The longest, continuous amount of time in days, since the last visit when the subject did not take medication     14. EPS Part I: Ratin  Rate Elbow Rigidity for all subjects  0 Normal   1 Slight stiffness and resistance   2 Moderate stiffness and resistance   3 Marked rigidity with difficulty in passive movement   4 Extreme stiffness and rigidity with almost a frozen joint   U Unable to assess           EPS Part 2: Signs of EPS: 0  Are there are other signs of EPS (eg diminished arm swing, postural instability, cogwheeling, tremor, akinesia) present based upon patient report or exam?  0 No   1 Yes     15. Akathesia: Ratin  0 No restlessness reported or observed   1 Mild restlessness observed; e.g., occasional jiggling of the foot occurs when subject is seated   2 Moderate restlessness observed; e.g., on several occasions, jiggles foot, crosses and uncrosses legs or twists a part of the body   3 Restlessness is frequently observed; e.g., the foot or legs moving most of the time   4 Restlessness persistently observed; e.g., subject cannot sit still, may get up and walk   U Unable to assess     16. Dyskinetic Movement Ratings: Ratin  0 None   1 Minimal, may be extreme normal   2 Mild   3 Moderate   4 Severe   U Unable to assess     SIDE EFFECT ASSESSMENT:  Clinician Rating Form in [facility] - Side Effect Assessment  Address side effects reported by the patient and rate using this scale  0 Not present   1 Minimal, may be extreme normal   2 Mild   3 Moderate   4 Severe   U  Unable to assess   P Present, but not related     Feeling dizzy or faint: 0  Blurred vision: 0  Dry mouth: 3  Too much saliva/droolin  Nausea:  0  Constipation: 0  Increased appetite: 2  Weight gain: 2  Weight loss: 0  Feeling tired/fatigue: 2  Daytime sedation: 1  Hypersomnia: 0  Insomnia: 3  Low libido: 3  Other problems with sex: 0  Breast enlargement or discharge:  0  Irregular Menstruation or amenorrhea: n/a  Other (please list and rate): 0    RECENT SUBSTANCE USE:  Clinician Rating Form in [facility] - Substance Use Assessment  Alcohol Use Severity: Ratin  0 none   1 use without impairment: drinks but no immediate social or medical impairment   2 use with impairment: e.g. becomes grossly intoxicated; alcohol use or withdrawal compromises school, work or social functioning; alcohol use or withdrawal exacerbates symptoms (e.g. gets depressed when drinking)     Marijuana Use Severity: Ratin  0 none   1 occasional use without impairment: e.g. uses marijuana a few days a month and has no immediate social or medical impairment   2 frequent use without impairment: e.g. uses marijuana several or more days a week but has no immediate social or medical impairment   3 use with impairment: e.g. becomes grossly intoxicated; marijuana use compromises school, work or social functioning; marijuana use exacerbates symptoms (e.g. gets paranoid when using)     Other Drug Use Severity: Ratin  0 none   1 occasional use without impairment: e.g. uses drug(s) a few days a month and has no immediate social or medical impairment   2 frequent use without impairment: e.g. uses drug(s) several or more days a week but has no immediate social or medical impairment   3 use with impairment: e.g. becomes grossly intoxicated; drug use compromises school, work or social functioning; drug use exacerbates symptoms (e.g. gets paranoid when using)     RECENT SOCIAL HISTORY:    FINANCIAL SUPPORT- family or friend       CHILDREN- None        LIVING SITUATION- living on campus       LEGAL- None  EARLY HISTORY/ EDUCATION- provided support reading since 6th grade   SOCIAL/ SPIRITUAL SUPPORT- support groups; family and friends        CULTURAL INFLUENCES/ IMPACT- transgender MTF       TRAUMA HISTORY (self-report)- none  FEELS SAFE AT HOME- Yes  FAMILY HISTORY-  Sister has been dx with BPAD, possibly mom?      MEDICAL ROS:    GENERAL: Negative for malaise, significant weight loss and fever  HEENT: No changes in hearing or vision, no nose bleeds or other nasal problems and Positive for nasal congestion  NECK: Negative for lumps, goiter, pain and significant neck swelling  RESPIRATORY: Negative for cough, wheezing and shortness of breath  CARDIOVASCULAR: Negative for chest pain, leg swelling and palpitations  GI: Negative for abdominal discomfort, blood in stools or black stools and change in bowel habits  : Negative for dysuria, frequency and incontinence  MUSCULOSKELETAL: Negative for joint pain or swelling, back pain, and muscle pain.  SKIN: Negative for lesions, rash, and itching.  PSYCH: See HPI  HEMATOLOGY/LYMPHOLOGY Positive for easy bruising and bleeding  ENDOCRINE: Negative for cold or heat intolerance, polyuria, polydipsia and goiter.  NEURO:  negative, tension headaches, syncope and seizures           First Episode of Psychosis History      DUP (duration untreated psychosis):  unknown  Route to initial care: hospitalization  Medication adherence overall:  See above, Clinician Rating Form in [facility] Item 13  General frequency of visits:  monthly  Participation in groups:  No  Cognitive Remediation:  No  Other treatment history: see HPI    Reviewed for completion of First Episode work-up:  Yes  First episode workup:  Done (if completed, see LABS for results)  MATRICS Consensus Cognitive Battery:  Not Done (if completed, see LABS for results)         Medical/Surgical History     Banana    Patient Active Problem List   Diagnosis     Blood clot in  vein     Psychiatric disorder     Gender dysphoria in adult     Acute psychosis (H)            Medications     Current Outpatient Medications   Medication Sig Dispense Refill     ARIPiprazole (ABILIFY) 10 MG tablet Take 10 mg with 2 mg for total daily dose of 12 mg 30 tablet 3     ARIPiprazole (ABILIFY) 2 MG tablet Take 2mg in addition to 10 mg tab for total daily dose of 12 mg 30 tablet 3     citalopram (CELEXA) 10 MG tablet Take 1 tablet (10 mg) by mouth daily 30 tablet 3     hydrOXYzine (ATARAX) 25 MG tablet Take 2 tablets (50 mg) by mouth every 4 hours as needed for anxiety 120 tablet 0     estradiol (VIVELLE-DOT) 0.1 MG/24HR bi-weekly patch Place 2 patches onto the skin twice a week 16 patch 2     Melatonin 10 MG TABS tablet Take 10 mg by mouth nightly as needed for sleep       progesterone (PROMETRIUM) 200 MG capsule Take 1 capsule (200 mg) by mouth daily 30 capsule 2     spironolactone (ALDACTONE) 100 MG tablet Take 1 tablet (100 mg) by mouth daily 30 tablet 2             Vitals     /73   Pulse 73   Temp 99  F (37.2  C)   Wt 82.5 kg (181 lb 12.8 oz)   BMI 26.85 kg/m        Weight prior to medication: 167 lbs         Mental Status Exam     Alertness: alert  and oriented  Appearance: well groomed  Behavior/Demeanor: cooperative, pleasant and calm, with good  eye contact   Speech: normal and regular rate and rhythm  Language: intact and no problems  Psychomotor: normal or unremarkable  Mood: description consistent with euthymia  Affect: full range and appropriate; was congruent to mood; was congruent to content  Thought Process/Associations: unremarkable  Thought Content:  Reports delusions [details in Interim History];  Denies suicidal and violent ideation  Perception:  Reports auditory hallucinations;  Denies visual hallucinations  Insight: good  Judgment: good  Cognition: (6) oriented: time, person, and place  attention span: intact  concentration: intact  recent memory: intact  remote memory:  intact  fund of knowledge: appropriate  Gait and Station: unremarkable         Labs and Data     RATING SCALES:  PHQ9    PHQ9 TODAY = 2  PHQ-9 SCORE 1/3/2019 1/21/2019 1/29/2019   PHQ-9 Total Score 6 3 1       ANTIPSYCHOTIC LABS ROUTINE    [glu, A1C, lipids (focus LDL), liver enzymes, WBC, ANEU, Hgb, plts]   q12 mo  Recent Labs   Lab Test 12/20/18  1620 10/25/18  0903  10/17/17  1634   .6* 91.9   < > 93.4   A1C  --   --   --  5.3    < > = values in this interval not displayed.     Recent Labs   Lab Test 12/20/18  1620 10/25/18  0903   CHOL 132.2 143.6   TRIG 58.6 67.1   LDL 63 79   HDL 57.3 51.0     Recent Labs   Lab Test 12/20/18  1620 10/25/18  0903   AST 13.5 8.5   ALT 10.2 12.2   ALKPHOS 40.2 43.4     Recent Labs   Lab Test 12/20/18  1620 10/25/18  0903 10/01/18  1102  12/08/17  0647   WBC  --   --  6.7  --  9.3   ANEU  --   --  3.8  --  3.5   HGB 11.9* 13.5 12.6*   < > 13.8   PLT  --   --  237  --  188    < > = values in this interval not displayed.       Diagnosis and Assessment                                                                             m2, h3       Today the following issues were addressed:  1) Schizophrenia  2) Rule-out Schizoaffective disorder  3) Cannabis use disorder, moderte  4) Catatonia, by history  5) Gender dysphoria, by history      Assessment:  Patient reports ongoing good management of symptoms of psychosis. She endorses ongoing AH as well as delusional thougths, however, is able to maintain reality testing and use of skills to manage these symptoms. Because pt experienced a period of time during which AH were absent, discussed increasing dose of aripiprazole to 12 mg daily in an effort to regain better control of AH. Risk and side effects were discussed and she agreed that putative benefit outweighed risk. At last visit she also endorsed increase in depressive symptoms and was started on citalopram to manage this. Today she reports stability of mood with some improvement  in ability to handle regular stressors.    She endorses ongoing regular cannabis use to assist with sleep. Discussed increasing hydroxyzine as well as decreased use of computer before bed. She was agreeable to increase in hydroxyzine. Will continue to discuss ongoing long-term risks associated with cannabis use including worsening of psychosis symptoms as well as untoward effects on cognition.           Plan     1) PSYCHOTROPIC MEDICATIONS:  - Increase aripiprazole to 12 mg daily  - Increase hydroxyzine to 50 mg daily  - Continue citalopram 10 mg daily    2) THERAPY:  Continue    3) NEXT DUE:    Labs- 12/2019  Rating Scales- see above    4) REFERRALS:    No Referrals needed    5) RTC: 1 month    6) CRISIS NUMBERS:   Provided routinely in AVS.  Especially emphasized:  [facility] [employer] Mobile Team for Adults [[phone]];  Child [[phone]]      TREATMENT RISK STATEMENT:  The risks, benefits, alternatives and potential adverse effects have been discussed and are understood by the pt. The pt understands the risks of using street drugs or alcohol. There are no medical contraindications, the pt agrees to treatment with the ability to do so. The pt knows to call the clinic for any problems or to access emergency care if needed.  Medical and substance use concerns are documented above.  Psychotropic drug interaction check was done, including changes made today.    MANAGEMENT:  Monitoring for adverse effects, routine vitals, routine labs and patient is aware of risks    PROVIDER:  MD RICH [facility] Prescription Monitoring Program [] review was not needed today.    PSYCHOTROPIC DRUG INTERACTIONS: none clinically relevant       Psychiatry Clinic Individual Psychotherapy Note                                                                     [16]     Start time: 9:45am        End time: 10:30am  Date last reviewed: 02/28/19       Date next due: 05/28/19     Subjective: This supportive psychotherapy  session addressed issues related to orientation to therapy, goals of therapy, and current stressors consisting of current symptoms, school.  Patient's reaction: Maintenance in the context of mental status appropriate for ambulatory setting.  Progress: good  Plan: RTC 1 month  Psychotherapy services during this visit included myself and the patient.   Treatment Plan      SYMPTOMS; PROBLEMS   MEASURABLE GOALS;    FUNCTIONAL IMPROVEMENT INTERVENTIONS;   GAINS MADE DISCHARGE CRITERIA   Psychosis: delusions-   [details in Interim History]   use skills to put thoughts in perspective increase coping skills marked symptom improvement   Psychosis: auditory hallucinations   take medications as prescribed on a daily basis medications  symptom resolution     PROVIDER:  Radha Nolasco MD

## 2019-03-01 ENCOUNTER — TELEPHONE (OUTPATIENT)
Dept: PSYCHIATRY | Facility: CLINIC | Age: 24
End: 2019-03-01

## 2019-03-01 ASSESSMENT — PATIENT HEALTH QUESTIONNAIRE - PHQ9: SUM OF ALL RESPONSES TO PHQ QUESTIONS 1-9: 2

## 2019-03-01 NOTE — TELEPHONE ENCOUNTER
NAVIGATE Family Peer Support  A Part of the [facility] First Episode of Psychosis Program     Patient Name: Janey Givens  /Age:  1995 (23 year old)  Date of Encounter: 3/1/2019  Length of Contact: 25 minutes    Reached out to offer resources and support to mother. Katei doing well in school.  Billie is concerned about Katie's relationships with siblings. Indicated communication is awkward between them; not sure how to handle the situation.  Will reach out to this writer as needed.    NATY HaasATE Family Peer    [Billing Code 06027 for No Billable Service as family peer support is a nonbillable service]

## 2019-03-14 ENCOUNTER — BEH TREATMENT PLAN (OUTPATIENT)
Dept: PSYCHIATRY | Facility: CLINIC | Age: 24
End: 2019-03-14

## 2019-03-14 ENCOUNTER — OFFICE VISIT (OUTPATIENT)
Dept: PSYCHIATRY | Facility: CLINIC | Age: 24
End: 2019-03-14
Payer: COMMERCIAL

## 2019-03-14 DIAGNOSIS — F20.9 SCHIZOPHRENIA, UNSPECIFIED TYPE (H): Primary | ICD-10-CM

## 2019-03-14 ASSESSMENT — ANXIETY QUESTIONNAIRES
1. FEELING NERVOUS, ANXIOUS, OR ON EDGE: NOT AT ALL
5. BEING SO RESTLESS THAT IT IS HARD TO SIT STILL: NOT AT ALL
2. NOT BEING ABLE TO STOP OR CONTROL WORRYING: NOT AT ALL
7. FEELING AFRAID AS IF SOMETHING AWFUL MIGHT HAPPEN: NOT AT ALL
6. BECOMING EASILY ANNOYED OR IRRITABLE: NOT AT ALL
GAD7 TOTAL SCORE: 0
3. WORRYING TOO MUCH ABOUT DIFFERENT THINGS: NOT AT ALL

## 2019-03-14 ASSESSMENT — PATIENT HEALTH QUESTIONNAIRE - PHQ9
5. POOR APPETITE OR OVEREATING: NOT AT ALL
SUM OF ALL RESPONSES TO PHQ QUESTIONS 1-9: 5

## 2019-03-14 NOTE — PROGRESS NOTES
NAVIGSATINDER Clinician Contact & Progress Note  For Individual Resiliency Training (IRT)  A Part of the [facility] First Episode of Psychosis Program    NAVIGATE Enrollee: Janey Givens (1995)     MRN: [id number]  Date:  3/14/19  Diagnosis: Schizophrenia, unspecified (F20.9)  Clinician: CARMEN Individual Resiliency Trainer, VERENICE See     1. Type of contact: (majority of time spent)  IRT Session    2. People present:   Writer  Client: Janey Givens    3. Total number of persons who participated in contact: 2, including writer    4. Length of Actual Contact: Start Time: 10:00; End Time: 11:00   Traveled?    No     5. Location of contact:  Psychiatry Clinic, [place]    6. Did the client complete the home practice option(s) from the previous session: Partially Completed    7. Motivational Teaching Strategies:  Connect info and skills with personal goals  Promote hope and positive expectations  Explore pros and cons of change  Re-frame experiences in positive light    8. Educational Teaching Strategies:  Review of written material/education  Relate information to client's experience  Ask questions to check comprehension  Break down information into small chunks    9. CBT Teaching Strategies:  Reinforcement and shaping (positive feedback for steps towards goals, gains in knowledge & skills and follow-through on home assignments)  Coping skills training (review current coping skills, increase currently used skills and plan home practice)  Relaxation training (model relaxation technique and plan home practice)    10. IRT Module(s) Addressed:  Module 5 - Processing the Psychotic Episode  Module 8 - Dealing with Negative Feelings  Module 9 - Coping with Symptoms    11. Techniques utilized:   Timblin announced at beginning of session  Review of homework  Review of previous meeting  Present new material  Problem-solving practice  Help client choose a home practice option  Summarize progress made in current  session    12. Mental Status Exam:    Alertness: alert  and oriented  Appearance: casually groomed  Behavior/Demeanor: cooperative and pleasant, with good  eye contact   Speech: normal and regular rate and rhythm  Language: intact. Preferred language identified as English.  Psychomotor: fidgety  Mood: depressed  Affect: restricted; was congruent to mood; was congruent to content  Thought Process/Associations: unremarkable  Thought Content:  Reports delusions and preoccupations;  Denies suicidal and violent ideation  Perception:  Reports auditory hallucinations;  Denies visual hallucinations  Insight: fair  Judgment: fair  Cognition: does  appear grossly intact; formal cognitive testing was not done  Suicidal ideation: denies SI, denies intent,  and denies plan  Homicidal Ideation: denies    13. Assessment/Progress Note:     This writer met with Katie for a follow-up IRT session. Katie reported auditory hallucinations daily, but finds them to be manageable. Katie stated she has been sleeping well, but has been using Nyquil and marijuana to assist in falling asleep. Katie mentioned she has attempted to decrease use of Nyquil, but had a difficult time finding an effective stress management strategy. This writer brainstormed potential techniques, with relaxation as a focus (deep breathing, PMR, listening to music, sleep hygiene). Katie has been decreasing her marijuana use gradually, in order to improve focus in school. Katie stated she missed a day of class yesterday and expressed feelings of guilt. She was able to identify the marijuana may be limiting her motivation in academics. Katie then began discussing how delusional thinking has impacted college performance. She began processing her episode, beginning with a previous hospitalization. This writer helped process her feelings of embarrassment, anger, and sadness regarding these thoughts. She recalled feeling as though her professors were participating in a society and were  "communicating to Katie via telepathy and thought broadcasting. She expressed confusion over a specific message she received while in the hospital and is consistently reality testing this. She expressed feelings of shame and disappointment, as she is not in this society currently. This writer encouraged Katie to continue to monitor stress levels and utilize her support network when encountering negative thoughts/emotions. This writer highly encouraged Katie to make a follow-up IRT appointment in 2 weeks, to continue processing through the episode.     14. Plan/Referrals:     This writer will continue to assist Katie in processing through her psychosis episode during the next session.     This writer will continue to consult with the team.    Billing for \"Interactive Complexity\"?    No      VERENICE See    NAVIGATE Individual Resiliency Trainer    Attestation:    I did not see this patient directly. This patient is discussed with me in individual clinical social work supervision, and I agree with the plan as documented.     MARCELL Morales, [facility], April 8, 2019      "

## 2019-03-15 ASSESSMENT — ANXIETY QUESTIONNAIRES: GAD7 TOTAL SCORE: 0

## 2019-03-20 NOTE — PROGRESS NOTES
NAVIGATE SEE Progress Note   For Supported Employment & Education    NAVIGATE Enrollee: Janey Givens (1995)     MRN: [id number]  Date:  3/14/19  Clinician: CARMEN Supported Employment & , Ame Borrero    1. Client Status Update:   Janey Givens is interested in education (Client continuing a class or school program)    2. People present:   SEE/Writer  Client: Janey Givens      3. Total number of persons who participated in contact: (do not count yourself/SEE) 1    4. Length of Actual Contact: 20 minutes   Traveled? No    5. Location of contact:  Psychiatry Clinic, [place]    6. Brief description of session, contact, or client status (include: strategies, interventions, client reaction to contact, next steps, etc)     and Katie met for a follow up SEE session. Katie reports that her recently obtained accommodations in school have been very helpful. She is utilizing a separate exam room and extended test taking time. She had 3 midterms in the last week and said that she felt confident that she did well. She reports getting all As and Bs this semester. Writer offered to check in periodically on her progress in class and to check on her accommodations.     7. Completion of mutually agreed upon client task from previous meeting:  Completed    8. Orientation and Treatment Planning:  Pursuing current SEE goals    9. Assessment:  Assessing client's need for follow-along supports    10. Placement:  Not Applicable    11. Follow Along Supports: (for clients who are working or attending school)   Education (Developing or using coping strategies for cognitive difficulties for school)    12. Mutually agreed upon client task for next meeting:     na    13. Next Meeting Scheduled for: sweetie MORALES Supported Employment &

## 2019-04-02 ENCOUNTER — OFFICE VISIT (OUTPATIENT)
Dept: PSYCHIATRY | Facility: CLINIC | Age: 24
End: 2019-04-02
Payer: COMMERCIAL

## 2019-04-02 VITALS
TEMPERATURE: 98.9 F | WEIGHT: 182 LBS | DIASTOLIC BLOOD PRESSURE: 76 MMHG | SYSTOLIC BLOOD PRESSURE: 113 MMHG | HEART RATE: 83 BPM | BODY MASS INDEX: 26.88 KG/M2

## 2019-04-02 DIAGNOSIS — F20.9 SCHIZOPHRENIA, UNSPECIFIED TYPE (H): Primary | ICD-10-CM

## 2019-04-02 RX ORDER — ZOLPIDEM TARTRATE 5 MG/1
5 TABLET ORAL
Qty: 30 TABLET | Refills: 0 | Status: SHIPPED | OUTPATIENT
Start: 2019-04-02 | End: 2019-05-07

## 2019-04-02 ASSESSMENT — PAIN SCALES - GENERAL: PAINLEVEL: NO PAIN (0)

## 2019-04-02 NOTE — PROGRESS NOTES
"NAVIGATE Medication Management Progress Note  A Part of the [facility] First Episode of Psychosis Program    NAVIGATE Enrollee: Janey Givens (1995)     MRN: [id number]  Date:  4/02/19     Janey Givens is a 23 year old adult who prefers the name Katie and pronoun she, her.  Therapist(s)/Other Providers: Nia Fernandez for [facility] Cerda for individual resiliency training, Nia Fernandez for family therapy, and Ame Borrero for employment support  PCP: Ariana Andrea    Psych critical item history includes catatonia, psychosis [sxs include delusions, disorganized thinking], psych hosp (<3) and SUBSTANCE USE: cannabis, LSD.          Contributors to the Assessment     Chart Reviewed.   Interview completed with Janey Givens.  Collateral information obtained from EHR.         Chief Complaint      psychosis         Interim History      Janey Givens is a 24 year old adult who was last seen in clinic on 2/28/2019 by Leidy Guzman at which time citalopram 10 mg daily was started. The patient reports good treatment adherence.  History was provided by the patient who was a good historian.    Since the last visit:    Pt reports that she is doing well since she was last seen.    At last visit, we increased her dose of aripiprazole, however, she does not note any difference in voices since she was last seen.    Continues to hear voices ~2-3 times per week. They are quite brief insofar as they are only a sentence or so. Mostly they either call her a \"shitty person\" or agree with someone else who has called her a shitty person.    She continues to smoke cannabis on a daily basis.    Discussed possibility of reducing aripiprazole dose back to 10 mg such that she doesn't gain more weight and consider cross-titration to brexiprazole over the summer which may have less risk for weight gain.    Having a hard time getting out of bed in the morning although also having difficulty falling asleep which is why she smokes cannabis " nightly. Discussed starting another medication in lieu of cannabis given this provider's concern for psychotogenic nature of this substance in vulnerable individuals such as the pt. She was agreeable to starting zolpidem instead of cannabis as it should shorten sleep latency but without morning somnelence given it's short half life. Discussed risks for use including impulsive decision making (I.e., excessive shopping, sleep eating) after dosing. Encouraged her to contact me should this happen.      PSYCH ROS:  Clinician Rating Form in [facility]  1. Depressed Mood: Rating 1  0 Not reported    1 Very mild occasionally feels sad or  down ; of questionable clinical significance   2 Mild occasionally feels moderately depressed or often feels sad or  down    3 Moderate occasionally feels very depressed or often feels moderately depressed   4 Moderately severe often feels very depressed   5 Severe feels very depressed most of the time   6 Very severe constant extremely painful feelings of depression   U Unable to assess      2. Anxiety/Worry: Ratin  0 Not reported    1 Very mild occasionally feels a little anxious; of questionable clinical significance   2 Mild occasionally feels moderately anxious or often feels a little anxious or worried   3 Moderate occasionally feels very anxious or often feels moderately anxious   4 Moderately severe often feels very anxious or often feels moderately anxious   5 Severe feels very anxious or worried most of the time   6 Very severe patient is continually preoccupied with severe anxiety   U Unable to assess      3. Suicidal Ideation/Behavior: Ratin   0 Not reported    1 Very mild occasional thoughts of dying,  I d be better off dead  or  I wish I were dead    2 Mild frequents thoughts of dying or occasional thoughts of killing self, without plan or method   3 Moderate often thinks of suicide or has though of a specific method   4 Moderately severe has mentally rehearsed a  specific method of suicide or has made a suicide attempt with questionable intent to die (e.r. takes aspirins and then tells family)   5 Severe has made preparations for a potentially lethal suicide attempt (e.g acquires a gun and bullets for an attempt)   6 Very severe has made a suicide attempt with an intent to die   U Unable to assess       4. Elevated/Expansive Mood: Ratin  0 Not at all    1 Very mild questionable; more cheerful than most people in his/her circumstances but of only possible clinical significance   2 Mild brief elevated/expansive mood but only somewhat out of proportion to the circumstances   3 Moderate brief/occasional elevation of mood which is clearly out of proportion to the circumstances   4 Moderately severe sustained/frequent elevation of mood which is clearly out of proportion to the circumstances   5 Severe mood is euphoric most of the time   6 Very severe sustained elevation;  everything is wonderful  almost all of the time   U Unable to assess      5. Hostility/Anger/Irritability/Aggressiveness: Ratin  0 Not at all    1 Very mild occasional irritability of doubtful clinical significance   2 Mild occasionally feels angry or mild or indirect expressions of anger, e.g. sarcasm, disrespect or hostile gestures   3 Moderate frequently feels angry, frequent irritability or occasional direct expression of anger, e.g. yelling at others   4 Moderately severe often feels very angry, often yells at others or occasionally threatens to harm others   5 Severe has acted on his anger by becoming physically abusive on one or two occasions or makes frequent threats to harm others or is very angry most of the time   6 Very severe has been physically aggressive and/or required intervention to prevent assaultiveness on several occasions; or any serious assaultive act   U Unable to assess      6. Impulsive Behavior: Ratin  0 Not at all    1 Very mild one instance of impulsive behavior which is  of doubtful clinical significance   2 Mild occasional impulsive acts, e.g. making phone calls at odd hours   3 Moderate occasional impulsive acts with some potential negative consequence, e.g. leaving work abruptly; changing plans without thinking   4 Moderately severe impulsive acts with definite negative consequences, e.g. overspending on non-essentials; repeated reckless sexual behavior   5 Severe impulsive acts with direct negative consequences, e.g. spends entire income on nonessentials without regard for basic needs   6 Very severe impulsive behavior which is potentially life threatening, e.g. jumps from dangerous height (without suicidal intent) or criminal behavior, e.g. impulsive robbery   U Unable to assess      7. Suspiciousness: Ratin  0 Not present    1 Very mild Seems on guard. Reluctant to respond to some  personal  questions. Reports being overly self-conscious in public   2 Mild Describes incidents in which others have harmed or wanted to harm him/her that sound plausible. Patient feels as if others are watching, laughing, or criticizing him/her in public, but this occurs only occasionally or rarely. Little or no preoccupation   3 Moderate Says others are talking about him/her maliciously, have negative intentions, or may harm him/her. Beyond the likelihood of plausibility, but not delusional. Incidents of suspected persecution occur occasionally (less than once per week) with some preoccupation   4 Moderately severe Same as 3, but incidents occur frequently such as more than once a week. Patient is moderately preoccupied with ideas of persecution OR patient reports persecutory delusions expressed with much doubt (e.g. partial delusion)   5 Severe Delusional -- speaks of Mafia plots, the FBI, or others poisoning his/her food, persecution by supernatural forces   6 Extremely severe Same as 5, but the beliefs are bizarre or more preoccupying. Patient tends to disclose or act on persecutory  delusions.   U Unable to assess      8. Unusual Thought Content: Ratin  0 Not present    1 Very mild Ideas of reference (people may stare or may laugh at him), ideas of persecution (people may mistreat him). Unusual beliefs in psychic casiano, spirits, UFOs, or unrealistic beliefs in one's own abilities. Not strongly held. Some doubt   2 Mild Same as 1, but degree of reality distortion is more severe as indicated by highly unusual ideas or greater conviction. Content may be typical of delusions (even bizarre), but without full conviction. The delusion does not seem to have fully formed, but is considered as one possible explanation for an unusual experience   3 Moderate Delusion present but no preoccupation or functional impairment. May be an encapsulated delusion or a firmly endorsed absurd belief about past delusional circumstances   4 Moderately severe Full delusion(s) present with some preoccupation OR some areas of functioning disrupted by delusional thinking   5 Severe Full delusion(s) present with much preoccupation OR many areas of functioning are disrupted by delusional thinking   6 Extremely severe Full delusions present with almost   U Unable to assess      9. Hallucinations: Ratin  0 Not present    1 Very mild While resting or going to sleep, sees visions, smells odors, or hears voices, sounds or whispers in the absence of external stimulation, but no impairment in functioning   2 Mild While in a clear state of consciousness, hears a voice calling the subject s name, experiences non-verbal auditory hallucinations (e.g., sounds or whispers), formless visual hallucinations, or has sensory experiences in the presence of a modality-relevant stimulus (e.g., visual illusions) infrequently (e.g., 1-2 times per week) and with no functional impairment   3 Moderate Occasional verbal, visual, gustatory, olfactory, or tactile hallucinations with no functional impairment OR non-verbal auditory  hallucinations/visual illusions more than infrequently or with impairment   4 Moderately severe Experiences daily hallucinations OR some areas of functioning are disrupted by hallucinations   5 Severe Experiences verbal or visual hallucinations several times a day OR many areas of functioning are disrupted by these hallucinations   6 Extremely severe Persistent verbal or visual hallucinations throughout the day OR most areas of functioning are disrupted by these hallucinations   U Unable to assess      10. Conceptual Disorganization: Ratin  0 Not present    1 Very mild Peculiar use of words or rambling but speech is comprehensible   2 Mild Speech a bit hard to understand or make sense of due to tangentiality, circumstantiality, or sudden topic shifts   3 Moderate Speech difficult to understand due to tangentiality, circumstantiality, idiosyncratic speech, or topic shifts on many occasions OR 1-2 instances of incoherent phrases   4 Moderately severe Speech difficult to understand due to circumstantiality, tangentiality, neologisms, blocking, or topic shifts most of the time OR 3-5 instances of incoherent phrases   5 Severe Speech is incomprehensible due to severe impairments most of the time. Many PSRS items cannot be rated by self-report alone   6 Extremely severe Speech is incomprehensible throughout interview   U Unable to assess      11. Avolition/Apathy: Ratin  0 Not at all    1 Very mild questionable decrease in time spent in goal-directed activities   2 Mild spends less time in goal-directed activities than is appropriate for situation and age   3 Moderate initiates activities at times but does not follow through   4 Moderately severe rarely initiates activity but will passively engage with encouragement   5 Severe almost never initiates activities; requires assistance to accomplish basic activities   6 Very severe does not initiate or persist in any goal-directed activity even with outside assistance    U Unable to assess      12. Asociality/Low Social Drive: Ratin  0 Not at all    1 Very mild questionable   2 Mild slow to initiate social interactions but usually responds to overtures by others   3 Moderate rarely initiates social interactions; sometimes responds to overtures by others   4 Moderately severe does not initiate but sometimes responds to overtures by others; little social interaction outside close family members   5 Severe never initiates and rarely encourages conversations or activities; avoids being with others unless prodded, may have contacts with family   6 Very severe avoids being with others (even family members) whenever possible, extreme social isolation   U Unable to assess      13. Adherence: Days: 0  The longest, continuous amount of time in days, since the last visit when the subject did not take medication     14. EPS Part I: Ratin  Rate Elbow Rigidity for all subjects  0 Normal   1 Slight stiffness and resistance   2 Moderate stiffness and resistance   3 Marked rigidity with difficulty in passive movement   4 Extreme stiffness and rigidity with almost a frozen joint   U Unable to assess           EPS Part 2: Signs of EPS: 0  Are there are other signs of EPS (eg diminished arm swing, postural instability, cogwheeling, tremor, akinesia) present based upon patient report or exam?  0 No   1 Yes     15. Akathesia: Ratin  0 No restlessness reported or observed   1 Mild restlessness observed; e.g., occasional jiggling of the foot occurs when subject is seated   2 Moderate restlessness observed; e.g., on several occasions, jiggles foot, crosses and uncrosses legs or twists a part of the body   3 Restlessness is frequently observed; e.g., the foot or legs moving most of the time   4 Restlessness persistently observed; e.g., subject cannot sit still, may get up and walk   U Unable to assess     16. Dyskinetic Movement Ratings: Ratin  0 None   1 Minimal, may be extreme normal   2  Mild   3 Moderate   4 Severe   U Unable to assess     SIDE EFFECT ASSESSMENT:  Clinician Rating Form in [facility] - Side Effect Assessment  Address side effects reported by the patient and rate using this scale  0 Not present   1 Minimal, may be extreme normal   2 Mild   3 Moderate   4 Severe   U Unable to assess   P Present, but not related     Feeling dizzy or faint: 0  Blurred vision: 0  Dry mouth: 3  Too much saliva/droolin  Nausea:  0  Constipation: 0  Increased appetite: 2  Weight gain: 2  Weight loss: 0  Feeling tired/fatigue: 2  Daytime sedation: 1  Hypersomnia: 0  Insomnia: 3  Low libido: 3  Other problems with sex: 0  Breast enlargement or discharge:  0  Irregular Menstruation or amenorrhea: n/a  Other (please list and rate): 0    RECENT SUBSTANCE USE:  Clinician Rating Form in [facility] - Substance Use Assessment  Alcohol Use Severity: Ratin  0 none   1 use without impairment: drinks but no immediate social or medical impairment   2 use with impairment: e.g. becomes grossly intoxicated; alcohol use or withdrawal compromises school, work or social functioning; alcohol use or withdrawal exacerbates symptoms (e.g. gets depressed when drinking)     Marijuana Use Severity: Ratin  0 none   1 occasional use without impairment: e.g. uses marijuana a few days a month and has no immediate social or medical impairment   2 frequent use without impairment: e.g. uses marijuana several or more days a week but has no immediate social or medical impairment   3 use with impairment: e.g. becomes grossly intoxicated; marijuana use compromises school, work or social functioning; marijuana use exacerbates symptoms (e.g. gets paranoid when using)     Other Drug Use Severity: Ratin  0 none   1 occasional use without impairment: e.g. uses drug(s) a few days a month and has no immediate social or medical impairment   2 frequent use without impairment: e.g. uses drug(s) several or more days a week but has no immediate  social or medical impairment   3 use with impairment: e.g. becomes grossly intoxicated; drug use compromises school, work or social functioning; drug use exacerbates symptoms (e.g. gets paranoid when using)     RECENT SOCIAL HISTORY:    FINANCIAL SUPPORT- family or friend       CHILDREN- None       LIVING SITUATION- living on campus       LEGAL- None  EARLY HISTORY/ EDUCATION- provided support reading since 6th grade   SOCIAL/ SPIRITUAL SUPPORT- support groups; family and friends        CULTURAL INFLUENCES/ IMPACT- transgender MTF       TRAUMA HISTORY (self-report)- none  FEELS SAFE AT HOME- Yes  FAMILY HISTORY-  Sister has been dx with BPAD, possibly mom?      MEDICAL ROS:    GENERAL: Negative for malaise, significant weight loss and fever  HEENT: No changes in hearing or vision, no nose bleeds or other nasal problems and Positive for nasal congestion  NECK: Negative for lumps, goiter, pain and significant neck swelling  RESPIRATORY: Negative for cough, wheezing and shortness of breath  CARDIOVASCULAR: Negative for chest pain, leg swelling and palpitations  GI: Negative for abdominal discomfort, blood in stools or black stools and change in bowel habits  : Negative for dysuria, frequency and incontinence  MUSCULOSKELETAL: Negative for joint pain or swelling, back pain, and muscle pain.  SKIN: Negative for lesions, rash, and itching.  PSYCH: See HPI  HEMATOLOGY/LYMPHOLOGY Positive for easy bruising and bleeding  ENDOCRINE: Negative for cold or heat intolerance, polyuria, polydipsia and goiter.  NEURO:  negative, tension headaches, syncope and seizures           First Episode of Psychosis History      DUP (duration untreated psychosis):  unknown  Route to initial care: hospitalization  Medication adherence overall:  See above, Clinician Rating Form in [facility]PASS Item 13  General frequency of visits:  monthly  Participation in groups:  No  Cognitive Remediation:  No  Other treatment history: see HPI    Reviewed for  completion of First Episode work-up:  Yes  First episode workup:  Done (if completed, see LABS for results)  MATRICS Consensus Cognitive Battery:  Not Done (if completed, see LABS for results)         Medical/Surgical History     Banana    Patient Active Problem List   Diagnosis     Blood clot in vein     Psychiatric disorder     Gender dysphoria in adult     Acute psychosis (H)            Medications     Current Outpatient Medications   Medication Sig Dispense Refill     ARIPiprazole (ABILIFY) 10 MG tablet Take 10 mg with 2 mg for total daily dose of 12 mg 30 tablet 3     ARIPiprazole (ABILIFY) 2 MG tablet Take 2mg in addition to 10 mg tab for total daily dose of 12 mg 30 tablet 3     citalopram (CELEXA) 10 MG tablet Take 1 tablet (10 mg) by mouth daily 30 tablet 3     estradiol (VIVELLE-DOT) 0.1 MG/24HR bi-weekly patch Place 2 patches onto the skin twice a week 16 patch 2     hydrOXYzine (ATARAX) 25 MG tablet Take 2 tablets (50 mg) by mouth every 4 hours as needed for anxiety 120 tablet 0     Melatonin 10 MG TABS tablet Take 10 mg by mouth nightly as needed for sleep       progesterone (PROMETRIUM) 200 MG capsule Take 1 capsule (200 mg) by mouth daily 30 capsule 0     spironolactone (ALDACTONE) 100 MG tablet Take 1 tablet (100 mg) by mouth daily 30 tablet 2             Vitals     /76 (BP Location: Left arm, Patient Position: Chair, Cuff Size: Adult Regular)   Pulse 83   Temp 98.9  F (37.2  C)   Wt 82.6 kg (182 lb)   BMI 26.88 kg/m        Weight prior to medication: 167 lbs         Mental Status Exam     Alertness: alert  and oriented  Appearance: well groomed  Behavior/Demeanor: cooperative, pleasant and calm, with good  eye contact   Speech: normal and regular rate and rhythm  Language: intact and no problems  Psychomotor: normal or unremarkable  Mood: description consistent with euthymia  Affect: full range and appropriate; was congruent to mood; was congruent to content  Thought Process/Associations:  unremarkable  Thought Content:  Reports delusions [details in Interim History];  Denies suicidal and violent ideation  Perception:  Reports auditory hallucinations;  Denies visual hallucinations  Insight: good  Judgment: good  Cognition: (6) oriented: time, person, and place  attention span: intact  concentration: intact  recent memory: intact  remote memory: intact  fund of knowledge: appropriate  Gait and Station: unremarkable         Labs and Data     RATING SCALES:  PHQ9    PHQ9 TODAY = 2  PHQ-9 SCORE 1/29/2019 3/1/2019 3/14/2019   PHQ-9 Total Score 1 2 5       ANTIPSYCHOTIC LABS ROUTINE    [glu, A1C, lipids (focus LDL), liver enzymes, WBC, ANEU, Hgb, plts]   q12 mo  Recent Labs   Lab Test 12/20/18  1620 10/25/18  0903  10/17/17  1634   .6* 91.9   < > 93.4   A1C  --   --   --  5.3    < > = values in this interval not displayed.     Recent Labs   Lab Test 12/20/18  1620 10/25/18  0903   CHOL 132.2 143.6   TRIG 58.6 67.1   LDL 63 79   HDL 57.3 51.0     Recent Labs   Lab Test 12/20/18  1620 10/25/18  0903   AST 13.5 8.5   ALT 10.2 12.2   ALKPHOS 40.2 43.4     Recent Labs   Lab Test 12/20/18  1620 10/25/18  0903 10/01/18  1102  12/08/17  0647   WBC  --   --  6.7  --  9.3   ANEU  --   --  3.8  --  3.5   HGB 11.9* 13.5 12.6*   < > 13.8   PLT  --   --  237  --  188    < > = values in this interval not displayed.       Diagnosis and Assessment                                                                             m2, h3       Today the following issues were addressed:  1) Schizophrenia  2) Rule-out Schizoaffective disorder  3) Cannabis use disorder, moderte  4) Catatonia, by history  5) Gender dysphoria, by history      Assessment:  Patient reports ongoing good management of symptoms of psychosis. She endorses ongoing AH as well as delusional thougths, however, is able to maintain reality testing and use of skills to manage these symptoms. Because pt experienced a period of time during which AH were absent,  discussed increasing dose of aripiprazole to 12 mg daily in an effort to regain better control of AH. Risk and side effects were discussed and she agreed that putative benefit outweighed risk. At last visit she also endorsed increase in depressive symptoms and was started on citalopram to manage this. Today she reports stability of mood with some improvement in ability to handle regular stressors.    She endorses ongoing regular cannabis use to assist with sleep. Discussed increasing hydroxyzine as well as decreased use of computer before bed. She was agreeable to increase in hydroxyzine. Will continue to discuss ongoing long-term risks associated with cannabis use including worsening of psychosis symptoms as well as untoward effects on cognition.           Plan     1) PSYCHOTROPIC MEDICATIONS:  - Start zolpidem 5 mg at bedtime PRN sleep  - Decrease aripiprazole to 1o mg daily  - Increase hydroxyzine to 50 mg daily  - Continue citalopram 10 mg daily    2) THERAPY:  Continue    3) NEXT DUE:    Labs- 12/2019  Rating Scales- see above    4) REFERRALS:    No Referrals needed    5) RTC: 1 month    6) CRISIS NUMBERS:   Provided routinely in AVS.  Especially emphasized:  COPE 24/[employer] [place] Mobile Team for Adults [[phone]];  Child [[phone]]      TREATMENT RISK STATEMENT:  The risks, benefits, alternatives and potential adverse effects have been discussed and are understood by the pt. The pt understands the risks of using street drugs or alcohol. There are no medical contraindications, the pt agrees to treatment with the ability to do so. The pt knows to call the clinic for any problems or to access emergency care if needed.  Medical and substance use concerns are documented above.  Psychotropic drug interaction check was done, including changes made today.    MANAGEMENT:  Monitoring for adverse effects, routine vitals, routine labs and patient is aware of risks    PROVIDER:  Radha Nolasco MD    [place]  Prescription Monitoring Program [] review was not needed today.    PSYCHOTROPIC DRUG INTERACTIONS: none clinically relevant       Psychiatry Clinic Individual Psychotherapy Note                                                                     [16]     Start time: 10:30am        End time: 10:50am  Date last reviewed: 02/28/19       Date next due: 05/28/19     Subjective: This supportive psychotherapy session addressed issues related to orientation to therapy, goals of therapy, and current stressors consisting of current symptoms, school.  Patient's reaction: Maintenance in the context of mental status appropriate for ambulatory setting.  Progress: good  Plan: RTC 1 month  Psychotherapy services during this visit included myself and the patient.   Treatment Plan      SYMPTOMS; PROBLEMS   MEASURABLE GOALS;    FUNCTIONAL IMPROVEMENT INTERVENTIONS;   GAINS MADE DISCHARGE CRITERIA   Psychosis: delusions-   [details in Interim History]   use skills to put thoughts in perspective increase coping skills marked symptom improvement   Psychosis: auditory hallucinations   take medications as prescribed on a daily basis medications  symptom resolution     PROVIDER:  Radha Nolasco MD

## 2019-04-03 ENCOUNTER — OFFICE VISIT (OUTPATIENT)
Dept: FAMILY MEDICINE | Facility: CLINIC | Age: 24
End: 2019-04-03
Payer: COMMERCIAL

## 2019-04-03 VITALS
SYSTOLIC BLOOD PRESSURE: 115 MMHG | WEIGHT: 187.6 LBS | TEMPERATURE: 98.1 F | HEART RATE: 75 BPM | RESPIRATION RATE: 16 BRPM | BODY MASS INDEX: 27.7 KG/M2 | OXYGEN SATURATION: 97 % | DIASTOLIC BLOOD PRESSURE: 66 MMHG

## 2019-04-03 DIAGNOSIS — F64.0 GENDER DYSPHORIA IN ADULT: Primary | ICD-10-CM

## 2019-04-03 LAB — ESTRADIOL SERPL-MCNC: 168 PG/ML (ref 6–50)

## 2019-04-03 NOTE — PROGRESS NOTES
Preceptor Attestation:   Patient seen, evaluated and discussed with the resident. I have verified the content of the note, which accurately reflects my assessment of the patient and the plan of care.   Supervising Physician:  Susana Brown MD

## 2019-04-03 NOTE — PROGRESS NOTES
APURVA Toth is a 23 year old individual that uses pronouns She/Her/Hers/Herself that presents today for follow up of:  feminizing hormone therapy. Gender identity: feminine     On hormones?  Patches, 2 patches twice a week  100 mg of Spironolactone    Any special concerns today?  No acute HRT concerns    Interested in IM estradiol.  Does feel like overall testosterone is much higher with unexpected increase in erections and body hair.   ---    Past Surgical History:   Procedure Laterality Date     COSMETIC SURGERY      August 2018     KNEE SURGERY Left 2014       Patient Active Problem List   Diagnosis     Blood clot in vein     Psychiatric disorder     Gender dysphoria in adult     Acute psychosis (H)       Current Outpatient Medications   Medication Sig Dispense Refill     ARIPiprazole (ABILIFY) 10 MG tablet Take 10 mg with 2 mg for total daily dose of 12 mg 30 tablet 3     citalopram (CELEXA) 10 MG tablet Take 1 tablet (10 mg) by mouth daily 30 tablet 3     estradiol (VIVELLE-DOT) 0.1 MG/24HR bi-weekly patch Place 2 patches onto the skin twice a week 16 patch 2     Melatonin 10 MG TABS tablet Take 10 mg by mouth nightly as needed for sleep       progesterone (PROMETRIUM) 200 MG capsule Take 1 capsule (200 mg) by mouth daily 30 capsule 0     spironolactone (ALDACTONE) 100 MG tablet Take 1 tablet (100 mg) by mouth daily 30 tablet 2     zolpidem (AMBIEN) 5 MG tablet Take 1 tablet (5 mg) by mouth nightly as needed for sleep 30 tablet 0       History   Smoking Status     Never Smoker   Smokeless Tobacco     Never Used          Allergies   Allergen Reactions     Banana Anaphylaxis     Throat swelling       Problem, Medication and Allergy Lists were reviewed and updated if needed..         Review of Systems:      General    Fat redistribution: Same    Weight change: YES, gain  HEENT    Voice change: not applicable     Cardiovascular (CV)    Chest Pains: no    Shortness of breath: no Chest    Decreased  "exercise tolerance:  no    Breast changes/development: Has had change     Gastrointestinal (GI)    Abdominal pain: no    Change in appetite: Increased, 2/2 Abilify  Skin    Softer skin: YES    Change in hair: Feels like hair is getting dense and more diffuse      Genitourinary ()    Abnormal vaginal bleeding: not applicable     Decreased spontaneous erections: More frequent and bothersome     Change in libido: Feel it is more     New sexual partners: yes, cis male  Musculoskeletal    Leg pain or swelling: no     Psychiatric (Psych)    Depression: Unsure if burnt out from school, feeling down     Anxiety/Panic: Stable    Mood:  \"burnt out\"                Physical Exam:     Vitals:    04/03/19 1421   BP: 115/66   Pulse: 75   Resp: 16   Temp: 98.1  F (36.7  C)   TempSrc: Oral   SpO2: 97%   Weight: 85.1 kg (187 lb 9.6 oz)     BMI= Body mass index is 27.7 kg/m .   Wt Readings from Last 10 Encounters:   04/03/19 85.1 kg (187 lb 9.6 oz)   04/02/19 82.6 kg (182 lb)   02/28/19 82.5 kg (181 lb 12.8 oz)   01/02/19 78 kg (172 lb)   12/20/18 80.7 kg (178 lb)   12/17/18 79.4 kg (175 lb)   11/26/18 81.2 kg (179 lb)   10/25/18 78.7 kg (173 lb 9.6 oz)   10/14/18 75.9 kg (167 lb 4.8 oz)   09/20/18 79.7 kg (175 lb 12.8 oz)     Appearance: Female appearance and dress    GENERAL: healthy, alert and no distress  EYES: Eyes grossly normal to inspection  RESP: lungs clear to auscultation - no rales, no rhonchi, no wheezes  BREAST: deferred breast assessment and measurement  CV: regular rates and rhythm, normal S1 S2, no S3 or S4 and no murmur, no click or rub -  ABDOMEN: soft, no tenderness, no  hepatosplenomegaly, no masses, normal bowel sounds  SKIN: no suspicious lesions, no rashes  Affect: Blunted/Flat           Labs:   Results ordered and pending    Assessment and Plan     Janey Givens is a 22 y/o transgender female who was seen today for HRT follow up.     Gender Dysphoria  Continue with current dosage of estradiol and " spironolactone  Will repeat testosterone labs at this time. Additionally requesting estradiol level. Adjustment of medication pending lab results.      Follow up: Pending lab results    Ariana Andrea MD  [facility] Resident PGY-2  [phone]    Those present during this appointment were: patient and myself

## 2019-04-04 ASSESSMENT — PATIENT HEALTH QUESTIONNAIRE - PHQ9: SUM OF ALL RESPONSES TO PHQ QUESTIONS 1-9: 6

## 2019-04-08 LAB — TESTOST SERPL-MCNC: 1407 NG/DL (ref 240–950)

## 2019-04-15 DIAGNOSIS — F64.0 GENDER DYSPHORIA IN ADULT: ICD-10-CM

## 2019-04-15 NOTE — TELEPHONE ENCOUNTER

## 2019-04-16 RX ORDER — SPIRONOLACTONE 100 MG/1
100 TABLET, FILM COATED ORAL DAILY
Qty: 30 TABLET | Refills: 2 | OUTPATIENT
Start: 2019-04-16

## 2019-04-16 RX ORDER — ESTRADIOL 0.1 MG/D
2 FILM, EXTENDED RELEASE TRANSDERMAL
Qty: 16 PATCH | Refills: 2 | OUTPATIENT
Start: 2019-04-18

## 2019-04-16 NOTE — TELEPHONE ENCOUNTER
Medication Refill Denied  Reason: Patient needs: provider visit and labs  From most recent visit, patient has had highly elevated testosterone. Triage nursing made patient aware of result and was requested for prompt follow up with a provider at the clinic to further evaluate.    Provider: I have not called the patient about the Rx denial, please call.  PCS: Please notify the pharmacy, Please contact the patient to explain reasoning provided above and to schedule the patient for a provider visit and lab tests with any provider..    Once patient makes an appointment please send back to me to order temporary refill so that the patient will not run out of medication prior to the scheduled visit.    Ariana Andrea MD

## 2019-04-18 NOTE — TELEPHONE ENCOUNTER
Verify that the refill encounter hasn't been started Yes    [facility] phone call message- patient requesting a refill:    Full Medication Name: estradiol (VIVELLE-DOT) 0.1 MG/24HR bi-weekly patch, Route: Place 2 patches onto the skin twice a week.    spironolactone (ALDACTONE) 100 MG tablet, Route: Take 1 tablet (100 mg) by mouth daily        Pharmacy confirmed as     [facility] [address] [facility] [facility] [address]  Phone: [phone] Fax: [phone]  : Yes    Medication tab checked to see if medication has been sent  Yes    Additional Comments: Patient made an appointment to see  on 5/8/19 @ 4:00p.m     OK to leave a message on voice mail? Yes    Advised patient refill may take up to 2 business days? No, patient states she needs medication ASAP    Primary language: English      needed? No    Call taken on April 18, 2019 at 10:19 AM by Mili Carr    Route to P [facility] MED REFILL

## 2019-04-19 ENCOUNTER — TELEPHONE (OUTPATIENT)
Dept: FAMILY MEDICINE | Facility: CLINIC | Age: 24
End: 2019-04-19

## 2019-04-19 NOTE — TELEPHONE ENCOUNTER
[facility] phone call message- patient requesting to speak directly with PCP or provider.    PCP: Ariana Andrea    Reason for Call: Patient has been out of medication since 4/17/19. Has appointment scheduled 5/8/19.     Additional Details: Worried that they will not have medication for over a month. I offered a sooner appointment time but patient was unable to except due to schedule restrictions.    Are you willing to speak with a nurse? No    Is a call back needed? Yes    Patient informed that it may take up to 2 business days to hear back from PCP:Yes    OK to leave a message on voice mail? Yes    Primary language: English      needed? No    Call taken on April 19, 2019 at 9:46 AM by Zully Bird route to PCP unless willing to speak with a nurse.

## 2019-04-24 ENCOUNTER — OFFICE VISIT (OUTPATIENT)
Dept: FAMILY MEDICINE | Facility: CLINIC | Age: 24
End: 2019-04-24
Payer: COMMERCIAL

## 2019-04-24 VITALS
BODY MASS INDEX: 26.01 KG/M2 | RESPIRATION RATE: 16 BRPM | TEMPERATURE: 98.2 F | HEIGHT: 71 IN | WEIGHT: 185.8 LBS | OXYGEN SATURATION: 96 % | HEART RATE: 79 BPM | DIASTOLIC BLOOD PRESSURE: 72 MMHG | SYSTOLIC BLOOD PRESSURE: 118 MMHG

## 2019-04-24 DIAGNOSIS — R79.89 ELEVATED TESTOSTERONE LEVEL: Primary | ICD-10-CM

## 2019-04-24 DIAGNOSIS — F64.0 GENDER DYSPHORIA IN ADULT: ICD-10-CM

## 2019-04-24 RX ORDER — SPIRONOLACTONE 100 MG/1
200 TABLET, FILM COATED ORAL DAILY
Qty: 60 TABLET | Refills: 2 | Status: SHIPPED | OUTPATIENT
Start: 2019-04-24 | End: 2019-07-26

## 2019-04-24 RX ORDER — ESTRADIOL 0.1 MG/D
2 FILM, EXTENDED RELEASE TRANSDERMAL
Qty: 16 PATCH | Refills: 2 | Status: SHIPPED | OUTPATIENT
Start: 2019-04-25 | End: 2019-07-26

## 2019-04-24 ASSESSMENT — MIFFLIN-ST. JEOR: SCORE: 1865.91

## 2019-04-24 NOTE — PROGRESS NOTES
"        APURVA Toth is a 24 year old individual that uses pronouns She/Her/Hers/Herself that presents today for follow up of:  feminizing hormone therapy. Gender identity: female    Concern today - patient has been feeling more masculine over the past month or so. Last testosterone 4/3/2019 was highly elevated to 1,407. She reports feeling like her hair is growing fast and is more diffuse. Also feels \"like a smell like a man.\" Unable to state if any acne change. Denies change in her libido. She is having more spontaneous erections. Denies any use of exogenous testosterone or using any cosmetic gels or creams.     Patient very upset that they have been off estradiol for a week and multiple refill requests were denied until appointment was made. When appointment was made, provider had not been notified to send in limited refill.     On hormones?  YES, on spironolactone 200 mg daily and estradiol 2 patches a week   Due for labs?  Yes       Refills of meds needed?  Yes      Past Surgical History:   Procedure Laterality Date     COSMETIC SURGERY      August 2018     KNEE SURGERY Left 2014       Patient Active Problem List   Diagnosis     Blood clot in vein     Psychiatric disorder     Gender dysphoria in adult     Acute psychosis (H)       Current Outpatient Medications   Medication Sig Dispense Refill     ARIPiprazole (ABILIFY) 10 MG tablet Take 10 mg with 2 mg for total daily dose of 12 mg 30 tablet 3     citalopram (CELEXA) 10 MG tablet Take 1 tablet (10 mg) by mouth daily 30 tablet 3     zolpidem (AMBIEN) 5 MG tablet Take 1 tablet (5 mg) by mouth nightly as needed for sleep 30 tablet 0     estradiol (VIVELLE-DOT) 0.1 MG/24HR bi-weekly patch Place 2 patches onto the skin twice a week (Patient not taking: Reported on 4/24/2019) 16 patch 2     Melatonin 10 MG TABS tablet Take 10 mg by mouth nightly as needed for sleep       progesterone (PROMETRIUM) 200 MG capsule Take 1 capsule (200 mg) by mouth daily (Patient " "not taking: Reported on 4/24/2019) 30 capsule 0     spironolactone (ALDACTONE) 100 MG tablet Take 1 tablet (100 mg) by mouth daily (Patient not taking: Reported on 4/24/2019) 30 tablet 2       History   Smoking Status     Never Smoker   Smokeless Tobacco     Never Used          Allergies   Allergen Reactions     Banana Anaphylaxis     Throat swelling       Problem, Medication and Allergy Lists were reviewed and updated if needed..         Review of Systems:      General    Fat redistribution: stable    Weight change: stable HEENT    Voice change: not applicable     Cardiovascular (CV)    Chest Pains: no    Shortness of breath: no Chest    Decreased exercise tolerance:  no    Breast changes/development: YES     Gastrointestinal (GI)    Abdominal pain: no    Change in appetite: no Skin    Acne or oily skin: YES, but stable    Change in hair: YES, as noted above     Genitourinary ()    Abnormal vaginal bleeding: not applicable     Decreased spontaneous erections: no    Change in libido: no    New sexual partners: no Musculoskeletal    Leg pain or swelling: no     Psychiatric (Psych)    Depression: Stable    Anxiety/Panic: Stable    Mood:  \"okay\"                    Physical Exam:     Vitals:    04/24/19 1620   BP: 118/72   BP Location: Left arm   Patient Position: Sitting   Cuff Size: Adult Regular   Pulse: 79   Resp: 16   Temp: 98.2  F (36.8  C)   TempSrc: Oral   SpO2: 96%   Weight: 84.3 kg (185 lb 12.8 oz)   Height: 1.805 m (5' 11.06\")     BMI= Body mass index is 25.87 kg/m .   Wt Readings from Last 10 Encounters:   04/24/19 84.3 kg (185 lb 12.8 oz)   04/03/19 85.1 kg (187 lb 9.6 oz)   04/02/19 82.6 kg (182 lb)   02/28/19 82.5 kg (181 lb 12.8 oz)   01/02/19 78 kg (172 lb)   12/20/18 80.7 kg (178 lb)   12/17/18 79.4 kg (175 lb)   11/26/18 81.2 kg (179 lb)   10/25/18 78.7 kg (173 lb 9.6 oz)   10/14/18 75.9 kg (167 lb 4.8 oz)     Appearance: Female appearance and dress    GENERAL: healthy, alert and no distress  RESP: " normal effort  CV: regular rates and rhythm, normal S1 S2, no S3 or S4 and no murmur, no click or rub -  SKIN: no suspicious lesions, no rashes  : testicles normal without atrophy or masses  Psych: Alert and oriented times 3; coherent speech, normal rate and volume, able to articulate logical thoughts, able to abstract reason, no tangential thoughts, no hallucinations or delusions. Affect is blunted. Poor eye contact           Labs:   Results from last visit:  Office Visit on 04/03/2019   Component Date Value Ref Range Status     Estradiol 04/03/2019 168* 6 - 50 pg/mL Final     Testosterone Total 04/03/2019 1,407* 240 - 950 ng/dL Final    Comment: This test was developed and its performance characteristics determined by the   [facility],  Special [facility] Laboratory. It has   not been cleared or approved by the FDA. The laboratory is regulated under   CLIA as qualified to perform high-complexity testing. This test is used for   clinical purposes. It should not be regarded as investigational or for   research.           Assessment and Plan     Janey Givens is a 23 y/o transfemale who was seen today for elevated testosterone.     Repeat testosterone found to be in appropriately suppressed level, 27. Still unclear etiology of unexpected testosterone spike, highest consideration is lab error, though patient reporting elevated testosterone symptoms. Encouraged patient to be doing testicular exams every month. PE with no acute concerns. Will continue to follow for general HRT. No acute changes in HRT medication regimen. If testosterone symptoms continue to persist, encouraged follow up with strong consideration for testicular ultrasound.     Follow up:  Follow up in 3 months.  Results by mychart  Questions were elicited and answered.     Ariana Andrea MD  [facility] Resident PGY-2  [phone]    Those present during this appointment were: patient and myself

## 2019-04-24 NOTE — PATIENT INSTRUCTIONS
Follow up depending on labs. If not highly elevated, we will recheck in a month after being on your HRT medications.  If still on the high end, will place ultrasound for further work up

## 2019-04-26 LAB
SHBG SERPL-SCNC: 44 NMOL/L (ref 11–80)
TESTOST FREE SERPL-MCNC: 0.4 NG/DL (ref 4.7–24.4)
TESTOST SERPL-MCNC: 27 NG/DL (ref 240–950)

## 2019-05-03 NOTE — PROGRESS NOTES
Preceptor Attestation:   Patient seen, evaluated and discussed with the resident. I have verified the content of the note, which accurately reflects my assessment of the patient and the plan of care.   Supervising Physician:  Timmy Smith MD

## 2019-05-07 ENCOUNTER — OFFICE VISIT (OUTPATIENT)
Dept: PSYCHIATRY | Facility: CLINIC | Age: 24
End: 2019-05-07
Payer: COMMERCIAL

## 2019-05-07 VITALS
SYSTOLIC BLOOD PRESSURE: 115 MMHG | HEART RATE: 76 BPM | BODY MASS INDEX: 25.53 KG/M2 | WEIGHT: 183.4 LBS | DIASTOLIC BLOOD PRESSURE: 73 MMHG

## 2019-05-07 DIAGNOSIS — F20.9 SCHIZOPHRENIA, UNSPECIFIED TYPE (H): Primary | ICD-10-CM

## 2019-05-07 RX ORDER — ZOLPIDEM TARTRATE 5 MG/1
TABLET ORAL
Qty: 35 TABLET | Refills: 2 | Status: SHIPPED | OUTPATIENT
Start: 2019-05-07 | End: 2019-05-23

## 2019-05-07 ASSESSMENT — PATIENT HEALTH QUESTIONNAIRE - PHQ9: SUM OF ALL RESPONSES TO PHQ QUESTIONS 1-9: 1

## 2019-05-07 ASSESSMENT — PAIN SCALES - GENERAL: PAINLEVEL: NO PAIN (0)

## 2019-05-07 NOTE — PROGRESS NOTES
NAVIGATE Medication Management Progress Note  A Part of the [facility] First Episode of Psychosis Program    NAVIGATE Enrollee: Janey Givens (1995)     MRN: [id number]  Date:  5/07/19     Janey Givens is a 23 year old adult who prefers the name Katie and pronoun she, her.  Therapist(s)/Other Providers: Nia Cerda for individual resiliency training, Nia Fernandez for family therapy, and Ame Borrero for employment support  PCP: Ariana Andrea    Psych critical item history includes catatonia, psychosis [sxs include delusions, disorganized thinking], psych hosp (<3) and SUBSTANCE USE: cannabis, LSD.          Contributors to the Assessment     Chart Reviewed.   Interview completed with Janey Givens.  Collateral information obtained from EHR.         Chief Complaint      psychosis         Interim History      Janey Givens is a 24 year old adult who was last seen in clinic on 2/28/2019 by Leidy Guzman at which time citalopram 10 mg daily was started. The patient reports good treatment adherence.  History was provided by the patient who was a good historian.    Since the last visit:    Pt reports that she is doing well since she was last seen.    States that she only has finals left and is looking forward to classes being over.    States that she started zolpidem and it was helpful although she felt that it wasn't always helpful enough. Also states that she didn't have any increase in impulsivity or amnestic behaviors after taking it. Felt rested in the mornings after taking it. She reports that she took up to 10 mg ~3-4 times. Discussed how this provider would prefer to have her take 5 mg if possible and then add an additonal 5 mg up to 5 times per month.    Stoppd smoking cannabis after starting zolpidem but then began after medication.    Denies problems with decreasing aripiprazole back to 1 mg.    Overall feels that things are going well.    States that the last time she heard voices  "was several weeks ago. She feels that voices are occuring far less frequently if at all since she was last seen. Also states that they are not always voices but more intrusive thoughts that are not her own. Questioned whether she is able to recognize a correlation between cessation of cannabis use and decrease in psychotic symptoms. In response, she stated, \"I'm not going to say that smoking pot is bad.\"    She also reports that she stopped taking antidepressant 3 weeks ago as she doesn't feel that it was helping. Denies having any discontinuation effects. Does not feel that mood is worse since stopping.      PSYCH ROS:  Clinician Rating Form in [facility]  1. Depressed Mood: Rating 0  0 Not reported    1 Very mild occasionally feels sad or  down ; of questionable clinical significance   2 Mild occasionally feels moderately depressed or often feels sad or  down    3 Moderate occasionally feels very depressed or often feels moderately depressed   4 Moderately severe often feels very depressed   5 Severe feels very depressed most of the time   6 Very severe constant extremely painful feelings of depression   U Unable to assess      2. Anxiety/Worry: Ratin  0 Not reported    1 Very mild occasionally feels a little anxious; of questionable clinical significance   2 Mild occasionally feels moderately anxious or often feels a little anxious or worried   3 Moderate occasionally feels very anxious or often feels moderately anxious   4 Moderately severe often feels very anxious or often feels moderately anxious   5 Severe feels very anxious or worried most of the time   6 Very severe patient is continually preoccupied with severe anxiety   U Unable to assess      3. Suicidal Ideation/Behavior: Ratin   0 Not reported    1 Very mild occasional thoughts of dying,  I d be better off dead  or  I wish I were dead    2 Mild frequents thoughts of dying or occasional thoughts of killing self, without plan or method   3 Moderate " often thinks of suicide or has though of a specific method   4 Moderately severe has mentally rehearsed a specific method of suicide or has made a suicide attempt with questionable intent to die (e.r. takes aspirins and then tells family)   5 Severe has made preparations for a potentially lethal suicide attempt (e.g acquires a gun and bullets for an attempt)   6 Very severe has made a suicide attempt with an intent to die   U Unable to assess       4. Elevated/Expansive Mood: Ratin  0 Not at all    1 Very mild questionable; more cheerful than most people in his/her circumstances but of only possible clinical significance   2 Mild brief elevated/expansive mood but only somewhat out of proportion to the circumstances   3 Moderate brief/occasional elevation of mood which is clearly out of proportion to the circumstances   4 Moderately severe sustained/frequent elevation of mood which is clearly out of proportion to the circumstances   5 Severe mood is euphoric most of the time   6 Very severe sustained elevation;  everything is wonderful  almost all of the time   U Unable to assess      5. Hostility/Anger/Irritability/Aggressiveness: Ratin  0 Not at all    1 Very mild occasional irritability of doubtful clinical significance   2 Mild occasionally feels angry or mild or indirect expressions of anger, e.g. sarcasm, disrespect or hostile gestures   3 Moderate frequently feels angry, frequent irritability or occasional direct expression of anger, e.g. yelling at others   4 Moderately severe often feels very angry, often yells at others or occasionally threatens to harm others   5 Severe has acted on his anger by becoming physically abusive on one or two occasions or makes frequent threats to harm others or is very angry most of the time   6 Very severe has been physically aggressive and/or required intervention to prevent assaultiveness on several occasions; or any serious assaultive act   U Unable to assess       6. Impulsive Behavior: Ratin  0 Not at all    1 Very mild one instance of impulsive behavior which is of doubtful clinical significance   2 Mild occasional impulsive acts, e.g. making phone calls at odd hours   3 Moderate occasional impulsive acts with some potential negative consequence, e.g. leaving work abruptly; changing plans without thinking   4 Moderately severe impulsive acts with definite negative consequences, e.g. overspending on non-essentials; repeated reckless sexual behavior   5 Severe impulsive acts with direct negative consequences, e.g. spends entire income on nonessentials without regard for basic needs   6 Very severe impulsive behavior which is potentially life threatening, e.g. jumps from dangerous height (without suicidal intent) or criminal behavior, e.g. impulsive robbery   U Unable to assess      7. Suspiciousness: Ratin  0 Not present    1 Very mild Seems on guard. Reluctant to respond to some  personal  questions. Reports being overly self-conscious in public   2 Mild Describes incidents in which others have harmed or wanted to harm him/her that sound plausible. Patient feels as if others are watching, laughing, or criticizing him/her in public, but this occurs only occasionally or rarely. Little or no preoccupation   3 Moderate Says others are talking about him/her maliciously, have negative intentions, or may harm him/her. Beyond the likelihood of plausibility, but not delusional. Incidents of suspected persecution occur occasionally (less than once per week) with some preoccupation   4 Moderately severe Same as 3, but incidents occur frequently such as more than once a week. Patient is moderately preoccupied with ideas of persecution OR patient reports persecutory delusions expressed with much doubt (e.g. partial delusion)   5 Severe Delusional -- speaks of [employer] plots, the FBI, or others poisoning his/her food, persecution by supernatural forces   6 Extremely severe Same  as 5, but the beliefs are bizarre or more preoccupying. Patient tends to disclose or act on persecutory delusions.   U Unable to assess      8. Unusual Thought Content: Ratin  0 Not present    1 Very mild Ideas of reference (people may stare or may laugh at him), ideas of persecution (people may mistreat him). Unusual beliefs in psychic casiano, spirits, UFOs, or unrealistic beliefs in one's own abilities. Not strongly held. Some doubt   2 Mild Same as 1, but degree of reality distortion is more severe as indicated by highly unusual ideas or greater conviction. Content may be typical of delusions (even bizarre), but without full conviction. The delusion does not seem to have fully formed, but is considered as one possible explanation for an unusual experience   3 Moderate Delusion present but no preoccupation or functional impairment. May be an encapsulated delusion or a firmly endorsed absurd belief about past delusional circumstances   4 Moderately severe Full delusion(s) present with some preoccupation OR some areas of functioning disrupted by delusional thinking   5 Severe Full delusion(s) present with much preoccupation OR many areas of functioning are disrupted by delusional thinking   6 Extremely severe Full delusions present with almost   U Unable to assess      9. Hallucinations: Ratin  0 Not present    1 Very mild While resting or going to sleep, sees visions, smells odors, or hears voices, sounds or whispers in the absence of external stimulation, but no impairment in functioning   2 Mild While in a clear state of consciousness, hears a voice calling the subject s name, experiences non-verbal auditory hallucinations (e.g., sounds or whispers), formless visual hallucinations, or has sensory experiences in the presence of a modality-relevant stimulus (e.g., visual illusions) infrequently (e.g., 1-2 times per week) and with no functional impairment   3 Moderate Occasional verbal, visual, gustatory,  olfactory, or tactile hallucinations with no functional impairment OR non-verbal auditory hallucinations/visual illusions more than infrequently or with impairment   4 Moderately severe Experiences daily hallucinations OR some areas of functioning are disrupted by hallucinations   5 Severe Experiences verbal or visual hallucinations several times a day OR many areas of functioning are disrupted by these hallucinations   6 Extremely severe Persistent verbal or visual hallucinations throughout the day OR most areas of functioning are disrupted by these hallucinations   U Unable to assess      Endorses having thoughts that are not hers. Had these kinds of thoughts two weeks ago and almost never having these thoughts.    10. Conceptual Disorganization: Ratin  0 Not present    1 Very mild Peculiar use of words or rambling but speech is comprehensible   2 Mild Speech a bit hard to understand or make sense of due to tangentiality, circumstantiality, or sudden topic shifts   3 Moderate Speech difficult to understand due to tangentiality, circumstantiality, idiosyncratic speech, or topic shifts on many occasions OR 1-2 instances of incoherent phrases   4 Moderately severe Speech difficult to understand due to circumstantiality, tangentiality, neologisms, blocking, or topic shifts most of the time OR 3-5 instances of incoherent phrases   5 Severe Speech is incomprehensible due to severe impairments most of the time. Many PSRS items cannot be rated by self-report alone   6 Extremely severe Speech is incomprehensible throughout interview   U Unable to assess      11. Avolition/Apathy: Ratin  0 Not at all    1 Very mild questionable decrease in time spent in goal-directed activities   2 Mild spends less time in goal-directed activities than is appropriate for situation and age   3 Moderate initiates activities at times but does not follow through   4 Moderately severe rarely initiates activity but will passively engage  with encouragement   5 Severe almost never initiates activities; requires assistance to accomplish basic activities   6 Very severe does not initiate or persist in any goal-directed activity even with outside assistance   U Unable to assess      Feels that apathy may be getting worse but that it doesn't have anything to do with psychosis. Feels that it has been a shitty past 3 years. Thinks that this has to do with people being shitty.    12. Asociality/Low Social Drive: Ratin  0 Not at all    1 Very mild questionable   2 Mild slow to initiate social interactions but usually responds to overtures by others   3 Moderate rarely initiates social interactions; sometimes responds to overtures by others   4 Moderately severe does not initiate but sometimes responds to overtures by others; little social interaction outside close family members   5 Severe never initiates and rarely encourages conversations or activities; avoids being with others unless prodded, may have contacts with family   6 Very severe avoids being with others (even family members) whenever possible, extreme social isolation   U Unable to assess      13. Adherence: Days: 0  The longest, continuous amount of time in days, since the last visit when the subject did not take medication. Stopped taking antidepressant 3 weeks ago as she doesn't feel that they were doing anything. Doesn't feel that she is more depressed. Continues to feel motivated and intersted in leaving the house. Didn't have any withdrawal symptoms.     14. EPS Part I: Ratin  Rate Elbow Rigidity for all subjects  0 Normal   1 Slight stiffness and resistance   2 Moderate stiffness and resistance   3 Marked rigidity with difficulty in passive movement   4 Extreme stiffness and rigidity with almost a frozen joint   U Unable to assess           EPS Part 2: Signs of EPS: 0  Are there are other signs of EPS (eg diminished arm swing, postural instability, cogwheeling, tremor, akinesia)  present based upon patient report or exam?  0 No   1 Yes     15. Akathesia: Ratin  0 No restlessness reported or observed   1 Mild restlessness observed; e.g., occasional jiggling of the foot occurs when subject is seated   2 Moderate restlessness observed; e.g., on several occasions, jiggles foot, crosses and uncrosses legs or twists a part of the body   3 Restlessness is frequently observed; e.g., the foot or legs moving most of the time   4 Restlessness persistently observed; e.g., subject cannot sit still, may get up and walk   U Unable to assess     16. Dyskinetic Movement Ratings: Ratin  0 None   1 Minimal, may be extreme normal   2 Mild   3 Moderate   4 Severe   U Unable to assess     SIDE EFFECT ASSESSMENT:  Clinician Rating Form in [facility] - Side Effect Assessment  Address side effects reported by the patient and rate using this scale  0 Not present   1 Minimal, may be extreme normal   2 Mild   3 Moderate   4 Severe   U Unable to assess   P Present, but not related     Feeling dizzy or faint: 0  Blurred vision: 0  Dry mouth: 0  Too much saliva/droolin  Nausea:  0  Constipation: 0  Increased appetite: 2  Weight gain: 2  Weight loss: 0  Feeling tired/fatigue: 1  Daytime sedation: 1  Hypersomnia: 0  Insomnia: 1 (much better)  Low libido: 0 (feels this is better than previuos month)  Other problems with sex: 0  Breast enlargement or discharge:  0  Irregular Menstruation or amenorrhea: n/a  Other (please list and rate): 0    RECENT SUBSTANCE USE:  Clinician Rating Form in [facility] - Substance Use Assessment  Alcohol Use Severity: Ratin  0 none   1 use without impairment: drinks but no immediate social or medical impairment   2 use with impairment: e.g. becomes grossly intoxicated; alcohol use or withdrawal compromises school, work or social functioning; alcohol use or withdrawal exacerbates symptoms (e.g. gets depressed when drinking)     Marijuana Use Severity: Ratin.5  0 none   1 occasional  use without impairment: e.g. uses marijuana a few days a month and has no immediate social or medical impairment   2 frequent use without impairment: e.g. uses marijuana several or more days a week but has no immediate social or medical impairment   3 use with impairment: e.g. becomes grossly intoxicated; marijuana use compromises school, work or social functioning; marijuana use exacerbates symptoms (e.g. gets paranoid when using)     Other Drug Use Severity: Ratin  0 none   1 occasional use without impairment: e.g. uses drug(s) a few days a month and has no immediate social or medical impairment   2 frequent use without impairment: e.g. uses drug(s) several or more days a week but has no immediate social or medical impairment   3 use with impairment: e.g. becomes grossly intoxicated; drug use compromises school, work or social functioning; drug use exacerbates symptoms (e.g. gets paranoid when using)     RECENT SOCIAL HISTORY:    FINANCIAL SUPPORT- family or friend       CHILDREN- None       LIVING SITUATION- living on campus       LEGAL- None  EARLY HISTORY/ EDUCATION- provided support reading since 6th grade   SOCIAL/ SPIRITUAL SUPPORT- support groups; family and friends        CULTURAL INFLUENCES/ IMPACT- transgender MTF       TRAUMA HISTORY (self-report)- none  FEELS SAFE AT HOME- Yes  FAMILY HISTORY-  Sister has been dx with BPAD, possibly mom?      MEDICAL ROS:    GENERAL: Negative for malaise, significant weight loss and fever  HEENT: No changes in hearing or vision, no nose bleeds or other nasal problems and Positive for nasal congestion  NECK: Negative for lumps, goiter, pain and significant neck swelling  RESPIRATORY: Negative for cough, wheezing and shortness of breath  CARDIOVASCULAR: Negative for chest pain, leg swelling and palpitations  GI: Negative for abdominal discomfort, blood in stools or black stools and change in bowel habits  : Negative for dysuria, frequency and  incontinence  MUSCULOSKELETAL: Negative for joint pain or swelling, back pain, and muscle pain.  SKIN: Negative for lesions, rash, and itching.  PSYCH: See HPI  HEMATOLOGY/LYMPHOLOGY Positive for easy bruising and bleeding  ENDOCRINE: Negative for cold or heat intolerance, polyuria, polydipsia and goiter.  NEURO:  negative, tension headaches, syncope and seizures           First Episode of Psychosis History      DUP (duration untreated psychosis):  unknown  Route to initial care: hospitalization  Medication adherence overall:  See above, Clinician Rating Form in [facility] Item 13  General frequency of visits:  monthly  Participation in groups:  No  Cognitive Remediation:  No  Other treatment history: see HPI    Reviewed for completion of First Episode work-up:  Yes  First episode workup:  Done (if completed, see LABS for results)  MATRICS Consensus Cognitive Battery:  Not Done (if completed, see LABS for results)         Medical/Surgical History     Banana    Patient Active Problem List   Diagnosis     Blood clot in vein     Psychiatric disorder     Gender dysphoria in adult     Acute psychosis (H)            Medications     Current Outpatient Medications   Medication Sig Dispense Refill     ARIPiprazole (ABILIFY) 10 MG tablet Take 10 mg with 2 mg for total daily dose of 12 mg 30 tablet 3     citalopram (CELEXA) 10 MG tablet Take 1 tablet (10 mg) by mouth daily 30 tablet 3     estradiol (VIVELLE-DOT) 0.1 MG/24HR bi-weekly patch Place 2 patches onto the skin twice a week 16 patch 2     Melatonin 10 MG TABS tablet Take 10 mg by mouth nightly as needed for sleep       progesterone (PROMETRIUM) 200 MG capsule Take 1 capsule (200 mg) by mouth daily (Patient not taking: Reported on 4/24/2019) 30 capsule 0     spironolactone (ALDACTONE) 100 MG tablet Take 2 tablets (200 mg) by mouth daily 60 tablet 2     zolpidem (AMBIEN) 5 MG tablet Take 1 tablet (5 mg) by mouth nightly as needed for sleep 30 tablet 0             Vitals      /73   Pulse 76   Wt 83.2 kg (183 lb 6.4 oz)   BMI 25.53 kg/m        Weight prior to medication: 167 lbs         Mental Status Exam     Alertness: alert  and oriented  Appearance: well groomed  Behavior/Demeanor: cooperative, pleasant and calm, with good  eye contact   Speech: normal and regular rate and rhythm  Language: intact and no problems  Psychomotor: normal or unremarkable  Mood: description consistent with euthymia  Affect: full range and appropriate; was congruent to mood; was congruent to content  Thought Process/Associations: unremarkable  Thought Content:  Reports delusions [details in Interim History];  Denies suicidal and violent ideation  Perception:  Reports auditory hallucinations;  Denies visual hallucinations  Insight: good  Judgment: good  Cognition: (6) oriented: time, person, and place  attention span: intact  concentration: intact  recent memory: intact  remote memory: intact  fund of knowledge: appropriate  Gait and Station: unremarkable         Labs and Data     RATING SCALES:  PHQ9    PHQ9 TODAY = 2  PHQ-9 SCORE 3/14/2019 4/2/2019 5/7/2019   PHQ-9 Total Score 5 6 1       ANTIPSYCHOTIC LABS ROUTINE    [glu, A1C, lipids (focus LDL), liver enzymes, WBC, ANEU, Hgb, plts]   q12 mo  Recent Labs   Lab Test 12/20/18  1620 10/25/18  0903  10/17/17  1634   .6* 91.9   < > 93.4   A1C  --   --   --  5.3    < > = values in this interval not displayed.     Recent Labs   Lab Test 12/20/18  1620 10/25/18  0903   CHOL 132.2 143.6   TRIG 58.6 67.1   LDL 63 79   HDL 57.3 51.0     Recent Labs   Lab Test 12/20/18  1620 10/25/18  0903   AST 13.5 8.5   ALT 10.2 12.2   ALKPHOS 40.2 43.4     Recent Labs   Lab Test 12/20/18  1620 10/25/18  0903 10/01/18  1102  12/08/17  0647   WBC  --   --  6.7  --  9.3   ANEU  --   --  3.8  --  3.5   HGB 11.9* 13.5 12.6*   < > 13.8   PLT  --   --  237  --  188    < > = values in this interval not displayed.       Diagnosis and Assessment                                                                              m2, h3       Today the following issues were addressed:  1) Schizophrenia  2) Rule-out Schizoaffective disorder  3) Cannabis use disorder, moderte  4) Catatonia, by history  5) Gender dysphoria, by history      Assessment:  Patient reports ongoing good management of symptoms of psychosis. She endorses ongoing AH as well as delusional thougths, however, is able to maintain reality testing and use of skills to manage these symptoms. At previous visit, because pt experienced a period of time during which AH were absent, discussed increasing dose of aripiprazole to 12 mg daily in an effort to regain better control of AH. She did not note any improvement associated with increased dose so dose was reduced back to 10 mg.    Because pt states that regular cannabis use is primarily for managing sleep, discussed starting a sleep med if she agrees to discontinue cannabis. Zolpidem 5 mg was started. She reported efficacy although also descried several nights of taking 10 mg. Psychosis symptoms were significantly reduced with stopping regular cannabis use. Will continue zolpidem 5 mg at bedtime with option for increasing to 10 mg if needed provided she does not resume cannabis use. Will continue to emphasize long-term risks associated with resuming cannabis use including worsening of psychosis symptoms and untoward effects on cognition.    Although pt previously found benefit from citalopram for management of mood and stress, she reports self-discontinuing this due to perceived lack of benefit.           Plan     1) PSYCHOTROPIC MEDICATIONS:  - Continue zolpidem 5 mg at bedtime with and additional 5 mg PRN sleep  - Decrease aripiprazole to 10mg daily  - Increase hydroxyzine to 50 mg daily  - Stop citalopram 10 mg daily    2) THERAPY:  Continue    3) NEXT DUE:    Labs- 12/2019  Rating Scales- see above    4) REFERRALS:    No Referrals needed    5) RTC: 1 month    6) CRISIS  NUMBERS:   Provided routinely in AVS.  Especially emphasized:  COPE 24/[employer] [place] Mobile Team for Adults [[phone]];  Child [[phone]]      TREATMENT RISK STATEMENT:  The risks, benefits, alternatives and potential adverse effects have been discussed and are understood by the pt. The pt understands the risks of using street drugs or alcohol. There are no medical contraindications, the pt agrees to treatment with the ability to do so. The pt knows to call the clinic for any problems or to access emergency care if needed.  Medical and substance use concerns are documented above.  Psychotropic drug interaction check was done, including changes made today.    MANAGEMENT:  Monitoring for adverse effects, routine vitals, routine labs and patient is aware of risks    PROVIDER:  MD RICH [facility] Prescription Monitoring Program [] review was not needed today.    PSYCHOTROPIC DRUG INTERACTIONS: none clinically relevant       [facility]ry [facility] Individual Psychotherapy Note                                                                     [16]     Start time: 3:30pm        End time: 3:50pm  Date last reviewed: 02/28/19       Date next due: 05/28/19     Subjective: This supportive psychotherapy session addressed issues related to orientation to therapy, goals of therapy, and current stressors consisting of current symptoms, school.  Patient's reaction: Maintenance in the context of mental status appropriate for ambulatory setting.  Progress: good  Plan: RTC 1 month  Psychotherapy services during this visit included myself and the patient.   Treatment Plan      SYMPTOMS; PROBLEMS   MEASURABLE GOALS;    FUNCTIONAL IMPROVEMENT INTERVENTIONS;   GAINS MADE DISCHARGE CRITERIA   Psychosis: delusions-   [details in Interim History]   use skills to put thoughts in perspective increase coping skills marked symptom improvement   Psychosis: auditory hallucinations   take medications as prescribed on a daily  basis medications  symptom resolution     PROVIDER:  Rahda Nolasco MD

## 2019-05-21 DIAGNOSIS — F20.9 SCHIZOPHRENIA, UNSPECIFIED TYPE (H): ICD-10-CM

## 2019-05-21 NOTE — TELEPHONE ENCOUNTER
Last seen: 5/7/19  RTC: 1 month   Cancel: none  No-show: none  Next appt: 6/3/19    Incoming refill from Patient via phone    Medication requested: Zolpidem 5 mg   Directions: Take 5-10 mg at bedtime for sleep    Qty: 35  Last refilled: 5/7/19-however cannot find printed script.     Routed to provider to fill, controlled substance

## 2019-05-23 RX ORDER — ZOLPIDEM TARTRATE 5 MG/1
TABLET ORAL
Qty: 35 TABLET | Refills: 2 | Status: SHIPPED | OUTPATIENT
Start: 2019-05-23 | End: 2019-08-01

## 2019-06-03 ENCOUNTER — BEH TREATMENT PLAN (OUTPATIENT)
Dept: PSYCHIATRY | Facility: CLINIC | Age: 24
End: 2019-06-03

## 2019-06-03 ENCOUNTER — OFFICE VISIT (OUTPATIENT)
Dept: PSYCHIATRY | Facility: CLINIC | Age: 24
End: 2019-06-03
Payer: COMMERCIAL

## 2019-06-03 DIAGNOSIS — F20.9 SCHIZOPHRENIA, UNSPECIFIED TYPE (H): ICD-10-CM

## 2019-06-03 DIAGNOSIS — F20.9 SCHIZOPHRENIA, UNSPECIFIED TYPE (H): Primary | ICD-10-CM

## 2019-06-03 RX ORDER — ARIPIPRAZOLE 10 MG/1
TABLET ORAL
Qty: 30 TABLET | Refills: 0 | Status: SHIPPED | OUTPATIENT
Start: 2019-06-03 | End: 2019-10-11

## 2019-06-03 NOTE — TELEPHONE ENCOUNTER
Last seen: 5/7/19  RTC: 1 month   Cancel: none  No-show: none  Next appt: none scheduled    Incoming refill from Patient via appointment today    Medication requested: Abilify 10 mg   Directions: Take 10 mg with 2 mg for total daily dose of 12 mg   Qty: 30  Last refilled: 2/28/19 with 3 refills     Routed to provider, as last note is not signed.

## 2019-06-04 ENCOUNTER — OFFICE VISIT (OUTPATIENT)
Dept: FAMILY MEDICINE | Facility: CLINIC | Age: 24
End: 2019-06-04
Payer: COMMERCIAL

## 2019-06-04 VITALS
SYSTOLIC BLOOD PRESSURE: 109 MMHG | TEMPERATURE: 97.5 F | BODY MASS INDEX: 25.65 KG/M2 | WEIGHT: 183.2 LBS | DIASTOLIC BLOOD PRESSURE: 69 MMHG | HEIGHT: 71 IN | RESPIRATION RATE: 16 BRPM | HEART RATE: 75 BPM | OXYGEN SATURATION: 97 %

## 2019-06-04 DIAGNOSIS — F64.0 GENDER DYSPHORIA IN ADULT: ICD-10-CM

## 2019-06-04 DIAGNOSIS — Z00.00 ROUTINE HISTORY AND PHYSICAL EXAMINATION OF ADULT: Primary | ICD-10-CM

## 2019-06-04 ASSESSMENT — MIFFLIN-ST. JEOR: SCORE: 1847.86

## 2019-06-04 ASSESSMENT — PATIENT HEALTH QUESTIONNAIRE - PHQ9: SUM OF ALL RESPONSES TO PHQ QUESTIONS 1-9: 1

## 2019-06-04 NOTE — PROGRESS NOTES
Female Physical Note          HPI         Concerns today: Patient requesting letters of recommendation for gender alignment surgery.  Discussed extensively that at this time WPATH standards states that the letters of recommendation must be provided by mental health provider/therapist and no longer by a medical provider.  Patient further investigated this during visit and noted that surgeon she desires to see in [place] is following WPATH standard discussed and patient was provided a list of community therapists whom she will be able to see for support.    Patient Active Problem List   Diagnosis     Blood clot in vein     Psychiatric disorder     Gender dysphoria in adult     Acute psychosis (H)       Past Medical History:   Diagnosis Date     Blood clot in vein     Patient had a blood clot after surgery of left knee 4/2014 (occured 4 days after surgery).  Patient took warfarin for 6 months.     Uncomplicated asthma       Family History   Problem Relation Age of Onset     Lung Cancer Maternal Grandmother      Parkinsonism Paternal Grandfather      Dementia Paternal Grandfather      Autoimmune Disease Sister        Patient's past medical history, family history, social history, medication and allergies are updated in the chart.         Review of Systems:     Review of Systems:  CONSTITUTIONAL: NEGATIVE for fever, chills  INTEGUMENTARY/SKIN: NEGATIVE for worrisome rashes, moles or lesions  EYES: NEGATIVE for vision changes or irritation  ENT/MOUTH: NEGATIVE for ear, mouth and throat problems  RESP: NEGATIVE for significant cough or SOB  BREAST: NEGATIVE for masses, tenderness or discharge  CV: NEGATIVE for chest pain, palpitations or peripheral edema  GI: NEGATIVE for nausea, abdominal pain, heartburn, or change in bowel habits  : NEGATIVE for frequency, dysuria, or hematuria  MUSCULOSKELETAL: NEGATIVE for significant arthralgias or myalgia  NEURO: NEGATIVE for weakness, dizziness or paresthesias  ENDOCRINE:  NEGATIVE for temperature intolerance, skin/hair changes  HEME/ALLERGY: NEGATIVE for bleeding problems  PSYCHIATRIC: NEGATIVE for changes in mood or affect    Sleep:   Do you snore most or the night (as reported by a family member)? No  Do you feel sleepy or extremely tired during most of the day? No           Social History     Social History     Socioeconomic History     Marital status: Single     Spouse name: Not on file     Number of children: Not on file     Years of education: Not on file     Highest education level: Not on file   Occupational History     Not on file   Social Needs     Financial resource strain: Not on file     Food insecurity:     Worry: Not on file     Inability: Not on file     Transportation needs:     Medical: Not on file     Non-medical: Not on file   Tobacco Use     Smoking status: Never Smoker     Smokeless tobacco: Never Used   Substance and Sexual Activity     Alcohol use: Yes     Drug use: No     Comment: canabis lsd mushrooms      Sexual activity: Never   Lifestyle     Physical activity:     Days per week: Not on file     Minutes per session: Not on file     Stress: Not on file   Relationships     Social connections:     Talks on phone: Not on file     Gets together: Not on file     Attends Gnosticist service: Not on file     Active member of club or organization: Not on file     Attends meetings of clubs or organizations: Not on file     Relationship status: Not on file     Intimate partner violence:     Fear of current or ex partner: Not on file     Emotionally abused: Not on file     Physically abused: Not on file     Forced sexual activity: Not on file   Other Topics Concern     Not on file   Social History Narrative     Not on file       Marital Status:Single  Who lives in your household? 3 room mates     Has anyone hurt you physically, for example by pushing, hitting, slapping or kicking you or forcing you to have sex? Denies  Do you feel threatened or controlled by a partner,  "ex-partner or anyone in your life? Denies    Sexual Health     Sexual concerns: No   STI History: Neg  Pregnancy History: No shabnam uterus   LMP: no shabnam uterus  Last pap: no shabnam cervix    Recommended Screening     No outstanding family history or ongoing concerns that require screening.         Physical Exam:     Vitals: /69   Pulse 75   Temp 97.5  F (36.4  C) (Oral)   Resp 16   Ht 1.795 m (5' 10.67\")   Wt 83.1 kg (183 lb 3.2 oz)   SpO2 97%   BMI 25.79 kg/m    BMI= Body mass index is 25.79 kg/m .     GENERAL: healthy, alert and no distress  EYES: Eyes grossly normal to inspection, extraocular movements - intact, and PERRL  HENT: ear canals- normal; TMs- normal; Nose- normal; Mouth- no ulcers, no lesions  NECK: no tenderness, no adenopathy, no asymmetry, no masses, no stiffness; thyroid- normal to palpation  RESP: lungs clear to auscultation - no rales, no rhonchi, no wheezes  CV: regular rates and rhythm, normal S1 S2, no S3 or S4 and no murmur, no click or rub -  ABDOMEN: soft, no tenderness, no  hepatosplenomegaly, no masses, normal bowel sounds  MS: extremities- no gross deformities noted, no edema  SKIN: no suspicious lesions, no rashes  NEURO: strength and tone- normal, sensory exam- grossly normal, mentation- intact, speech- normal, reflexes- symmetric  PSYCH: Alert and oriented times 3; speech- coherent , normal rate and volume; able to articulate logical thoughts, able to abstract reason, no tangential thoughts, no hallucinations or delusions, affect- flat  LYMPHATICS: ant. cervical- normal, post. cervical- normal, supraclavicular- normal      Assessment and Plan     Janey Givens is a 25 y/o transgender female who was seen today for letter request and physical.    Routine history and physical examination of adult  No acute findings or needs to screening. Continue with annual wellness visit.     Gender dysphoria in adult  As discussed with patient above. Writer unable to provider letters of " support as they no longer meet the WPATH standards. She was provided an extensive list of the community therapists with LGBTQ care. Encouraged patient to call offices to see if provider is able to writer letter and if office is covered by current health insurance.       1. Health Care Maintenance: Normal Physical Exam      1. Routine follow up in one year.  2.Contraception: condoms if needed    Options for treatment and follow-up care were reviewed with the patient . Janey Timmonsehr and/or guardian engaged in the decision making process and verbalized understanding of the options discussed and agreed with the final plan.    Ariana Andrea MD  [facility] Resident PGY-2  [phone]    Those present during this appointment were: patient and myself

## 2019-06-06 ASSESSMENT — ANXIETY QUESTIONNAIRES
7. FEELING AFRAID AS IF SOMETHING AWFUL MIGHT HAPPEN: SEVERAL DAYS
6. BECOMING EASILY ANNOYED OR IRRITABLE: NOT AT ALL
GAD7 TOTAL SCORE: 2
2. NOT BEING ABLE TO STOP OR CONTROL WORRYING: NOT AT ALL
1. FEELING NERVOUS, ANXIOUS, OR ON EDGE: NOT AT ALL
5. BEING SO RESTLESS THAT IT IS HARD TO SIT STILL: SEVERAL DAYS
3. WORRYING TOO MUCH ABOUT DIFFERENT THINGS: NOT AT ALL

## 2019-06-06 ASSESSMENT — PATIENT HEALTH QUESTIONNAIRE - PHQ9
SUM OF ALL RESPONSES TO PHQ QUESTIONS 1-9: 0
5. POOR APPETITE OR OVEREATING: NOT AT ALL

## 2019-06-07 ASSESSMENT — ANXIETY QUESTIONNAIRES: GAD7 TOTAL SCORE: 2

## 2019-06-08 NOTE — PROGRESS NOTES
[facility] NAVIGATE Program Treatment Plan Summary  A Part of the [facility] First Episode of Psychosis Program     NAVIGATE Enrollee: Janey Givens  /Age:  1995 (24 year old)  MRN: [id number]     Date of Initial Service: 18  Date of INTIAL Treatment Plan: 18   Last Review/Update Date:  18 (due to treatment gaps)  90-Day Review Date: 19     The following represents REVIEWED treatment plan.     1. DSM-V Diagnosis (include numeric code)  Schizophrenia, 295.90 (F20.9)     2. Current symptoms and circumstances that substantiate the diagnosis     Janey has a history of psychosis (delusions, thought broadcasting, odd beliefs per family/friends, auditory hallucinations and negative symptoms (diminished emotional expression and anhedonia)). She presents with current symptoms of psychosis (delusions, thought broadcasting and auditory hallucinations).      3. How symptoms and/or behaviors are affecting level of function     Janey s systems are impacting functioning with respect to social relationships and academics.     4. Risk Assessment:  Suicide:  Assessed Level of Immediate Risk: Low  Ideation: No  Plan:  No  Means: No  Intent: No     Homicide/Violence:  Assessed Level of Immediate Risk: Low  Ideation: No  Plan: No  Means: No  Intent: No     5. Medications         Current Outpatient Medications   Medication     ARIPiprazole (ABILIFY) 10 MG tablet     citalopram (CELEXA) 10 MG tablet     estradiol (VIVELLE-DOT) 0.1 MG/24HR bi-weekly patch     Melatonin 10 MG TABS tablet     progesterone (PROMETRIUM) 200 MG capsule     spironolactone (ALDACTONE) 100 MG tablet     zolpidem (AMBIEN) 5 MG tablet      No current facility-administered medications for this visit.          6. Strengths      Medication adherence  Supportive friends, family, recovery environment  Bravery & Valor     Capacity to love and be loved    Caution, Prudence, & Discretion    Creativity, Ingenuity, & Originality    Curiosity     Honest, Authentic, Genuine    Humor & Playfulness   Industry, diligence, & perseverance    Judgment, critical thinking, & open-mindedness    Love of learning        7. Barriers & Suicide Risk Factors      Coping, limited to no coping strategies  Substance use  Grandiose or Persecutory Delusions  Isolation/Reduced Supervision  Symptoms of psychosis, positive (delusions and/or hallucinations)  Symptoms of psychosis, negative (flat affect, avolition, anhedonia, alogia, and/or apathy)     8. Treatment Domains and Goals          Domain 1: Illness Management & Recovery  Identify and engage possible areas of improvement related to medication optimization and psychosis (delusions and auditory hallucinations) and ability to management illness.      Measurable Objectives Interventions Target Dates & Discharge Criteria   Medication Objectives      -Take medications as prescribed and have reduced frequency and severity of symptoms  -Learn and implement strategies for overcoming barriers to taking medication    Medication Management  -Prescribe and monitor medications  -Monitor and treat side effects  -Psychoeducation     IRT/Psychotherapy  -Psychoeducation  -Motivation interviewing  -CBT  -Behavioral activation  -Mindfulness     Family Therapy  -Psychoeducation  -Motivational interviewing  -Behavioral family therapy  -CBT  -Behavioral activation     Case Management  -NA or None    Target date:   12 months from 6/14/19     Discharge criteria:  Marked and sustained symptom improvement             Individual s Objectives    -Demonstrate understanding of psychosis (delusions and auditory hallucinations) in the context of self with respect to symptoms, causes, course, medications and the impact of stress  -Learn at least 2 coping strategies to successfully target current symptoms  -Demonstrate understanding for how substance use impacts symptoms, identify stage of change, and experiment with reduced use or abstinence from all  illicit substances   -Learn strategies to build positive emotions and facilitate resiliency   -Build client build resiliency through the skills of gratitude, savoring, active/constructive communication, and practicing acts of kindness.  -Develop and implement a relapse prevention plan including identification of warning signs, triggers, coping mechanisms, and how other persons can be supportive if symptoms increase or reemerge   -Process the psychotic episode by demonstrating understanding of how the episode impacted self, identifying positive coping strategies and resiliency used during that time, challenging self-stigmatizing beliefs, and developing a positive attitude towards facing future life challenges  -Identify primary styles of thinking, and demonstrate understanding of and use cognitive restructuring to successfully deal with negative feelings  -Identify persistent symptoms that interfere with activities and/or enjoyment and successfully implement two coping strategies to reduce symptoms severity    Medication Management  -Prescribe and monitor medications  -Monitor and treat side effects  -Psychoeducation     IRT/Psychotherapy  -Psychoeducation  -Motivation interviewing  -CBT  -Behavioral activation  -Mindfulness     Family Therapy  -Psychoeducation  -Motivational interviewing  -Behavioral family therapy  -CBT  -Behavioral activation     Case Management  -NA or None    Target date:   12 months from 6/14/19     Discharge criteria:  Marked and sustained symptom improvement      Janey demonstrates understanding of mental illness      Janey successfully implements strategies to cope with stressors and/or symptoms to mitigate risk for increase in symptom severity or relapse            Support System Objectives     -Supports, including family members, verbally reinforce the client's active attempts to build self-esteem and rapport   -Verbalize understanding of the client's long-term and short-term  goals  -Demonstrate understanding of psychosis (delusions and auditory hallucinations) in the context of the client with respect to symptoms, causes, course, medications and the impact of stress  -Learn the client's signs of stress and possible coping skills  -Learn skills that strengthen and support the client's positive behavior change  -Learn strategies to build positive emotions and facilitate resiliency including use of a resiliency story  -Develop and implement a relapse prevention plan including identification of warning signs, triggers, coping mechanisms, and how other persons can be supportive if symptoms increase or reemerge     Medication Management  -Prescribe and monitor medications  -Monitor and treat side effects  -Psychoeducation     IRT/Psychotherapy  -Psychoeducation  -Motivation interviewing  -CBT  -Behavioral activation  -Mindfulness     Family Therapy  -Psychoeducation  -Motivational interviewing  -Behavioral family therapy  -CBT  -Behavioral activation     Case Management  -NA or None    Target date:   12 months from 6/14/19     Discharge criteria:  Support system demonstrates understanding of mental illness      Support system successfully implements strategies to assist Janey cope with stressors and/or symptoms to mitigate risk for increase in symptom severity or relapse                  Domain 2: Health & Basic Living Needs  Identify and engage possible areas of improvement related to basic needs being met and maintaining or improving overall health and well-being      Measurable Objectives Interventions Discharge Criteria   -Learn and implement at least 2 skills to promote health sleep  -Establish and adhere to a plan to increase physical exercise    Medication Management  -Prescribe and monitor medications  -Monitor and treat side effects  -Psychoeducation     IRT/Psychotherapy  -Psychoeducation  -Motivation interviewing  -CBT  -Behavioral activation  -Mindfulness     Family  Therapy  -Psychoeducation  -Motivational interviewing  -Behavioral family therapy  -CBT  -Behavioral activation     Supported Education & Employment  -Motivational interviewing  -Individualized placement and support   -Behavioral Activation     Case Management  -NA or None    Target date:   12 months from 6/14/19     Discharge criteria:  Janey, her supports and treatment team report no unmet health and basic living needs                 Domain 3: Family & Other Supports  Identify and engage possible areas of improvement related to engaging family, friends and other supports      Measurable Objectives Interventions Discharge Criteria   -Improve the quality of relationships by developing skills to better understand other people, communicate more effectively, manage disclosure, and understand social cues  -Increase communication with the parents, resulting in feeling attended to and understood  -Supports teach and reinforce healthy social skills and attitudes   -Learn and implement problem-solving and/or conflict resolution skills to manage personal and interpersonal problems constructively  -Identify and implement two approaches to how strengths and interests can be used to initiate social contacts and develop peer friendships  -Learn and implement calming and coping strategies to manage anxiety symptoms and focus attention usefully during moments of social anxiety    -Renew two old fun activities or develop two new fun activity    Medication Management  -Prescribe and monitor medications  -Monitor and treat side effects  -Psychoeducation     IRT/Psychotherapy  -Psychoeducation  -Motivation interviewing  -CBT  -Behavioral activation  -Mindfulness     Family Therapy  -Psychoeducation  -Motivational interviewing  -Behavioral family therapy  -CBT  -Behavioral activation     Supported Education & Employment  -Motivational interviewing  -Individualized placement and support   -Behavioral Activation     Case  Management  -NA or None    Target date:   12 months from 6/14/19     Discharge criteria:  Janey and her support system report feeling equipped with the necessary skills to communicate and problem solve during times of disagreement     Conflict with supports and peers are resolved constructively and consistently over time; 6 months                 Domain 4: Academic and Employment  Identify and engage possible areas of improvement relates to education and employment      Measurable Objectives Interventions Discharge Criteria   -Stay current with schoolwork, completing assignments and interacting appropriately with peers and teachers   -Increase participate in school-related activities   -Family members provide praise, encouragement for the client's attempts and successes at school/work    Medication Management  -Prescribe and monitor medications  -Monitor and treat side effects  -Psychoeducation     IRT/Psychotherapy  -Psychoeducation  -Motivation interviewing  -CBT  -Behavioral activation     Family Therapy  -Psychoeducation  -Motivational interviewing  -Behavioral family therapy  -CBT  -Behavioral activation     Supported Education & Employment  -Motivational interviewing  -Individualized placement and support   -Behavioral Activation     Case Management  -NA or None    Target date:   12 months from 6/14/19     Discharge criteria:  Work and school goals are achieved and maintained without follow along NAVIGATE Supported Education and Employment supports for 6 months            9. Frequency of sessions and expected duration of treatment:   1-4x per month Medication Management with Prescriber ongoing  6 months of weekly IRT/Individual Psychotherapy followed by 12-18 months of biweekly or monthly IRT  2-4x per month Supported Education and Employment Services for 6 months  2-4x per month Family Education and Support Services for 6 months     10. Participants in therapy plan:   Janey MORALES Team:    -Family Clinician: Nia Fernandez LGRENAE  -IRT Clinician: MARCELL See [facility]  -SEE: MAKSIM Heredia     See scanned document for Acknowledgement of Current Treatment Plan     Regulatory Guidelines for Updating Treatment Plan  [place] Medical Assistance: Reviewed & signed at least every 90 days  Medicare:  Update per policy

## 2019-06-18 ENCOUNTER — OFFICE VISIT (OUTPATIENT)
Dept: PSYCHIATRY | Facility: CLINIC | Age: 24
End: 2019-06-18
Payer: COMMERCIAL

## 2019-06-18 DIAGNOSIS — F20.9 SCHIZOPHRENIA, UNSPECIFIED TYPE (H): Primary | ICD-10-CM

## 2019-06-18 ASSESSMENT — ANXIETY QUESTIONNAIRES
6. BECOMING EASILY ANNOYED OR IRRITABLE: NOT AT ALL
GAD7 TOTAL SCORE: 0
5. BEING SO RESTLESS THAT IT IS HARD TO SIT STILL: NOT AT ALL
3. WORRYING TOO MUCH ABOUT DIFFERENT THINGS: NOT AT ALL
7. FEELING AFRAID AS IF SOMETHING AWFUL MIGHT HAPPEN: NOT AT ALL
2. NOT BEING ABLE TO STOP OR CONTROL WORRYING: NOT AT ALL
1. FEELING NERVOUS, ANXIOUS, OR ON EDGE: NOT AT ALL

## 2019-06-18 ASSESSMENT — PATIENT HEALTH QUESTIONNAIRE - PHQ9
5. POOR APPETITE OR OVEREATING: NOT AT ALL
SUM OF ALL RESPONSES TO PHQ QUESTIONS 1-9: 2

## 2019-06-18 NOTE — PROGRESS NOTES
NAVIGSATINDER Clinician Contact & Progress Note  For Individual Resiliency Training (IRT)  A Part of the [facility] First Episode of Psychosis Program    NAVIGATE Enrollee: Janey Givens (1995)     MRN: [id number]  Date:  6/03/19  Diagnosis: Schizophrenia, unspecified (F20.9)  Clinician: CARMEN Individual Resiliency Trainer, LEEROY See     1. Type of contact: (majority of time spent)  IRT Session    2. People present:   Writer  Client: Janey Givens    3. Total number of persons who participated in contact: 2, including writer    4. Length of Actual Contact: Start Time: 11:00; End Time: 12:00   Traveled?    No     5. Location of contact:  Psychiatry Clinic, [place]    6. Did the client complete the home practice option(s) from the previous session: Partially Completed    7. Motivational Teaching Strategies:  Connect info and skills with personal goals  Promote hope and positive expectations  Explore pros and cons of change  Re-frame experiences in positive light    8. Educational Teaching Strategies:  Review of written material/education  Relate information to client's experience  Ask questions to check comprehension  Break down information into small chunks  Adopt client's language     9. CBT Teaching Strategies:  Reinforcement and shaping (positive feedback for steps towards goals and gains in knowledge & skills)  Coping skills training (review current coping skills, increase currently used skills and plan home practice)    10. IRT Module(s) Addressed:  Module 7 - Building a Bridging to Your Goals  Module 8 - Dealing with Negative Feelings  Module 9 - Coping with Symptoms    11. Techniques utilized:   [place] announced at beginning of session  Review of homework  Review of goal  Review of previous meeting  Present new material  Problem-solving practice  Help client choose a home practice option  Summarize progress made in current session    12. Mental Status Exam:    Alertness: oriented and  drowsy  Appearance: casually groomed  Behavior/Demeanor: cooperative, pleasant and calm, with good  eye contact   Speech: normal and regular rate and rhythm  Language: intact. Preferred language identified as English.  Psychomotor: normal or unremarkable  Mood: depressed  Affect: appropriate; was congruent to mood; was congruent to content  Thought Process/Associations: unremarkable  Thought Content:  Reports preoccupations;  Denies suicidal and violent ideation  Perception:  Reports auditory hallucinations;  Denies visual hallucinations  Insight: adequate  Judgment: adequate for safety  Cognition: does  appear grossly intact; formal cognitive testing was not done  Suicidal ideation: denies SI, denies intent,  and denies plan  Homicidal Ideation: denies    13. Assessment/Progress Note:     This writer met with Katie for a follow-up IRT session. Katie mentioned she has been unable to take two of her medications the past 4 days due to not having a refill. This writer will coordinate with Leidy Can to obtain refills. Katie has not used marijuana for the past 5 weeks, as her sleep has improved significantly with Ambien. She stated her stress levels have also decreased after her college course load reduced for the summer. This writer praised Katie for remaining sober during this time. She plans to take the GRE on 7/11 and has been studying. This writer encouraged Katie to schedule a time with Ame (SEE) to discuss accommodations. Katie reported an increase in auditory hallucinations during a music festival recently. This writer helped Katie to identify triggers/stressors at this event and develop strategies to cope. This writer and Katie then reviewed her progress. She expressed pride in being able to finish a college semester without an episode of psychosis. She noted this has not occurred in 2 years; she believes medications and social support have been the most helpful. This writer and Katie then updated her treatment plan  "goals. These include: start vegan diet, run 2x per week, establish a sleep routine, process the episode, make a new friend, get an A in summer class, get a 165/145/4 on GRE.     14. Plan/Referrals:      This writer will continue with Module 5: Processing the Episode.    This writer will continue to consult with the team.    Billing for \"Interactive Complexity\"?    No      LEEROY See    NAVIGATE Individual Resiliency Trainer    Attestation:    I did not see this patient directly. This patient is discussed with me in individual clinical social work supervision, and I agree with the plan as documented.     MARCELL Morales, LICSW, July 10, 2019  "

## 2019-06-18 NOTE — PROGRESS NOTES
NAVIGATE SEE Progress Note   For Supported Employment & Education    NAVIGATE Enrollee: Janey Givens (1995)     MRN: [id number]  Date:  6/18/2019    Clinician: ARELYATE Supported Employment & , Ame Borrero    1. Client Status Update:   Janey Givens is interested in education (Client completed a class, term or semester)    2. People present:   SEE/Writer  Client: Janey Givens    3. Total number of persons who participated in contact: (do not count yourself/SEE) 1    4. Length of Actual Contact: 30 minutes   Traveled? No    5. Location of contact:  Psychiatry Clinic, [place]    6. Brief description of session, contact, or client status (include: strategies, interventions, client reaction to contact, next steps, etc)     and Katie met per her request to discuss accommodations on the GRE exam. Upon review, we found that in order to obtain accommodations, Katie would need to request them at least 6 weeks in advance of signing up for the exam. Katie already has a scheduled time so she decided that she would try and take the GRE without accommodations first and then set them up in case she doesn't get the scores she is looking for. She has been studying and preparing for the test by taking practice exams, reading practice GRE books, etc. She is most anxious about the writing section and doesn't feel like she has the necessary resources to practice this skill. Jesser encouraged her to meet with a writing fellow, ask a prof for assistance or the writing center, hiring a personal  for writing, and practicing writing prompts at least once a week.    will work on obtaining letter from her doctor to prepare for her second GRE exam. Will continue to meet as needed.    7. Completion of mutually agreed upon client task from previous meeting:  Completed    8. Orientation and Treatment Planning:  Pursuing current SEE goals    9. Assessment:  Assessing client's need for  follow-along supports    10. Placement:  Not Applicable    11. Follow Along Supports: (for clients who are working or attending school)   Education (Assisting with requesting accommodations, Reviewing coping skills for symptoms for school and Developing or using coping strategies for cognitive difficulties for school)    12. Mutually agreed upon client task for next meeting:     See above    13. Next Meeting Scheduled for: sweetie MCGEEATE Supported Employment &

## 2019-06-18 NOTE — PROGRESS NOTES
NAVIGSATINDER Clinician Contact & Progress Note  For Individual Resiliency Training (IRT)  A Part of the [facility] First Episode of Psychosis Program    NAVIGATE Enrollee: Janey Givens (1995)     MRN: [id number]  Date:  6/18/19  Diagnosis: Schizophrenia, unspecified (F20.9)  Clinician: CARMEN Individual Resiliency Trainer, LEEROY See     1. Type of contact: (majority of time spent)  IRT Session    2. People present:   Writer  Client: Janey Givens    3. Total number of persons who participated in contact: 2, including writer    4. Length of Actual Contact: Start Time: 11:00; End Time: 12:00   Traveled?    No     5. Location of contact:  Psychiatry Clinic, [place]    6. Did the client complete the home practice option(s) from the previous session: Partially Completed    7. Motivational Teaching Strategies:  Connect info and skills with personal goals  Promote hope and positive expectations  Explore pros and cons of change  Re-frame experiences in positive light    8. Educational Teaching Strategies:  Review of written material/education  Relate information to client's experience  Ask questions to check comprehension  Break down information into small chunks  Adopt client's language     9. CBT Teaching Strategies:  Reinforcement and shaping (positive feedback for steps towards goals and gains in knowledge & skills)  Social skills training (rationale for skill and plan home practice)    10. IRT Module(s) Addressed:  Module 3 - Education about Psychosis  Module 11 - Having Fun and Developing Good Relationships    11. Techniques utilized:   [place] announced at beginning of session  Review of homework  Review of goal  Review of previous meeting  Present new material  Problem-solving practice  Help client choose a home practice option  Summarize progress made in current session    12. Mental Status Exam:    Alertness: alert  and oriented  Appearance: casually groomed  Behavior/Demeanor: cooperative and  pleasant, with good  eye contact   Speech: normal and regular rate and rhythm  Language: intact. Preferred language identified as English.  Psychomotor: normal or unremarkable  Mood: depressed  Affect: appropriate; was congruent to mood; was congruent to content  Thought Process/Associations: unremarkable  Thought Content:  Reports preoccupations;  Denies suicidal and violent ideation and delusions  Perception:  Reports none;  Denies auditory hallucinations and visual hallucinations  Insight: good  Judgment: good  Cognition: does  appear grossly intact; formal cognitive testing was not done  Suicidal ideation: denies SI, denies intent,  and denies plan  Homicidal Ideation: denies    13. Assessment/Progress Note:     This writer met with Katie for a follow-up IRT session. Katie reported no auditory hallucinations or delusions since the last session. She stated she has stopped using marijuana, as she has found alternatives to help with sleep. This writer provided education regarding the potential parallel between decreased substance use and lowered symptoms; she agreed. Katie has been studying for the GRE and plans to complete it in July. This writer encouraged her to brainstorm study tips and potential accommodations with Ame (SEE). Katie is considering future options for graduate programs. She has been exploring schools in [place]. She has been more social this week with friends and would like to keep the social support into the school year. This writer praised Katie for initiating contact with friends and balancing school/personal life. This writer and Katie brainstormed potential opportunities to meet new people. She plans to look into an O-STEM group, specifically for individuals studying science and math who also identify as LGBTQA+. Katie is currently focused on school, but would like to establish a weekly routine for running. Katie is interested in further processing emotions associated with her psychosis episode  "during the next session.     14. Plan/Referrals:     This writer will begin Module 5: Processing the Episode during the next visit.    This writer will continue to consult with the team.    Billing for \"Interactive Complexity\"?    No      LEEROY See    NAVIGATE Individual Resiliency Trainer    Attestation:    I did not see this patient directly. This patient is discussed with me in individual clinical social work supervision, and I agree with the plan as documented.     MARCELL Morales, LICSW, July 15, 2019  "

## 2019-06-19 ASSESSMENT — ANXIETY QUESTIONNAIRES: GAD7 TOTAL SCORE: 0

## 2019-06-24 NOTE — PROGRESS NOTES
[facility] NAVIGATE Program Treatment Plan Summary  A Part of the [facility] First Episode of Psychosis Program     NAVIGATE Enrollee: Janey Givens  /Age:  1995 (24 year old)  MRN: [id number]     Date of Initial Service: 18  Date of INTIAL Treatment Plan: 18   Last Review/Update Date:  3/14/19  90-Day Review Date: 9/3/19     The following represents REVIEWED treatment plan.     1. DSM-V Diagnosis (include numeric code)  Schizophrenia, 295.90 (F20.9)     2. Current symptoms and circumstances that substantiate the diagnosis     Janey has a history of psychosis (delusions, thought broadcasting, odd beliefs per family/friends, auditory hallucinations and negative symptoms (diminished emotional expression and anhedonia)). She presents with current symptoms of psychosis (delusions, thought broadcasting and auditory hallucinations).      3. How symptoms and/or behaviors are affecting level of function     Janey s systems are impacting functioning with respect to social relationships and academics.     4. Risk Assessment:  Suicide:  Assessed Level of Immediate Risk: Low  Ideation: No  Plan:  No  Means: No  Intent: No     Homicide/Violence:  Assessed Level of Immediate Risk: Low  Ideation: No  Plan: No  Means: No  Intent: No     5. Medications         Current Outpatient Medications   Medication     ARIPiprazole (ABILIFY) 10 MG tablet     citalopram (CELEXA) 10 MG tablet     estradiol (VIVELLE-DOT) 0.1 MG/24HR bi-weekly patch     Melatonin 10 MG TABS tablet     progesterone (PROMETRIUM) 200 MG capsule     spironolactone (ALDACTONE) 100 MG tablet     zolpidem (AMBIEN) 5 MG tablet      No current facility-administered medications for this visit.          6. Strengths      Medication adherence  Supportive friends, family, recovery environment  Bravery & Valor     Capacity to love and be loved    Caution, Prudence, & Discretion    Creativity, Ingenuity, & Originality    Curiosity    Honest, Authentic, Genuine     Humor & Playfulness   Industry, diligence, & perseverance    Judgment, critical thinking, & open-mindedness    Love of learning        7. Barriers & Suicide Risk Factors      Coping, limited to no coping strategies  Isolation/Reduced Supervision  Symptoms of psychosis, positive (delusions and/or hallucinations)  Symptoms of psychosis, negative (flat affect, avolition, anhedonia, alogia, and/or apathy)     8. Treatment Domains and Goals          Domain 1: Illness Management & Recovery  Identify and engage possible areas of improvement related to medication optimization and psychosis (delusions and auditory hallucinations) and ability to management illness.      Measurable Objectives Interventions Target Dates & Discharge Criteria   Medication Objectives      -Take medications as prescribed and have reduced frequency and severity of symptoms  -Learn and implement strategies for overcoming barriers to taking medication    Medication Management  -Prescribe and monitor medications  -Monitor and treat side effects  -Psychoeducation     IRT/Psychotherapy  -Psychoeducation  -Motivation interviewing  -CBT  -Behavioral activation  -Mindfulness     Family Therapy  -Psychoeducation  -Motivational interviewing  -Behavioral family therapy  -CBT  -Behavioral activation     Case Management  -NA or None    Target date:   12 months from 6/3/19     Discharge criteria:  Marked and sustained symptom improvement             Individual s Objectives    -Learn at least 2 coping strategies to successfully target current symptoms  -Demonstrate understanding for how substance use impacts symptoms, identify stage of change, and experiment with reduced use or abstinence from all illicit substances   -Process the psychotic episode by demonstrating understanding of how the episode impacted self, identifying positive coping strategies and resiliency used during that time, challenging self-stigmatizing beliefs, and developing a positive attitude  towards facing future life challenges  -Identify primary styles of thinking, and demonstrate understanding of and use cognitive restructuring to successfully deal with negative feelings  -Identify persistent symptoms that interfere with activities and/or enjoyment and successfully implement two coping strategies to reduce symptoms severity    Medication Management  -Prescribe and monitor medications  -Monitor and treat side effects  -Psychoeducation     IRT/Psychotherapy  -Psychoeducation  -Motivation interviewing  -CBT  -Behavioral activation  -Mindfulness     Family Therapy  -Psychoeducation  -Motivational interviewing  -Behavioral family therapy  -CBT  -Behavioral activation     Case Management  -NA or None    Target date:   12 months from 6/3/19     Discharge criteria:  Marked and sustained symptom improvement      Janey demonstrates understanding of mental illness      Janey successfully implements strategies to cope with stressors and/or symptoms to mitigate risk for increase in symptom severity or relapse            Support System Objectives     -Supports, including family members, verbally reinforce the client's active attempts to build self-esteem and rapport   -Verbalize understanding of the client's long-term and short-term goals  -Learn the client's signs of stress and possible coping skills  -Learn skills that strengthen and support the client's positive behavior change  -Learn strategies to build positive emotions and facilitate resiliency including use of a resiliency story    Medication Management  -Prescribe and monitor medications  -Monitor and treat side effects  -Psychoeducation     IRT/Psychotherapy  -Psychoeducation  -Motivation interviewing  -CBT  -Behavioral activation  -Mindfulness     Family Therapy  -Psychoeducation  -Motivational interviewing  -Behavioral family therapy  -CBT  -Behavioral activation     Case Management  -NA or None    Target date:   12 months from  6/3/19     Discharge criteria:  Support system demonstrates understanding of mental illness      Support system successfully implements strategies to assist Janey cope with stressors and/or symptoms to mitigate risk for increase in symptom severity or relapse                  Domain 2: Health & Basic Living Needs  Identify and engage possible areas of improvement related to basic needs being met and maintaining or improving overall health and well-being      Measurable Objectives Interventions Discharge Criteria   -Learn and implement at least 2 skills to promote health sleep  -Establish and adhere to a plan to increase physical exercise  -Engage in a vegan lifestyle, to improve nutrition    Medication Management  -Prescribe and monitor medications  -Monitor and treat side effects  -Psychoeducation     IRT/Psychotherapy  -Psychoeducation  -Motivation interviewing  -CBT  -Behavioral activation  -Mindfulness     Family Therapy  -Psychoeducation  -Motivational interviewing  -Behavioral family therapy  -CBT  -Behavioral activation     Supported Education & Employment  -Motivational interviewing  -Individualized placement and support   -Behavioral Activation     Case Management  -NA or None    Target date:   12 months from 6/3/19     Discharge criteria:  Janey, her supports and treatment team report no unmet health and basic living needs                 Domain 3: Family & Other Supports  Identify and engage possible areas of improvement related to engaging family, friends and other supports      Measurable Objectives Interventions Discharge Criteria   -Improve the quality of relationships by developing skills to better understand other people, communicate more effectively, manage disclosure, and understand social cues  -Supports teach and reinforce healthy social skills and attitudes   -Learn and implement problem-solving and/or conflict resolution skills to manage personal and interpersonal problems  constructively  -Identify and implement two approaches to how strengths and interests can be used to initiate social contacts and develop peer friendships  -Learn and implement calming and coping strategies to manage anxiety symptoms and focus attention usefully during moments of social anxiety    -Renew two old fun activities or develop two new fun activity    Medication Management  -Prescribe and monitor medications  -Monitor and treat side effects  -Psychoeducation     IRT/Psychotherapy  -Psychoeducation  -Motivation interviewing  -CBT  -Behavioral activation  -Mindfulness     Family Therapy  -Psychoeducation  -Motivational interviewing  -Behavioral family therapy  -CBT  -Behavioral activation     Supported Education & Employment  -Motivational interviewing  -Individualized placement and support   -Behavioral Activation     Case Management  -NA or None    Target date:   12 months from 6/3/19     Discharge criteria:  Janey and her support system report feeling equipped with the necessary skills to communicate and problem solve during times of disagreement     Conflict with supports and peers are resolved constructively and consistently over time; 6 months                 Domain 4: Academic and Employment  Identify and engage possible areas of improvement relates to education and employment      Measurable Objectives Interventions Discharge Criteria   -Stay current with schoolwork, completing assignments and interacting appropriately with peers and teachers   -Increase participate in school-related activities   -Family members provide praise, encouragement for the client's attempts and successes at school/work    Medication Management  -Prescribe and monitor medications  -Monitor and treat side effects  -Psychoeducation     IRT/Psychotherapy  -Psychoeducation  -Motivation interviewing  -CBT  -Behavioral activation     Family Therapy  -Psychoeducation  -Motivational interviewing  -Behavioral family  therapy  -CBT  -Behavioral activation     Supported Education & Employment  -Motivational interviewing  -Individualized placement and support   -Behavioral Activation     Case Management  -NA or None    Target date:   12 months from 6/3/19     Discharge criteria:  Work and school goals are achieved and maintained without follow along NAVIGATE Supported Education and Employment supports for 6 months            9. Frequency of sessions and expected duration of treatment:   1-4x per month Medication Management with Prescriber ongoing  6 months of weekly IRT/Individual Psychotherapy followed by 12-18 months of biweekly or monthly IRT  2-4x per month Supported Education and Employment Services for 6 months  2-4x per month Family Education and Support Services for 6 months     10. Participants in therapy plan:   Janey Givens     NAVIGATE Team:   -Family Clinician: VERENICE Dash  -[facility]  -SEE: MAKSIM Heredia  -Prescriber: Dr. Nolasco     See scanned document for Acknowledgement of Current Treatment Plan     Regulatory Guidelines for Updating Treatment Plan  [place] Medical Assistance: Reviewed & signed at least every 90 days  Medicare:  Update per policy

## 2019-07-26 DIAGNOSIS — F64.0 GENDER DYSPHORIA IN ADULT: ICD-10-CM

## 2019-07-26 RX ORDER — ESTRADIOL 0.1 MG/D
2 FILM, EXTENDED RELEASE TRANSDERMAL
Qty: 16 PATCH | Refills: 2 | Status: SHIPPED | OUTPATIENT
Start: 2019-07-29 | End: 2019-08-01

## 2019-07-26 RX ORDER — SPIRONOLACTONE 100 MG/1
200 TABLET, FILM COATED ORAL DAILY
Qty: 60 TABLET | Refills: 2 | Status: SHIPPED | OUTPATIENT
Start: 2019-07-26 | End: 2019-08-01

## 2019-07-29 ENCOUNTER — TELEPHONE (OUTPATIENT)
Dept: FAMILY MEDICINE | Facility: CLINIC | Age: 24
End: 2019-07-29

## 2019-07-29 NOTE — TELEPHONE ENCOUNTER
PRIOR AUTHORIZATION FOR   Drug: Estradiol Patches  Insurance: [employer]  ID Number: [id number]  BIN Number: [id number]  N Number: [facility]  Group Number: [id number]  Phone Number: [phone]  MAXIMUM DAILY DOSE OF .[id number]    Please notify pharmacy if Prior Auth is approved or denied

## 2019-07-29 NOTE — LETTER
2019      Janey Givens  : 1995  MRN: [id number]    To Whom it May Concern:      Janey Givens is my patient. She was male sex assigned at birth but has been living as female and henceforth in this document will be referred to as female. She has been prescribed estradiol patches and oral spironolactone, for treatment of gender dysphoria. This is a diagnosis recognized to be successfully treated with feminizing hormone therapy with estrogen . It is recommended for her medical and psychological health that she restart should be able to continue her current dosage of estradiol, which is under the [facility] standards for care. Additionally patient has been on this current dosage since October with no acute concern. This therapy is necessary to support Janey's psychological wellbeing and gender identity, and provide permanent physical feminizing hormone therapy with estrogen .    This treatment is considered medically necessary.     I am a [place]-licensed family physician practicing at [facility], [facility] in [place]. I am experienced in evaluation and treatment of patients with gender dysphoria.     If you have any questions, please do not hesitate to contact me.          Respectfully,           Ariana Andrea MD

## 2019-08-01 ENCOUNTER — OFFICE VISIT (OUTPATIENT)
Dept: FAMILY MEDICINE | Facility: CLINIC | Age: 24
End: 2019-08-01
Payer: COMMERCIAL

## 2019-08-01 VITALS
HEART RATE: 68 BPM | HEIGHT: 71 IN | DIASTOLIC BLOOD PRESSURE: 68 MMHG | OXYGEN SATURATION: 100 % | BODY MASS INDEX: 24.69 KG/M2 | TEMPERATURE: 97.7 F | SYSTOLIC BLOOD PRESSURE: 104 MMHG | WEIGHT: 176.4 LBS

## 2019-08-01 DIAGNOSIS — F64.0 GENDER DYSPHORIA IN ADULT: ICD-10-CM

## 2019-08-01 RX ORDER — ESTRADIOL 0.1 MG/D
2 FILM, EXTENDED RELEASE TRANSDERMAL
Qty: 16 PATCH | Refills: 5 | Status: SHIPPED | OUTPATIENT
Start: 2019-08-01 | End: 2019-11-05

## 2019-08-01 RX ORDER — SPIRONOLACTONE 100 MG/1
200 TABLET, FILM COATED ORAL DAILY
Qty: 60 TABLET | Refills: 5 | Status: SHIPPED | OUTPATIENT
Start: 2019-08-01

## 2019-08-01 ASSESSMENT — MIFFLIN-ST. JEOR: SCORE: 1814.34

## 2019-08-01 NOTE — PROGRESS NOTES
APURVA Toth is a 24 year old individual that uses pronouns She/Her/Hers/Herself that presents today for follow up of:  feminizing hormone therapy. Gender identity: female    Any HRT concerns today?  no current concerns  Patient requesting 6-month refill of medications.  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  Patient is a student at the [employer].  Denies any masculinizing sent as was noted several months ago when she had an elevated testosterone.    On hormones?  YES        Due for labs?  No        Refills of meds needed?  Yes    Any other medical concerns today?  Discussed with patient's if she has been able to set up her letters of support.  Patient states that she has been.  Unsure at this time when she proceed with vaginoplasty.  ---    Past Surgical History:   Procedure Laterality Date     COSMETIC SURGERY      August 2018     KNEE SURGERY Left 2014       Patient Active Problem List   Diagnosis     Blood clot in vein     Psychiatric disorder     Gender dysphoria in adult     Acute psychosis (H)       Current Outpatient Medications   Medication Sig Dispense Refill     ARIPiprazole (ABILIFY) 10 MG tablet Take 10 mg with 2 mg for total daily dose of 12 mg 30 tablet 0     estradiol (VIVELLE-DOT) 0.1 MG/24HR bi-weekly patch Place 2 patches onto the skin twice a week 16 patch 5     spironolactone (ALDACTONE) 100 MG tablet Take 2 tablets (200 mg) by mouth daily 60 tablet 5       History   Smoking Status     Never Smoker   Smokeless Tobacco     Never Used          Allergies   Allergen Reactions     Banana Anaphylaxis     Throat swelling       Problem, Medication and Allergy Lists were reviewed and updated if needed..         Review of Systems:      General    Fat redistribution: YES    Weight change: YES HEENT    Voice change: not applicable     Cardiovascular (CV)    Chest Pains: no    Shortness of breath: no Chest    Decreased exercise tolerance:   "no    Breast changes/development: YES     Gastrointestinal (GI)    Abdominal pain: no    Change in appetite: no Skin    Softer skin: YES    Change in hair: YES     Genitourinary ()    Abnormal vaginal bleeding: not applicable     Decreased spontaneous erections: YES    Change in libido: YES    New sexual partners: no Musculoskeletal    Leg pain or swelling: no     Psychiatric (Psych)    Depression: Stable    Anxiety/Panic: Stable    Mood:  \"okay\"                    Physical Exam:     Vitals:    08/01/19 1425   BP: 104/68   Pulse: 68   Temp: 97.7  F (36.5  C)   TempSrc: Oral   SpO2: 100%   Weight: 80 kg (176 lb 6.4 oz)   Height: 1.791 m (5' 10.5\")     BMI= Body mass index is 24.95 kg/m .   Wt Readings from Last 10 Encounters:   08/01/19 80 kg (176 lb 6.4 oz)   06/04/19 83.1 kg (183 lb 3.2 oz)   05/07/19 83.2 kg (183 lb 6.4 oz)   04/24/19 84.3 kg (185 lb 12.8 oz)   04/03/19 85.1 kg (187 lb 9.6 oz)   04/02/19 82.6 kg (182 lb)   02/28/19 82.5 kg (181 lb 12.8 oz)   01/02/19 78 kg (172 lb)   12/20/18 80.7 kg (178 lb)   12/17/18 79.4 kg (175 lb)     Appearance: Female appearance and dress    GENERAL: healthy, alert and no distress  EYES: Eyes grossly normal to inspection  CV: regular rates and rhythm, normal S1 S2, no S3 or S4 and no murmur, no click or rub -  ABDOMEN: soft, no tenderness, no  hepatosplenomegaly, no masses, normal bowel sounds  MS: extremities normal- no gross deformities noted, no edema  SKIN: no suspicious lesions, no rashes  Psych: Alert and oriented times 3; coherent speech, normal rate and volume, able to articulate logical thoughts, able to abstract reason, no tangential thoughts, no hallucinations or delusions.   Affect: Restricted and mildly flat           Labs:   No new labs pending.    Assessment and Plan     Gender dysphoria in adult  Patient presents for general HRT follow-up.  No outstanding concerns for elevated testosterone as had been noted in the past.  Labs have otherwise been stable.  " Patient is appropriate for six-month refill at this time.  Encourage patient to follow-up in 6 months to see how she is doing as well as mental health checking.  She continues to be seen by psychiatry for schizophrenia.  She feels overall that has been stable.    Contraception:  not needed, no partner. Have discussed use of condoms if needed    Follow up:  Follow up in 6 months.  Results by mychart  Questions were elicited and answered.     Ariana Andrea MD  [facility] Resident PGY-3  [phone]    Those present during this appointment were: patient and myself

## 2019-08-01 NOTE — PROGRESS NOTES
Preceptor Attestation:   Patient seen and discussed with the resident. Assessment and plan reviewed with resident and agreed upon.   Supervising Physician:  Raymond Cope MD  [facility]

## 2019-08-02 NOTE — TELEPHONE ENCOUNTER
Central Prior Authorization Team   Phone: [phone]      PA Initiation    Medication: estradiol (VIVELLE-DOT) 0.1 MG/24HR bi-weekly patch- PA Pending  Insurance Company: [employer] [place] - Phone [phone] Fax [phone]  Pharmacy Filling the Rx: [facility] [address] [place]  Filling Pharmacy Phone: [phone]  Filling Pharmacy Fax: [phone]  Start Date: 8/5/2019

## 2019-08-09 NOTE — TELEPHONE ENCOUNTER
Per insurance, PA was received on 8/5/19, but started on 8/8/19. Review can take 4-5 calendar days.

## 2019-08-14 NOTE — TELEPHONE ENCOUNTER
PRIOR AUTHORIZATION DENIED    Medication: estradiol (VIVELLE-DOT) 0.1 MG/24HR bi-weekly patch- DENIED    Denial Date: 8/14/2019    Denial Rational:         Appeal Information:

## 2019-08-15 NOTE — TELEPHONE ENCOUNTER
Calls completed by provider to [facility] and [facility]. No peer to peer was available. Will begin appeal process with letter of medical necessity. Will update you when letter is available to be faxed. MARY Andrea

## 2019-08-15 NOTE — TELEPHONE ENCOUNTER
Prior Authorization:     Denied Reason: see chart    Alternatives: None available    Pharmacy notified.  Routing to MD    Please advise if you would like to move forward with the appeal process or plan to  prescribe an alternative medication. If Appeal is desired a letter of medical necessity with denial rationale is needed to start the appeal process.   Route to PA Pool when completed.    Kelly Boswell CMA on 8/15/2019 at 2:35 PM

## 2019-08-21 NOTE — TELEPHONE ENCOUNTER
I faxed the letter to [facility] at [phone]. Waiting approval.  Maru Antunez, CMA

## 2019-08-21 NOTE — TELEPHONE ENCOUNTER
Letter of medical necessity was written and given to the prior authorization Katie FLOWERS. Letter to be faxed to [facility]. -A> Emre

## 2019-09-19 ENCOUNTER — TELEPHONE (OUTPATIENT)
Dept: PSYCHIATRY | Facility: CLINIC | Age: 24
End: 2019-09-19

## 2019-10-11 DIAGNOSIS — F20.9 SCHIZOPHRENIA, UNSPECIFIED TYPE (H): ICD-10-CM

## 2019-10-11 RX ORDER — ARIPIPRAZOLE 10 MG/1
TABLET ORAL
Qty: 30 TABLET | Refills: 0 | OUTPATIENT
Start: 2019-10-11

## 2019-10-11 NOTE — TELEPHONE ENCOUNTER
Medication Refill Denied  Reason: Medication prescribed by patient's psychiatrist. Refills to be completed by Dr. Nolasco  Provider: I have not called the patient about the Rx denial, please call.  PCS: Please notify the pharmacy, Please contact the patient to explain reasoning provided above.    Ariana Andrea MD

## 2019-10-22 ENCOUNTER — BEH TREATMENT PLAN (OUTPATIENT)
Dept: PSYCHIATRY | Facility: CLINIC | Age: 24
End: 2019-10-22

## 2019-10-22 ENCOUNTER — OFFICE VISIT (OUTPATIENT)
Dept: PSYCHIATRY | Facility: CLINIC | Age: 24
End: 2019-10-22
Payer: COMMERCIAL

## 2019-10-22 DIAGNOSIS — F20.9 SCHIZOPHRENIA, UNSPECIFIED TYPE (H): Primary | ICD-10-CM

## 2019-10-22 ASSESSMENT — ANXIETY QUESTIONNAIRES
GAD7 TOTAL SCORE: 0
7. FEELING AFRAID AS IF SOMETHING AWFUL MIGHT HAPPEN: NOT AT ALL
2. NOT BEING ABLE TO STOP OR CONTROL WORRYING: NOT AT ALL
4. TROUBLE RELAXING: NOT AT ALL
3. WORRYING TOO MUCH ABOUT DIFFERENT THINGS: NOT AT ALL
6. BECOMING EASILY ANNOYED OR IRRITABLE: NOT AT ALL
1. FEELING NERVOUS, ANXIOUS, OR ON EDGE: NOT AT ALL
5. BEING SO RESTLESS THAT IT IS HARD TO SIT STILL: NOT AT ALL

## 2019-10-22 ASSESSMENT — PATIENT HEALTH QUESTIONNAIRE - PHQ9: SUM OF ALL RESPONSES TO PHQ QUESTIONS 1-9: 1

## 2019-10-22 NOTE — PROGRESS NOTES
[facility] Clinician Contact & Progress Note  For Individual Resiliency Training (IRT)  A Part of the [facility] First Episode of Psychosis Program    NAVIGATE Enrollee: Janey Givens (1995)     MRN: [id number]  Date:  10/22/19  Diagnosis: Schizophrenia, unspecified (F20.9)  Clinician: [facility] Individual Resiliency Trainer, LEEROY See     1. Type of contact: (majority of time spent)  IRT Session    2. People present:   Writer  Client: Janey Givens    3. Length of Actual Contact: Start Time: 10:00; End Time: 11:02   Traveled?    No     4. Location of contact:  Psychiat[facility], [place]    5. Did the client complete the home practice option(s) from the previous session: Not Applicable    6. Motivational Teaching Strategies:  Connect info and skills with personal goals  Promote hope and positive expectations  Explore pros and cons of change  Re-frame experiences in positive light    7. Educational Teaching Strategies:  Relate information to client's experience  Ask questions to check comprehension  Break down information into small chunks  Adopt client's language     8. CBT Teaching Strategies:  Reinforcement and shaping (positive feedback for steps towards goals and gains in knowledge & skills)  Coping skills training (review current coping skills)    9. IRT Module(s) Addressed:  Module 8 - Dealing with Negative Feelings  Module 9 - Coping with Symptoms    10. Techniques utilized:   Payette announced at beginning of session  Problem-solving practice  Help client choose a home practice option  Summarize progress made in current session    11. Mental Status Exam:    Alertness: alert  and oriented  Appearance: casually groomed  Behavior/Demeanor: cooperative and pleasant, with good  eye contact   Speech: normal and regular rate and rhythm  Language: intact. Preferred language identified as English.  Psychomotor: normal or unremarkable  Mood: worried  Affect: tearful and appropriate; was congruent to  mood; was congruent to content  Thought Process/Associations: perseverative  Thought Content:  Reports delusions and preoccupations;  Denies suicidal and violent ideation  Perception:  Reports auditory hallucinations;  Denies visual hallucinations  Insight: good  Judgment: good  Cognition: does  appear grossly intact; formal cognitive testing was not done  Suicidal ideation: denies SI, denies intent,  and denies plan  Homicidal Ideation: denies    12. Assessment/Progress Note:     This writer met with Katie for a follow-up IRT session. Katie reported auditory hallucinations and delusions occasionally, but noted they are manageable and she is able to reality test them. Due to a gap in services, this writer reviewed Katie's engagement in [facility] and next steps. Katie noted she would like to continue with [facility], but believed the content of sessions could only be related to psychosis. Katie became tearful and discussed psychosocial stressors related to interpersonal relationships and identity as a primary concern. This writer validated her emotions of fear, disgust, uneasiness, and feeling insulted due to two recent events. This writer and Katie collaborated on a treatment goal of reducing distress related to identity. Katie discussed gender reassignment surgery (GRS) next steps. She expressed disappointment and frustration when receiving a letter from her former therapist. Katie stated the letter discussed a diagnosis of substance dependence and recommended abstinence from marijuana. Katie inquired as to how to take this diagnosis off of her record, as she does not feel it is accurate. Katie noted she did not take a drug test in the hospital during the onset of her episode due to a Hinduism-based delusion. Katie also mentioned she is currently sober and has been for a significant amount of time. This writer validated her frustration and encouraged her to continue efforts in sobriety, as well as continue providing detailed information  "during assessments regarding substance use.      13. Plan/Referrals:     This writer will continue to provide problem solving, coping skill development, and supportive counseling, to aid Katie in meeting her goals of getting accepted into graduate school, obtaining GRS surgery, reducing distress.     Billing for \"Interactive Complexity\"?    No      LEEROY See    NA[facility]IGATE Individual Resiliency Trainer  "

## 2019-10-23 ASSESSMENT — ANXIETY QUESTIONNAIRES: GAD7 TOTAL SCORE: 0

## 2019-11-26 ENCOUNTER — OFFICE VISIT (OUTPATIENT)
Dept: PSYCHIATRY | Facility: CLINIC | Age: 24
End: 2019-11-26
Payer: COMMERCIAL

## 2019-11-26 DIAGNOSIS — F20.9 SCHIZOPHRENIA, UNSPECIFIED TYPE (H): Primary | ICD-10-CM

## 2019-11-26 DIAGNOSIS — F20.9 SCHIZOPHRENIA, UNSPECIFIED TYPE (H): ICD-10-CM

## 2019-11-26 RX ORDER — ARIPIPRAZOLE 10 MG/1
10 TABLET ORAL DAILY
Qty: 30 TABLET | Refills: 0 | Status: SHIPPED | OUTPATIENT
Start: 2019-11-26 | End: 2020-03-03

## 2019-11-26 ASSESSMENT — ANXIETY QUESTIONNAIRES
7. FEELING AFRAID AS IF SOMETHING AWFUL MIGHT HAPPEN: NOT AT ALL
2. NOT BEING ABLE TO STOP OR CONTROL WORRYING: NOT AT ALL
6. BECOMING EASILY ANNOYED OR IRRITABLE: NOT AT ALL
5. BEING SO RESTLESS THAT IT IS HARD TO SIT STILL: NOT AT ALL
3. WORRYING TOO MUCH ABOUT DIFFERENT THINGS: NOT AT ALL
1. FEELING NERVOUS, ANXIOUS, OR ON EDGE: NOT AT ALL
GAD7 TOTAL SCORE: 0

## 2019-11-26 ASSESSMENT — PATIENT HEALTH QUESTIONNAIRE - PHQ9
5. POOR APPETITE OR OVEREATING: NOT AT ALL
SUM OF ALL RESPONSES TO PHQ QUESTIONS 1-9: 3

## 2019-11-26 NOTE — TELEPHONE ENCOUNTER
Last seen: 5/7/19  RTC: 1 month  Cancel: none  No-show: none  Next appt: 1/2/2020    Incoming refill from Patient via appointment    Medication requested: Abilify 10 mg   Directions: Take 1 tablet daily   Qty: 30  Last refilled: 10/17/19    Medication refill approved per refill protocol

## 2019-11-27 ASSESSMENT — ANXIETY QUESTIONNAIRES: GAD7 TOTAL SCORE: 0

## 2019-12-09 NOTE — PROGRESS NOTES
NA[facility] Clinician Contact & Progress Note  For Individual Resiliency Training (IRT)  A Part of the [facility] First Episode of Psychosis Program    NAVIGATE Enrollee: Janey Givens (1995)     MRN: [id number]  Date:  11/26/19  Diagnosis: Schizophrenia, unspecified (F20.9)  Clinician: [facility] Individual Resiliency Trainer, LEEROY See     1. Type of contact: (majority of time spent)  IRT Session    2. People present:   Writer  Client: Janey Givens    3. Length of Actual Contact: Start Time: 11:00; End Time: 12:00   Traveled?    No     4. Location of contact:  Psychiatry Clinic, [place]    5. Did the client complete the home practice option(s) from the previous session: Completed    6. Motivational Teaching Strategies:  Connect info and skills with personal goals  Promote hope and positive expectations  Explore pros and cons of change  Re-frame experiences in positive light    7. Educational Teaching Strategies:  Review of written material/education  Relate information to client's experience  Ask questions to check comprehension  Break down information into small chunks  Adopt client's language     8. CBT Teaching Strategies:  Reinforcement and shaping (positive feedback for steps towards goals, gains in knowledge & skills and follow-through on home assignments)  Coping skills training (review current coping skills, increase currently used skills and plan home practice)    9. IRT Module(s) Addressed:  Module 8 - Dealing with Negative Feelings  Module 11 - Having Fun and Developing Good Relationships    10. Techniques utilized:   Posey announced at beginning of session  Review of homework  Review of goal  Review of previous meeting  Present new material  Problem-solving practice  Help client choose a home practice option  Summarize progress made in current session    11. Mental Status Exam:    Alertness: alert  and oriented  Appearance: casually groomed  Behavior/Demeanor: cooperative, pleasant and  calm, with good  eye contact   Speech: normal and regular rate and rhythm  Language: intact. Preferred language identified as English.  Psychomotor: normal or unremarkable  Mood: depressed  Affect: restricted; was congruent to mood; was congruent to content  Thought Process/Associations: unremarkable  Thought Content:  Reports delusions and preoccupations;  Denies suicidal and violent ideation  Perception:  Reports auditory hallucinations;  Denies visual hallucinations  Insight: good  Judgment: good  Cognition: does  appear grossly intact; formal cognitive testing was not done  Suicidal ideation: denies SI, denies intent,  and denies plan  Homicidal Ideation: denies    12. Assessment/Progress Note:     This writer met with Katie for a follow-up IRT session. Katie reported auditory hallucinations, delusional thoughts, and difficulty sleeping. Katie stated she slept about 4 hours due to finals and exams at college. She has noticed a minor increase in stress and frequency of symptoms, but has practiced self-care (painting fingernails, taking a break, etc.). This writer praised Katie for identifying stressors and pursuing coping strategies, decreasing risk of relapse. Katie discussed conflicts with roommates, due to their insensitivity about transgender identity. Katie has advocated for herself with her landlord and is hopeful for future changes; this writer validated the frustration, disappointment, and isolative feelings and praised Katie for self-advocacy. Katie noted she will soon be going to [place] with her extended family out of state. She expressed worry about their reactions, as Katie has not individually come out as transgender to them. This writer validated and helped to process her emotions. This writer then encouraged her to utilize awareness into stress levels and seek support from reinforcing family members; she agreed. Katie stated during the previous session that she would like to discuss and process her gender  "identity in more detail. Katie mentioned she believes she does not \"pass\" due to a lower voice range; she discussed frustration with a former voice box surgery. However, she has researched surgeons that may be able to assist. Katie then processed different experiences from when she came out to family in the past. She expressed feelings of hurt, betrayal, frustration with some individuals, but acceptance and understanding from others. Katie noted she felt the session was beneficial and meaningful, as she was able to process built up emotions.     13. Plan/Referrals:     This writer will continue to provide problem solving, coping skill development, and supportive counseling, to aid Katie in meeting her goals of getting accepted into graduate school, obtaining GRS surgery, reducing distress.     Billing for \"Interactive Complexity\"?    No      LEEROY See Individual Resiliency Trainer  "

## 2019-12-19 ENCOUNTER — OFFICE VISIT (OUTPATIENT)
Dept: PSYCHIATRY | Facility: CLINIC | Age: 24
End: 2019-12-19
Payer: COMMERCIAL

## 2019-12-19 DIAGNOSIS — F20.9 SCHIZOPHRENIA, UNSPECIFIED TYPE (H): Primary | ICD-10-CM

## 2019-12-19 ASSESSMENT — ANXIETY QUESTIONNAIRES
3. WORRYING TOO MUCH ABOUT DIFFERENT THINGS: NOT AT ALL
2. NOT BEING ABLE TO STOP OR CONTROL WORRYING: NOT AT ALL
1. FEELING NERVOUS, ANXIOUS, OR ON EDGE: NOT AT ALL
6. BECOMING EASILY ANNOYED OR IRRITABLE: NOT AT ALL
5. BEING SO RESTLESS THAT IT IS HARD TO SIT STILL: NOT AT ALL
7. FEELING AFRAID AS IF SOMETHING AWFUL MIGHT HAPPEN: NOT AT ALL
GAD7 TOTAL SCORE: 0

## 2019-12-19 ASSESSMENT — PATIENT HEALTH QUESTIONNAIRE - PHQ9
5. POOR APPETITE OR OVEREATING: NOT AT ALL
SUM OF ALL RESPONSES TO PHQ QUESTIONS 1-9: 2

## 2019-12-20 ASSESSMENT — ANXIETY QUESTIONNAIRES: GAD7 TOTAL SCORE: 0

## 2020-01-14 ENCOUNTER — BEH TREATMENT PLAN (OUTPATIENT)
Dept: PSYCHIATRY | Facility: CLINIC | Age: 25
End: 2020-01-14

## 2020-01-14 ENCOUNTER — OFFICE VISIT (OUTPATIENT)
Dept: PSYCHIATRY | Facility: CLINIC | Age: 25
End: 2020-01-14
Payer: COMMERCIAL

## 2020-01-14 DIAGNOSIS — F20.9 SCHIZOPHRENIA, UNSPECIFIED TYPE (H): Primary | ICD-10-CM

## 2020-01-14 ASSESSMENT — ANXIETY QUESTIONNAIRES
6. BECOMING EASILY ANNOYED OR IRRITABLE: NOT AT ALL
5. BEING SO RESTLESS THAT IT IS HARD TO SIT STILL: NOT AT ALL
1. FEELING NERVOUS, ANXIOUS, OR ON EDGE: NOT AT ALL
3. WORRYING TOO MUCH ABOUT DIFFERENT THINGS: NOT AT ALL
7. FEELING AFRAID AS IF SOMETHING AWFUL MIGHT HAPPEN: SEVERAL DAYS
2. NOT BEING ABLE TO STOP OR CONTROL WORRYING: NOT AT ALL
GAD7 TOTAL SCORE: 1

## 2020-01-14 ASSESSMENT — PATIENT HEALTH QUESTIONNAIRE - PHQ9
SUM OF ALL RESPONSES TO PHQ QUESTIONS 1-9: 3
5. POOR APPETITE OR OVEREATING: NOT AT ALL

## 2020-01-14 NOTE — PROGRESS NOTES
[facility] NAVIGATE Program Treatment Plan Summary  A Part of the [facility] First Episode of Psychosis Program     NAVIGATE Enrollee: Janey Givens  /Age:  1995 (24 year old)  MRN: [id number]     Date of Initial Service: 18  Date of INTIAL Treatment Plan: 18   Last Review/Update Date:  6/3/19  90-Day Review Date: 20     The following represents REVIEWED treatment plan.     1. DSM-V Diagnosis (include numeric code)  Schizophrenia, 295.90 (F20.9)     2. Current symptoms and circumstances that substantiate the diagnosis     Janey has a history of psychosis (delusions, thought broadcasting, odd beliefs per family/friends, auditory hallucinations and negative symptoms (diminished emotional expression and anhedonia)). She presents with current symptoms of psychosis, such as occasional delusional thoughts and auditory hallucinations. Janey reports a history of marijuana use around the time of onset, but currently denies use.      3. How symptoms and/or behaviors are affecting level of function     Janey s systems are impacting functioning with respect to social relationships and academics.     4. Risk Assessment:  Suicide:  Assessed Level of Immediate Risk: Low  Ideation: No  Plan:  No  Means: No  Intent: No     Homicide/Violence:  Assessed Level of Immediate Risk: Low  Ideation: No  Plan: No  Means: No  Intent: No     5. Medications     Current Outpatient Medications   Medication     ARIPiprazole (ABILIFY) 10 MG tablet     estradiol (VIVELLE-DOT) 0.1 MG/24HR bi-weekly patch     spironolactone (ALDACTONE) 100 MG tablet     No current facility-administered medications for this visit.         6. Strengths      Medication adherence  Supportive friends, family, recovery environment  Bravery & Valor     Capacity to love and be loved    Caution, Prudence, & Discretion    Creativity, Ingenuity, & Originality    Curiosity    Honest, Authentic, Genuine    Humor & Playfulness   Industry, diligence, &  "perseverance    Judgment, critical thinking, & open-mindedness    Love of learning     Intelligence  Awareness      7. Barriers & Suicide Risk Factors      Isolation/Reduced Supervision  Symptoms of psychosis, positive (delusions and/or hallucinations)  Symptoms of psychosis, negative (flat affect, avolition, anhedonia, alogia, and/or apathy)     8. Treatment Domains and Goals          Domain 1: Illness Management & Recovery  Identify and engage possible areas of improvement related to medication optimization and psychosis (delusions and auditory hallucinations) and ability to management illness.      Measurable Objectives Interventions Target Dates & Discharge Criteria   Medication Objectives      -Take medications as prescribed and have reduced frequency and severity of symptoms  -Learn and implement strategies for overcoming barriers to taking medication    \"Keep taking medications\"    Medication Management  -Prescribe and monitor medications  -Monitor and treat side effects  -Psychoeducation     IRT/Psychotherapy  -Psychoeducation  -Motivation interviewing  -CBT  -Behavioral activation  -Mindfulness     Family Therapy  -Psychoeducation  -Motivational interviewing  -Behavioral family therapy  -CBT  -Behavioral activation     Case Management  -NA or None    Target date:   12 months from 10/22/19     Discharge criteria:  Marked and sustained symptom improvement             Individual s Objectives     -Process the psychotic episode by demonstrating understanding of how the episode impacted self, identifying positive coping strategies and resiliency used during that time, challenging self-stigmatizing beliefs, and developing a positive attitude towards facing future life challenges  -Identify primary styles of thinking, and demonstrate understanding of and use cognitive restructuring to successfully deal with negative feelings  -Identify persistent symptoms that interfere with activities and/or enjoyment and " "successfully implement two coping strategies to reduce symptoms severity    \"I might want to process the episode in the future, but I don't think it's the right time. I would like an opportunity to talk more about being transgender and the harassment that comes along with it.\"    Medication Management  -Prescribe and monitor medications  -Monitor and treat side effects  -Psychoeducation     IRT/Psychotherapy  -Psychoeducation  -Motivation interviewing  -CBT  -Behavioral activation  -Mindfulness     Family Therapy  -Psychoeducation  -Motivational interviewing  -Behavioral family therapy  -CBT  -Behavioral activation     Case Management  -NA or None    Target date:   12 months from 10/22/19     Discharge criteria:  Marked and sustained symptom improvement      Janey demonstrates understanding of mental illness      Janey successfully implements strategies to cope with stressors and/or symptoms to mitigate risk for increase in symptom severity or relapse         Gains Made:  -Learn at least 2 coping strategies to successfully target current symptoms  -Demonstrate understanding for how substance use impacts symptoms, identify stage of change, and experiment with reduced use or abstinence from all illicit substances  -Learn and implement communication strategies to disclose episode, resulting in feeling understood and supported         Support System Objectives     -Supports, including family members, verbally reinforce the client's active attempts to build self-esteem and rapport   -Verbalize understanding of the client's long-term and short-term goals  -Learn the client's signs of stress and possible coping skills  -Learn skills that strengthen and support the client's positive behavior change  -Learn strategies to build positive emotions and facilitate resiliency including use of a resiliency story    Medication Management  -Prescribe and monitor medications  -Monitor and treat side " "effects  -Psychoeducation     IRT/Psychotherapy  -Psychoeducation  -Motivation interviewing  -CBT  -Behavioral activation  -Mindfulness     Family Therapy  -Psychoeducation  -Motivational interviewing  -Behavioral family therapy  -CBT  -Behavioral activation     Case Management  -NA or None    Target date:   12 months from 10/22/19     Discharge criteria:  Support system demonstrates understanding of mental illness      Support system successfully implements strategies to assist Janey cope with stressors and/or symptoms to mitigate risk for increase in symptom severity or relapse                  Domain 2: Health & Basic Living Needs  Identify and engage possible areas of improvement related to basic needs being met and maintaining or improving overall health and well-being      Measurable Objectives Interventions Discharge Criteria   -Learn and implement at least 2 skills to promote health sleep  -Establish and adhere to a plan to increase physical exercise     \"Complete GRS surgery in May 2020\" Medication Management  -Prescribe and monitor medications  -Monitor and treat side effects  -Psychoeducation     IRT/Psychotherapy  -Psychoeducation  -Motivation interviewing  -CBT  -Behavioral activation  -Mindfulness     Family Therapy  -Psychoeducation  -Motivational interviewing  -Behavioral family therapy  -CBT  -Behavioral activation     Supported Education & Employment  -Motivational interviewing  -Individualized placement and support   -Behavioral Activation     Case Management  -NA or None    Target date:   12 months from 10/22/19     Discharge criteria:  Janey, her supports and treatment team report no unmet health and basic living needs      Gains Made:  -Established vegan diet to assist in healthy nutrition             Domain 3: Family & Other Supports  Identify and engage possible areas of improvement related to engaging family, friends and other supports      Measurable Objectives Interventions " "Discharge Criteria   -Improve the quality of relationships by developing skills to better understand other people, communicate more effectively, manage disclosure, and understand social cues  -Supports teach and reinforce healthy social skills and attitudes   -Learn and implement problem-solving and/or conflict resolution skills to manage personal and interpersonal problems constructively  -Identify and implement two approaches to how strengths and interests can be used to initiate social contacts and develop peer friendships  -Renew two old fun activities or develop two new fun activity    \"Find new roommates who are supportive, maybe interested in finding support within the LGBTQ community\"    Medication Management  -Prescribe and monitor medications  -Monitor and treat side effects  -Psychoeducation     IRT/Psychotherapy  -Psychoeducation  -Motivation interviewing  -CBT  -Behavioral activation  -Mindfulness     Family Therapy  -Psychoeducation  -Motivational interviewing  -Behavioral family therapy  -CBT  -Behavioral activation     Supported Education & Employment  -Motivational interviewing  -Individualized placement and support   -Behavioral Activation     Case Management  -NA or None    Target date:   12 months from 10/22/19     Discharge criteria:  Janey and her support system report feeling equipped with the necessary skills to communicate and problem solve during times of disagreement     Conflict with supports and peers are resolved constructively and consistently over time; 6 months      Gains Made:  -Learn and implement calming and coping strategies to manage anxiety symptoms and focus attention usefully during moments of social anxiety    -Advocated for self when experiencing periods of harassment            Domain 4: Academic and Employment  Identify and engage possible areas of improvement relates to education and employment      Measurable Objectives Interventions Discharge Criteria   -Stay " current with schoolwork, completing assignments and interacting appropriately with peers and teachers   -Increase participate in school-related activities   -Family members provide praise, encouragement for the client's attempts and successes at school/work    Medication Management  -Prescribe and monitor medications  -Monitor and treat side effects  -Psychoeducation     IRT/Psychotherapy  -Psychoeducation  -Motivation interviewing  -CBT  -Behavioral activation     Family Therapy  -Psychoeducation  -Motivational interviewing  -Behavioral family therapy  -CBT  -Behavioral activation     Supported Education & Employment  -Motivational interviewing  -Individualized placement and support   -Behavioral Activation     Case Management  -NA or None    Target date:   12 months from 10/22/19     Discharge criteria:  Work and school goals are achieved and maintained without follow along NAVIGATE Supported Education and Employment supports for 6 months      Gains Made:  -Completed semester of college and successfully managed symptoms during that time  -Obtained a research assistant position       9. Frequency of sessions and expected duration of treatment:   1-4x per month Medication Management with Prescriber ongoing  6 months of weekly IRT/Individual Psychotherapy followed by 12-18 months of biweekly or monthly IRT  2-4x per month Supported Education and Employment Services for 6 months  2-4x per month Family Education and Support Services for 6 months     10. Participants in therapy plan:   Janey MORALES Team:   -Family Clinician: VERENICE Dash  -IRT Clinician: MARCELL See [facility]  -SEE: MAKSMI Heredia  -Prescriber: Dr. Nolasco     See scanned document for Acknowledgement of Current Treatment Plan     Regulatory Guidelines for Updating Treatment Plan  [place] Medical Assistance: Reviewed & signed at least every 90 days  Medicare:  Update per policy

## 2020-01-14 NOTE — PROGRESS NOTES
NAVIGATE Clinician Contact & Progress Note  For Individual Resiliency Training (IRT)  A Part of the [facility] First Episode of Psychosis Program    NAVIGATE Enrollee: Janey Givens (1995)     MRN: [id number]  Date:  1/14/20  Diagnosis: Schizophrenia, unspecified (F20.9)  Clinician: CARMEN Individual Resiliency Trainer, LEEROY See     1. Type of contact: (majority of time spent)  IRT Session    2. People present:   Writer  Client: Janey Givens    3. Length of Actual Contact: Start Time: 2:00; End Time: 3:00   Traveled?    No     4. Location of contact:  [facility], [place]    5. Did the client complete the home practice option(s) from the previous session: Completed    6. Motivational Teaching Strategies:  Connect info and skills with personal goals  Promote hope and positive expectations  Explore pros and cons of change  Re-frame experiences in positive light    7. Educational Teaching Strategies:  Review of written material/education  Relate information to client's experience  Ask questions to check comprehension  Break down information into small chunks  Adopt client's language     8. CBT Teaching Strategies:  Reinforcement and shaping (positive feedback for steps towards goals, gains in knowledge & skills and follow-through on home assignments)  Social skills training (rationale for skill, role play practice and plan home practice)  Cognitive restructuring (identify thoughts related to negative feelings and change though or form action plan)    9. IRT Module(s) Addressed:  Module 8 - Dealing with Negative Feelings  Module 9 - Coping with Symptoms  Module 11 - Having Fun and Developing Good Relationships    10. Techniques utilized:   [place] announced at beginning of session  Review of homework  Review of goal  Review of previous meeting  Present new material  Problem-solving practice  Help client choose a home practice option  Summarize progress made in current session    11. Mental  "Status Exam:    Alertness: alert  and oriented  Appearance: casually groomed  Behavior/Demeanor: cooperative and pleasant, with good  eye contact   Speech: normal and regular rate and rhythm  Language: intact. Preferred language identified as English.  Psychomotor: normal or unremarkable  Mood: description consistent with euthymia  Affect: appropriate; was congruent to mood; was congruent to content  Thought Process/Associations: perseverative  Thought Content:  Reports preoccupations;  Denies suicidal and violent ideation  Perception:  Reports auditory hallucinations;  Denies visual hallucinations  Insight: good  Judgment: good  Cognition: does  appear grossly intact; formal cognitive testing was not done  Suicidal ideation: denies SI, denies intent,  and denies plan  Homicidal Ideation: denies    12. Assessment/Progress Note:     This writer met with Katie for a follow-up IRT session. Katie reported diminished auditory hallucinations, but denied delusional thoughts at this time. Katie stated she was recently accepted into [employer] for graduate school. This writer praised Katie for her ongoing dedication and determination related to education. Katie stated she didn't do as much as she'd hoped during winter break. This writer assisted Katie in reframing this, by acknowledging the importance of self-care, relaxation, and socialization. Katie recently moved into a new home with friends. She mentioned it is a supportive environment, but she has had preoccupations regarding her roommates. She stated she recently assisted them in cleaning the house, but is concerned they may be annoyed because of this. Katie portrayed insight and reframed her thoughts, reminding herself she may be \"reading too much\" into the situation. This writer praised Katie for utilizing reality testing and challenging distressing thoughts. This writer also encouraged Katie to check her thoughts directly with the roommates, to verify if they are " "annoyed. She agreed to try this throughout the week for a home practice opportunity. This writer assisted Katie in reviewing her goals during the session. These include: obtain GRS surgery in May, locate a surgeon specializing in voice box surgery, learn strategies for disclosure with psychosis, graduate college in May, move into parents' home in May until August 2020, reduce fast food to 1x per week, start utilizing a notebook for organizational purposes, decide on one graduate school and start fall 2020.     13. Plan/Referrals:     This writer will continue to provide problem solving, coping skill development, and supportive counseling, to aid Katie in meeting her goals of getting accepted into graduate school, obtaining GRS surgery, obtaining a new apartment, reducing distress.     Billing for \"Interactive Complexity\"?    No      LEEROY See Individual Resiliency Trainer    "

## 2020-01-15 ASSESSMENT — ANXIETY QUESTIONNAIRES: GAD7 TOTAL SCORE: 1

## 2020-01-17 NOTE — PROGRESS NOTES
NAVIGSATINDER Clinician Contact & Progress Note  For Individual Resiliency Training (IRT)  A Part of the [facility] First Episode of Psychosis Program    NAVIGATE Enrollee: Janey Givens (1995)     MRN: [id number]  Date:  12/19/19  Diagnosis: Schizophrenia, unspecified (F20.9)  Clinician: CARMEN Individual Resiliency Trainer, LEEROY See     1. Type of contact: (majority of time spent)  IRT Session    2. People present:   Writer  Client: Janey Givens    3. Length of Actual Contact: Start Time: 11:00; End Time: 12:00   Traveled?    No     4. Location of contact:  Psychiatry Clinic, [place]    5. Did the client complete the home practice option(s) from the previous session: Completed    6. Motivational Teaching Strategies:  Connect info and skills with personal goals  Promote hope and positive expectations  Explore pros and cons of change  Re-frame experiences in positive light    7. Educational Teaching Strategies:  Review of written material/education  Ask questions to check comprehension  Break down information into small chunks  Adopt client's language     8. CBT Teaching Strategies:  Reinforcement and shaping (positive feedback for steps towards goals, gains in knowledge & skills and follow-through on home assignments)  Coping skills training (review current coping skills, increase currently used skills and plan home practice)  Cognitive restructuring (identify thoughts related to negative feelings and change though or form action plan)    9. IRT Module(s) Addressed:  Module 5 - Processing the Psychotic Episode  Module 8 - Dealing with Negative Feelings  Module 9 - Coping with Symptoms    10. Techniques utilized:   [place] announced at beginning of session  Review of homework  Review of goal  Review of previous meeting  Present new material  Problem-solving practice  Help client choose a home practice option  Summarize progress made in current session    11. Mental Status Exam:    Alertness: alert   and oriented  Appearance: casually groomed  Behavior/Demeanor: cooperative, pleasant and calm, with good  eye contact   Speech: normal and regular rate and rhythm  Language: intact. Preferred language identified as English.  Psychomotor: normal or unremarkable  Mood: depressed  Affect: restricted; was congruent to mood; was congruent to content  Thought Process/Associations: perseverative  Thought Content:  Reports preoccupations;  Denies suicidal and violent ideation  Perception:  Reports auditory hallucinations;  Denies visual hallucinations  Insight: good  Judgment: good  Cognition: does  appear grossly intact; formal cognitive testing was not done  Suicidal ideation: denies SI, denies intent,  and denies plan  Homicidal Ideation: denies    12. Assessment/Progress Note:     This writer met with Katie for a follow-up IRT session. Katie reported auditory hallucinations daily, difficulties with self-care/hygiene, and decreased sleep. Katie mentioned the voices have been manageable and have not interfered with school or relationships. 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 This is causing her to sleep at 5 AM and get approximately 5-7 hours of sleep. Katie stated she has been very focused/preoccupied with finishing the product, creating a video game. This writer assisted Katie in developing strategies to decrease stress and ruminations, including setting a bedtime routine and using reframing CBT techniques. Specifically, Katie and this writer discussed reminding herself she has weeks to complete the game and can plan out specific goal set points for each day. Katie agreed to try this throughout the week as a home practice opportunity. Last session, Katie and this writer began discussing goals, but other topics were more pressing. Therefore, during today's session, goals were reviewed briefly. Katie mentioned she would like to obtain a new apartment with different roommates, learn and utilize  "strategies to disclose about psychosis experience, and apply to final graduate school by the middle of January. Katie then described her experiences during the onset of her episode. She recalled being admitted to the hospital and feeling scared and misunderstood. She remembered feeling as though others were trying to kill her and being restrained. This writer validated her emotions of fear, uncertainty, confusion, and betrayal. Katie stated she has not been interested previously in processing her episode, due to concerns it would trigger unsettling memories. This writer normalized this response, while also reinforcing her ability to confront these experiences during today's session.     13. Plan/Referrals:     This writer will continue to provide problem solving, coping skill development, and supportive counseling, to aid Katie in meeting her goals of getting accepted into graduate school, obtaining GRS surgery, obtaining a new apartment, reducing distress.     Billing for \"Interactive Complexity\"?    No      LEEROY See Individual Resiliency Trainer  "

## 2020-01-30 ENCOUNTER — OFFICE VISIT (OUTPATIENT)
Dept: PSYCHIATRY | Facility: CLINIC | Age: 25
End: 2020-01-30
Payer: COMMERCIAL

## 2020-01-30 DIAGNOSIS — F20.9 SCHIZOPHRENIA, UNSPECIFIED TYPE (H): Primary | ICD-10-CM

## 2020-01-30 ASSESSMENT — PATIENT HEALTH QUESTIONNAIRE - PHQ9: SUM OF ALL RESPONSES TO PHQ QUESTIONS 1-9: 0

## 2020-01-30 ASSESSMENT — ANXIETY QUESTIONNAIRES
2. NOT BEING ABLE TO STOP OR CONTROL WORRYING: NOT AT ALL
GAD7 TOTAL SCORE: 0
6. BECOMING EASILY ANNOYED OR IRRITABLE: NOT AT ALL
3. WORRYING TOO MUCH ABOUT DIFFERENT THINGS: NOT AT ALL
7. FEELING AFRAID AS IF SOMETHING AWFUL MIGHT HAPPEN: NOT AT ALL
4. TROUBLE RELAXING: NOT AT ALL
5. BEING SO RESTLESS THAT IT IS HARD TO SIT STILL: NOT AT ALL
1. FEELING NERVOUS, ANXIOUS, OR ON EDGE: NOT AT ALL

## 2020-01-30 NOTE — PROGRESS NOTES
NAVIGSATINDER Clinician Contact & Progress Note  For Individual Resiliency Training (IRT)  A Part of the [facility] First Episode of Psychosis Program    NAVIGATE Enrollee: Janey Givens (1995)     MRN: [id number]  Date:  1/30/20  Diagnosis: Schizophrenia (F20.9)  Clinician: CARMEN Individual Resiliency Trainer, LEEROY See     1. Type of contact: (majority of time spent)  IRT Session    2. People present:   Writer  Client: Janey Givens    3. Length of Actual Contact: Start Time: 1:00; End Time: 2:00   Traveled?    No     4. Location of contact:  Psychiatry Clinic, [place]    5. Did the client complete the home practice option(s) from the previous session: Completed    6. Motivational Teaching Strategies:  Connect info and skills with personal goals  Promote hope and positive expectations  Explore pros and cons of change  Re-frame experiences in positive light    7. Educational Teaching Strategies:  Review of written material/education  Relate information to client's experience  Ask questions to check comprehension  Break down information into small chunks  Adopt client's language     8. CBT Teaching Strategies:  Reinforcement and shaping (positive feedback for steps towards goals, gains in knowledge & skills and follow-through on home assignments)  Social skills training (rationale for skill and plan home practice)  Cognitive restructuring (identify thoughts related to negative feelings, examine the evidence and change though or form action plan)    9. IRT Module(s) Addressed:  Module 5 - Processing the Psychotic Episode  Module 8 - Dealing with Negative Feelings  Module 9 - Coping with Symptoms    10. Techniques utilized:   [place] announced at beginning of session  Review of homework  Review of goal  Review of previous meeting  Present new material  Problem-solving practice  Help client choose a home practice option  Summarize progress made in current session    11. Mental Status Exam:    Alertness:  alert  and oriented  Appearance: casually groomed  Behavior/Demeanor: cooperative and pleasant, with good  eye contact   Speech: normal and regular rate and rhythm  Language: intact. Preferred language identified as English.  Psychomotor: normal or unremarkable  Mood: description consistent with euthymia  Affect: appropriate; was congruent to mood; was congruent to content  Thought Process/Associations: perseverative  Thought Content:  Reports preoccupations;  Denies suicidal and violent ideation  Perception:  Reports auditory hallucinations;  Denies visual hallucinations  Insight: good  Judgment: good  Cognition: does  appear grossly intact; formal cognitive testing was not done  Suicidal ideation: denies SI, denies intent,  and denies plan  Homicidal Ideation: denies    12. Assessment/Progress Note:     This writer met with Katie for a follow-up IRT session. Katie reported auditory hallucinations that are not bothersome at this time. She denied any delusional thoughts or safety concerns. Katie stated she was accepted into [employer] for graduate school, which is her number one choice. This writer congratulated her for hard work and dedication. Katie mentioned she has a date tonight and expressed some nervousness. This writer assisted Katie in processing through emotions of anxiousness, worry, and uncertainty. Katie noted she has been open about her identity with the individual and has received a supportive response. This writer provided reassurance and normalization regarding her responses. Katie stated she continues to have a goal of eating fast food or going out to eat twice per week maximum. This writer and Katie discussed and problem solved current barriers to meeting this. Katie completed her home practice of initiating an organizational system to keep track of her to-do list. This writer praised her for doing so, as she described it as useful. Katie has previously expressed interest in discussing disclosure about psychosis. This  "writer helped her to begin doing so, by opening a conversation about what she has told her closest friends. Katei has shared delusional thoughts about professors, Mosque thoughts, grandiosity related to presiding over a world. Katie stated she experiences difficulty in deciphering which memories are real or not based in reality. Katie then discussed a specific memory from a hospitalization, related to an interaction with a nurse about women's apparel. This interaction triggered a memory from Katie's childhood, where she felt misunderstood and invalidated by family. This writer validated and assisted Katie in processing feelings of betrayal and hurt. This writer then encouraged Katie to verify the specific details with family by initiating a conversation with them; however, Katie did not feel this would be a fruitful discussion at this time.     13. Plan/Referrals:     This writer will continue to provide problem solving, coping skill development, and supportive counseling, to aid Katie in meeting her goals of getting accepted into graduate school, obtaining GRS surgery, obtaining a new apartment, reducing distress.     Billing for \"Interactive Complexity\"?    No      LEEROY See Individual Resiliency Trainer  "

## 2020-01-31 ASSESSMENT — ANXIETY QUESTIONNAIRES: GAD7 TOTAL SCORE: 0

## 2020-02-13 ENCOUNTER — OFFICE VISIT (OUTPATIENT)
Dept: PSYCHIATRY | Facility: CLINIC | Age: 25
End: 2020-02-13
Payer: COMMERCIAL

## 2020-02-13 DIAGNOSIS — F20.9 SCHIZOPHRENIA, UNSPECIFIED TYPE (H): Primary | ICD-10-CM

## 2020-02-13 NOTE — PROGRESS NOTES
CARMEN Clinician Contact & Progress Note  For Individual Resiliency Training (IRT)  A Part of the [facility] First Episode of Psychosis Program    NAVIGATE Enrollee: Janey Givens (1995)     MRN: [id number]  Date:  2/13/20  Diagnosis: Schizophrenia, unspecified (F20.9)  Clinician: CARMEN Individual Resiliency Trainer, LEEROY See     1. Type of contact: (majority of time spent)  IRT Session    2. People present:   Writer  Client: Janey Givens    3. Length of Actual Contact: Start Time: 2:00; End Time: 3:00   Traveled?    No     4. Location of contact:  Psychiatry Clinic, [place]    5. Did the client complete the home practice option(s) from the previous session: Completed    6. Motivational Teaching Strategies:  Connect info and skills with personal goals  Promote hope and positive expectations  Explore pros and cons of change  Re-frame experiences in positive light    7. Educational Teaching Strategies:  Relate information to client's experience  Ask questions to check comprehension  Break down information into small chunks  Adopt client's language     8. CBT Teaching Strategies:  Reinforcement and shaping (positive feedback for steps towards goals, gains in knowledge & skills and follow-through on home assignments)  Cognitive restructuring (identify thoughts related to negative feelings and change though or form action plan)    9. IRT Module(s) Addressed:  Module 7 - Building a Bridging to Your Goals  Module 8 - Dealing with Negative Feelings  Module 9 - Coping with Symptoms  Module 13 - Nutrition and Exercise    10. Techniques utilized:   [place] announced at beginning of session  Review of homework  Review of goal  Review of previous meeting  Present new material  Problem-solving practice  Help client choose a home practice option  Summarize progress made in current session    11. Mental Status Exam:    Alertness: alert  and oriented  Appearance: casually groomed  Behavior/Demeanor:  cooperative and calm, with good  eye contact   Speech: regular rate and rhythm  Language: intact. Preferred language identified as English.  Psychomotor: normal or unremarkable  Mood: anxious  Affect: appropriate; was congruent to mood; was congruent to content  Thought Process/Associations: unremarkable  Thought Content:  Reports preoccupations;  Denies suicidal and violent ideation  Perception:  Reports auditory hallucinations;  Denies visual hallucinations  Insight: good  Judgment: good  Cognition: does  appear grossly intact; formal cognitive testing was not done  Suicidal ideation: denies SI, denies intent,  and denies plan  Homicidal Ideation: denies    12. Assessment/Progress Note:     This writer met with Katie for a follow-up IRT session. Katie denied any changes or concerns with symptoms at this time; she reported occasional auditory hallucinations, but denied delusional thoughts. She discussed upcoming Cathy's Day and spring break plans. Katie reported increased stress related to a college class project and budgeting. She has planned out and organized daily tasks to complete the project in a timely manner; this writer praised and reinforced her effort. Katie stated she is currently paying double rent, as she has been unable to find a subleaser. This writer and Katie brainstormed potential solutions for locating a subleaser. This writer and Katie then discussed budgeting, as Katie mentioned this as a primary stressor currently. Throughout the session, this writer and Katie developed an initial budget for the month. While doing so, Katie expressed negative thoughts about herself for needing monetary assistance. This writer normalized difficulties with finances during college and assisted Katie in reframing/challenging the negative thought. This writer assisted Katie in thinking through a decision making matrix regarding whether to ask for financial assistance or utilize savings. While creating a budget, Katie noticed she would  "like to reduce grocery expenses. Katie stated she currently shares her food with roommates often. This writer validated her uncertainty about changing this habit. This writer offered a solution of meal preparation for the week. Katie stated she would like to try this over the coming week as a home practice opportunity. Katie noted she needs a letter of support for GRS surgery from her psychiatrist. Katie expressed hesitation about this, as she recently transitioned to a new prescriber. This writer offered to provide a letter on her behalf; she plans to ask her clinic if this will be accepted.     13. Plan/Referrals:     This writer will continue to provide problem solving, coping skill development, and supportive counseling, to aid Katie in meeting her goals of starting graduate school, obtaining GRS surgery, reducing distress.     Billing for \"Interactive Complexity\"?    No      LEEROY SeeATE Individual Resiliency Trainer    "

## 2020-02-14 ASSESSMENT — ANXIETY QUESTIONNAIRES
GAD7 TOTAL SCORE: 0
5. BEING SO RESTLESS THAT IT IS HARD TO SIT STILL: NOT AT ALL
2. NOT BEING ABLE TO STOP OR CONTROL WORRYING: NOT AT ALL
6. BECOMING EASILY ANNOYED OR IRRITABLE: NOT AT ALL
7. FEELING AFRAID AS IF SOMETHING AWFUL MIGHT HAPPEN: NOT AT ALL
1. FEELING NERVOUS, ANXIOUS, OR ON EDGE: NOT AT ALL
3. WORRYING TOO MUCH ABOUT DIFFERENT THINGS: NOT AT ALL

## 2020-02-14 ASSESSMENT — PATIENT HEALTH QUESTIONNAIRE - PHQ9
SUM OF ALL RESPONSES TO PHQ QUESTIONS 1-9: 0
5. POOR APPETITE OR OVEREATING: NOT AT ALL

## 2020-02-15 ASSESSMENT — ANXIETY QUESTIONNAIRES: GAD7 TOTAL SCORE: 0

## 2020-03-02 ENCOUNTER — HEALTH MAINTENANCE LETTER (OUTPATIENT)
Age: 25
End: 2020-03-02

## 2020-03-03 ENCOUNTER — OFFICE VISIT (OUTPATIENT)
Dept: PSYCHIATRY | Facility: CLINIC | Age: 25
End: 2020-03-03
Payer: COMMERCIAL

## 2020-03-03 DIAGNOSIS — F20.9 SCHIZOPHRENIA, UNSPECIFIED TYPE (H): ICD-10-CM

## 2020-03-03 DIAGNOSIS — F20.9 SCHIZOPHRENIA, UNSPECIFIED TYPE (H): Primary | ICD-10-CM

## 2020-03-03 DIAGNOSIS — F19.10 SUBSTANCE ABUSE (H): Primary | ICD-10-CM

## 2020-03-03 RX ORDER — ARIPIPRAZOLE 10 MG/1
10 TABLET ORAL DAILY
Qty: 30 TABLET | Refills: 2 | Status: SHIPPED | OUTPATIENT
Start: 2020-03-03 | End: 2020-05-03

## 2020-03-09 NOTE — PROGRESS NOTES
NAVIGATE Medication Management Progress Note  A Part of the [facility] First Episode of Psychosis Program    NAVIGATE Enrollee: Janey Givens (1995)     MRN: [id number]  Date:  3/03/20         Contributors to the Assessment     Chart Reviewed.   Interview completed with Janey Givens.  Collateral information obtained from [facility] intake 11/1/2018.         Chief Complaint     I am doing okay concerned about grad school and letter for gender reassignment surgery         Interim History      Janey Givens is a 24 year old adult who was last seen in [facility] clinic by Dr. Lizabeth Nolasco on 5/17/2019 at which time focus was on sleep hygiene without the reliance upon cannabis use which aggravates her diagnosis of psychosis. The patient reports adequate treatment adherence.  History was provided by self who was a fair historian.  She recounts the first onset of psychosis occurred December 2017 for a month.  At that time there was some question as to whether it was a substance used disorder as it remitted fairly rapidly she was discharged on Zyprexa.  Her substance use leading up to hospitalization was heavy use of cannabis and Adderall with symptomatic features of paranoia religiosity and auditory hallucinations.  They thought I was a drug addict and pursued a court order for dual diagnosis treatment such that she went to [facility] but only for about 2 weeks. She had significant gender dysphoria and  had already begun treatment as of June 2017.  She tapered off the Zyprexa and suffered her second hospitalization for psychosis September 28, 2018 through October 15, 2018.  She had more pronounced paranoid psychosis and catatonic features at that time and did not rapidly remit.  She was discharged once again on olanzapine and referred to the [facility] program where her entry date was 11/1/2018.  At the time program entry she reported abstinence from all substances of abuse particularly cannabis.  Her care by   "Sesar began February 2019 at which time the olanzapine was switched over to Abilify which remains in place today.  She was next seen for a final time May 7, 2019 at which time Dr. Kaba was quite concerned about the destabilizing effect of her cannabis use and offered \"Z\" drug for sleep (zolpidem) in its place with cautionary instructions for use.  Since the last visit:  Since that last visit she has been attempting to return to  school in chemical engineering at the [employer] and begin to map out plans for gender reassignment surgery in the spring 2020.  To that end she request that I provide a medical letter as far as her current psychiatric status and subsequent psychiatric management should she have a recurrence of illness in the postoperative [place] of [place].  Surgeon is Dr. Cintron who was proposed a date of May 12.  She also has a psychologist Arleen Navas, PhD who will be providing evaluations on her behalf.  Primary physician Dr. Andrea at [facility] primary care clinic who is prescribing hormonal therapy and will provide general physical health summary.  As to pharmacotherapy for psychosis she is well stabilized on Abilify 10 mg.  She is no longer taking low-dose antidepressant for the gender dysphoria.  Recent Symptoms:  She is currently denying any symptoms of depression or anxiety.  Particularly no thoughts about suicide or being better off dead.   Psych critical item history includes catatonia, psychosis [sxs include delusions, disorganized thinking], psych hosp (<3) and SUBSTANCE USE: cannabis, LSD.    -Schizophrenia (dx at [facility] 9/28/18-10/15/18 hospitalization)  -Gender dysphoria (per EPIC, unclear when first diagnosed. 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  As noted above in her chief complaint further education and surgical reassignment are the predominant concerns at this time.    Medical ROS:   The 7 point Review of Systems is negative other than noted in the HPI         First Episode of Psychosis History      DUP (duration untreated psychosis): 2 months  Route to initial care: Hospitalization  Medication adherence overall:  See above, Clinician Rating Form in COMPASS Item 13  General frequency of visits: Monthly  Participation in groups:  No  Cognitive Remediation:  No  Other treatment history: Gender reassignment counseling    Reviewed for completion of First Episode work-up:  Yes  First episode workup:  Done (if completed, see LABS for results)  MATRICS Consensus Cognitive Battery:  Not Done (if completed, see LABS for results)         Medical/Surgical History     Banana    Patient Active Problem List   Diagnosis     Blood clot in vein     Psychiatric disorder     Gender dysphoria in adult     Acute psychosis (H)            Medications     Current Outpatient Medications   Medication Sig Dispense Refill     ARIPiprazole (ABILIFY) 10 MG tablet Take 1 tablet (10 mg) by mouth daily 30 tablet 2     estradiol (VIVELLE-DOT) 0.1 MG/24HR bi-weekly patch Place 2 patches onto the skin twice a week 16 patch 2     spironolactone (ALDACTONE) 100 MG tablet Take 2 tablets (200 mg) by mouth daily 60 tablet 5             Vitals     There were no vitals taken for this visit.      Weight prior to medication: NA         Mental Status Exam     Alertness: alert  and oriented  Appearance: adequately groomed  Behavior/Demeanor: cooperative, pleasant and calm, with good  eye contact   Speech: normal  Language: intact  Psychomotor: normal or unremarkable  Mood: description consistent with euthymia  Affect: flat; was congruent to mood; was congruent to content  Thought Process/Associations: unremarkable  Thought Content:  Reports paranoid ideation;  Denies  suicidal and violent ideation  Perception:  Reports auditory hallucinations;  Denies visual hallucinations  Insight: good  Judgment: good  Cognition: does  appear grossly intact; formal cognitive testing was not done         Labs and Data     RATING SCALES:  PHQ9 and KELSEY-7    PHQ9 TODAY = 0/=0  PHQ-9 SCORE 1/14/2020 1/30/2020 2/14/2020   PHQ-9 Total Score 3 0 0       ANTIPSYCHOTIC LABS ROUTINE    [glu, A1C, lipids (focus LDL), liver enzymes, WBC, ANEU, Hgb, plts]   q12 mo  Recent Labs   Lab Test 12/20/18  1620 10/25/18  0903  10/17/17  1634   .6* 91.9   < > 93.4   A1C  --   --   --  5.3    < > = values in this interval not displayed.     Recent Labs   Lab Test 12/20/18  1620 10/25/18  0903   CHOL 132.2 143.6   TRIG 58.6 67.1   LDL 63 79   HDL 57.3 51.0     Recent Labs   Lab Test 12/20/18  1620 10/25/18  0903   AST 13.5 8.5   ALT 10.2 12.2   ALKPHOS 40.2 43.4     Recent Labs   Lab Test 12/20/18  1620 10/25/18  0903 10/01/18  1102  12/08/17  0647   WBC  --   --  6.7  --  9.3   ANEU  --   --  3.8  --  3.5   HGB 11.9* 13.5 12.6*   < > 13.8   PLT  --   --  237  --  188    < > = values in this interval not displayed.     UDS:  Negative for alcohol, pain meds, 8 panel (THC neg)         Psychiatric Diagnoses     Schizophrenia  Rule out Schizoaffective disorder      Catatonia, by history  Gender dysphoria, by history  Cannabis use disorder, moderate, by history           Assessment     TODAY presents as a strongly determined transgender person male to female who is asymptomatic as far as outward signs of psychosis or depression.  She provided details as to the type of medical letter for psychiatric clearance for the gender reassignment.  She understands the cannabis use may work against her plans for surgery.  Based upon 2 psychotic episodes with the psychotic one distant from any substance use it is highly likely that she has an undercurrent psychotic disorder although schizophreniform features predominate whereas  affective symptoms do not.                PSYCHOTROPIC DRUG INTERACTIONS:   None.  MANAGEMENT:  Monitoring for adverse effects and using lowest therapeutic dose of [SGA]         Plan     1) PSYCHOTROPIC MEDICATIONS:  -Abilify 10mg  -estradiol 0.1mg/24hr  -spironolactone 200mg/d      2) THERAPY:  Continue    3) NEXT DUE:    Labs-[glu, A1C, lipids (focus LDL), liver enzymes, WBC, ANEU, Hgb, plts]   q12 mo  Rating Scales- N/A    4) REFERRALS:    No Referrals needed    5) RTC: 4 weeks    6) CRISIS NUMBERS:   Provided routinely in AVS.  Especially emphasized:  none    TREATMENT RISK STATEMENT:  The risks, benefits, alternatives and potential adverse effects have been discussed and are understood by the pt. The pt understands the risks of using street drugs or alcohol. There are no medical contraindications, the pt agrees to treatment with the ability to do so. The pt knows to call the clinic for any problems or to access emergency care if needed.  Medical and substance use concerns are documented above.  Psychotropic drug interaction check was done, including changes made today.      PROVIDER:  Toi Francis MD    I, Toi Francis MD spent a total of 60 minutes face to face with patient during today's office visit.  Over 50% of this time was spent counseling the patient,  and/or coordinating care regarding upcoming plans for approval of GRS..

## 2020-03-17 ENCOUNTER — VIRTUAL VISIT (OUTPATIENT)
Dept: PSYCHIATRY | Facility: CLINIC | Age: 25
End: 2020-03-17
Payer: COMMERCIAL

## 2020-03-17 DIAGNOSIS — F20.9 SCHIZOPHRENIA, UNSPECIFIED TYPE (H): Primary | ICD-10-CM

## 2020-03-17 NOTE — PROGRESS NOTES
ARELYATE IRT Telephone Call    NAVIGATE Enrollee: Janey Givens (1995)     MRN: [id number]    Call date: 3/17/20  Call made to: Katie  Diagnosis: Schizophrenia, unspecified type (F20.9)  Length of call: 24 minutes  Start time: 11:00 End time: 11:24    Assessment/Progress Note:  Writer and client completed phone call visit in lieu of meeting in person for today's scheduled visit to minimize risks related to  COVID-19 as recommended by the organization.     This writer called Katie, at her request, for a follow-up IRT session. This writer discussed the plan for telephone or telemedicine services, in order to limit exposure to COVID-19. Katie was agreeable to the plan. She reported no symptoms or concerns at this time. She recalled her recent visit to [facility], where she plans to attend graduate school. She expressed disappointment with certain aspects; this writer normalized this and assisted her in processing these emotions. Katie mentioned her second college tour in [place] was canceled due to the virus. Her college courses have now all moved to online instructing. Katie noted she has had decreased motivation in completing homework due to the changes. This writer normalized this and encouraged Katie to continue utilizing an organizational framework, as this has been helpful previously. Katie agreed to try completing homework in 1 hour increments. Katie then discussed letter requirements to obtain GRS surgery. She will send the specific requirements to this writer, with hopes of receiving a letter of support from her psychiatrist and therapist.     Plan:  This writer will coordinate with Dr. Francis regarding completing a letter of support for GRS surgery.     Aleah Cerda [facility] CARMEN     This patient visit was converted to a telephone call to minimize exposure to COVID-19.

## 2020-03-26 ENCOUNTER — OFFICE VISIT (OUTPATIENT)
Dept: FAMILY MEDICINE | Facility: CLINIC | Age: 25
End: 2020-03-26
Payer: COMMERCIAL

## 2020-03-26 ENCOUNTER — ANCILLARY PROCEDURE (OUTPATIENT)
Dept: GENERAL RADIOLOGY | Facility: CLINIC | Age: 25
End: 2020-03-26
Attending: FAMILY MEDICINE
Payer: COMMERCIAL

## 2020-03-26 VITALS
OXYGEN SATURATION: 97 % | DIASTOLIC BLOOD PRESSURE: 78 MMHG | SYSTOLIC BLOOD PRESSURE: 115 MMHG | HEART RATE: 85 BPM | RESPIRATION RATE: 16 BRPM | TEMPERATURE: 98.3 F | BODY MASS INDEX: 28.75 KG/M2 | HEIGHT: 70 IN | WEIGHT: 200.8 LBS

## 2020-03-26 DIAGNOSIS — Z86.718 H/O DEEP VENOUS THROMBOSIS: ICD-10-CM

## 2020-03-26 DIAGNOSIS — F64.0 GENDER DYSPHORIA IN ADULT: ICD-10-CM

## 2020-03-26 DIAGNOSIS — Z01.818 PREOP GENERAL PHYSICAL EXAM: Primary | ICD-10-CM

## 2020-03-26 DIAGNOSIS — Z01.818 PREOP GENERAL PHYSICAL EXAM: ICD-10-CM

## 2020-03-26 LAB
% GRANULOCYTES: 63.8 %G (ref 40–75)
ALBUMIN SERPL-MCNC: 5.1 MG/DL (ref 3.8–5)
ALP SERPL-CCNC: 42.2 U/L (ref 31.7–110.7)
ALT SERPL-CCNC: 27.3 U/L (ref 0–45)
APTT PPP: 32 SEC (ref 22–37)
AST SERPL-CCNC: 22.8 U/L (ref 0–55)
BILIRUB SERPL-MCNC: 0.8 MG/DL (ref 0.2–1.3)
BUN SERPL-MCNC: 16.4 MG/DL (ref 7–21)
CALCIUM SERPL-MCNC: 10 MG/DL (ref 8.5–10.1)
CHLORIDE SERPLBLD-SCNC: 98.1 MMOL/L (ref 98–110)
CO2 SERPL-SCNC: 29.1 MMOL/L (ref 20–32)
CREAT SERPL-MCNC: 0.9 MG/DL (ref 0.7–1.3)
GFR SERPL CREATININE-BSD FRML MDRD: >90 ML/MIN/1.7 M2
GLUCOSE SERPL-MCNC: 96.3 MG'DL (ref 70–99)
GRANULOCYTES #: 3.7 K/UL (ref 1.6–8.3)
HCT VFR BLD AUTO: 47.8 % (ref 40–53)
HEMOGLOBIN: 15 G/DL (ref 13.3–17.7)
INR PPP: 1 (ref 0.86–1.14)
LYMPHOCYTES # BLD AUTO: 1.6 K/UL (ref 0.8–5.3)
LYMPHOCYTES NFR BLD AUTO: 28.1 %L (ref 20–48)
MCH RBC QN AUTO: 30.1 PG (ref 26.5–35)
MCHC RBC AUTO-ENTMCNC: 31.4 G/DL (ref 32–36)
MCV RBC AUTO: 96 FL (ref 78–100)
MID #: 0.5 K/UL (ref 0–2.2)
MID %: 8.1 %M (ref 0–20)
PLATELET # BLD AUTO: 208 K/UL (ref 150–450)
POTASSIUM SERPL-SCNC: 4 MMOL/L (ref 3.3–4.5)
PROT SERPL-MCNC: 8.1 G/DL (ref 6.8–8.8)
RBC # BLD AUTO: 4.98 M/UL (ref 4.4–5.9)
SODIUM SERPL-SCNC: 134.7 MMOL/L (ref 132.6–141.4)
WBC # BLD AUTO: 5.8 K/UL (ref 4–11)

## 2020-03-26 ASSESSMENT — MIFFLIN-ST. JEOR: SCORE: 1912.07

## 2020-03-26 NOTE — PROGRESS NOTES
NAVIGSATINDER Clinician Contact & Progress Note  For Individual Resiliency Training (IRT)  A Part of the [facility] First Episode of Psychosis Program    NAVIGATE Enrollee: Janey Givens (1995)     MRN: [id number]  Date:  3/03/20  Diagnosis: Schizophrenia, unspecified (F20.9)  Clinician: CARMEN Individual Resiliency Trainer, LEEROY See     1. Type of contact: (majority of time spent)  IRT Session    2. People present:   Writer   Client: Janey Givens    3. Length of Actual Contact: Start Time: 1:00; End Time: 2:00   Traveled?    No     4. Location of contact:  [facility], [place]    5. Did the client complete the home practice option(s) from the previous session: Completed    6. Motivational Teaching Strategies:  Connect info and skills with personal goals  Promote hope and positive expectations  Explore pros and cons of change  Re-frame experiences in positive light    7. Educational Teaching Strategies:  Review of written material/education  Relate information to client's experience  Break down information into small chunks  Adopt client's language     8. CBT Teaching Strategies:  Reinforcement and shaping (positive feedback for steps towards goals, gains in knowledge & skills and follow-through on home assignments)  Coping skills training (review current coping skills, increase currently used skills and plan home practice)    9. IRT Module(s) Addressed:  Module 5 - Processing the Psychotic Episode  Module 9 - Coping with Symptoms    10. Techniques utilized:   [place] announced at beginning of session  Review of homework  Review of goal  Review of previous meeting  Present new material  Problem-solving practice  Help client choose a home practice option  Summarize progress made in current session    11. Mental Status Exam:    Alertness: alert  and oriented  Appearance: casually groomed  Behavior/Demeanor: cooperative and pleasant, with good  eye contact   Speech: regular rate and  rhythm  Language: intact. Preferred language identified as English.  Psychomotor: normal or unremarkable  Mood: description consistent with euthymia  Affect: appropriate; was congruent to mood; was congruent to content  Thought Process/Associations: unremarkable  Thought Content:  Reports delusions and preoccupations;  Denies suicidal and violent ideation  Perception:  Reports none;  Denies auditory hallucinations and visual hallucinations  Insight: good  Judgment: good  Cognition: does  appear grossly intact; formal cognitive testing was not done  Suicidal ideation: denies SI, denies intent,  and denies plan  Homicidal Ideation: denies    12. Assessment/Progress Note:     This writer met with Katie for a follow-up IRT session. Katie reported delusional thoughts and preoccupations. Katie mentioned she was able to complete her home practice and downloaded a phone scarlett to assist with budgetary needs. This writer praised her for problem solving and utilizing resources. She noted she has been spending time programming lately, which causes her to sleep a decreased amount of hours. Katie stated she recently told her roommates about her psychosis episode. She expressed embarrassment regarding the specific delusional thoughts she had. Discussing with her roommate led to increased preoccupation and paranoia. This writer provided education about the fight or flight response and the impact of relaxation strategies in combating this. Katie stated the delusional thoughts dissipated after she participated in programming activities. This writer encouraged her to continue to utilize self-care activities when feeling distress; she agreed. This writer also praised Katie for bravery and courageousness in discussing her experiences. Katie stated she would be interested in pursuing Module 5: Processing the Episode in the future. She stated she will be moving to [place] and is hopeful there is a program similar to NAVIGATE there. This writer will  "research potential resources prior to May. This writer and Katie then updated her treatment plan goals, which include: improve organization of homework assignments/daily tasks, obtain a letter for GRS, begin utilizing meal preparation to aid in budgeting, discuss finances with parents, talk to roommates about budget, practice stress management (journaling, meditation, PMR, visualization, recalling previous experiences).     13. Plan/Referrals:     This writer and Katie will pursue Module 5: Processing the Episode, in alignment with her goals of sharing her story with friends/family, attending graduate school, and improving budgeting.     Billing for \"Interactive Complexity\"?    No      LEEROY See Individual Resiliency Trainer  "

## 2020-03-26 NOTE — PROGRESS NOTES
Preceptor Attestation:   Patient seen, evaluated and discussed with the resident. I have verified the content of the note, which accurately reflects my assessment of the patient and the plan of care.   Supervising Physician:  Jesus Bailey MD

## 2020-03-26 NOTE — PROGRESS NOTES
[facility]  [address],  [address]  [place] [address]  Phone: [phone]  Fax: [phone]    3/26/2020    Adult PRE-OP Evaluation:    Janey Givens, 1995 presents for pre-operative evaluation and assessment as requested by   prior to undergoing surgery/procedure for treatment of gender dysphoria.  Proposed procedure: Vaginoplasty; penile inversion   Date of Surgery/ Procedure: 5/12/2020  Hospital/Surgical Facility: [facility] in [place], [place]     Primary Physician: Ariana Andrea  Type of Anesthesia Anticipated: General  History of anesthesia complications: NONE  History of  abnormal bleeding: NONE   History of blood transfusions: NO  Patient has a Health Care Directive or Living Will:  NO    Preoperative Questions   1. NO - Do you have a history of heart attack, stroke, stent, bypass or surgery on an artery in the head, neck, heart or legs?  2. NO - Do you ever have any pain or discomfort in your chest?  3. NO - Have you ever had a severe pain across the front of your chest lasting for half an hour or more?  4. NO - Do you have a history of Congestive Heart Failure?  5. NO - Are you troubled by shortness of breath when: walking on the level/ up a slight hill/ at night?  6. NO - Does your chest ever sound wheezy or whistling?  7. NO - Do you currently have a cold, bronchitis or other respiratory infection?  8. NO - Have you had a cold, bronchitis or other respiratory infection within the last 2 weeks?  9. NO - Do you usually have a cough?  10. NO - Do you sometimes get pains in the calves of your legs when you walk?  11. YES - Do you or anyone in your family have previous history of blood clots? Provoked DVT after ACL surgery; now off blood thinners  12. NO - Do you or does anyone in your family have a serious bleeding problem such as prolonged bleeding following surgeries or cuts?  13. NO - Have you ever had problems with anemia or been told to take  iron pills?  14. NO - Have you had any abnormal blood loss such as black, tarry or bloody stools, or abnormal vaginal bleeding?  15. NO - Have you ever had a blood transfusion?  16. NO - Have you or any of your relatives ever had problems with anesthesia?  17. NO - Do you have sleep apnea, excessive snoring or daytime drowsiness?  18. NO - Do you have any prosthetic heart valves?  19. NO - Do you have prosthetic joints?  20. NO - Is there any chance that you may be pregnant?    Patient Active Problem List   Diagnosis     Blood clot in vein     Psychiatric disorder     Gender dysphoria in adult     Acute psychosis (H)       ARIPiprazole (ABILIFY) 10 MG tablet, Take 1 tablet (10 mg) by mouth daily  estradiol (VIVELLE-DOT) 0.1 MG/24HR bi-weekly patch, Place 2 patches onto the skin twice a week  spironolactone (ALDACTONE) 100 MG tablet, Take 2 tablets (200 mg) by mouth daily    No current facility-administered medications on file prior to visit.       OTC products: None, except as noted above    Allergies   Allergen Reactions     Banana Anaphylaxis     Throat swelling     Latex Allergy: NO    Social History     Socioeconomic History     Marital status: Single     Spouse name: Not on file     Number of children: Not on file     Years of education: Not on file     Highest education level: Not on file   Occupational History     Not on file   Social Needs     Financial resource strain: Not on file     Food insecurity     Worry: Not on file     Inability: Not on file     Transportation needs     Medical: Not on file     Non-medical: Not on file   Tobacco Use     Smoking status: Never Smoker     Smokeless tobacco: Never Used   Substance and Sexual Activity     Alcohol use: Yes     Drug use: No     Comment: canabis lsd mushrooms      Sexual activity: Never   Lifestyle     Physical activity     Days per week: Not on file     Minutes per session: Not on file     Stress: Not on file   Relationships     Social connections      "Talks on phone: Not on file     Gets together: Not on file     Attends Zoroastrianism service: Not on file     Active member of club or organization: Not on file     Attends meetings of clubs or organizations: Not on file     Relationship status: Not on file     Intimate partner violence     Fear of current or ex partner: Not on file     Emotionally abused: Not on file     Physically abused: Not on file     Forced sexual activity: Not on file   Other Topics Concern     Not on file   Social History Narrative     Not on file       REVIEW OF SYSTEMS:   Constitutional, HEENT, cardiovascular, pulmonary, GI, , musculoskeletal, neuro, skin, endocrine and psych systems are negative, except as otherwise noted.    EXAM:     Patient Vitals for the past 24 hrs:   BP Temp Temp src Pulse Resp SpO2 Height Weight   03/26/20 1328 115/78 98.3  F (36.8  C) Oral 85 16 97 % 1.778 m (5' 10\") 91.1 kg (200 lb 12.8 oz)     Body mass index is 28.81 kg/m .  GENERAL: healthy, alert and no distress  EYES: Eyes grossly normal to inspection, extraocular movements - intact, and PERRL  HENT: ear canals- normal; TMs- normal; Nose- normal; Mouth- no ulcers, no lesions  NECK: no tenderness, no adenopathy, no asymmetry, no masses, no stiffness; thyroid- normal to palpation  RESP: lungs clear to auscultation - no rales, no rhonchi, no wheezes  CV: regular rates and rhythm, normal S1 S2, no S3 or S4 and no murmur, no click or rub -  ABDOMEN: soft, no tenderness, no  hepatosplenomegaly, no masses, normal bowel sounds  MS: extremities- no gross deformities noted, no edema  SKIN: no suspicious lesions, no rashes  NEURO: strength and tone- normal, sensory exam- grossly normal, mentation- intact, speech- normal, reflexes- symmetric  BACK: no CVA tenderness, no paralumbar tenderness  PSYCH: Alert and oriented times 3; speech- coherent , normal rate and volume; able to articulate logical thoughts  LYMPHATICS: ant. cervical- normal, post. cervical- normal  GENITAL " EXAM: normal penis, normal testicles bilaterally without tenderness, erythema, swelling; no inguinal lymphadenopathy    DIAGNOSTICS:      Labs Drawn and in Process:   CBC/CMP, coags, CXR, EKG    RISK ASSESSMENT:     Cardiovascular Risk:  -Patient is able to participate in strenuous activities without chest pain.  -The patient does not have chest pain with exertion.  -Patient does not have a history of congestive heart failure.    -The patient does not have a history of stroke and does not have a history of valvular disease.    Pulmonary Risk:  -In terms of risk factors for pulmonary complication, the patient has no risk factors    Perioperative Complications:  -The patient has a history of bleeding or clotting problems in the past.    -The patient has had the following complications of surgery: DVT from ACL repair.    -The patient does not have a family history of any anesthesia or surgical complications.      IMPRESSION:   Reason for surgery/procedure: Gender dysphoria    The proposed surgical procedure is considered INTERMEDIATE risk.    For above listed surgery and anesthesia:   Patient is at low risk for surgery/procedure and perioperative/procedure complications.    RECOMMENDATIONS:     Labs:  Coags, CXR, EKG and CBC/CMP    Fasting:  NPO for 12 hours prior to surgery    Preop Plan:  --Approval given to proceed with proposed procedure, without further diagnostic evaluation  --Because of DVT or PE history, patient should be on low molecular weight heparin and wear compression hose before & after surgery  --Consider stopping Estrogen 2 weeks before and for 2 weeks after surgery to decrease risk of DVT, but will defer to surgeon for final decision    Medications:  Patient should take their regular medications the morning of surgery unless otherwise instructed.    Patient will need DVT prophylaxis.      Moe Montiel MD    Please contact our office if there are any further questions or information required  about this patient.

## 2020-03-31 ENCOUNTER — VIRTUAL VISIT (OUTPATIENT)
Dept: PSYCHIATRY | Facility: CLINIC | Age: 25
End: 2020-03-31
Payer: COMMERCIAL

## 2020-03-31 DIAGNOSIS — F20.9 SCHIZOPHRENIA, UNSPECIFIED TYPE (H): ICD-10-CM

## 2020-03-31 DIAGNOSIS — F29 PSYCHOSIS, UNSPECIFIED PSYCHOSIS TYPE (H): Primary | ICD-10-CM

## 2020-03-31 DIAGNOSIS — F19.10 SUBSTANCE ABUSE (H): ICD-10-CM

## 2020-03-31 DIAGNOSIS — F20.9 SCHIZOPHRENIA, UNSPECIFIED TYPE (H): Primary | ICD-10-CM

## 2020-03-31 NOTE — PROGRESS NOTES
"Janey Givens is a 25 year old adult who is being evaluated via a billable telephone visit.      The patient has been notified of following:     \"This telephone visit will be conducted via a call between you and your physician/provider. We have found that certain health care needs can be provided without the need for a physical exam.  This service lets us provide the care you need with a short phone conversation.  If a prescription is necessary we can send it directly to your pharmacy.  If lab work is needed we can place an order for that and you can then stop by our lab to have the test done at a later time.    If during the course of the call the physician/provider feels a telephone visit is not appropriate, you will not be charged for this service.\"     Patient has given verbal consent for Telephone visit?  Yes    Janey Givens complains of  No chief complaint on file.      I have reviewed and updated the patient's Past Medical History, Social History, Family History and     Medication List.    Trisha Jordan, CMA    ALLERGIES  Banana.    How would you like to obtain your AVS? Mail a copy    Subjective     Janey Givens is a 25 year old adult who presents to clinic today for the following health issues:  Mood has been stable, primary focus is on the letters of support for surgery. [place] not doing GSR and will be postponed to next summer at the earliest. She is discouraged at the delay but accepting of circumstances affecting worldwide.She is doing OK in sheltering in with roommates at school. Medications are taken regularly w/o s/e of concern.    Review of Systems   ROS COMP: Constitutional, HEENT, cardiovascular, pulmonary, gi and gu systems are negative, except as otherwise noted.       Objective   Reported vitals:  There were no vitals taken for this visit.     PSYCH: Alert and oriented times 3; coherent " "speech, normal   rate and volume, able to articulate logical thoughts, able   to abstract reason, no tangential thoughts, no hallucinations   or delusions  His affect is normal and pleasant  RESP: No cough, no audible wheezing, able to talk in full sentences  Remainder of exam unable to be completed due to telephone visits       Diagnostic Test Results:   general medication screen orders on file for completion before next month    Assessment  Psychosis nos, r/o Schizophrenia  Catatonia, by history  Gender dysphoria,   Cannabis use disorder, in remission     Plan:  No change in Abilify given episodic recurrence of psychosis x2  Continue THC abstinence  Letter in support of GRS    RTC 4 weeks      Phone call duration:  30 minutes    Toi Francis MD  \"  "

## 2020-03-31 NOTE — PROGRESS NOTES
NAVIGSATINDER Clinician Contact & Progress Note  For Individual Resiliency Training (IRT)  A Part of the [facility] First Episode of Psychosis Program    NAVIGATE Enrollee: Janey Givens (1995)     MRN: [id number]  Date:  3/31/20  Diagnosis: Schizophrenia, unspecified (F20.9)  Clinician: CARMEN Individual Resiliency Trainer, LEEROY See     1. Type of contact: (majority of time spent)  IRT Session via telehealth  Mode of communication: telephone call. Patient consented verbally to this mode of therapy today.  Reason for telehealth: COVID-19. This patient visit was converted to a telehealth visit to minimize exposure to COVID-19.    2. People present:   Writer  Client: Yes     3. Length of Actual Contact: Start Time: 2:00; End Time: 2:46     4. Location of contact:  Originating Location (patient location): Their home, located in [place], [place]  Distant Location (provider location): Home office, located in [place], [place], using appropriate privacy considerations and procedures    5. Did the client complete the home practice option(s) from the previous session: Completed    6. Motivational Teaching Strategies:  Connect info and skills with personal goals  Promote hope and positive expectations  Explore pros and cons of change  Re-frame experiences in positive light    7. Educational Teaching Strategies:  Review of written material/education  Relate information to client's experience  Ask questions to check comprehension  Break down information into small chunks  Adopt client's language     8. CBT Teaching Strategies:  Reinforcement and shaping (positive feedback for steps towards goals, gains in knowledge & skills and follow-through on home assignments)  Social skills training (rationale for skill, modeling, role play practice and plan home practice)    9. IRT Module(s) Addressed:  Module 9 - Coping with Symptoms  Module 11 - Having Fun and Developing Good Relationships    10. Techniques  utilized:   [place] announced at beginning of session  Review of homework  Review of goal  Review of previous meeting  Present new material  Role-play  Problem-solving practice  Help client choose a home practice option  Summarize progress made in current session    11. Measures:    Mental Status Exam  Alertness: alert  and oriented  Behavior/Demeanor: cooperative and pleasant  Speech: normal and regular rate and rhythm  Language: intact.   Mood: anxious  Thought Process/Associations: unremarkable  Thought Content:  Reports preoccupations;  Denies suicidal and violent ideation  Perception:  Reports none;  Denies auditory hallucinations and visual hallucinations  Insight: good  Judgment: good  Cognition: does  appear grossly intact; formal cognitive testing was not done    PHQ-9  Over the last 2 weeks, how often have you been bothered by any the following problems?    1. Little interest or pleasure in doing things: 0 - Not at all  2. Feeling down, depressed, or hopeless: 0 - Not at all  3. Trouble falling or staying asleep, or sleeping too much: 0 - Not at all  4. Feeling tired or having little energy: 0 - Not at all  5. Poor appetite or overeatin - Not at all  6. Feeling bad about yourself - or that you are a failure or have let yourself or your family down: 0 - Not at all  7. Trouble concentrating on things, such as reading the newspaper or watching television: 0 - Not at all  8. Moving or speaking so slowly that other people could have noticed. Or the opposite-being fidgety or restless that you have been moving around a lot more than usual: 0 - Not at all  9. Thoughts that you would be better off dead, or of hurting yourself in some way: 0 - Not at all    If you checked off any problems, how difficulty have these problems made it for you to do your work, take care of things at home, or get along with other people? Not difficult at all     [place] Protocol Risk Identification  1) Have you wished you were dead  or wished you could go to sleep and not wake up? No  2) Have you actually had any thoughts about killing yourself? No  If YES to 2, answer questions 3, 4, 5, 6  If NO to 2, go directly to question 6  3) Have you thought about how you might do this? N/A  4) Have you had any intension of acting on these thoughts of killing yourself, as opposed to you have the thoughts but you definitely would not act on them? N/A  5) Have you started to work out or worked out the details of how to kill yourself? Do you intent to carry out this plan? N/A  Always Ask Question 6  6) Have you done anything, started to do anything, or prepared to do anything to end your life? No  Examples: collected pills, obtained a gun, gave away valuables, wrote a will or suicide note, held a gun but changed your mind, cut yourself, tried to hang yourself, etc.    12. Assessment/Progress Note:     This writer called Katie for a follow-up phone call, at her request. Katie denied any delusional thoughts or auditory hallucinations at this time. She reported improved sleep and more relaxation. Katie recently moved to her parents' house for the summer. She has been pursuing programming as a fulfilling activity. She has started a diet, in order to improve nutrition. She is reducing the amount of snacks per day. This writer reinforced her efforts in taking manageable steps towards her goal. She recently found a residence in [place] to move into when she begins graduate school. Katie expressed uncertainty about gender disclosure with new roommates or her landlord. This writer and Katie brainstormed potential strategies, using a social skills lens. This writer encouraged Katie to begin a conversation about reasons they are relocating, subjects studying in school, or general interests to initiate rapport. Katie also expressed uncertainty about how to end an interpersonal relationship when moving. This writer assisted Katie in processing through the pro's and con's and  "strategies in doing so. She thanked this writer and agreed to try these throughout the coming week.     13. Plan/Referrals:     This writer will continue to pursue social skills training and problem solving, in alignment with Katie's goals of developing meaningful relationships with new roommates, obtaining GRS, and beginning graduate school.     Billing for \"Interactive Complexity\"?    No      LEEROY See    NAVIGATE Individual Resiliency Trainer  "

## 2020-04-06 ENCOUNTER — APPOINTMENT (OUTPATIENT)
Dept: NEUROLOGY | Facility: CLINIC | Age: 25
End: 2020-04-06
Payer: COMMERCIAL

## 2020-04-07 ENCOUNTER — VIRTUAL VISIT (OUTPATIENT)
Dept: PSYCHIATRY | Facility: CLINIC | Age: 25
End: 2020-04-07
Payer: COMMERCIAL

## 2020-04-07 DIAGNOSIS — F29 PSYCHOSIS, UNSPECIFIED PSYCHOSIS TYPE (H): Primary | ICD-10-CM

## 2020-04-07 NOTE — PROGRESS NOTES
CARMEN Clinician Contact & Progress Note  For Individual Resiliency Training (IRT)  A Part of the [facility] First Episode of Psychosis Program    NAVIGATE Enrollee: Janey Givens (1995)     MRN: [id number]  Date:  4/07/20  Diagnosis: Psychosis, unspecified (F29)  Clinician: CARMEN Individual Resiliency Trainer, LEEROY See     1. Type of contact: (majority of time spent)  IRT Session via telehealth  Mode of communication: telephone call. Patient consented verbally to this mode of therapy today.  Reason for telehealth: COVID-19. This patient visit was converted to a telehealth visit to minimize exposure to COVID-19.    2. People present:   Writer  Client: Yes     3. Length of Actual Contact: Start Time: 2:03; End Time: 2:55   4. Location of contact:  Originating Location (patient location): Their home, located in [place], [place]  Distant Location (provider location): Home office, located in [place], [place], using appropriate privacy considerations and procedures    5. Did the client complete the home practice option(s) from the previous session: Completed    6. Motivational Teaching Strategies:  Connect info and skills with personal goals  Promote hope and positive expectations  Explore pros and cons of change  Re-frame experiences in positive light    7. Educational Teaching Strategies:  Review of written material/education  Relate information to client's experience  Ask questions to check comprehension  Break down information into small chunks  Adopt client's language     8. CBT Teaching Strategies:  Reinforcement and shaping (positive feedback for steps towards goals, gains in knowledge & skills and follow-through on home assignments)  Relapse prevention planning (review of stressors and early warning signs)    9. IRT Module(s) Addressed:  Module 4 - Relapse Prevention Planning  Module 5 - Processing the Psychotic Episode    10. Techniques utilized:   [place] announced at beginning of  session  Review of homework  Review of goal  Review of previous meeting  Present new material  Problem-solving practice  Help client choose a home practice option  Summarize progress made in current session    11. Measures:    Mental Status Exam  Alertness: alert  and oriented  Behavior/Demeanor: cooperative and pleasant  Speech: regular rate and rhythm  Language: intact.   Mood: description consistent with euthymia  Thought Process/Associations: unremarkable  Thought Content:  Reports none;  Denies suicidal and violent ideation  Perception:  Reports none;  Denies auditory hallucinations and visual hallucinations  Insight: good  Judgment: good  Cognition: does  appear grossly intact; formal cognitive testing was not done    [place] Protocol Risk Identification  1) Have you wished you were dead or wished you could go to sleep and not wake up? No  2) Have you actually had any thoughts about killing yourself? No  If YES to 2, answer questions 3, 4, 5, 6  If NO to 2, go directly to question 6  3) Have you thought about how you might do this? No  4) Have you had any intension of acting on these thoughts of killing yourself, as opposed to you have the thoughts but you definitely would not act on them? No  5) Have you started to work out or worked out the details of how to kill yourself? Do you intent to carry out this plan? No  Always Ask Question 6  6) Have you done anything, started to do anything, or prepared to do anything to end your life? No  Examples: collected pills, obtained a gun, gave away valuables, wrote a will or suicide note, held a gun but changed your mind, cut yourself, tried to hang yourself, etc.    PHQ-9  Over the last 2 weeks, how often have you been bothered by any the following problems?    1. Little interest or pleasure in doing things: 0 - Not at all  2. Feeling down, depressed, or hopeless: 0 - Not at all  3. Trouble falling or staying asleep, or sleeping too much: 0 - Not at all  4. Feeling  tired or having little energy: 0 - Not at all  5. Poor appetite or overeatin - Not at all  6. Feeling bad about yourself - or that you are a failure or have let yourself or your family down: 0 - Not at all  7. Trouble concentrating on things, such as reading the newspaper or watching television: 0 - Not at all  8. Moving or speaking so slowly that other people could have noticed. Or the opposite-being fidgety or restless that you have been moving around a lot more than usual: 0 - Not at all  9. Thoughts that you would be better off dead, or of hurting yourself in some way: 0 - Not at all    If you checked off any problems, how difficulty have these problems made it for you to do your work, take care of things at home, or get along with other people? Not difficult at all    KELSEY-7  Over the last 2 weeks, how often have you been bothered by the following problems?    1. Feeling nervous, anxious or on edge: 0 - Not at all  2. Not being able to stop or control worryin - Not at all  3. Worrying too much about different things: 0 - Not at all  4. Trouble relaxin - Not at all  5. Being so restless that it is hard to sit still: 0 - Not at all  6. Becoming easily annoyed or irritable: 0 - Not at all  7. Feeling afraid as if something awful might happen: 0 - Not at all    12. Assessment/Progress Note:     This writer called Katie, at her request, for a follow-up IRT session. Katie denied any concerns with symptoms at this time. Katie stated she is living with her parents during COVID-19, but is maintaining consistent contact with friends. Katie is looking forward to Gender Confirmation Surgery in May. She has requested a letter on her behalf from this writer. This writer confirmed ability to do so. In order to complete the letter, this writer and Katie needed to complete a psychosocial assessment focused history of gender identity. During the remainder of the session, this writer utilized narrative therapy to assist Katie in  "processing experiences, thoughts, emotions related to gender identity. Katie created a timeline of events, from childhood until present life. This writer and Katie then discussed post-surgery plans, using a relapse prevention lens. She stated her parents will be present with her until the recovery period is complete. Katie agreed to regularly scheduled IRT and medication management appointments during the recovery period. She mentioned social support, relaxation, and a sleep routine will be important. This writer praised Katie for her awareness and insight.     13. Plan/Referrals:     This writer will continue to pursue narrative therapy and problem solving, in alignment with Katie's goals of developing meaningful relationships, obtaining GRS, and beginning graduate school.     Billing for \"Interactive Complexity\"?    No      LEEROY See Individual Resiliency Trainer  "

## 2020-04-08 LAB
AMPHETAMINES UR QL SCN: NEGATIVE
BARBITURATES UR QL: NEGATIVE
BENZODIAZ UR QL: NEGATIVE
CANNABINOIDS UR QL SCN: NEGATIVE
COCAINE UR QL: NEGATIVE
ETHANOL UR QL SCN: NEGATIVE
OPIATES UR QL SCN: NEGATIVE
PCP UR QL SCN: NEGATIVE

## 2020-04-10 LAB
AMPHET UR QL CFM: NEGATIVE
ETHYL GLUCURONIDE UR QL: NEGATIVE
PAIN DRUG SCR UR W RPTD MEDS: NORMAL

## 2020-04-20 ENCOUNTER — PATIENT OUTREACH (OUTPATIENT)
Dept: PSYCHIATRY | Facility: CLINIC | Age: 25
End: 2020-04-20

## 2020-04-20 NOTE — PROGRESS NOTES
This writer received a call from Karo through [facility]. Karo stated Katie was recently informed that her GRS surgery was canceled, due to COVID-19. Karo wanted the NAVIGATE Team to be aware, to assist in processing emotions related to this. Karo noted they do not have a reschedule date as of now, but that they are working to reopen as soon as they can. Karo requested the NAVIGATE team call with any significant updates.     This is a non-billable encounter as it was solely for the purposes of outreach and/or care coordination.

## 2020-04-20 NOTE — PROGRESS NOTES
This writer received a voice mail from the [facility] requesting a call back. This writer left a voice message for care collaboration purposes.     This is a non-billable encounter as it was solely for the purposes of outreach and/or care coordination.

## 2020-04-21 NOTE — TELEPHONE ENCOUNTER
As we expected.... how does she handle disappointments?  Does she self injure or make suicide gestures? Hopefully she can deal with it w/o substance abuse.   All this matters as far as a prediction of her future readiness for major and prolonged surgery.

## 2020-04-24 ENCOUNTER — BEH TREATMENT PLAN (OUTPATIENT)
Dept: PSYCHIATRY | Facility: CLINIC | Age: 25
End: 2020-04-24

## 2020-04-24 ENCOUNTER — VIRTUAL VISIT (OUTPATIENT)
Dept: PSYCHIATRY | Facility: CLINIC | Age: 25
End: 2020-04-24
Payer: COMMERCIAL

## 2020-04-24 DIAGNOSIS — F20.9 SCHIZOPHRENIA, UNSPECIFIED TYPE (H): Primary | ICD-10-CM

## 2020-04-24 NOTE — PROGRESS NOTES
NAVIGSATINDER Clinician Contact & Progress Note  For Individual Resiliency Training (IRT)  A Part of the [facility] First Episode of Psychosis Program    NAVIGATE Enrollee: Janey Givens (1995)     MRN: [id number]  Date:  4/24/20  Diagnosis: Schizophrenia, unspecified (F20.9)  Clinician: CARMEN Individual Resiliency Trainer, LEEROY See     1. Type of contact: (majority of time spent)  IRT Session via telehealth  Mode of communication: telephone call. Patient consented verbally to this mode of therapy today.  Reason for telehealth: COVID-19. This patient visit was converted to a telehealth visit to minimize exposure to COVID-19.    2. People present:   Writer  Client: Yes     3. Length of Actual Contact: Start Time: 12:58; End Time: 1:24     4. Location of contact:  Originating Location (patient location): Their home, located in [place], [place]  Distant Location (provider location): Home office, located in [place], [place], using appropriate privacy considerations and procedures    5. Did the client complete the home practice option(s) from the previous session: Completed    6. Motivational Teaching Strategies:  Connect info and skills with personal goals  Promote hope and positive expectations  Explore pros and cons of change  Re-frame experiences in positive light    7. Educational Teaching Strategies:  Review of written material/education  Ask questions to check comprehension  Break down information into small chunks  Adopt client's language     8. CBT Teaching Strategies:  Reinforcement and shaping (positive feedback for steps towards goals, gains in knowledge & skills and follow-through on home assignments)  Relapse prevention planning (review of stressors)  Cognitive restructuring (identify thoughts related to negative feelings and change though or form action plan)    9. IRT Module(s) Addressed:  Module 4 - Relapse Prevention Planning  Module 8 - Dealing with Negative Feelings  Module 9  - Coping with Symptoms    10. Techniques utilized:   [place] announced at beginning of session  Review of homework  Review of goal  Review of previous meeting  Present new material  Problem-solving practice  Help client choose a home practice option  Summarize progress made in current session    11. Measures:    Mental Status Exam  Alertness: oriented and drowsy  Behavior/Demeanor: cooperative and pleasant  Speech: slowed  Language: intact.   Mood: depressed  Thought Process/Associations: unremarkable  Thought Content:  Reports preoccupations;  Denies suicidal and violent ideation  Perception:  Reports none;  Denies auditory hallucinations  Insight: adequate  Judgment: adequate for safety  Cognition: does  appear grossly intact; formal cognitive testing was not done    [place] Protocol Risk Identification  1) Have you wished you were dead or wished you could go to sleep and not wake up? No  2) Have you actually had any thoughts about killing yourself? No  If YES to 2, answer questions 3, 4, 5, 6  If NO to 2, go directly to question 6  3) Have you thought about how you might do this? No  4) Have you had any intension of acting on these thoughts of killing yourself, as opposed to you have the thoughts but you definitely would not act on them? No  5) Have you started to work out or worked out the details of how to kill yourself? Do you intent to carry out this plan? No  Always Ask Question 6  6) Have you done anything, started to do anything, or prepared to do anything to end your life? No  Examples: collected pills, obtained a gun, gave away valuables, wrote a will or suicide note, held a gun but changed your mind, cut yourself, tried to hang yourself, etc.    PHQ-9  Over the last 2 weeks, how often have you been bothered by any the following problems?    1. Little interest or pleasure in doing things: 0 - Not at all  2. Feeling down, depressed, or hopeless: 0 - Not at all  3. Trouble falling or staying asleep, or  sleeping too much: 1 - Several days  4. Feeling tired or having little energy: 0 - Not at all  5. Poor appetite or overeatin - Not at all  6. Feeling bad about yourself - or that you are a failure or have let yourself or your family down: 0 - Not at all  7. Trouble concentrating on things, such as reading the newspaper or watching television: 0 - Not at all  8. Moving or speaking so slowly that other people could have noticed. Or the opposite-being fidgety or restless that you have been moving around a lot more than usual: 0 - Not at all  9. Thoughts that you would be better off dead, or of hurting yourself in some way: 0 - Not at all    If you checked off any problems, how difficulty have these problems made it for you to do your work, take care of things at home, or get along with other people? Not difficult at all    12. Assessment/Progress Note:     This writer called Katie, at her request, for a follow-up IRT session. Katie denied any symptoms or substance use. Katie stated her college courses are complete in one week, but she has end of semester projects to finish. Katie stated she will not receive a commencement ceremony. This writer assisted her in processing feelings related to this. Katie mentioned her Gender Confirmation Surgery was postponed, originally scheduled for early May. This writer validated her frustration, disappointment, worry, and concern about this. She mentioned she has been utilizing distraction (video games) to reduce thoughts about her future. She is hopeful the surgery can be rescheduled before July, in alignment with her plans to move in September. This writer identified difficulties in uncertainty and encouraged her to continue seeking social support, practicing self-care, and pursuing meaningful daily activities. Due to audio difficulties, the session was shortened.     13. Plan/Referrals:     Due to audio difficulties, this writer will call Katie on 20 for a follow-up IRT session.  "    Billing for \"Interactive Complexity\"?    No      LEEROY See    NAVIGATE Individual Resiliency Trainer  "

## 2020-04-24 NOTE — TELEPHONE ENCOUNTER
Good idea, I found her to be somewhat testy with me . However when I write the letter I'd like to say the substance use is not an active problem. Medical cannabis with a verified RX card would be only exception

## 2020-04-28 ENCOUNTER — VIRTUAL VISIT (OUTPATIENT)
Dept: PSYCHIATRY | Facility: CLINIC | Age: 25
End: 2020-04-28
Payer: COMMERCIAL

## 2020-04-28 DIAGNOSIS — F20.9 SCHIZOPHRENIA, UNSPECIFIED TYPE (H): Primary | ICD-10-CM

## 2020-04-28 NOTE — PROGRESS NOTES
NAVIGSATINDER Clinician Contact & Progress Note  For Individual Resiliency Training (IRT)  A Part of the [facility] First Episode of Psychosis Program    NAVIGATE Enrollee: Janey Givens (1995)     MRN: [id number]  Date:  4/28/20  Diagnosis: Schizophrenia, unspecified (F20.9)  Clinician: CARMEN Individual Resiliency Trainer, LEEROY See     1. Type of contact: (majority of time spent)  IRT Session via telehealth  Mode of communication: telephone call. Patient consented verbally to this mode of therapy today.  Reason for telehealth: COVID-19. This patient visit was converted to a telehealth visit to minimize exposure to COVID-19.    2. People present:   Writer  Client: Yes     3. Length of Actual Contact: Start Time: 2:00; End Time: 2:32     4. Location of contact:  Originating Location (patient location): Their home, located in [place], [place]  Distant Location (provider location): Home office, located in [place], [place], using appropriate privacy considerations and procedures    5. Did the client complete the home practice option(s) from the previous session: Completed    6. Motivational Teaching Strategies:  Connect info and skills with personal goals  Promote hope and positive expectations  Explore pros and cons of change    7. Educational Teaching Strategies:  Relate information to client's experience  Break down information into small chunks  Adopt client's language     8. CBT Teaching Strategies:  Reinforcement and shaping (positive feedback for steps towards goals, gains in knowledge & skills and follow-through on home assignments)  Coping skills training (review current coping skills, increase currently used skills and plan home practice)  Cognitive restructuring (identify thoughts related to negative feelings and change though or form action plan)    9. IRT Module(s) Addressed:  Module 8 - Dealing with Negative Feelings  Module 9 - Coping with Symptoms    10. Techniques utilized:    [place] announced at beginning of session  Review of homework  Review of goal  Review of previous meeting  Present new material  Problem-solving practice  Help client choose a home practice option  Summarize progress made in current session    11. Measures:    Mental Status Exam  Alertness: oriented and drowsy  Behavior/Demeanor: cooperative and pleasant  Speech: slowed  Language: intact.   Mood: depressed  Thought Process/Associations: unremarkable  Thought Content:  Reports preoccupations;  Denies suicidal and violent ideation  Perception:  Reports none;  Denies auditory hallucinations  Insight: adequate  Judgment: adequate for safety  Cognition: does  appear grossly intact; formal cognitive testing was not done    [place] Protocol Risk Identification  1) Have you wished you were dead or wished you could go to sleep and not wake up? No  2) Have you actually had any thoughts about killing yourself? No  If YES to 2, answer questions 3, 4, 5, 6  If NO to 2, go directly to question 6  3) Have you thought about how you might do this? No  4) Have you had any intension of acting on these thoughts of killing yourself, as opposed to you have the thoughts but you definitely would not act on them? No  5) Have you started to work out or worked out the details of how to kill yourself? Do you intent to carry out this plan? No  Always Ask Question 6  6) Have you done anything, started to do anything, or prepared to do anything to end your life? No  Examples: collected pills, obtained a gun, gave away valuables, wrote a will or suicide note, held a gun but changed your mind, cut yourself, tried to hang yourself, etc.    PHQ-9  Over the last 2 weeks, how often have you been bothered by any the following problems?    1. Little interest or pleasure in doing things: 1 - Several days  2. Feeling down, depressed, or hopeless: 1 - Several days  3. Trouble falling or staying asleep, or sleeping too much: 1 - Several days  4. Feeling  tired or having little energy: 1 - Several days  5. Poor appetite or overeatin - Several days  6. Feeling bad about yourself - or that you are a failure or have let yourself or your family down: 0 - Not at all  7. Trouble concentrating on things, such as reading the newspaper or watching television: 0 - Not at all  8. Moving or speaking so slowly that other people could have noticed. Or the opposite-being fidgety or restless that you have been moving around a lot more than usual: 0 - Not at all  9. Thoughts that you would be better off dead, or of hurting yourself in some way: 0 - Not at all    If you checked off any problems, how difficulty have these problems made it for you to do your work, take care of things at home, or get along with other people? Somewhat difficult    12. Assessment/Progress Note:     This writer called Katie, at her request, for a follow-up IRT session. Katie reported anhedonia, avolition, preoccupations. Katie stated she has been finishing final projects for college. She was able to reality test a thought that classmates may cause her a reduction in grade intentionally. Katie stated she has been processing the future possibilities, if she is unable to obtain Gender Confirmation Surgery. She mentioned the potential of not obtaining the surgery has led to decreased motivation, oversleeping, increased eating, and lack of interest in previously enjoyed activities. This writer validated the significant disappointment and difficulty with uncertainty. Katie became appropriately tearful when discussing her future plans. This writer normalized this and encouraged Katie to practice activities related to well-being. This writer suggested talking to friends, re-establishing a sleep routine, pursuing a meaningful activity each day. She stated she would be willing to try this throughout the coming week.     13. Plan/Referrals:     This writer will continue to pursue narrative therapy and problem solving, in  "alignment with Katie's goals of developing meaningful relationships, obtaining GRS, and beginning graduate school.     Billing for \"Interactive Complexity\"?    No      LEEROY See    NAVIGATE Individual Resiliency Trainer  "

## 2020-04-29 DIAGNOSIS — F20.9 SCHIZOPHRENIA, UNSPECIFIED TYPE (H): ICD-10-CM

## 2020-04-29 RX ORDER — ARIPIPRAZOLE 10 MG/1
TABLET ORAL
Qty: 30 TABLET | Refills: 0 | OUTPATIENT
Start: 2020-04-29

## 2020-05-03 RX ORDER — ARIPIPRAZOLE 10 MG/1
10 TABLET ORAL DAILY
Qty: 30 TABLET | Refills: 2 | Status: SHIPPED | OUTPATIENT
Start: 2020-05-03 | End: 2020-08-24

## 2020-05-04 NOTE — PROGRESS NOTES
[facility] NAVIGATE Program Treatment Plan Summary  A Part of the [facility] First Episode of Psychosis Program     NAVIGATE Enrollee: Janey Givens  /Age:  1995 (24 year old)  MRN: [id number]     Date of Initial Service: 18  Date of INTIAL Treatment Plan: 18   Last Review/Update Date:  10/22/19  90-Day Review Date: 20     The following represents an UPDATED treatment plan.     1. DSM-V Diagnosis (include numeric code)  Schizophrenia, 295.90 (F20.9)     2. Current symptoms and circumstances that substantiate the diagnosis     Janey has a history of psychosis (delusions, thought broadcasting, odd beliefs per family/friends, auditory hallucinations and negative symptoms (diminished emotional expression and anhedonia)). She presents with current symptoms of psychosis, such as occasional delusional thoughts and auditory hallucinations. Janey reports a history of marijuana use around the time of onset, but currently denies use.      3. How symptoms and/or behaviors are affecting level of function     Janey s systems are impacting functioning with respect to social relationships and academics.     4. Risk Assessment:  Suicide:  Assessed Level of Immediate Risk: Low  Ideation: No  Plan:  No  Means: No  Intent: No     Homicide/Violence:  Assessed Level of Immediate Risk: Low  Ideation: No  Plan: No  Means: No  Intent: No     5. Medications     Current Outpatient Medications   Medication     ARIPiprazole (ABILIFY) 10 MG tablet     estradiol (VIVELLE-DOT) 0.1 MG/24HR bi-weekly patch     spironolactone (ALDACTONE) 100 MG tablet     No current facility-administered medications for this visit.       6. Strengths      Medication adherence  Supportive friends, family, recovery environment  Bravery & Valor     Capacity to love and be loved    Caution, Prudence, & Discretion    Creativity, Ingenuity, & Originality    Curiosity    Honest, Authentic, Genuine    Humor & Playfulness   Industry, diligence, &  "perseverance    Judgment, critical thinking, & open-mindedness    Love of learning     Intelligence  Awareness      7. Barriers & Suicide Risk Factors      Isolation/Reduced Supervision  Symptoms of psychosis, positive (delusions and/or hallucinations)  Symptoms of psychosis, negative (avolition)     8. Treatment Domains and Goals          Domain 1: Illness Management & Recovery  Identify and engage possible areas of improvement related to medication optimization and psychosis (delusions and auditory hallucinations) and ability to management illness.      Measurable Objectives Interventions Target Dates & Discharge Criteria   Medication Objectives      -Take medications as prescribed and have reduced frequency and severity of symptoms    \"Keep taking medications\"    Medication Management  -Prescribe and monitor medications  -Monitor and treat side effects  -Psychoeducation     IRT/Psychotherapy  -Psychoeducation  -Motivation interviewing  -CBT  -Behavioral activation  -Mindfulness     Family Therapy  -Psychoeducation  -Motivational interviewing  -Behavioral family therapy  -CBT  -Behavioral activation     Case Management  -NA or None    Target date:   12 months from 1/14/20     Discharge criteria:  Marked and sustained symptom improvement             Individual s Objectives     -Process the psychotic episode by demonstrating understanding of how the episode impacted self, identifying positive coping strategies and resiliency used during that time, challenging self-stigmatizing beliefs, and developing a positive attitude towards facing future life challenges  -Identify primary styles of thinking, and demonstrate understanding of and use cognitive restructuring to successfully deal with negative feelings      \"Learn strategies for talking about psychosis, start using a notebook for organizational purposes\"    Medication Management  -Prescribe and monitor medications  -Monitor and treat side " effects  -Psychoeducation     IRT/Psychotherapy  -Psychoeducation  -Motivation interviewing  -CBT  -Behavioral activation  -Mindfulness     Family Therapy  -Psychoeducation  -Motivational interviewing  -Behavioral family therapy  -CBT  -Behavioral activation     Case Management  -NA or None    Target date:   12 months from 1/14/20     Discharge criteria:  Marked and sustained symptom improvement      Janey demonstrates understanding of mental illness      Janey successfully implements strategies to cope with stressors and/or symptoms to mitigate risk for increase in symptom severity or relapse         Gains Made:  -Learn at least 2 coping strategies to successfully target current symptoms  -Demonstrate understanding for how substance use impacts symptoms, identify stage of change, and experiment with reduced use or abstinence from all illicit substances  -Learn and implement communication strategies to disclose episode, resulting in feeling understood and supported  -Identify persistent symptoms that interfere with activities and/or enjoyment and successfully implement two coping strategies to reduce symptoms severity  -Utilized reality testing to cope with distressing psychosis symptoms       Support System Objectives     -Supports, including family members, verbally reinforce the client's active attempts to build self-esteem and rapport   -Verbalize understanding of the client's long-term and short-term goals  -Learn the client's signs of stress and possible coping skills  -Learn skills that strengthen and support the client's positive behavior change  -Learn strategies to build positive emotions and facilitate resiliency including use of a resiliency story    Medication Management  -Prescribe and monitor medications  -Monitor and treat side effects  -Psychoeducation     IRT/Psychotherapy  -Psychoeducation  -Motivation interviewing  -CBT  -Behavioral activation  -Mindfulness     Family  "Therapy  -Psychoeducation  -Motivational interviewing  -Behavioral family therapy  -CBT  -Behavioral activation     Case Management  -NA or None    Target date:   12 months from 1/14/2020     Discharge criteria:  Support system demonstrates understanding of mental illness      Support system successfully implements strategies to assist Janey cope with stressors and/or symptoms to mitigate risk for increase in symptom severity or relapse                  Domain 2: Health & Basic Living Needs  Identify and engage possible areas of improvement related to basic needs being met and maintaining or improving overall health and well-being      Measurable Objectives Interventions Discharge Criteria   -Learn and implement at least 2 skills to promote health sleep  -Reduce fast food intake to one time per week     \"Locate a surgeon specializing in voice box surgery, move into parents' home in May 2020, reduce fast food to 1x per week\" Medication Management  -Prescribe and monitor medications  -Monitor and treat side effects  -Psychoeducation     IRT/Psychotherapy  -Psychoeducation  -Motivation interviewing  -CBT  -Behavioral activation  -Mindfulness     Family Therapy  -Psychoeducation  -Motivational interviewing  -Behavioral family therapy  -CBT  -Behavioral activation     Supported Education & Employment  -Motivational interviewing  -Individualized placement and support   -Behavioral Activation     Case Management  -NA or None    Target date:   12 months from 1/14/2020     Discharge criteria:  Janey, her supports and treatment team report no unmet health and basic living needs      Gains Made:  -Established vegan diet to assist in healthy nutrition  -Utilized a sleep hygiene routine             Domain 3: Family & Other Supports  Identify and engage possible areas of improvement related to engaging family, friends and other supports      Measurable Objectives Interventions Discharge Criteria   -Improve the quality of " "relationships by developing skills to better understand other people, communicate more effectively, manage disclosure, and understand social cues  -Supports teach and reinforce healthy social skills and attitudes   -Learn and implement problem-solving and/or conflict resolution skills to manage personal and interpersonal problems constructively  -Renew two old fun activities or develop two new fun activity    \"Find new roommates who are supportive, maybe interested in finding support within the LGBTQ community\"    Medication Management  -Prescribe and monitor medications  -Monitor and treat side effects  -Psychoeducation     IRT/Psychotherapy  -Psychoeducation  -Motivation interviewing  -CBT  -Behavioral activation  -Mindfulness     Family Therapy  -Psychoeducation  -Motivational interviewing  -Behavioral family therapy  -CBT  -Behavioral activation     Supported Education & Employment  -Motivational interviewing  -Individualized placement and support   -Behavioral Activation     Case Management  -NA or None    Target date:   12 months from 1/14/2020      Discharge criteria:  Janey and her support system report feeling equipped with the necessary skills to communicate and problem solve during times of disagreement     Conflict with supports and peers are resolved constructively and consistently over time; 6 months      Gains Made:  -Learn and implement calming and coping strategies to manage anxiety symptoms and focus attention usefully during moments of social anxiety    -Advocated for self when experiencing periods of harassment   -Researched potential roommates/living arrangements            Domain 4: Academic and Employment  Identify and engage possible areas of improvement relates to education and employment      Measurable Objectives Interventions Discharge Criteria   -Stay current with schoolwork, completing assignments and interacting appropriately with peers and teachers   -Family members provide " "praise, encouragement for the client's attempts and successes at school/work  -Apply to 1 final graduate school by January 30th 2020    \"Decide on one graduate school to attend in 2020\"    Medication Management  -Prescribe and monitor medications  -Monitor and treat side effects  -Psychoeducation     IRT/Psychotherapy  -Psychoeducation  -Motivation interviewing  -CBT  -Behavioral activation     Family Therapy  -Psychoeducation  -Motivational interviewing  -Behavioral family therapy  -CBT  -Behavioral activation     Supported Education & Employment  -Motivational interviewing  -Individualized placement and support   -Behavioral Activation     Case Management  -NA or None    Target date:   12 months from 1/14/2020     Discharge criteria:  Work and school goals are achieved and maintained without follow along NAVIGATE Supported Education and Employment supports for 6 months      Gains Made:  -Completed semester of college and successfully managed symptoms during that time  -Obtained a research assistant position   -Successfully applied to several graduate schools      9. Frequency of sessions and expected duration of treatment:   1-4x per month Medication Management with Prescriber ongoing  6 months of weekly IRT/Individual Psychotherapy followed by 12-18 months of biweekly or monthly IRT  2-4x per month Supported Education and Employment Services for 6 months  2-4x per month Family Education and Support Services for 6 months     10. Participants in therapy plan:   Janey Givens     NAVIGATE Team:   -Family Clinician: VERENICE Dash  -IRT Clinician: MARCELL See [facility]  -SEE: MAKSIM Heredia  -Prescriber: Dr. Nolasco     See scanned document for Acknowledgement of Current Treatment Plan     Regulatory Guidelines for Updating Treatment Plan  [place] Medical Assistance: Reviewed & signed at least every 90 days  Medicare:  Update per policy                      "

## 2020-05-20 ENCOUNTER — MEDICAL CORRESPONDENCE (OUTPATIENT)
Dept: HEALTH INFORMATION MANAGEMENT | Facility: CLINIC | Age: 25
End: 2020-05-20

## 2020-05-26 ENCOUNTER — VIRTUAL VISIT (OUTPATIENT)
Dept: PSYCHIATRY | Facility: CLINIC | Age: 25
End: 2020-05-26
Payer: COMMERCIAL

## 2020-05-26 ENCOUNTER — TELEPHONE (OUTPATIENT)
Dept: PSYCHIATRY | Facility: CLINIC | Age: 25
End: 2020-05-26

## 2020-05-26 DIAGNOSIS — F20.9 SCHIZOPHRENIA, UNSPECIFIED TYPE (H): Primary | ICD-10-CM

## 2020-05-26 NOTE — PROGRESS NOTES
NAVIGSATINDER Clinician Contact & Progress Note  For Individual Resiliency Training (IRT)  A Part of the [facility] First Episode of Psychosis Program    NAVIGATE Enrollee: Janey Givens (1995)     MRN: [id number]  Date:  5/26/20  Diagnosis: Schizophrenia, unspecified type (F20.9)  Clinician: CARMEN Individual Resiliency Trainer, LEEROY See     1. Type of contact: (majority of time spent)  IRT Session via telehealth  Mode of communication: telephone call. Patient consented verbally to this mode of therapy today.  Reason for telehealth: COVID-19. This patient visit was converted to a telehealth visit to minimize exposure to COVID-19.    2. People present:   Writer  Client: Yes     3. Length of Actual Contact: Start Time: 2:00; End Time: 2:33     4. Location of contact:  Originating Location (patient location): Hotel room, located in [place]   Distant Location (provider location): Home office, located in [place], [place], using appropriate privacy considerations and procedures    5. Did the client complete the home practice option(s) from the previous session: Completed    6. Motivational Teaching Strategies:  Connect info and skills with personal goals  Promote hope and positive expectations  Explore pros and cons of change  Re-frame experiences in positive light    7. Educational Teaching Strategies:  Review of written material/education  Relate information to client's experience  Ask questions to check comprehension  Break down information into small chunks  Adopt client's language     8. CBT Teaching Strategies:  Reinforcement and shaping (positive feedback for steps towards goals, gains in knowledge & skills and follow-through on home assignments)  Coping skills training (review current coping skills and plan home practice)    9. IRT Module(s) Addressed:  Module 5 - Processing the Psychotic Episode  Module 9 - Coping with Symptoms    10. Techniques utilized:   [place] announced at beginning of  session  Review of homework  Review of goal  Review of previous meeting  Present new material  Problem-solving practice  Help client choose a home practice option  Summarize progress made in current session    11. Measures:    Mental Status Exam  Alertness: alert  and oriented  Behavior/Demeanor: cooperative and pleasant  Speech: regular rate and rhythm  Language: intact.   Mood: description consistent with euthymia  Thought Process/Associations: unremarkable  Thought Content:  Reports preoccupations;  Denies suicidal and violent ideation  Perception:  Reports none;  Denies auditory hallucinations  Insight: good  Judgment: good  Cognition: does  appear grossly intact; formal cognitive testing was not done    [place] Protocol Risk Identification  1) Have you wished you were dead or wished you could go to sleep and not wake up? No  2) Have you actually had any thoughts about killing yourself? No  If YES to 2, answer questions 3, 4, 5, 6  If NO to 2, go directly to question 6  3) Have you thought about how you might do this? No  4) Have you had any intension of acting on these thoughts of killing yourself, as opposed to you have the thoughts but you definitely would not act on them? No  5) Have you started to work out or worked out the details of how to kill yourself? Do you intend to carry out this plan? No  Always Ask Question 6  6) Have you done anything, started to do anything, or prepared to do anything to end your life? No  Examples: collected pills, obtained a gun, gave away valuables, wrote a will or suicide note, held a gun but changed your mind, cut yourself, tried to hang yourself, etc.    PHQ-9  Over the last 2 weeks, how often have you been bothered by any the following problems?    1. Little interest or pleasure in doing things: 0 - Not at all  2. Feeling down, depressed, or hopeless: 0 - Not at all  3. Trouble falling or staying asleep, or sleeping too much: 1 - Several days  4. Feeling tired or having  little energy: 0 - Not at all  5. Poor appetite or overeatin - Not at all  6. Feeling bad about yourself - or that you are a failure or have let yourself or your family down: 0 - Not at all  7. Trouble concentrating on things, such as reading the newspaper or watching television: 0 - Not at all  8. Moving or speaking so slowly that other people could have noticed. Or the opposite-being fidgety or restless that you have been moving around a lot more than usual: 0 - Not at all  9. Thoughts that you would be better off dead, or of hurting yourself in some way: 0 - Not at all    If you checked off any problems, how difficulty have these problems made it for you to do your work, take care of things at home, or get along with other people? Not difficult at all    12. Assessment/Progress Note:     This writer called Katie, at her request, for a follow-up IRT session. Katie denied any symptoms at this time. She stated she will obtain Gender Confirmation Surgery (GCS) tomorrow, if the COVID-19 test comes back negative. This writer assisted Katie in processing feelings of worry related to the possibility of not getting the surgery. This writer and Katie then discussed IRT appointments following surgery. She requested a telephone call immediately after surgery and once a week following. She noted she has had increased preoccupations about her previous surgery. She stated at that time, she experienced delusions and auditory hallucinations. She did not feel comfortable talking to anyone about them, leading to increased stress/symptoms. This writer validated her reaction and reviewed the progress she has made since the previous surgery. This writer identified her increased ability in stress management skills, seeking social support, reality testing delusions. She stated she feels confident in her skills and will utilize this writer if any delusional thoughts arise. This writer praised Katie for her continued efforts in recovery.   "    13. Plan/Referrals:     This writer will call Katie on 5/29/20 for a follow-up phone call post-surgery.     Billing for \"Interactive Complexity\"?    No      LEEROY See    NAVIGATE Individual Resiliency Trainer  "

## 2020-05-26 NOTE — TELEPHONE ENCOUNTER
What is the concern that needs to be addressed by a nurse? Karo needs to discuss Janey's upcoming surgery tomorrow.     May a detailed message be left on voicemail?     Date of last office visit: 3/31/20    Message routed to: Dr. Francis    Called [facility] on her behalf.  Awaiting call back from RENAE Huddleston    12:36  [facility] [address]

## 2020-05-26 NOTE — TELEPHONE ENCOUNTER
Responded to a call from the Sherri [facility]INKLE [facility] on behalf of Janey who is in a preoperative phase for GR S starting tomorrow.  Attending surgeon Amanda Cintron.   Karo called to clarify our role and assistance at a remote distance.  Concern was whether or not in the postoperative period this physician could be consulted regarding emergent psychosis despite remaining on medication.    It was explained that the hospital stay is 3 nights and after which she will be staying hopefully at 1 of the facilities being bees for the next week.  Explained that this involves fairly intricate wound care and that if patient were to experience psychosis it would be disruptive to the successful outcome.    Indicated I would be available by long distance phone to speak with Dr. Cintron but that any specific needs on the scene would need to involve the services of a local psychiatrist during the hospitalization phase.  However could consult regarding any indications to increase the dosage of Abilify should the need arise.  Also stated that Aleah Cerda her primary therapist plans to be available throughout the course of the stay.

## 2020-05-29 ENCOUNTER — VIRTUAL VISIT (OUTPATIENT)
Dept: PSYCHIATRY | Facility: CLINIC | Age: 25
End: 2020-05-29
Payer: COMMERCIAL

## 2020-05-29 DIAGNOSIS — F20.9 SCHIZOPHRENIA, UNSPECIFIED TYPE (H): Primary | ICD-10-CM

## 2020-05-29 NOTE — PROGRESS NOTES
NAVIGSATINDER Clinician Contact & Progress Note  For Individual Resiliency Training (IRT)  A Part of the [facility] First Episode of Psychosis Program    NAVIGATE Enrollee: Janey Givens (1995)     MRN: [id number]  Date:  5/29/20  Diagnosis: Schizophrenia, unspecified (F20.9)  Clinician: CARMEN Individual Resiliency Trainer, LEEROY See     1. Type of contact: (majority of time spent)  Other (specify): IRT session  Mode of communication: telephone call. Patient consented verbally to this mode of therapy today.  Reason for telehealth: COVID-19. This patient visit was converted to a telehealth visit to minimize exposure to COVID-19.    2. People present:   Writer  Client: Yes }    3. Length of Actual Contact: Start Time: 11:32; End Time: 11:48     4. Location of contact:  Originating Location (patient location): Hospital, located in [place]   Distant Location (provider location): Home office, located in [place], [place], using appropriate privacy considerations and procedures    5. Did the client complete the home practice option(s) from the previous session: Completed    6. Motivational Teaching Strategies:  Connect info and skills with personal goals  Promote hope and positive expectations    7. Educational Teaching Strategies:  Relate information to client's experience  Break down information into small chunks  Adopt client's language     8. CBT Teaching Strategies:  Reinforcement and shaping (positive feedback for steps towards goals, gains in knowledge & skills and follow-through on home assignments)    9. IRT Module(s) Addressed:  Module 8 - Dealing with Negative Feelings  Module 9 - Coping with Symptoms    10. Techniques utilized:   [place] announced at beginning of session  Review of homework  Review of goal  Review of previous meeting  Help client choose a home practice option  Summarize progress made in current session    11. Measures:    Mental Status Exam  Alertness: oriented and  drowsy  Behavior/Demeanor: cooperative and pleasant  Speech: regular rate and rhythm  Language: intact.   Mood: description consistent with euthymia  Thought Process/Associations: unremarkable  Thought Content:  Reports delusions;  Denies suicidal and violent ideation  Perception:  Reports none;  Denies auditory hallucinations  Insight: good  Judgment: good  Cognition: does  appear grossly intact; formal cognitive testing was not done    PHQ-9  Over the last 2 weeks, how often have you been bothered by any the following problems?     1. Little interest or pleasure in doing things: 0 - Not at all  2. Feeling down, depressed, or hopeless: 0 - Not at all  3. Trouble falling or staying asleep, or sleeping too much: 1 - Several days  4. Feeling tired or having little energy: 0 - Not at all  5. Poor appetite or overeatin - Not at all  6. Feeling bad about yourself - or that you are a failure or have let yourself or your family down: 0 - Not at all  7. Trouble concentrating on things, such as reading the newspaper or watching television: 0 - Not at all  8. Moving or speaking so slowly that other people could have noticed. Or the opposite-being fidgety or restless that you have been moving around a lot more than usual: 0 - Not at all  9. Thoughts that you would be better off dead, or of hurting yourself in some way: 0 - Not at all     If you checked off any problems, how difficulty have these problems made it for you to do your work, take care of things at home, or get along with other people? Not difficult at all    [place] Protocol Risk Identification  1) Have you wished you were dead or wished you could go to sleep and not wake up? No  2) Have you actually had any thoughts about killing yourself? No  If YES to 2, answer questions 3, 4, 5, 6  If NO to 2, go directly to question 6  3) Have you thought about how you might do this? No  4) Have you had any intension of acting on these thoughts of killing yourself, as  "opposed to you have the thoughts but you definitely would not act on them? No  5) Have you started to work out or worked out the details of how to kill yourself? Do you intend to carry out this plan? No  Always Ask Question 6  6) Have you done anything, started to do anything, or prepared to do anything to end your life? No  Examples: collected pills, obtained a gun, gave away valuables, wrote a will or suicide note, held a gun but changed your mind, cut yourself, tried to hang yourself, etc.    12. Assessment/Progress Note:     This writer called Katie, at her request, for a follow-up IRT session. Katie recently completed Gender Confirmation Surgery (GCS). Katie stated the surgery went well and they will discharge her from the hospital today. She shared about the healing process and next steps. Katie noted she experienced one delusional thoughts immediately following the surgery. Specifically, she had thoughts about her surgery causing unrest in [place]. However, she was immediately able to reality test and challenge this thought. She noted it was fleeting and did not cause stress. This writer praised her for utilizing coping strategies to combat a distressing thought. Katie denied any other changes in symptoms. This writer reinforced her met goal of obtaining surgery and the positive emotions associated. She will be returning to the hotel with family for a week. This writer offered ongoing support, as needed.     13. Plan/Referrals:     This writer will call Katie next week to continue providing support and coping skill training, in alignment with relapse prevention.     Billing for \"Interactive Complexity\"?    No      LEEROY See Individual Resiliency Trainer  "

## 2020-06-04 ENCOUNTER — VIRTUAL VISIT (OUTPATIENT)
Dept: PSYCHIATRY | Facility: CLINIC | Age: 25
End: 2020-06-04
Payer: COMMERCIAL

## 2020-06-04 DIAGNOSIS — F20.9 SCHIZOPHRENIA, UNSPECIFIED TYPE (H): Primary | ICD-10-CM

## 2020-06-04 NOTE — PROGRESS NOTES
This writer called Katie at the scheduled appointment time. Katie stated she was released to go home after her surgery. However, there was a death in the family, so Katie will be traveling to attend the . This writer validated the sadness caused by the death. This writer offered ongoing support. Katie was in the car with others during the call and felt it would be best to reschedule the therapy appointment. Katie will contact this writer to schedule it.     This is a non-billable encounter as it was solely for the purposes of outreach and/or care coordination.

## 2020-06-12 ENCOUNTER — VIRTUAL VISIT (OUTPATIENT)
Dept: PSYCHIATRY | Facility: CLINIC | Age: 25
End: 2020-06-12
Payer: COMMERCIAL

## 2020-06-12 DIAGNOSIS — F20.9 SCHIZOPHRENIA, UNSPECIFIED TYPE (H): Primary | ICD-10-CM

## 2020-06-12 NOTE — PROGRESS NOTES
NAVIGSATINDER Clinician Contact & Progress Note  For Individual Resiliency Training (IRT)  A Part of the [facility] First Episode of Psychosis Program    NAVIGATE Enrollee: Janey Givens (1995)     MRN: [id number]  Date:  6/12/20  Diagnosis: Schizophrenia, unspecified (F20.9)  Clinician: CARMEN Individual Resiliency Trainer, LEEROY See     1. Type of contact: (majority of time spent)  IRT Session via telehealth  Mode of communication: telephone call. Patient consented verbally to this mode of therapy today.  Reason for telehealth: COVID-19. This patient visit was converted to a telehealth visit to minimize exposure to COVID-19.    2. People present:   Writer  Client: Yes     3. Length of Actual Contact: Start Time: 1:03; End Time: 1:39     4. Location of contact:  Originating Location (patient location): Their home, located in [place], [place]  Distant Location (provider location): Home office, located in [place], [place], using appropriate privacy considerations and procedures    5. Did the client complete the home practice option(s) from the previous session: Completed    6. Motivational Teaching Strategies:  Connect info and skills with personal goals  Promote hope and positive expectations  Explore pros and cons of change  Re-frame experiences in positive light    7. Educational Teaching Strategies:  Review of written material/education  Relate information to client's experience  Ask questions to check comprehension  Break down information into small chunks  Adopt client's language     8. CBT Teaching Strategies:  Reinforcement and shaping (positive feedback for steps towards goals, gains in knowledge & skills and follow-through on home assignments)  Coping skills training (review current coping skills, increase currently used skills and plan home practice)    9. IRT Module(s) Addressed:  Module 8 - Dealing with Negative Feelings  Module 9 - Coping with Symptoms    10. Techniques utilized:    [place] announced at beginning of session  Review of homework  Review of goal  Review of previous meeting  Present new material  Problem-solving practice  Help client choose a home practice option  Summarize progress made in current session    11. Measures:    Mental Status Exam  Alertness: alert  and oriented  Behavior/Demeanor: cooperative and pleasant  Speech: regular rate and rhythm  Language: intact.   Mood: description consistent with euthymia  Thought Process/Associations: unremarkable  Thought Content:  Reports none;  Denies suicidal and violent ideation and delusions  Perception:  Reports none;  Denies auditory hallucinations  Insight: good  Judgment: good  Cognition: does  appear grossly intact; formal cognitive testing was not done    PHQ-9  Over the last 2 weeks, how often have you been bothered by any the following problems?    1. Little interest or pleasure in doing things: 0 - Not at all  2. Feeling down, depressed, or hopeless: 0 - Not at all  3. Trouble falling or staying asleep, or sleeping too much: 0 - Not at all  4. Feeling tired or having little energy: 1 - Several days  5. Poor appetite or overeatin - Not at all  6. Feeling bad about yourself - or that you are a failure or have let yourself or your family down: 0 - Not at all  7. Trouble concentrating on things, such as reading the newspaper or watching television: 0 - Not at all  8. Moving or speaking so slowly that other people could have noticed. Or the opposite-being fidgety or restless that you have been moving around a lot more than usual: 0 - Not at all  9. Thoughts that you would be better off dead, or of hurting yourself in some way: 0 - Not at all    If you checked off any problems, how difficulty have these problems made it for you to do your work, take care of things at home, or get along with other people? Not difficult at all    [place] Protocol Risk Identification  1) Have you wished you were dead or wished you could go to  "sleep and not wake up? No  2) Have you actually had any thoughts about killing yourself? No  If YES to 2, answer questions 3, 4, 5, 6  If NO to 2, go directly to question 6  3) Have you thought about how you might do this? No  4) Have you had any intension of acting on these thoughts of killing yourself, as opposed to you have the thoughts but you definitely would not act on them? No  5) Have you started to work out or worked out the details of how to kill yourself? Do you intend to carry out this plan? No  Always Ask Question 6  6) Have you done anything, started to do anything, or prepared to do anything to end your life? No  Examples: collected pills, obtained a gun, gave away valuables, wrote a will or suicide note, held a gun but changed your mind, cut yourself, tried to hang yourself, etc.    12. Assessment/Progress Note:     This writer called Katie, at her request, for a follow-up IRT session. Katie stated she has returned to [place] after surgery. She denied any symptoms at this time. She stated she recently discussed her episode with a friend, causing her to reflect on previous delusional thoughts. This writer assisted her in processing through emotions related to this. Katie stated she has noticed an increase in confidence and positive emotions following Gender Confirmation Surgery (GCS). This writer reinforced and validated the progress in gender affirmation. Katie recalled disappointment immediately following surgery, as she was misgendered by staff. Katie inquired about the appropriateness of reporting this to the hospital. This writer assisted Katie in thinking through the pro's and con's. Ultimately, Katie decided to report it. This writer praised Katie for utilizing assertive communication to pursue advocacy.     13. Plan/Referrals:     This writer will call Katie next week to continue providing support and coping skill training, in alignment with relapse prevention.     Billing for \"Interactive Complexity\"?  "   No      LEEROY See    NAVIGATE Individual Resiliency Trainer

## 2020-06-19 ENCOUNTER — TELEPHONE (OUTPATIENT)
Dept: FAMILY MEDICINE | Facility: CLINIC | Age: 25
End: 2020-06-19

## 2020-06-19 DIAGNOSIS — F64.0 GENDER DYSPHORIA IN ADULT: ICD-10-CM

## 2020-06-19 NOTE — TELEPHONE ENCOUNTER
Verify that the refill encounter hasn't been started Yes    [facility] phone call message- patient requesting a refill:    Full Medication Name: estradiol (VIVELLE-DOT) 0.1 MG/24HR bi-weekly       Dose:      Pharmacy confirmed as     [facility] [address] [facility] [address]  Phone: [phone] Fax: [phone]  : Yes    Medication tab checked to see if medication has been sent  Yes    Additional Comments: Out of medication      OK to leave a message on voice mail? Yes    Advised patient refill may take up to 2 business days? No:     Primary language: English      needed? No    Call taken on June 19, 2020 at 9:49 AM by Leah Waggoner    Route to P [facility] MED REFILL

## 2020-06-22 RX ORDER — ESTRADIOL 0.1 MG/D
2 FILM, EXTENDED RELEASE TRANSDERMAL
Qty: 16 PATCH | Refills: 0 | Status: SHIPPED | OUTPATIENT
Start: 2020-06-22

## 2020-06-23 ENCOUNTER — VIRTUAL VISIT (OUTPATIENT)
Dept: PSYCHIATRY | Facility: CLINIC | Age: 25
End: 2020-06-23
Payer: COMMERCIAL

## 2020-06-23 DIAGNOSIS — F20.9 SCHIZOPHRENIA, UNSPECIFIED TYPE (H): Primary | ICD-10-CM

## 2020-06-23 NOTE — PROGRESS NOTES
NAVIGSATINDER Clinician Contact & Progress Note  For Individual Resiliency Training (IRT)  A Part of the [facility] First Episode of Psychosis Program    NAVIGATE Enrollee: Janey Givens (1995)     MRN: [id number]  Date:  6/23/20  Diagnosis: Schizophrenia, unspecified (F20.9)  Clinician: CARMEN Individual Resiliency Trainer, LEEROY See     1. Type of contact: (majority of time spent)  IRT Session via telehealth  Mode of communication: telephone call. Patient consented verbally to this mode of therapy today.  Reason for telehealth: COVID-19. This patient visit was converted to a telehealth visit to minimize exposure to COVID-19.    2. People present:   Writer  Client: Yes    3. Length of Actual Contact: Start Time: 11:00; End Time: 11:36     4. Location of contact:  Originating Location (patient location): Their home, located in [place], [place]  Distant Location (provider location): Home office, located in [place], [place], using appropriate privacy considerations and procedures    5. Did the client complete the home practice option(s) from the previous session: Completed    6. Motivational Teaching Strategies:  Connect info and skills with personal goals  Promote hope and positive expectations  Explore pros and cons of change  Re-frame experiences in positive light    7. Educational Teaching Strategies:  Review of written material/education  Relate information to client's experience  Ask questions to check comprehension  Break down information into small chunks  Adopt client's language     8. CBT Teaching Strategies:  Reinforcement and shaping (positive feedback for steps towards goals, gains in knowledge & skills and follow-through on home assignments)  Social skills training (rationale for skill, modeling, role play practice, feedback and plan home practice)    9. IRT Module(s) Addressed:  Module 11 - Having Fun and Developing Good Relationships    10. Techniques utilized:   [place] announced  at beginning of session  Review of homework  Review of goal  Review of previous meeting  Present new material  Problem-solving practice  Help client choose a home practice option  Summarize progress made in current session    11. Measures:    Mental Status Exam  Alertness: alert  and oriented  Behavior/Demeanor: cooperative and pleasant  Speech: regular rate and rhythm  Language: intact.   Mood: anxious  Thought Process/Associations: unremarkable  Thought Content:  Reports none;  Denies suicidal and violent ideation and delusions  Perception:  Reports none;  Denies auditory hallucinations  Insight: good  Judgment: good  Cognition: does  appear grossly intact; formal cognitive testing was not done    KELSEY-7  Over the last 2 weeks, how often have you been bothered by the following problems?    1. Feeling nervous, anxious or on edge: 0 - Not at all  2. Not being able to stop or control worryin - Not at all  3. Worrying too much about different things: 0 - Not at all  4. Trouble relaxin - Not at all  5. Being so restless that it is hard to sit still: 0 - Not at all  6. Becoming easily annoyed or irritable: 0 - Not at all  7. Feeling afraid as if something awful might happen: 0 - Not at all    PHQ-9  Over the last 2 weeks, how often have you been bothered by any the following problems?    1. Little interest or pleasure in doing things: 0 - Not at all  2. Feeling down, depressed, or hopeless: 0 - Not at all  3. Trouble falling or staying asleep, or sleeping too much: 0 - Not at all  4. Feeling tired or having little energy: 0 - Not at all  5. Poor appetite or overeatin - Not at all  6. Feeling bad about yourself - or that you are a failure or have let yourself or your family down: 0 - Not at all  7. Trouble concentrating on things, such as reading the newspaper or watching television: 0 - Not at all  8. Moving or speaking so slowly that other people could have noticed. Or the opposite-being fidgety or restless  that you have been moving around a lot more than usual: 0 - Not at all  9. Thoughts that you would be better off dead, or of hurting yourself in some way: 0 - Not at all    If you checked off any problems, how difficulty have these problems made it for you to do your work, take care of things at home, or get along with other people? Not difficult at all    [place] Protocol Risk Identification  1) Have you wished you were dead or wished you could go to sleep and not wake up? No  2) Have you actually had any thoughts about killing yourself? No  If YES to 2, answer questions 3, 4, 5, 6  If NO to 2, go directly to question 6  3) Have you thought about how you might do this? No  4) Have you had any intension of acting on these thoughts of killing yourself, as opposed to you have the thoughts but you definitely would not act on them? No  5) Have you started to work out or worked out the details of how to kill yourself? Do you intend to carry out this plan? No  Always Ask Question 6  6) Have you done anything, started to do anything, or prepared to do anything to end your life? No  Examples: collected pills, obtained a gun, gave away valuables, wrote a will or suicide note, held a gun but changed your mind, cut yourself, tried to hang yourself, etc.    12. Assessment/Progress Note:     This writer called Katie, at her request, for a follow-up IRT session. Katie denied any symptoms at this time. Katie discussed the preference to meet in person for IRT appointments. This writer assisted her in scheduling. She noted she plans to move to [place] in September to begin graduate school. She has interest in pursuing relapse prevention planning prior to her move. 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 This writer praised Katie for taking steps towards her goals. Katie mentioned a recent celebration post-surgery with friends. She discussed interpersonal  "relationship stressors and inquired about communication strategies. This writer validated her reactions and assisted her in thinking through various strategies, including using \"I feel\" statements. Katie agreed to utilize these throughout the week as a home practice opportunity.     13. Plan/Referrals:     This writer will call Katie next week to continue providing support and coping skill training, in alignment with relapse prevention.     Billing for \"Interactive Complexity\"?    No      LEEROY See    NAVIGATE Individual Resiliency Trainer  "

## 2020-07-08 ENCOUNTER — OFFICE VISIT (OUTPATIENT)
Dept: PSYCHIATRY | Facility: CLINIC | Age: 25
End: 2020-07-08
Payer: COMMERCIAL

## 2020-07-08 DIAGNOSIS — F20.9 SCHIZOPHRENIA, UNSPECIFIED TYPE (H): Primary | ICD-10-CM

## 2020-07-08 NOTE — PROGRESS NOTES
NAVIGSATINDER Clinician Contact & Progress Note  For Individual Resiliency Training (IRT)  A Part of the [facility] First Episode of Psychosis Program    NAVIGATE Enrollee: Janey Givens (1995)     MRN: [id number]  Date:  7/08/20  Diagnosis: Schizophrenia, unspecified (F20.9)  Clinician: CARMEN Individual Resiliency Trainer, LEEROY See     1. Type of contact: (majority of time spent)  IRT Session    2. People present:   Writer  Client: Janey Givens    3. Length of Actual Contact: Start Time: 1:02; End Time: 1:58   Traveled?    No     4. Location of contact:  [facility], [place]    5. Did the client complete the home practice option(s) from the previous session: Completed    6. Motivational Teaching Strategies:  Connect info and skills with personal goals  Promote hope and positive expectations  Explore pros and cons of change  Re-frame experiences in positive light    7. Educational Teaching Strategies:  Review of written material/education  Relate information to client's experience  Ask questions to check comprehension  Break down information into small chunks  Adopt client's language     8. CBT Teaching Strategies:  Reinforcement and shaping (positive feedback for steps towards goals, gains in knowledge & skills and follow-through on home assignments)  Coping skills training (review current coping skills, increase currently used skills and plan home practice)  Cognitive restructuring (identify thoughts related to negative feelings)    9. IRT Module(s) Addressed:  Module 7 - Building a Bridging to Your Goals  Module 8 - Dealing with Negative Feelings    10. Techniques utilized:   [place] announced at beginning of session  Review of homework  Review of goal  Review of previous meeting  Present new material  Problem-solving practice  Help client choose a home practice option  Summarize progress made in current session    11. Mental Status Exam:    Alertness: alert  and oriented  Appearance:  "casually groomed  Behavior/Demeanor: cooperative and pleasant, with good  eye contact   Speech: regular rate and rhythm  Language: intact. Preferred language identified as English.  Psychomotor: normal or unremarkable  Mood: anxious  Affect: appropriate; was congruent to mood; was congruent to content  Thought Process/Associations: perseverative  Thought Content:  Reports preoccupations;  Denies suicidal and violent ideation and delusions  Perception:  Reports none;  Denies auditory hallucinations and visual hallucinations  Insight: good  Judgment: good  Cognition: does  appear grossly intact; formal cognitive testing was not done  Suicidal ideation: denies SI, denies intent,  and denies plan  Homicidal Ideation: denies    12. Assessment/Progress Note:     This writer met with Katie for a follow-up IRT session. Katie denied any ongoing concerns with symptoms at this time. Katie expressed frustration in the limitations that have occurred since COVID-19 began. This writer assisted her in processing this and normalized her response. Katie stated she is in the midst of making a life decision, whether to move to [place] and begin graduate school or stay in [place] for one year and pursue creating a video game. This decision has caused a significant increase in stress. Throughout the session, this writer assisted Katie in weighing the pro's and con's of each option, as well as her emotions/thoughts related to them. She reflected on completing online classes (due to COVID), needing to find a subleaser in [place], locating a part-time job in [place], potential loss of emotional support from family. Katie noted it is helpful to talk through each option. Katie will discuss this further with family and friends prior to determining.     13. Plan/Referrals:     This writer will continue providing support and coping skill training, in alignment with relapse prevention.     Billing for \"Interactive Complexity\"?    No      LEEROY See  "   NAVIGATE Individual Resiliency Trainer

## 2020-07-10 ASSESSMENT — ANXIETY QUESTIONNAIRES
7. FEELING AFRAID AS IF SOMETHING AWFUL MIGHT HAPPEN: NOT AT ALL
GAD7 TOTAL SCORE: 3
2. NOT BEING ABLE TO STOP OR CONTROL WORRYING: SEVERAL DAYS
6. BECOMING EASILY ANNOYED OR IRRITABLE: NOT AT ALL
1. FEELING NERVOUS, ANXIOUS, OR ON EDGE: SEVERAL DAYS
5. BEING SO RESTLESS THAT IT IS HARD TO SIT STILL: NOT AT ALL
3. WORRYING TOO MUCH ABOUT DIFFERENT THINGS: NOT AT ALL

## 2020-07-10 ASSESSMENT — PATIENT HEALTH QUESTIONNAIRE - PHQ9
5. POOR APPETITE OR OVEREATING: SEVERAL DAYS
SUM OF ALL RESPONSES TO PHQ QUESTIONS 1-9: 0

## 2020-07-11 ASSESSMENT — ANXIETY QUESTIONNAIRES: GAD7 TOTAL SCORE: 3

## 2020-07-15 ENCOUNTER — VIRTUAL VISIT (OUTPATIENT)
Dept: PSYCHIATRY | Facility: CLINIC | Age: 25
End: 2020-07-15
Payer: COMMERCIAL

## 2020-07-15 DIAGNOSIS — F20.9 SCHIZOPHRENIA, UNSPECIFIED TYPE (H): Primary | ICD-10-CM

## 2020-07-15 NOTE — PROGRESS NOTES
NAVIGSATINDER Clinician Contact & Progress Note  For Individual Resiliency Training (IRT)  A Part of the [facility] First Episode of Psychosis Program    NAVIGATE Enrollee: Janey Givens (1995)     MRN: [id number]  Date:  7/15/20  Diagnosis: Schizophrenia, unspecified (F20.9)  Clinician: CARMEN Individual Resiliency Trainer, LEEROY See     1. Type of contact: (majority of time spent)  IRT Session via telehealth  Mode of communication: telephone call. Patient consented verbally to this mode of therapy today.  Reason for telehealth: COVID-19. This patient visit was converted to a telehealth visit to minimize exposure to COVID-19.    2. People present:   Writer  Client: Yes     3. Length of Actual Contact: Start Time: 1:00; End Time: 1:37     4. Location of contact:  Originating Location (patient location): In car, located in [place]  Distant Location (provider location): Home office, located in [place], [place], using appropriate privacy considerations and procedures    5. Did the client complete the home practice option(s) from the previous session: Completed    6. Motivational Teaching Strategies:  Connect info and skills with personal goals  Promote hope and positive expectations  Explore pros and cons of change  Re-frame experiences in positive light    7. Educational Teaching Strategies:  Review of written material/education  Relate information to client's experience  Break down information into small chunks  Adopt client's language     8. CBT Teaching Strategies:  Reinforcement and shaping (positive feedback for steps towards goals, gains in knowledge & skills and follow-through on home assignments)  Coping skills training (review current coping skills and increase currently used skills)  Cognitive restructuring (identify thoughts related to negative feelings)    9. IRT Module(s) Addressed:  Module 8 - Dealing with Negative Feelings  Module 9 - Coping with Symptoms    10. Techniques  utilized:   [place] announced at beginning of session  Review of homework  Review of goal  Review of previous meeting  Present new material  Problem-solving practice  Help client choose a home practice option  Summarize progress made in current session    11. Measures:    Mental Status Exam  Alertness: alert  and oriented  Behavior/Demeanor: cooperative and pleasant  Speech: regular rate and rhythm  Language: intact.   Mood: depressed  Thought Process/Associations: unremarkable  Thought Content:  Reports preoccupations;  Denies suicidal and violent ideation and delusions  Perception:  Reports none;  Denies auditory hallucinations and visual hallucinations  Insight: adequate  Judgment: good  Cognition: does  appear grossly intact; formal cognitive testing was not done    PHQ-9  Over the last 2 weeks, how often have you been bothered by any the following problems?    1. Little interest or pleasure in doing things: 1 - Several days  2. Feeling down, depressed, or hopeless: 1 - Several days  3. Trouble falling or staying asleep, or sleeping too much: 1 - Several days  4. Feeling tired or having little energy: 1 - Several days  5. Poor appetite or overeatin - More than half the days  6. Feeling bad about yourself - or that you are a failure or have let yourself or your family down: 1 - Several days  7. Trouble concentrating on things, such as reading the newspaper or watching television: 0 - Not at all  8. Moving or speaking so slowly that other people could have noticed. Or the opposite-being fidgety or restless that you have been moving around a lot more than usual: 0 - Not at all  9. Thoughts that you would be better off dead, or of hurting yourself in some way: 0 - Not at all    If you checked off any problems, how difficulty have these problems made it for you to do your work, take care of things at home, or get along with other people? Somewhat difficult    [place] Protocol Risk Identification  1) Have you  "wished you were dead or wished you could go to sleep and not wake up? No  2) Have you actually had any thoughts about killing yourself? No  If YES to 2, answer questions 3, 4, 5, 6  If NO to 2, go directly to question 6  3) Have you thought about how you might do this? No  4) Have you had any intension of acting on these thoughts of killing yourself, as opposed to you have the thoughts but you definitely would not act on them? No  5) Have you started to work out or worked out the details of how to kill yourself? Do you intend to carry out this plan? No  Always Ask Question 6  6) Have you done anything, started to do anything, or prepared to do anything to end your life? No  Examples: collected pills, obtained a gun, gave away valuables, wrote a will or suicide note, held a gun but changed your mind, cut yourself, tried to hang yourself, etc.    12. Assessment/Progress Note:     This writer called Katie, at her request, for a follow-up IRT session. Katie was driving at the time of the call and was unable to do a video session. Katie discussed grief and disappointment related to an interpersonal relationship change. This writer validated her response and provided normalization of the grief process. Katie stated she has noticed oversleeping and an increased appetite. This writer and Katie reviewed her coping strategies. Katie expressed interest in going for walks, talking with friends regularly, and playing video games. Katie agreed to practice self-care at least once daily. She stated she has decided to move in with friends for the summer, prior to moving to [place] in September. She expressed interest in weekly IRT sessions prior to September. This writer assisted Katie in scheduling.     13. Plan/Referrals:     This writer will continue providing support and coping skill training, in alignment with relapse prevention.     Billing for \"Interactive Complexity\"?    No      LEEROY See    NAVIGATE Individual Resiliency " Trainer

## 2020-07-23 NOTE — PROGRESS NOTES
[facility] NAVIGATE Program Treatment Plan Summary  A Part of the [facility] First Episode of Psychosis Program     NAVIGATE Enrollee: Janey Givens  /Age:  1995 (24 year old)  MRN: [id number]     Date of Initial Service: 18  Date of INTIAL Treatment Plan: 18   Last Review/Update Date:  20  90-Day Review Date: 20     The following represents an UPDATED treatment plan.     1. DSM-V Diagnosis (include numeric code)  Schizophrenia, 295.90 (F20.9)     2. Current symptoms and circumstances that substantiate the diagnosis     Janey has a history of psychosis (delusions, thought broadcasting, odd beliefs per family/friends, auditory hallucinations and negative symptoms (diminished emotional expression and anhedonia)).     Today, Janey reports occasional delusional thoughts and avolition, but denies other symptoms at this time. Janey reports a history of marijuana use around the time of onset, but currently denies use.      3. How symptoms and/or behaviors are affecting level of function     Janey s systems are impacting functioning with respect to social relationships and academics.     4. Risk Assessment:  Suicide:  Assessed Level of Immediate Risk: Low  Ideation: No  Plan:  No  Means: No  Intent: No     Homicide/Violence:  Assessed Level of Immediate Risk: Low  Ideation: No  Plan: No  Means: No  Intent: No     5. Medications  Current Outpatient Medications   Medication     ARIPiprazole (ABILIFY) 10 MG tablet     estradiol (VIVELLE-DOT) 0.1 MG/24HR bi-weekly patch     spironolactone (ALDACTONE) 100 MG tablet     No current facility-administered medications for this visit.      6. Strengths      Medication adherence  Supportive friends, family, recovery environment  Bravery & Valor     Capacity to love and be loved    Caution, Prudence, & Discretion    Creativity, Ingenuity, & Originality    Curiosity    Honest, Authentic, Genuine    Humor & Playfulness   Industry, diligence, &  "perseverance    Judgment, critical thinking, & open-mindedness    Love of learning     Intelligence  Awareness      7. Barriers & Suicide Risk Factors      Isolation/Reduced Supervision  Symptoms of psychosis, positive (delusions and/or hallucinations)  Symptoms of psychosis, negative (avolition)     8. Treatment Domains and Goals          Domain 1: Illness Management & Recovery  Identify and engage possible areas of improvement related to medication optimization and psychosis (delusions and auditory hallucinations) and ability to management illness.      Measurable Objectives Interventions Target Dates & Discharge Criteria   Medication Objectives      -Take medications as prescribed and have reduced frequency and severity of symptoms    \"Keep taking medications\"    Medication Management  -Prescribe and monitor medications  -Monitor and treat side effects  -Psychoeducation     IRT/Psychotherapy  -Psychoeducation  -Motivation interviewing  -CBT  -Behavioral activation  -Mindfulness     Family Therapy  -Psychoeducation  -Motivational interviewing  -Behavioral family therapy  -CBT  -Behavioral activation     Case Management  -NA or None    Target date:   12 months from 1/14/20     Discharge criteria:  Marked and sustained symptom improvement             Individual s Objectives     -Process the psychotic episode by demonstrating understanding of how the episode impacted self, identifying positive coping strategies and resiliency used during that time, challenging self-stigmatizing beliefs, and developing a positive attitude towards facing future life challenges  -Identify primary styles of thinking, and demonstrate understanding of and use cognitive restructuring to successfully deal with negative feelings  -Create an updated relapse prevention plan and share with family/friends to increase support      \"Keep using organizational skills, get Gender Confirmation Surgery\"    Medication Management  -Prescribe and monitor " medications  -Monitor and treat side effects  -Psychoeducation     IRT/Psychotherapy  -Psychoeducation  -Motivation interviewing  -CBT  -Behavioral activation  -Mindfulness     Family Therapy  -Psychoeducation  -Motivational interviewing  -Behavioral family therapy  -CBT  -Behavioral activation     Case Management  -NA or None    Target date:   12 months from 1/14/20     Discharge criteria:  Marked and sustained symptom improvement      Janey demonstrates understanding of mental illness      Janey successfully implements strategies to cope with stressors and/or symptoms to mitigate risk for increase in symptom severity or relapse         Gains Made:  -Learn at least 2 coping strategies to successfully target current symptoms  -Demonstrate understanding for how substance use impacts symptoms, identify stage of change, and experiment with reduced use or abstinence from all illicit substances  -Learn and implement communication strategies to disclose episode, resulting in feeling understood and supported  -Identify persistent symptoms that interfere with activities and/or enjoyment and successfully implement two coping strategies to reduce symptoms severity  -Utilized reality testing to cope with distressing psychosis symptoms       Support System Objectives     -Supports, including family members, verbally reinforce the client's active attempts to build self-esteem and rapport   -Verbalize understanding of the client's long-term and short-term goals  -Learn the client's signs of stress and possible coping skills  -Learn skills that strengthen and support the client's positive behavior change  -Learn strategies to build positive emotions and facilitate resiliency including use of a resiliency story    Medication Management  -Prescribe and monitor medications  -Monitor and treat side effects  -Psychoeducation     IRT/Psychotherapy  -Psychoeducation  -Motivation interviewing  -CBT  -Behavioral  "activation  -Mindfulness     Family Therapy  -Psychoeducation  -Motivational interviewing  -Behavioral family therapy  -CBT  -Behavioral activation     Case Management  -NA or None    Target date:   12 months from 1/14/2020     Discharge criteria:  Support system demonstrates understanding of mental illness      Support system successfully implements strategies to assist Janey cope with stressors and/or symptoms to mitigate risk for increase in symptom severity or relapse                  Domain 2: Health & Basic Living Needs  Identify and engage possible areas of improvement related to basic needs being met and maintaining or improving overall health and well-being      Measurable Objectives Interventions Discharge Criteria   -Learn and implement at least 2 skills to promote health sleep  -Reduce fast food intake to one time per week  -Locate an independent apartment in [place] prior to July 2020  -Obtain and complete Gender Confirmation Surgery by August 2020     \"Complete Gender Confirmation Surgery\" Medication Management  -Prescribe and monitor medications  -Monitor and treat side effects  -Psychoeducation     IRT/Psychotherapy  -Psychoeducation  -Motivation interviewing  -CBT  -Behavioral activation  -Mindfulness     Family Therapy  -Psychoeducation  -Motivational interviewing  -Behavioral family therapy  -CBT  -Behavioral activation     Supported Education & Employment  -Motivational interviewing  -Individualized placement and support   -Behavioral Activation     Case Management  -NA or None    Target date:   12 months from 1/14/2020     Discharge criteria:  Janey, her supports and treatment team report no unmet health and basic living needs      Gains Made:  -Established vegan diet to assist in healthy nutrition  -Utilized a sleep hygiene routine             Domain 3: Family & Other Supports  Identify and engage possible areas of improvement related to engaging family, friends and other supports    " "  Measurable Objectives Interventions Discharge Criteria   -Improve the quality of relationships by developing skills to better understand other people, communicate more effectively, manage disclosure, and understand social cues  -Supports teach and reinforce healthy social skills and attitudes   -Learn and implement problem-solving and/or conflict resolution skills to manage personal and interpersonal problems constructively  -Renew two old fun activities or develop two new fun activity    \"Find LGBTQ friendly network in [place]\"     Medication Management  -Prescribe and monitor medications  -Monitor and treat side effects  -Psychoeducation     IRT/Psychotherapy  -Psychoeducation  -Motivation interviewing  -CBT  -Behavioral activation  -Mindfulness     Family Therapy  -Psychoeducation  -Motivational interviewing  -Behavioral family therapy  -CBT  -Behavioral activation     Supported Education & Employment  -Motivational interviewing  -Individualized placement and support   -Behavioral Activation     Case Management  -NA or None    Target date:   12 months from 1/14/2020      Discharge criteria:  Janey and her support system report feeling equipped with the necessary skills to communicate and problem solve during times of disagreement     Conflict with supports and peers are resolved constructively and consistently over time; 6 months      Gains Made:  -Learn and implement calming and coping strategies to manage anxiety symptoms and focus attention usefully during moments of social anxiety    -Advocated for self when experiencing periods of harassment   -Researched potential roommates/living arrangements            Domain 4: Academic and Employment  Identify and engage possible areas of improvement relates to education and employment      Measurable Objectives Interventions Discharge Criteria   -Graduate college by June 2020  -Begin and attend graduate school by September 2020  -Explore volunteer " opportunities in [place]        Medication Management  -Prescribe and monitor medications  -Monitor and treat side effects  -Psychoeducation     IRT/Psychotherapy  -Psychoeducation  -Motivation interviewing  -CBT  -Behavioral activation     Family Therapy  -Psychoeducation  -Motivational interviewing  -Behavioral family therapy  -CBT  -Behavioral activation     Supported Education & Employment  -Motivational interviewing  -Individualized placement and support   -Behavioral Activation     Case Management  -NA or None    Target date:   12 months from 1/14/2020     Discharge criteria:  Work and school goals are achieved and maintained without follow along NAVIGATE Supported Education and Employment supports for 6 months      Gains Made:  -Completed semester of college and successfully managed symptoms during that time  -Obtained a research assistant position   -Successfully applied to several graduate schools      9. Frequency of sessions and expected duration of treatment:   1-4x per month Medication Management with Prescriber ongoing  6 months of weekly IRT/Individual Psychotherapy followed by 12-18 months of biweekly or monthly IRT  2-4x per month Supported Education and Employment Services for 6 months  2-4x per month Family Education and Support Services for 6 months     10. Participants in therapy plan:   Janey Givens     NAVIGATE Team:   -Family Clinician: VERENICE Dash  -IRT Clinician: MARCELL See [facility]  -SEE: MAKSIM Heredia  -Prescriber: Dr. Tito MD     See scanned document for Acknowledgement of Current Treatment Plan     Regulatory Guidelines for Updating Treatment Plan  [place] Medical Assistance: Reviewed & signed at least every 90 days  Medicare:  Update per policy

## 2020-07-29 ENCOUNTER — VIRTUAL VISIT (OUTPATIENT)
Dept: PSYCHIATRY | Facility: CLINIC | Age: 25
End: 2020-07-29
Payer: COMMERCIAL

## 2020-07-29 ENCOUNTER — BEH TREATMENT PLAN (OUTPATIENT)
Dept: PSYCHIATRY | Facility: CLINIC | Age: 25
End: 2020-07-29

## 2020-07-29 DIAGNOSIS — F20.9 SCHIZOPHRENIA, UNSPECIFIED TYPE (H): Primary | ICD-10-CM

## 2020-07-29 NOTE — PROGRESS NOTES
NAVIGSATINDER Clinician Contact & Progress Note  For Individual Resiliency Training (IRT)  A Part of the [facility] First Episode of Psychosis Program    NAVIGATE Enrollee: Janey Givens (1995)     MRN: [id number]  Date:  7/29/20  Diagnosis: Schizophrenia, unspecified (F20.9)  Clinician: CARMEN Individual Resiliency Trainer, LEEROY See     1. Type of contact: (majority of time spent)  IRT Session via telehealth  Mode of communication: telephone call. Patient consented verbally to this mode of therapy today.  Reason for telehealth: COVID-19. This patient visit was converted to a telehealth visit to minimize exposure to COVID-19.    2. People present:   Writer  Client: Yes    3. Length of Actual Contact: Start Time: 4:00; End Time: 4:46     4. Location of contact:  Originating Location (patient location): En route to house, located in [place], [place]  Distant Location (provider location): Home office, located in [place], [place], using appropriate privacy considerations and procedures    5. Did the client complete the home practice option(s) from the previous session: Completed    6. Motivational Teaching Strategies:  Connect info and skills with personal goals  Promote hope and positive expectations  Explore pros and cons of change  Re-frame experiences in positive light    7. Educational Teaching Strategies:  Review of written material/education  Relate information to client's experience  Ask questions to check comprehension  Break down information into small chunks  Adopt client's language     8. CBT Teaching Strategies:  Reinforcement and shaping (positive feedback for steps towards goals, gains in knowledge & skills and follow-through on home assignments)  Coping skills training (review current coping skills, increase currently used skills and plan home practice)    9. IRT Module(s) Addressed:  Module 8 - Dealing with Negative Feelings  Module 9 - Coping with Symptoms  Module 11 - Having  Fun and Developing Good Relationships    10. Techniques utilized:   [place] announced at beginning of session  Review of homework  Review of goal  Review of previous meeting  Present new material  Problem-solving practice  Help client choose a home practice option  Summarize progress made in current session    11. Measures:    Mental Status Exam  Alertness: alert  and oriented  Behavior/Demeanor: cooperative and pleasant  Speech: regular rate and rhythm  Language: intact.   Mood: depressed  Thought Process/Associations: unremarkable  Thought Content:  Reports preoccupations;  Denies suicidal and violent ideation and delusions  Perception:  Reports none;  Denies auditory hallucinations and visual hallucinations  Insight: good  Judgment: good  Cognition: does  appear grossly intact; formal cognitive testing was not done    KELSEY-7  Over the last 2 weeks, how often have you been bothered by the following problems?    1. Feeling nervous, anxious or on edge: 2 - More than half the days  2. Not being able to stop or control worryin - Several days  3. Worrying too much about different things: 1 - Several days  4. Trouble relaxin - More than half the days  5. Being so restless that it is hard to sit still: 1 - Several days  6. Becoming easily annoyed or irritable: 0 - Not at all  7. Feeling afraid as if something awful might happen: 1 - Several days    PHQ-9  Over the last 2 weeks, how often have you been bothered by any the following problems?    1. Little interest or pleasure in doing things: 2 - More than half the days  2. Feeling down, depressed, or hopeless: 2 - More than half the days  3. Trouble falling or staying asleep, or sleeping too much: 2 - More than half the days  4. Feeling tired or having little energy: 1 - Several days  5. Poor appetite or overeatin - More than half the days  6. Feeling bad about yourself - or that you are a failure or have let yourself or your family down: 2 - More than half the  days  7. Trouble concentrating on things, such as reading the newspaper or watching television: 1 - Several days  8. Moving or speaking so slowly that other people could have noticed. Or the opposite-being fidgety or restless that you have been moving around a lot more than usual: 0 - Not at all  9. Thoughts that you would be better off dead, or of hurting yourself in some way: 0 - Not at all    If you checked off any problems, how difficulty have these problems made it for you to do your work, take care of things at home, or get along with other people? Somewhat difficult    [place] Protocol Risk Identification  1) Have you wished you were dead or wished you could go to sleep and not wake up? No  2) Have you actually had any thoughts about killing yourself? No  If YES to 2, answer questions 3, 4, 5, 6  If NO to 2, go directly to question 6  3) Have you thought about how you might do this? No  4) Have you had any intension of acting on these thoughts of killing yourself, as opposed to you have the thoughts but you definitely would not act on them? No  5) Have you started to work out or worked out the details of how to kill yourself? Do you intend to carry out this plan? No  Always Ask Question 6  6) Have you done anything, started to do anything, or prepared to do anything to end your life? No  Examples: collected pills, obtained a gun, gave away valuables, wrote a will or suicide note, held a gun but changed your mind, cut yourself, tried to hang yourself, etc.    12. Assessment/Progress Note:     This writer called Katie, at her request, for a follow-up IRT session. Katie was driving and requested a phone call. She reported one night of paranoid delusions and low mood. She described recent interpersonal conflict, resulting in low mood. This writer assisted Katie in processing emotions, thoughts, and reactions. This writer and Katie then discussed social skills, including establishing boundaries and utilizing assertive  "communication strategies. This writer validated and normalized the difficulty of the situation. Katie requested additional IRT appointments to continue discussing recent stressors. This writer assisted her in scheduling weekly August appointments. Katie noted she will be moving out of state in early September. This writer and Katie briefly collaborated on a plan for the next sessions until discharge. This writer and Katie then reviewed self-care and coping strategies; she agreed to utilize these throughout the week as a home practice opportunity.     13. Plan/Referrals:     This writer will continue providing support and coping skill training, in alignment with relapse prevention.     Billing for \"Interactive Complexity\"?    No      LEEROY See    NAVIGATE Individual Resiliency Trainer  "

## 2020-08-04 ENCOUNTER — VIRTUAL VISIT (OUTPATIENT)
Dept: PSYCHIATRY | Facility: CLINIC | Age: 25
End: 2020-08-04
Payer: COMMERCIAL

## 2020-08-04 DIAGNOSIS — F20.9 SCHIZOPHRENIA, UNSPECIFIED TYPE (H): Primary | ICD-10-CM

## 2020-08-04 NOTE — PROGRESS NOTES
NAVIGSATINDER Clinician Contact & Progress Note  For Individual Resiliency Training (IRT)  A Part of the [facility] First Episode of Psychosis Program    NAVIGATE Enrollee: Janey Givens (1995)     MRN: [id number]  Date:  8/04/20  Diagnosis: Schizophrenia, unspecified (F20.9)  Clinician: CARMEN Individual Resiliency Trainer, LEEROY See     1. Type of contact: (majority of time spent)  IRT Session via telehealth  Mode of communication: [email or web address] (HIPAA compliant, secure platform). Patient consented verbally to this mode of therapy today.  Reason for telehealth: COVID-19. This patient visit was converted to a telehealth visit to minimize exposure to COVID-19.    2. People present:   Writer  Client: Yes    3. Length of Actual Contact: Start Time: 2:00; End Time: 2:51     4. Location of contact:  Originating Location (patient location): Their home, located in [place], [place]  Distant Location (provider location): Home office, located in [place], [place], using appropriate privacy considerations and procedures    5. Did the client complete the home practice option(s) from the previous session: Completed    6. Motivational Teaching Strategies:  Connect info and skills with personal goals  Promote hope and positive expectations  Explore pros and cons of change  Re-frame experiences in positive light    7. Educational Teaching Strategies:  Review of written material/education  Relate information to client's experience  Ask questions to check comprehension  Break down information into small chunks  Adopt client's language     8. CBT Teaching Strategies:  Reinforcement and shaping (positive feedback for steps towards goals, gains in knowledge & skills and follow-through on home assignments)  Social skills training (rationale for skill, role play practice, feedback and plan home practice)  Relapse prevention planning (review of stressors and early warning signs)    9. IRT Module(s) Addressed:  Module 4 -  Relapse Prevention Planning  Module 10 - Substance Use  Module 11 - Having Fun and Developing Good Relationships    10. Techniques utilized:   [place] announced at beginning of session  Review of homework  Review of goal  Review of previous meeting  Present new material  Problem-solving practice  Help client choose a home practice option  Summarize progress made in current session    11. Measures:    Mental Status Exam  Alertness: alert  and oriented  Behavior/Demeanor: cooperative and pleasant  Speech: regular rate and rhythm  Language: intact.   Mood: description consistent with euthymia  Thought Process/Associations: unremarkable  Thought Content:  Reports delusions;  Denies suicidal and violent ideation  Perception:  Reports none;  Denies auditory hallucinations and visual hallucinations  Insight: good  Judgment: good  Cognition: does  appear grossly intact; formal cognitive testing was not done    PHQ-9  Over the last 2 weeks, how often have you been bothered by any the following problems?    1. Little interest or pleasure in doing things: 1 - Several days  2. Feeling down, depressed, or hopeless: 1 - Several days  3. Trouble falling or staying asleep, or sleeping too much: 1 - Several days  4. Feeling tired or having little energy: 0 - Not at all  5. Poor appetite or overeatin - More than half the days  6. Feeling bad about yourself - or that you are a failure or have let yourself or your family down: 0 - Not at all  7. Trouble concentrating on things, such as reading the newspaper or watching television: 1 - Several days  8. Moving or speaking so slowly that other people could have noticed. Or the opposite-being fidgety or restless that you have been moving around a lot more than usual: 1 - Several days  9. Thoughts that you would be better off dead, or of hurting yourself in some way: 0 - Not at all    If you checked off any problems, how difficulty have these problems made it for you to do your work,  take care of things at home, or get along with other people? Somewhat difficult    [place] Protocol Risk Identification  1) Have you wished you were dead or wished you could go to sleep and not wake up? No  2) Have you actually had any thoughts about killing yourself? No  If YES to 2, answer questions 3, 4, 5, 6  If NO to 2, go directly to question 6  3) Have you thought about how you might do this? No  4) Have you had any intension of acting on these thoughts of killing yourself, as opposed to you have the thoughts but you definitely would not act on them? No  5) Have you started to work out or worked out the details of how to kill yourself? Do you intend to carry out this plan? No  Always Ask Question 6  6) Have you done anything, started to do anything, or prepared to do anything to end your life? No  Examples: collected pills, obtained a gun, gave away valuables, wrote a will or suicide note, held a gun but changed your mind, cut yourself, tried to hang yourself, etc.    12. Assessment/Progress Note:     This writer met with Katie via [employer] for a follow-up IRT session. As this session was the first on telehealth, Katie provided feedback about headphones providing increased comfort. This writer agreed to utilize them. Katie reported delusional thoughts, caused by one time edible marijuana use. Katie portrayed insight into marijuana causing delusions and dedication to sobriety. This writer praised Katie's awareness and reinforced the elimination of marijuana. Katie reflected on interpersonal relationship stressors. This writer assisted Katie in processing emotions and thoughts related to this. This writer then reinforced consistent communication and setting of boundaries. Katie then discussed her interest in obtaining a job prior to moving. She discussed her recent interview. This writer validated the supportive environment and encouraged her to communicate about plans to move in 1 month. She agreed to do so as a home practice  "opportunity.     13. Plan/Referrals:     This writer will continue providing support and coping skill training, in alignment with relapse prevention.     Billing for \"Interactive Complexity\"?    No      LEEROY See    NAVIGATE Individual Resiliency Trainer  "

## 2020-08-18 ENCOUNTER — VIRTUAL VISIT (OUTPATIENT)
Dept: PSYCHIATRY | Facility: CLINIC | Age: 25
End: 2020-08-18
Payer: COMMERCIAL

## 2020-08-18 DIAGNOSIS — F20.9 SCHIZOPHRENIA, UNSPECIFIED TYPE (H): Primary | ICD-10-CM

## 2020-08-18 NOTE — PROGRESS NOTES
NAVIGATE Clinician Contact & Progress Note  For Individual Resiliency Training (IRT)  A Part of the [facility] First Episode of Psychosis Program    NAVIGATE Enrollee: Janey Givens (1995)     MRN: [id number]  Date:  8/18/20  Diagnosis: Schizophrenia, unspecified (F20.9)  Clinician: CARMEN Individual Resiliency Trainer, LEEROY See     1. Type of contact: (majority of time spent)  IRT Session via telehealth  Mode of communication: [email or web address] (HIPAA compliant, secure platform). Patient consented verbally to this mode of therapy today.  Reason for telehealth: COVID-19. This patient visit was converted to a telehealth visit to minimize exposure to COVID-19.    2. People present:   Writer  Client: Yes    3. Length of Actual Contact: Start Time: 2:00; End Time: 2:49     4. Location of contact:  Originating Location (patient location): Their home, located in [place], [place]  Distant Location (provider location): Home office, located in [place], [place], using appropriate privacy considerations and procedures    5. Did the client complete the home practice option(s) from the previous session: Completed    6. Motivational Teaching Strategies:  Connect info and skills with personal goals  Promote hope and positive expectations  Explore pros and cons of change  Re-frame experiences in positive light    7. Educational Teaching Strategies:  Review of written material/education  Relate information to client's experience  Ask questions to check comprehension  Break down information into small chunks  Adopt client's language     8. CBT Teaching Strategies:  Reinforcement and shaping (positive feedback for steps towards goals, gains in knowledge & skills and follow-through on home assignments)  Social skills training (rationale for skill, feedback and plan home practice)  Relapse prevention planning (review of stressors, early warning signs and written plan to respond to signs)    9. IRT Module(s)  Addressed:  Module 4 - Relapse Prevention Planning  Module 11 - Having Fun and Developing Good Relationships    10. Techniques utilized:   [place] announced at beginning of session  Review of homework  Review of goal  Review of previous meeting  Present new material  Problem-solving practice  Help client choose a home practice option  Summarize progress made in current session    11. Measures:    Mental Status Exam  Alertness: alert  and oriented  Behavior/Demeanor: cooperative and pleasant  Speech: regular rate and rhythm  Language: intact.   Mood: description consistent with euthymia  Thought Process/Associations: unremarkable  Thought Content:  Reports preoccupations;  Denies suicidal and violent ideation and delusions  Perception:  Reports none;  Denies auditory hallucinations and visual hallucinations  Insight: good  Judgment: good  Cognition: does  appear grossly intact; formal cognitive testing was not done    KELSEY-7  Over the last 2 weeks, how often have you been bothered by the following problems?    1. Feeling nervous, anxious or on edge: 0 - Not at all  2. Not being able to stop or control worryin - Not at all  3. Worrying too much about different things: 0 - Not at all  4. Trouble relaxin - Not at all  5. Being so restless that it is hard to sit still: 1 - Several days  6. Becoming easily annoyed or irritable: 0 - Not at all  7. Feeling afraid as if something awful might happen: 0 - Not at all    PHQ-9  Over the last 2 weeks, how often have you been bothered by any the following problems?    1. Little interest or pleasure in doing things: 0 - Not at all  2. Feeling down, depressed, or hopeless: 0 - Not at all  3. Trouble falling or staying asleep, or sleeping too much: 1 - Several days  4. Feeling tired or having little energy: 1 - Several days  5. Poor appetite or overeatin - Not at all  6. Feeling bad about yourself - or that you are a failure or have let yourself or your family down: 0 - Not  at all  7. Trouble concentrating on things, such as reading the newspaper or watching television: 0 - Not at all  8. Moving or speaking so slowly that other people could have noticed. Or the opposite-being fidgety or restless that you have been moving around a lot more than usual: 0 - Not at all  9. Thoughts that you would be better off dead, or of hurting yourself in some way: 0 - Not at all    If you checked off any problems, how difficulty have these problems made it for you to do your work, take care of things at home, or get along with other people? Not difficult at all    [place] Protocol Risk Identification  1) Have you wished you were dead or wished you could go to sleep and not wake up? No  2) Have you actually had any thoughts about killing yourself? No  If YES to 2, answer questions 3, 4, 5, 6  If NO to 2, go directly to question 6  3) Have you thought about how you might do this? No  4) Have you had any intension of acting on these thoughts of killing yourself, as opposed to you have the thoughts but you definitely would not act on them? No  5) Have you started to work out or worked out the details of how to kill yourself? Do you intend to carry out this plan? No  Always Ask Question 6  6) Have you done anything, started to do anything, or prepared to do anything to end your life? No  Examples: collected pills, obtained a gun, gave away valuables, wrote a will or suicide note, held a gun but changed your mind, cut yourself, tried to hang yourself, etc.    12. Assessment/Progress Note:     This writer met with Katie via [employer] for a follow-up IRT session. Katie denied any symptoms since the previous session. Katie discussed uncertainty about an interpersonal relationship. This writer assisted Katie in processing feelings/thoughts related to this and then problem solved communication strategies. During the remainder of the session, Katie and this writer began Module 4: Relapse Prevention Planning, prior to Katie  "moving for graduate school. This writer guided Katie through common warning signs and triggers. Katie identified the following warning signs: preoccupation about safety, derealization, feeling depressed, neglecting appearance (brushing teeth), not sleeping multiple days in a row, overly focused on homework (staying up all night to complete it). She indicated the following may be triggers: arguments with friends, negative thoughts about self, increased stress from school/work, and discontinuing medications. This writer praised Katie's awareness and encouraged her to think about coping strategies to add to the plan during the next session.     13. Plan/Referrals:     This writer will continue to assist Katie in completing a relapse prevention plan prior to her discharge/graduation from [employer].     Billing for \"Interactive Complexity\"?    No      [facility] Individual Resiliency Trainer  "

## 2020-08-24 ENCOUNTER — VIRTUAL VISIT (OUTPATIENT)
Dept: PSYCHIATRY | Facility: CLINIC | Age: 25
End: 2020-08-24
Payer: COMMERCIAL

## 2020-08-24 DIAGNOSIS — F20.9 SCHIZOPHRENIA, UNSPECIFIED TYPE (H): ICD-10-CM

## 2020-08-24 RX ORDER — ARIPIPRAZOLE 10 MG/1
10 TABLET ORAL DAILY
Qty: 90 TABLET | Refills: 1 | Status: SHIPPED | OUTPATIENT
Start: 2020-08-24 | End: 2021-03-22

## 2020-08-24 ASSESSMENT — PATIENT HEALTH QUESTIONNAIRE - PHQ9: SUM OF ALL RESPONSES TO PHQ QUESTIONS 1-9: 1

## 2020-08-24 NOTE — PROGRESS NOTES
"Janey Givens is a 25 year old adult who is being evaluated via a billable telephone visit.      The patient has been notified of following:     \"This telephone visit will be conducted via a call between you and your physician/provider. We have found that certain health care needs can be provided without the need for a physical exam.  This service lets us provide the care you need with a short phone conversation.  If a prescription is necessary we can send it directly to your pharmacy.  If lab work is needed we can place an order for that and you can then stop by our lab to have the test done at a later time.    Telephone visits are billed at different rates depending on your insurance coverage. During this emergency period, for some insurers they may be billed the same as an in-person visit.  Please reach out to your insurance provider with any questions.    If during the course of the call the physician/provider feels a telephone visit is not appropriate, you will not be charged for this service.\"    Patient has given verbal consent for Telephone visit?  Yes    What phone number would you like to be contacted at? [phone]    [facility]    Phone call duration: 30 minutes    Toi Francis MD        "

## 2020-09-08 NOTE — PROGRESS NOTES
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 He reassignment surgery vianney very well and May-June with uncomplicated recovery.          Patient Active Problem List   Diagnosis     Blood clot in vein     Gender dysphoria in adult     Acute psychosis (H)     Past Surgical History:   Procedure Laterality Date     COSMETIC SURGERY      August 2018     KNEE SURGERY Left 2014       Social History     Tobacco Use     Smoking status: Never Smoker     Smokeless tobacco: Never Used   Substance Use Topics     Alcohol use: Yes     Family History   Problem Relation Age of Onset     Lung Cancer Maternal Grandmother      Parkinsonism Paternal Grandfather      Dementia Paternal Grandfather      Autoimmune Disease Sister          Current Outpatient Medications   Medication Sig Dispense Refill     ARIPiprazole (ABILIFY) 10 MG tablet Take 1 tablet (10 mg) by mouth daily 90 tablet 1     estradiol (VIVELLE-DOT) 0.1 MG/24HR bi-weekly patch Place 2 patches onto the skin twice a week 16 patch 0     spironolactone (ALDACTONE) 100 MG tablet Take 2 tablets (200 mg) by mouth daily 60 tablet 5       Reviewed and updated          Review of Systems   Constitutional, HEENT, cardiovascular, pulmonary, gi and gu systems are negative, except as otherwise noted.       Objective   Reported vitals:  There were no vitals taken for this visit.     RESP: No cough, no audible wheezing, able to talk in full sentences  Remainder of exam unable to be completed due to telephone visits      PSYCH: Alert and oriented times 3; coherent speech, normal   rate and volume, able to articulate logical thoughts, able   to abstract reason, no tangential thoughts, no hallucinations   or delusions  His affect is normal, pleasant and full    Risk Assessment for safety: Identified risk factors and/or  vulnerabilities include fe male, NO panic, extreme anxiety, hopelessness, worthlessness, intoxication, anger, extreme perturbation and psychosis. Protective factors and/or strengths identified as committed to sobriety, open to suggestions / feedback and support of family, friends and providers. Pt denies thoughts, plans or intent to harm self or others.    Suicide Risk appears  Low}. Safety plan was discussed and included use of crisis hotlines, calling 9-1-1 or visiting the nearest ED with safety concerns for self or others.    Diagnostic Test Results:  Labs reviewed in Epic wnl        Assessment/Plan:  1. Schizophrenia, unspecified type (H)  Doing exceptionally well and psychosis is in remission on maint. therapy  - ARIPiprazole (ABILIFY) 10 MG tablet; Take 1 tablet (10 mg) by mouth daily  Dispense: 90 tablet; Refill: 1    No follow-ups on file due to transfer of care to [place].   Will provide 3-6 month overlap in med refills.      Phone call duration:  30 minutes    Toi Francis MD

## 2020-10-02 NOTE — PROGRESS NOTES
[facility] NAVIGATE Program Treatment Plan Summary  A Part of the [facility] First Episode of Psychosis Program     NAVIGATE Enrollee: Janey Givens  /Age:  1995 (24 year old)  MRN: [id number]     Date of Initial Service: 18  Date of INTIAL Treatment Plan: 18   Last Review/Update Date:  20  90-Day Review Date: 10/29/20     The following represents an REVIEWED treatment plan.     1. DSM-V Diagnosis (include numeric code)  Schizophrenia, 295.90 (F20.9)     2. Current symptoms and circumstances that substantiate the diagnosis     Janey has a history of psychosis (delusions, thought broadcasting, odd beliefs per family/friends, auditory hallucinations and negative symptoms (diminished emotional expression and anhedonia)).     Today, Janey reports occasional delusional thoughts and avolition, but denies other symptoms at this time. Janey reports a history of marijuana use around the time of onset, but currently denies use.      3. How symptoms and/or behaviors are affecting level of function     Janey s systems are impacting functioning with respect to social relationships and academics.     4. Risk Assessment:  Suicide:  Assessed Level of Immediate Risk: Low  Ideation: No  Plan:  No  Means: No  Intent: No     Homicide/Violence:  Assessed Level of Immediate Risk: Low  Ideation: No  Plan: No  Means: No  Intent: No     5. Medications  Current Outpatient Medications   Medication     ARIPiprazole (ABILIFY) 10 MG tablet     estradiol (VIVELLE-DOT) 0.1 MG/24HR bi-weekly patch     spironolactone (ALDACTONE) 100 MG tablet     No current facility-administered medications for this visit.      6. Strengths      Medication adherence  Supportive friends, family, recovery environment  Bravery & Valor     Capacity to love and be loved    Caution, Prudence, & Discretion    Creativity, Ingenuity, & Originality    Curiosity    Honest, Authentic, Genuine    Humor & Playfulness   Industry, diligence, &  "perseverance    Judgment, critical thinking, & open-mindedness    Love of learning     Intelligence  Awareness      7. Barriers & Suicide Risk Factors      Symptoms of psychosis, positive (delusions and/or hallucinations)  Symptoms of psychosis, negative (avolition)     8. Treatment Domains and Goals          Domain 1: Illness Management & Recovery  Identify and engage possible areas of improvement related to medication optimization and psychosis (delusions and auditory hallucinations) and ability to management illness.      Measurable Objectives Interventions Target Dates & Discharge Criteria   Medication Objectives      -Take medications as prescribed and have reduced frequency and severity of symptoms    \"Keep taking medications\"    Medication Management  -Prescribe and monitor medications  -Monitor and treat side effects  -Psychoeducation     IRT/Psychotherapy  -Psychoeducation  -Motivation interviewing  -CBT  -Behavioral activation  -Mindfulness     Family Therapy  -Psychoeducation  -Motivational interviewing  -Behavioral family therapy  -CBT  -Behavioral activation     Case Management  -NA or None    Target date:   12 months from 1/14/20     Discharge criteria:  Marked and sustained symptom improvement             Individual s Objectives     -Process the psychotic episode by demonstrating understanding of how the episode impacted self, identifying positive coping strategies and resiliency used during that time, challenging self-stigmatizing beliefs, and developing a positive attitude towards facing future life challenges  -Identify primary styles of thinking, and demonstrate understanding of and use cognitive restructuring to successfully deal with negative feelings        \"Keep using organizational skills\"    Medication Management  -Prescribe and monitor medications  -Monitor and treat side effects  -Psychoeducation     IRT/Psychotherapy  -Psychoeducation  -Motivation interviewing  -CBT  -Behavioral " activation  -Mindfulness     Family Therapy  -Psychoeducation  -Motivational interviewing  -Behavioral family therapy  -CBT  -Behavioral activation     Case Management  -NA or None    Target date:   12 months from 1/14/20     Discharge criteria:  Marked and sustained symptom improvement      Janey demonstrates understanding of mental illness      Janey successfully implements strategies to cope with stressors and/or symptoms to mitigate risk for increase in symptom severity or relapse         Gains Made:  -Learn at least 2 coping strategies to successfully target current symptoms  -Demonstrate understanding for how substance use impacts symptoms, identify stage of change, and experiment with reduced use or abstinence from all illicit substances  -Learn and implement communication strategies to disclose episode, resulting in feeling understood and supported  -Identify persistent symptoms that interfere with activities and/or enjoyment and successfully implement two coping strategies to reduce symptoms severity  -Utilized reality testing to cope with distressing psychosis symptoms  -Create an updated relapse prevention plan and share with family/friends to increase support  -Obtained Gender Confirmation Surgery, resulting in an improved quality of life and well-being       Support System Objectives     -Supports, including family members, verbally reinforce the client's active attempts to build self-esteem and rapport   -Learn skills that strengthen and support the client's positive behavior change  -Learn strategies to build positive emotions and facilitate resiliency including use of a resiliency story    Medication Management  -Prescribe and monitor medications  -Monitor and treat side effects  -Psychoeducation     IRT/Psychotherapy  -Psychoeducation  -Motivation interviewing  -CBT  -Behavioral activation  -Mindfulness     Family Therapy  -Psychoeducation  -Motivational interviewing  -Behavioral family  therapy  -CBT  -Behavioral activation     Case Management  -NA or None    Target date:   12 months from 1/14/2020     Discharge criteria:  Support system demonstrates understanding of mental illness      Support system successfully implements strategies to assist Janey cope with stressors and/or symptoms to mitigate risk for increase in symptom severity or relapse      -Verbalize understanding of the client's long-term and short-term goals  -Learn the client's signs of stress and possible coping skills              Domain 2: Health & Basic Living Needs  Identify and engage possible areas of improvement related to basic needs being met and maintaining or improving overall health and well-being      Measurable Objectives Interventions Discharge Criteria   -Learn and implement at least 2 skills to promote health sleep        Medication Management  -Prescribe and monitor medications  -Monitor and treat side effects  -Psychoeducation     IRT/Psychotherapy  -Psychoeducation  -Motivation interviewing  -CBT  -Behavioral activation  -Mindfulness     Family Therapy  -Psychoeducation  -Motivational interviewing  -Behavioral family therapy  -CBT  -Behavioral activation     Supported Education & Employment  -Motivational interviewing  -Individualized placement and support   -Behavioral Activation     Case Management  -NA or None    Target date:   12 months from 1/14/2020     Discharge criteria:  Janey, her supports and treatment team report no unmet health and basic living needs      Gains Made:  -Established vegan diet to assist in healthy nutrition  -Utilized a sleep hygiene routine  -Reduce fast food intake to one time per week  -Locate an independent apartment in [place] prior to July 2020  -Obtain and complete Gender Confirmation Surgery by August 2020           Domain 3: Family & Other Supports  Identify and engage possible areas of improvement related to engaging family, friends and other supports     "  Measurable Objectives Interventions Discharge Criteria   -Improve the quality of relationships by developing skills to better understand other people, communicate more effectively, manage disclosure, and understand social cues  -Renew two old fun activities or develop two new fun activity    \"Find LGBTQ friendly network in [place]\"     Medication Management  -Prescribe and monitor medications  -Monitor and treat side effects  -Psychoeducation     IRT/Psychotherapy  -Psychoeducation  -Motivation interviewing  -CBT  -Behavioral activation  -Mindfulness     Family Therapy  -Psychoeducation  -Motivational interviewing  -Behavioral family therapy  -CBT  -Behavioral activation     Supported Education & Employment  -Motivational interviewing  -Individualized placement and support   -Behavioral Activation     Case Management  -NA or None    Target date:   12 months from 1/14/2020      Discharge criteria:  Janey and her support system report feeling equipped with the necessary skills to communicate and problem solve during times of disagreement     Conflict with supports and peers are resolved constructively and consistently over time; 6 months      Gains Made:  -Learn and implement calming and coping strategies to manage anxiety symptoms and focus attention usefully during moments of social anxiety    -Advocated for self when experiencing periods of harassment   -Researched potential roommates/living arrangements   -Supports teach and reinforce healthy social skills and attitudes   -Learn and implement problem-solving and/or conflict resolution skills to manage personal and interpersonal problems constructively           Domain 4: Academic and Employment  Identify and engage possible areas of improvement relates to education and employment      Measurable Objectives Interventions Discharge Criteria     -Explore volunteer opportunities in [place]        Medication Management  -Prescribe and monitor " medications  -Monitor and treat side effects  -Psychoeducation     IRT/Psychotherapy  -Psychoeducation  -Motivation interviewing  -CBT  -Behavioral activation     Family Therapy  -Psychoeducation  -Motivational interviewing  -Behavioral family therapy  -CBT  -Behavioral activation     Supported Education & Employment  -Motivational interviewing  -Individualized placement and support   -Behavioral Activation     Case Management  -NA or None    Target date:   12 months from 1/14/2020     Discharge criteria:  Work and school goals are achieved and maintained without follow along NAVIGATE Supported Education and Employment supports for 6 months      Gains Made:  -Completed semester of college and successfully managed symptoms during that time  -Obtained a research assistant position   -Successfully applied to several graduate schools  -[place] by June 2020  -Begin and attend graduate school by September 2020      9. Frequency of sessions and expected duration of treatment:   1-4x per month Medication Management with Prescriber ongoing  6 months of weekly IRT/Individual Psychotherapy followed by 12-18 months of biweekly or monthly IRT  2-4x per month Supported Education and Employment Services for 6 months  2-4x per month Family Education and Support Services for 6 months     10. Participants in therapy plan:   Janey Givens     NAVIGATE Team:   -Family Clinician: VERENICE Dash  -IRT Clinician: MARCELL See [facility]  -SEE: MAKSIM Heredia  -Prescriber: Dr. Tito MD     See scanned document for Acknowledgement of Current Treatment Plan     Regulatory Guidelines for Updating Treatment Plan  [place] Medical Assistance: Reviewed & signed at least every 90 days  Medicare:  Update per policy

## 2020-10-05 ENCOUNTER — DOCUMENTATION ONLY (OUTPATIENT)
Dept: PSYCHIATRY | Facility: CLINIC | Age: 25
End: 2020-10-05

## 2020-11-12 NOTE — PROGRESS NOTES
NAVIGATE Discharge  A Part of the [facility] First Episode of Psychosis Program     Patient Name: Janey Givens  /Age:  1995 (25 year old)  Diagnosis(es):   Schizophrenia    Program Discharge Date:   10/5/20  Reason for Discharge:   [place] out of [place]  Patient/Family informed of discharge in writing via text message.     Ame Newman [facility]   NAVIGATE Director & Family Clinician

## 2020-12-20 ENCOUNTER — HEALTH MAINTENANCE LETTER (OUTPATIENT)
Age: 25
End: 2020-12-20

## 2021-02-12 ENCOUNTER — PATIENT OUTREACH (OUTPATIENT)
Dept: PSYCHIATRY | Facility: CLINIC | Age: 26
End: 2021-02-12

## 2021-02-12 NOTE — PROGRESS NOTES
"This writer received a call from Katie, requesting resources. This writer called Katie, who stated she had a \"mental breakdown\" and is returning to [place]. Katie expressed interest in locating services or returning to NAVIGATE. This writer assisted Katie in scheduling an appointment to discuss re-enrollment. This writer then provided the [facility] Crisis number. This writer will coordinate with the team regarding service enrollment.     This is a non-billable encounter as it was solely for the purposes of outreach and/or care coordination.   "

## 2021-02-16 ENCOUNTER — PATIENT OUTREACH (OUTPATIENT)
Dept: PSYCHIATRY | Facility: CLINIC | Age: 26
End: 2021-02-16

## 2021-02-16 ENCOUNTER — VIRTUAL VISIT (OUTPATIENT)
Dept: PSYCHIATRY | Facility: CLINIC | Age: 26
End: 2021-02-16
Payer: COMMERCIAL

## 2021-02-16 DIAGNOSIS — F20.9 SCHIZOPHRENIA, UNSPECIFIED TYPE (H): Primary | ICD-10-CM

## 2021-02-16 NOTE — PROGRESS NOTES
[facility] Clinician Contact & Progress Note  For Individual Resiliency Training (IRT)  A Part of the [facility] First Episode of Psychosis Program    NAVIGATE Enrollee: Janey Givens (1995)     MRN: [id number]  Date:  2/16/21  Diagnosis: Schizophrenia, unspecified (F20.9)  Clinician: [facility] Individual Resiliency Trainer, LEEROY See     1. Type of contact: (majority of time spent)  IRT Session via telehealth  Mode of communication: telephone call. Patient consented verbally to this mode of therapy today.  Reason for telehealth: COVID-19. This patient visit was converted to a telehealth visit to minimize exposure to COVID-19.    2. People present:   Writer  Client: Yes  Significant Other/Family/Friend:  Mother    3. Length of Actual Contact: Start Time: 9:00; End Time: 9:50     4. Location of contact:  Originating Location (patient location): Their home, located in [place], [place]  Distant Location (provider location): Home office, located in [place], [place], using appropriate privacy considerations and procedures    5. Did the client complete the home practice option(s) from the previous session: Not Applicable    6. Motivational Teaching Strategies:  Connect info and skills with personal goals  Promote hope and positive expectations    7. Educational Teaching Strategies:  Relate information to client's experience  Ask questions to check comprehension  Break down information into small chunks  Adopt client's language     8. CBT Teaching Strategies:  Reinforcement and shaping (positive feedback for steps towards goals)    9. IRT Module(s) Addressed:  Module 4 - Relapse Prevention Planning    10. Techniques utilized:   [place] announced at beginning of session  Problem-solving practice  Summarize progress made in current session    11. Measures:    Mental Status Exam  Alertness: alert  and oriented  Behavior/Demeanor: cooperative and pleasant  Speech: slowed  Language: intact.   Mood:  depressed  Thought Process/Associations: response delay  Thought Content:  Reports delusions and paranoid ideation;  Denies suicidal and violent ideation  Perception:  Reports auditory hallucinations;  Denies none  Insight: fair  Judgment: fair  Cognition: does  appear grossly intact; formal cognitive testing was not done    PHQ-9  Over the last 2 weeks, how often have you been bothered by any the following problems?    1. Little interest or pleasure in doing things: 3 - Nearly every day  2. Feeling down, depressed, or hopeless: 3 - Nearly every day  3. Trouble falling or staying asleep, or sleeping too much: 3 - Nearly every day  4. Feeling tired or having little energy: 1 - Several days  5. Poor appetite or overeating: 3 - Nearly every day  6. Feeling bad about yourself - or that you are a failure or have let yourself or your family down: 2 - More than half the days  7. Trouble concentrating on things, such as reading the newspaper or watching television: 3 - Nearly every day  8. Moving or speaking so slowly that other people could have noticed. Or the opposite-being fidgety or restless that you have been moving around a lot more than usual: 3 - Nearly every day  9. Thoughts that you would be better off dead, or of hurting yourself in some way: 2 - More than half the days    If you checked off any problems, how difficulty have these problems made it for you to do your work, take care of things at home, or get along with other people? Not answered    [place] Protocol Risk Identification  1) Have you wished you were dead or wished you could go to sleep and not wake up? Yes  2) Have you actually had any thoughts about killing yourself? Yes  If YES to 2, answer questions 3, 4, 5, 6  If NO to 2, go directly to question 6  3) Have you thought about how you might do this? No  4) Have you had any intension of acting on these thoughts of killing yourself, as opposed to you have the thoughts but you definitely would not  "act on them? Yes  5) Have you started to work out or worked out the details of how to kill yourself? Do you intend to carry out this plan? No  Always Ask Question 6  6) Have you done anything, started to do anything, or prepared to do anything to end your life? No  Examples: collected pills, obtained a gun, gave away valuables, wrote a will or suicide note, held a gun but changed your mind, cut yourself, tried to hang yourself, etc.    12. Assessment/Progress Note:     This writer called Katie, at her request, for a discussion regarding re-enrollment in NAVIGATE services. Katie stated she has returned from [place] due to a \"mental breakdown.\" She reported suicidal thoughts the week prior, as well as delusions regarding artifical intelligence. She stated her family is concerned about her and is encouraging treatment. Katie noted she no longer is experiencing suicidal thoughts and feels safe. However, she stated she is questioning others' intentions and is unsure who to trust. Katie noted she has been mistreated by the medical system in the past (hospitalizations) and is hesitant to return. She agreed to communicate to family members if suicidal thoughts arise. She mentioned she is agreeable to going to a hospital if suicidal thoughts are present.     Katie's mother, Billie, then joined the call. Billie stated Katie was not taking medications for months, but restarted 20 MG Abilify a week prior. Billie inquired about potential alternate medications with more effectiveness. This writer encouraged them to obtain a medical appointment to discuss further. Katie, Billie, and this writer then began discussing next steps in treatment. This writer provided an overview of options, including inpatient, IOP, or partial hospitalization. After discussion, Katie was open to IOP or partial hospitalization. This writer agreed to call for information on availability and enrollment.       13. Plan/Referrals:     This writer will contact IOP and " "partial hospitalization programs regarding enrollment in services.     This writer will continue to provide care coordination to ensure continuity of care.     Billing for \"Interactive Complexity\"?    No      LEEROY See    NAVIGSATINDER Individual Resiliency Trainer  " Detail Level: Detailed Hide Additional Notes?: No

## 2021-02-18 ENCOUNTER — HOSPITAL ENCOUNTER (OUTPATIENT)
Dept: BEHAVIORAL HEALTH | Facility: CLINIC | Age: 26
Discharge: HOME | End: 2021-02-18
Attending: FAMILY MEDICINE | Admitting: FAMILY MEDICINE
Payer: COMMERCIAL

## 2021-02-18 ENCOUNTER — BEH TREATMENT PLAN (OUTPATIENT)
Dept: BEHAVIORAL HEALTH | Facility: CLINIC | Age: 26
End: 2021-02-18
Attending: PSYCHIATRY & NEUROLOGY

## 2021-02-18 DIAGNOSIS — F20.9 SCHIZOPHRENIA (H): ICD-10-CM

## 2021-02-18 PROCEDURE — 90791 PSYCH DIAGNOSTIC EVALUATION: CPT | Mod: 95 | Performed by: COUNSELOR

## 2021-02-18 ASSESSMENT — ANXIETY QUESTIONNAIRES
GAD7 TOTAL SCORE: 21
IF YOU CHECKED OFF ANY PROBLEMS ON THIS QUESTIONNAIRE, HOW DIFFICULT HAVE THESE PROBLEMS MADE IT FOR YOU TO DO YOUR WORK, TAKE CARE OF THINGS AT HOME, OR GET ALONG WITH OTHER PEOPLE: SOMEWHAT DIFFICULT
1. FEELING NERVOUS, ANXIOUS, OR ON EDGE: NEARLY EVERY DAY
3. WORRYING TOO MUCH ABOUT DIFFERENT THINGS: NEARLY EVERY DAY
2. NOT BEING ABLE TO STOP OR CONTROL WORRYING: NEARLY EVERY DAY
6. BECOMING EASILY ANNOYED OR IRRITABLE: NEARLY EVERY DAY
7. FEELING AFRAID AS IF SOMETHING AWFUL MIGHT HAPPEN: NEARLY EVERY DAY
5. BEING SO RESTLESS THAT IT IS HARD TO SIT STILL: NEARLY EVERY DAY

## 2021-02-18 ASSESSMENT — PATIENT HEALTH QUESTIONNAIRE - PHQ9
5. POOR APPETITE OR OVEREATING: NEARLY EVERY DAY
SUM OF ALL RESPONSES TO PHQ QUESTIONS 1-9: 15

## 2021-02-18 NOTE — PATIENT INSTRUCTIONS
It was a pleasure meeting with you today Katie.  Attached you will find the welcome letter and safety plan per our discussion today.    Please contact me with any questions,    Olga Herrera [facility]  [phone]      Welcome to the Adult Mental Health Outpatient Programs. Thank you for choosing us at [facility]!    Managing mental health symptoms while balancing life stressors can be a struggle. Our mental health programming will provide you the therapy, education, skills and support needed to improve your well-being and to live a healthy and more manageable lifestyle.    After completing the Diagnostic Assessment, you will receive a recommendation for a level of care or specialty service that is right for you. Our Outpatient Mental Health programs are all group-based and allow you to meet with peers who are trying to manage similar symptoms or life circumstances in a safe and therapeutic setting.    Program Recommendations for:  You will be scheduled to join the following program: Adult Day Treatment Program  You will be in the follow group /track:  2A  Please attend:  Mondays, Tuesdays, and Thursdays  at:  9a-12 noon  Your scheduled start of the Program is on: Monday, February 22, 2021  What will happen next?   You will receive a series of calls from our [facility] providers and/or schedulers to prepare you for your program participation.    1. Admission Call: Program staff will contact you after your diagnostic assessment to provide a brief check in and to complete the admission process. We will ask you about concerns that may need to be addressed right away. This could include: personal safety, insurance clarification, technology access, or another concern you may have. This call may occur days in advance or right before your first scheduled group.    2. Physical Health Screen Call:  A nurse will contact you either prior to admission or during your first week in the program to ask a few required  "physical health screening questions and discuss concerns as needed.   3. Provider/Scheduling Call: Program staff may also call to schedule an individual appointment with a psychiatrist or psychologist (therapist). This will depend on your needs and may be required for the program that was recommended for you. This program requirement does NOT replace your follow up with your community provider.  However, it is important that you do NOT see your community psychiatric provider on the same day that you see the program psychiatrist. If you do, this could result in a denial from your insurance. Please let us know if you have any concerns and we will help you.   What if I still have questions or cannot attend as planned?  We hope to be able to answer your questions during the admission call. You can also reach out to us by contacting the program directly at:  Choose an item.  Sincerely,     Your Outpatient Adult Mental Health Program Team    Outpatient Mental Health Services - Adult     MY COPING PLAN FOR SAFETY     PATIENT'S NAME:    Janey Givens  MRN:                           [id number]     SAFETY PLAN:     Step 1: Warning signs / cues (Thoughts, images, mood, situation, behavior) that a crisis may be developing:     ? Images: visions of harm: rope in the basements  ? Thinking Processes: paranoia: especially when not taking medications  ? Behaviors: not sleeping enough and working way too hard or not working at all  ? Situations: stress      Step 2: Coping strategies - Things I can do to take my mind off of my problems without contacting another person (relaxation technique, physical activity):     ? Focus on helpful thoughts:  \"Just that we are [place]\"     Step 3: People and social settings that provide distraction:                 Name: My family         Phone: in phone              Name: My friends        Phone: in phone        Step 5: When I am in crisis, I can ask these people to help me use my safety " plan:                 Name: My family         Phone: in phone              Name: My friends        Phone: in phone     Step 6: Making the environment safe:      ? Taking it slow, doing things in moderation     Step 7: Professionals or agencies I can contact during a crisis:     ? Suicide Prevention Lifeline: 7-298-458-TALK (0282)  ? Crisis Text Line Service (available 24 hours a day, 7 days a week): Text [place] to 624500  ? Call  **CRISIS (340044) from a cell phone to talk to a team of professionals who can help you.          Crisis Services By [facility]: Phone Number:   [place]     [phone]   [place]    [phone]   [place]    [phone]   [place]    [phone]   [place]    [phone]   [place] [phone]   [place]     [phone]      ? Call 911 or go to my nearest emergency department.             I helped develop this safety plan and agree to use it when needed.  I have been given a copy of this plan.          Today s date:  2/18/2021  Adapted from Safety Plan Template 2008 Janette Venegas and Mendel Olguin is reprinted with the express permission of the authors.  No portion of the Safety Plan Template may be reproduced without the express, written permission.  You can contact the authors at [email or web address] or [email or web address]

## 2021-02-18 NOTE — PROGRESS NOTES
"[facility]  Provider Name:  Olga Herrera     Credentials:  [facility]    PATIENT'S NAME: Janey Givens  PREFERRED NAME: Katie  PRONOUNS: she/her/hers     MRN: [id number]  : 1995  ADDRESS: [address] [facility] [address]   ACCT. NUMBER:  [id number]  DATE OF SERVICE: 21  START TIME: 1100  END TIME: 1215  PREFERRED PHONE: [phone] email: [email or web address]  May we leave a program related message: Yes  SERVICE MODALITY:  Video Visit:      Provider verified identity through the following two step process.  Patient provided:  Patient  and Patient address    Telemedicine Visit: The patient's condition can be safely assessed and treated via synchronous audio and visual telemedicine encounter.      Reason for Telemedicine Visit: Services only offered telehealth    Originating Site (Patient Location): Patient's home    Distant Site (Provider Location): Provider Remote Setting    Consent:  The patient/guardian has verbally consented to: the potential risks and benefits of telemedicine (video visit) versus in person care; bill my insurance or make self-payment for services provided; and responsibility for payment of non-covered services.     Patient would like the video invitation sent by:  My Chart    Mode of Communication:  Video Conference via Sensser    As the provider I attest to compliance with applicable laws and regulations related to telemedicine.    UNIVERSAL ADULT Mental Health DIAGNOSTIC ASSESSMENT      Identifying Information:  Patient is a 25 year old, .  The pronoun use throughout this assessment reflects the patient's chosen pronoun.  Patient was referred for an assessment by family.  Patient attended the session with mom.     Chief Complaint:   The reason for seeking services at this time is: \" A week ago - I wanted to kill myself and my parents would not leave me alone so I could not think about it anymore - I was " "thinking too fast and I could not think and was confused \"   Patient reports being a student with [employer] when parents were visiting and it was decided patient was to return home with them due to an increase in symptoms.  The problem(s) began 12/2017 though most recent concern in the past few weeks. Patient has attempted to resolve these concerns in the past through therapy, medications, Navigate Program.  Patient has tried seeing a therapist but not for very long.    Social/Family History:  Patient reported they grew up in\"[place]where - a little bit in [place], a little bit in [place], a little bit [place]\" .  They were raised by biological parents.  Parents stayed ..   Patient reported that their childhood was \"during one of my schizophrenic episodes - I was convinced that I was an AI so I would say I had a good childhood\".  Patient described their current relationships with family of origin as \"getting better - I was ready to give up on them as I felt I was not being heard\".      The patient describes their cultural background as \"a little bit [place], a little bit [place], a little bit internet, Estonian, Welsh, a little Mongolian\".  Cultural influences and impact on patient's life structure, values, norms, and healthcare: Social Orientation: Does not trust the medical field in how they treat trans people.  Contextual influences on patient's health include: Individual Factors Transgender, psychosis.    These factors will be addressed in the Preliminary Treatment plan.  Patient identified their preferred language to be English. Patient reported they does not need the assistance of an  or other support involved in therapy.     Patient reported had no significant delays in developmental tasks.   Patient's highest education level was college graduate. Patient identified the following learning problems: none reported.  Modifications will not be used to assist communication in therapy.   Patient " reports they are not  able to understand written materials.    Patient reported the following relationship history: Patient does not currently have a nesting mate..  Patient's current relationship status is single for unknown.   Patient identified their sexual orientation as pansexual.  Patient reported having zero child(dar). Patient identified mother, father, siblings and friends as part of their support system.  Patient identified the quality of these relationships as stable and meaningful.      Patient's current living/housing situation involves staying with parents.  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 Patient was attending [employer] before returning to [place] with parents due to mental health concerns.      Patient is currently a student and reports they are able to function appropriately at school..  Patient reports their finances are obtained through through grad school and parents.  Patient does not identify finances as a current stressor.      Patient reported that they have not been involved with the legal system.   Patient denies being on probation / parole / under the jurisdiction of the court.    Patient's Strengths and Limitations:  Patient identified the following strengths or resources that will help them succeed in treatment: commitment to health and well being, friends / good social support, family support, intelligence, motivation and strong social skills. Things that may interfere with the patient's success in treatment include: none identified.     Personal and Family Medical History:   Patient does report a family history of mental health concerns.  Patient reports family history includes Autoimmune Disease in her sister; Bipolar Disorder in her sister; Dementia in her paternal grandfather; Lung Cancer in her maternal grandmother; Parkinsonism in her paternal grandfather..     Patient does report Mental Health  Diagnosis and/or Treatment.  Patient Patient reported the following previous diagnoses which include(s): Schizophrenia.  Patient reported symptoms began in 2017.   Patient has received mental health services in the past: [facility], some therapy.  Psychiatric Hospitalizations: [facility] 9/28/18-10/15/18. This was Katie's second hospitalization; her first being 12/6/17-12/22/17 @ [facility] for symptoms of psychosis.  Patient denies a history of civil commitment.  Currently, patient is receiving other mental health services.  These include Navigate program through the [facility] .           Patient has had a physical exam to rule out medical causes for current symptoms.  Date of last physical exam was within the past year. Client was encouraged to follow up with PCP if symptoms were to develop. The patient has a [facility] Provider, who is named Ca Golden.  Patient reports no current medical and/or dental concerns.  Patient denies any issues with pain..   There are not significant appetite / nutritional concerns / weight changes.   Patient does not report a history of head injury / trauma / cognitive impairment.      Patient reports current meds as:   Outpatient Medications Marked as Taking for the 2/18/21 encounter (Hospital Encounter) with Olga Herrera LADC   Medication Sig     ARIPiprazole (ABILIFY) 10 MG tablet Take 1 tablet (10 mg) by mouth daily     estradiol (VIVELLE-DOT) 0.1 MG/24HR bi-weekly patch Place 2 patches onto the skin twice a week       Medication Adherence:  Patient reports taking prescribed medications as prescribed.    Patient Allergies:    Allergies   Allergen Reactions     Banana Anaphylaxis     Throat swelling       Medical History:    Past Medical History:   Diagnosis Date     Blood clot in vein     Patient had a blood clot after surgery of left knee 4/2014 (occured 4 days after surgery).  Patient took warfarin for 6 months.     Uncomplicated asthma          Current  Mental Status Exam:   Appearance:  Appropriate    Eye Contact:  Good   Psychomotor:  Normal       Gait / station:  no problem  Attitude / Demeanor: Interested  Speech      Rate / Production: Normal/ Responsive      Volume:  Soft  volume      Language:  no obvious problem  Mood:   Apathetic  Affect:   Flat    Thought Content: Clear   Thought Process: Logical       Associations: No loosening of associations  Insight:   Fair   Judgment:  Intact   Orientation:  All  Attention/concentration: Fair    Rating Scales:    PHQ9:    PHQ-9 SCORE 7/10/2020 8/24/2020 2/18/2021   PHQ-9 Total Score 0 1 15   ;    GAD7:    KELSEY-7 SCORE 2/14/2020 7/10/2020 2/18/2021   Total Score 0 3 21     CGI:     First:Considering your total clinical experience with this particular patient population, how severe are the patient's symptoms at this time?: 5 (2/18/2021 11:12 AM)  ;    Most recentCompared to the patient's condition at the START of treatment, this patient's condition is: 4 (2/18/2021 11:12 AM)      Substance Use:  Patient did not report a family history of substance use concerns; see medical history section for details.  Patient has not received chemical dependency treatment in the past.  Patient has not ever been to detox.      Patient is not currently receiving any chemical dependency treatment. Patient reported the following problems as a result of their substance use: Patient denied.    Patient reports using alcohol 1 times per month and has 3 glasses of wine at a time. Patient first started drinking at age 18.  Patient reported date of last use was 3 weeks ago.  Patient reports heaviest use was while at school, patient would drink sometimes 3-4 times a week.  Patient denies using tobacco.  Patient reports using marijuana 1 times per month and smokes 1 at a time. Patient started using marijuana at age 18.  Patient reports last use was unknown.  Patient reports heaviest use was while at [employer].  Patient denies using  caffeine.  Patient reports using/abusing the following substance(s). Patient reported no other substance use.     CAGE- AID:    CAGE-AID Total Score 2/18/2021   Total Score 0       Substance Use: No symptoms    Based on the negative CAGE score and clinical interview there  are not indications of drug or alcohol abuse.      Significant Losses / Trauma / Abuse / Neglect Issues:   Patient did not serve in the .  There are indications or report of significant loss, trauma, abuse or neglect issues related to: are no indications and client denies any losses, trauma, abuse, or neglect concerns.  Concerns for possible neglect are not present.     Safety Assessment:   Current Safety Concerns:  Patient denies current thoughts of self harm, suicidal ideation.  Patient reports experiencing suicidal ideation one time in the past two weeks and currently feels safe.  Patient denies history of suicide attempts, plan or intent.  [place] Suicide Severity Rating Scale (Short Version)  [place] Suicide Severity Rating (Short Version) 2/18/2021   Over the past 2 weeks have you felt down, depressed, or hopeless? yes   Over the past 2 weeks have you had thoughts of killing yourself? yes   Have you ever attempted to kill yourself? no   Q1 Wished to be Dead (Past Month) yes   Q2 Suicidal Thoughts (Past Month) yes   Q3 Suicidal Thought Method no   Q4 Suicidal Intent without Specific Plan no   Q5 Suicide Intent with Specific Plan no   Q6 Suicide Behavior (Lifetime) no     Patient denies current homicidal ideation and behaviors.  Patient denies current self-injurious ideation and behaviors.    Patient denied risk behaviors associated with substance use.  Patient reported impulsive decisionmaking associated with mental health symptoms.  Patient reports the following current concerns for their personal safety: None.  Patient reports there are not  firearms in the house. Patient denies having firearms.     History of Safety  Concerns:  Patient denied a history of homicidal ideation.     Patient denied a history of personal safety concerns.    Patient denied a history of assaultive behaviors.    Patient denied a history of sexual assault behaviors.     Patient denied a history of risk behaviors associated with substance use.  Patient denies any history of high risk behaviors associated with mental health symptoms.  Patient reports the following protective factors: safe and stable environment, adherence with prescribed medication, agreement to use safety plan, living with other people and uses community crisis resources    Risk Plan:  See Recommendations for Safety and Risk Management Plan    Review of Symptoms per patient report:  Depression: Change in sleep, Lack of interest, Change in energy level, Difficulties concentrating, Change in appetite and Feeling sad, down, or depressed  [place]:  No Symptoms  Psychosis: Auditory hallucinations, Thought insertions or deletions and delusions  Anxiety: Excessive worry, Nervousness, Physical complaints, such as headaches, stomachaches, muscle tension, Sleep disturbance, Poor concentration and Irritability  Panic:  No symptoms  Post Traumatic Stress Disorder:  No Symptoms   Eating Disorder: No Symptoms  ADD / ADHD:  No symptoms  Conduct Disorder: No symptoms  Autism Spectrum Disorder: No symptoms  Obsessive Compulsive Disorder: No Symptoms    Patient reports the following compulsive behaviors and treatment history: Patient denies.      Diagnostic Criteria:   Mixed anxiety-depressive disorder: clinically significant symptoms of anxiety and depression, but the criteria are not met for either a specific Mood Disorder or a specific Anxiety Disorder.  Client reports the following symptoms of anxiety:   - The person finds it difficult to control the worry.   - Restlessness or feeling keyed up or on edge.    - Being easily fatigued.    - Difficulty concentrating or mind going blank.    - Irritability.     - Muscle tension.    - Sleep disturbance (difficulty falling or staying asleep, or restless unsatisfying sleep).   A. Characteristic symptoms: Two (or more) of the following, each present for a significant portion of time during a 1-month period (or less if successfully treated)*:    - Delusions   - Hallucinations   - Negative symptoms, ie, affective flattening, alogia, or avolition   B. Marked functional impairment in Occupational or Academic/Interpersonal Relationships/Self-care: For a significant portion of the time since the onset of the disturbance, one or more major areas of functioning such as work, interpersonal relations, or self-care are markedly below the level achieved prior to the onset (or when the onset is in childhood or adolescence, failure to achieve expected level of interpersonal, academic, or occupational achievement).  C. Duration: Continuous signs of the disturbance persist for at least 6 months. This 6-month period must include at least 1 month of symptoms (or less if successfully treated) that meet Criterion A (ie, active-phase symptoms) and may include periods of prodromal or residual symptoms. During these prodromal or residual periods, the signs of the disturbance may be manifested by only negative symptoms or two or more symptoms listed in Criterion A present in an attenuated form (eg, odd beliefs, unusual perceptual experiences).  D. Schizoaffective and mood disorder exclusion: Schizoaffective disorder and mood disorder with psychotic features have been ruled out because either (1) no major depressive, manic, or mixed episodes have occurred concurrently with the active-phase symptoms; or (2) if mood episodes have occurred during active-phase symptoms, their total duration has been brief relative to the duration of the active and residual periods.  E. Substance/general medical condition exclusion: The disturbance is not due to the direct physiological effects of a substance (eg, a drug  of abuse, a medication) or a general medical condition.    Functional Status:  Patient reports the following functional impairments: academic performance, educational activities, health maintenance, management of the household and or completion of tasks, relationship(s) and work / vocational responsibilities.     WHODAS:   WHODAS 2.0 Total Score 2/18/2021   Total Score 22     Programmatic care:  Current LOCUS was assessed and patient needs the following level of care based on score 18  .    Clinical Summary:  1. Reason for assessment: To determine level of care  .  2. Psychosocial, Cultural and Contextual Factors: Transgender, Psychosis  .  3. Principal DSM5 Diagnoses  (Sustained by DSM5 Criteria Listed Above):   295.90  (F20.9) Schizophrenia.  4. Other Diagnoses that is relevant to services:   300.00 (F41.9) Unspecified Anxiety Disorder.  5. Provisional Diagnosis: Further clarification of diagnosis may be beneficial to determine diagnosis of anxiety  6. Prognosis: Expect Improvement.  7. Likely consequences of symptoms if not treated: Higher level of care.  8. Client strengths include:  caring, has a previous history of therapy, insightful, intelligent, wants to learn, willing to ask questions and willing to relate to others .     Recommendations:     1. Plan for Safety and Risk Management:   A safety and risk management plan has been developed including: Patient consented to co-developed safety plan.  Safety and risk management plan was completed.  Patient agreed to use safety plan should any safety concerns arise.  A copy was given to the patient..          Report to child / adult protection services was NA.     2. Patient's identified sexual orientation concerns will be addressed by request by patient.     3. Initial Treatment will focus on:    Thought Disturbance including: hallucinations and paranoia.     4. Resources/Service Plan:    services are not indicated.   Modifications to assist  communication are not indicated.   Additional disability accommodations are not indicated.      5. Collaboration:   Collaboration / coordination of treatment will be initiated with the following  support professionals: None at this time.      6.  Referrals:   The following referral(s) will be initiated: Day Treatment. Next Scheduled Appointment: 21 at 900 am.  Patient also recommended to re-initiate services with [facility] program for medication management, therapy services.     A Release of Information has been obtained for the following: Emergency Contact.    7. JAYANT:    JAYANT:  Discussed the general effects of drugs and alcohol on health and well-being. Provider gave patient printed information about the effects of chemical use on their health and well being. Recommendations:  To remain sober.     8. Records:   These were reviewed at time of assessment.   Information in this assessment was obtained from the medical record and  provided by patient who is a fair historian.    Patient will have open access to their mental health medical record.        Provider Name/ Credentials:  Olga Herrera [facility]  2021                                          LOCUS Worksheet     Name: Janey Givens MRN: [id number]    : 1995      Gender:  female    PMI:  ELTON   Provider NPI: [id number] Provider Name: [facility]    Actual level of Care Provided:  Medications     Service(s) receiving or referred to:  Adult Day Treatment    Reason for Variance: Higher Level of Care      Rating completed by: Olga Herrera [facility]      I. Risk of Harm:   1      Minimal Risk of Harm    II. Functional Status:   4      Serious Impairment    III. Co-Morbidity:   2      Minor Co-Morbidity    IV - A. Recovery Environment - Level of Stress:   4      Highly Stress Environment    IV - B. Recovery Environment - Level of Support:   2      Supportive Environment    V. Treatment and Recovery History:   3      Moderate  "to Equivocal Response to Treatment and Recovery Management    VI. Engagement and Recovery Project:   3      Limited Engagement and Recovery       18 Composite Score    Level of Care Recommendation:   17 to 19       High Intensity Community Based Services                  Outpatient Mental Health Services - Adult    MY COPING PLAN FOR SAFETY    PATIENT'S NAME: Janey Givens  MRN:   [id number]    SAFETY PLAN:    Step 1: Warning signs / cues (Thoughts, images, mood, situation, behavior) that a crisis may be developing:      Images: visions of harm: rope in the basements    Thinking Processes: paranoia: especially when not taking medications    Behaviors: not sleeping enough and working way too hard or not working at all    Situations: stress     Step 2: Coping strategies - Things I can do to take my mind off of my problems without contacting another person (relaxation technique, physical activity):      Focus on helpful thoughts:  \"Just that we are on [place]\"    Step 3: People and social settings that provide distraction:     Name: My family Phone: in phone   Name: My friends Phone: in phone      Step 5: When I am in crisis, I can ask these people to help me use my safety plan:     Name: My family Phone: in phone   Name: My friends Phone: in phone    Step 6: Making the environment safe:       Taking it slow, doing things in moderation    Step 7: Professionals or agencies I can contact during a crisis:      Suicide Prevention Lifeline: 7-040-251-BOVR (7873)    Crisis Text Line Service (available 24 hours a day, 7 days a week): Text [place] to 405750    Call  **CRISIS (815889) from a cell phone to talk to a team of professionals who can help you.    Crisis Services By [facility]: Phone Number:   [place]     [phone]   [place]    [phone]   [place]    [phone]   [place]    [phone]   [place]    [phone]   [place] [phone]   [place]     [phone]       Call 911 or go to my nearest emergency department. "     I helped develop this safety plan and agree to use it when needed.  I have been given a copy of this plan.        Today s date:  2/18/2021  Adapted from Safety Plan Template 2008 Janette Venegas and Mendel Olguin is reprinted with the express permission of the authors.  No portion of the Safety Plan Template may be reproduced without the express, written permission.  You can contact the authors at [email or web address] or [email or web address]

## 2021-02-19 ENCOUNTER — TELEPHONE (OUTPATIENT)
Dept: BEHAVIORAL HEALTH | Facility: CLINIC | Age: 26
End: 2021-02-19

## 2021-02-19 ASSESSMENT — ANXIETY QUESTIONNAIRES
6. BECOMING EASILY ANNOYED OR IRRITABLE: MORE THAN HALF THE DAYS
GAD7 TOTAL SCORE: 21
5. BEING SO RESTLESS THAT IT IS HARD TO SIT STILL: MORE THAN HALF THE DAYS
GAD7 TOTAL SCORE: 12
7. FEELING AFRAID AS IF SOMETHING AWFUL MIGHT HAPPEN: SEVERAL DAYS
3. WORRYING TOO MUCH ABOUT DIFFERENT THINGS: MORE THAN HALF THE DAYS
IF YOU CHECKED OFF ANY PROBLEMS ON THIS QUESTIONNAIRE, HOW DIFFICULT HAVE THESE PROBLEMS MADE IT FOR YOU TO DO YOUR WORK, TAKE CARE OF THINGS AT HOME, OR GET ALONG WITH OTHER PEOPLE: SOMEWHAT DIFFICULT
1. FEELING NERVOUS, ANXIOUS, OR ON EDGE: SEVERAL DAYS
2. NOT BEING ABLE TO STOP OR CONTROL WORRYING: MORE THAN HALF THE DAYS

## 2021-02-19 ASSESSMENT — PATIENT HEALTH QUESTIONNAIRE - PHQ9
5. POOR APPETITE OR OVEREATING: MORE THAN HALF THE DAYS
SUM OF ALL RESPONSES TO PHQ QUESTIONS 1-9: 18

## 2021-02-19 NOTE — TELEPHONE ENCOUNTER
Admission Date: 2/22/2021    Identify any current concerns with potential impact to admission:     medication/medical concerns: No     immediate safety concerns:No    Does patient have safety plan? Yes  Note: Please copy safety plan copied into [facility] Encounter     Other (insurance/childcare/transportation/housing/planned absences/etc): None    Patient's insurance is: [facility] .     Does patient need appointment with provider? No    Review patient's program schedule and inform them of any variation due to late days or holidays.                                                                                  (delete if not applicable):   Patient acknowledged being ready for the program this coming Monday(2/22/21). Reported problems with audio/video while doing DA yesterday. Patient was given Medical Zoom support number for technical support. Patient presented with some cognitive confusion and needed to have some information repeated.      Completed by: Ron TANG/JESSA

## 2021-02-19 NOTE — PROGRESS NOTES
Adult Day Treatment Program:  Individualized Treatment Plan     Date of Plan: 3/1/21    Name: Janey Givens MRN: [id number]    : 1995    Programs:  Adult Day Treatment (ADT)     Clinical Track (if applicable):  2A    DSM5 Diagnosis  295.90  (F20.9) Schizophrenia.  300.00 (F41.9) Unspecified Anxiety Disorder.    Adult Day Treatment Program Multidisciplinary Team Members: Denny Mtz MD and/or Dr. Zuly Coelho, and/or Dr. Glen Rios MD;     Janey Givens will participate in the Adult Day Treatment Program  3 days per week, 3 hours per day. Anticipated duration/discharge: 12 weeks    Due to COVID-19, services will be delivered via telemedicine until further notice.     Program Start Date: 21  Anticipated Discharge Date: 21 (pending authorization/clinical changes)  Discharged as of 3/29/21    NOTE: Complete CGI     Review Date: Does Janey Givens continue to meet criteria to participate in the Adult Day Treatment Program, 3 days per week; 3 hours per day?   3/1/21 yes                       Client Strengths:  caring, has a previous history of therapy, insightful, intelligent, wants to learn, willing to ask questions and willing to relate to others     Client Participation in Plan:  Contributed to goals and plan     Areas of Vulnerability:  Psychotic symptoms/behavior   Anxiety  History of Substance Abuse  Transgender    Long-Term Goals:  Knowledge about illness and management of symptoms   Maintenance of personal safety   Maintenance of sobriety     Abuse Prevention Plan:  Safe, therapeutic environment   Safety coping plan as needed   Education regarding illness and skill development   Coordination with care providers     Discharge Criteria:  Satisfactory progress toward treatment goals   Improvement re: identified problems and symptoms   Ability to continue recovery at next level of service   Has a discharge plan in place   Has safety/coping plan in place      Areas of Treatment  Focus        Area of Treatment Focus:   Personal Safety  Start Date:    3/1/21    Goal:  Target Date: 4/26/21 Status: Stopped  Client will notify staff when needing assistance to develop or implement a coping plan to manage suicidal or self injurious urges.  Client will use coping plan for safety, as needed.  Maintain sobriety.      Progress:   3/1/2021  Katie reported to be safe.        Treatment Strategies:   Assess / reassess level of potential for harm to self or others  Engage in safety planning when indicated  Facilitate increased self awareness          Area of Treatment Focus:   Symptom Stabilization and Management  Start Date:    3/11/21    Goal:  Target Date: 4/26/21 Status: Stopped  When in life skills Janey  will provide an update related to perceived progress with mental health recovery weekly.      Progress:   3/1/2021  Katie participates and updates group, and established goals.        Treatment Strategies:   Facilitate increased self awareness  Teach adaptive coping skills and communication skills          Area of Treatment Focus:   Wellness and Mental Health  Start Date:    3/1/21    Goal:  Target Date: 4/26/21 Status: Stopped  Janey will improve wellness related behaviors by getting enough sleep,exercise, balanced nutrition and take medications (if prescribed) to maintain good mental health.        Progress:   3/1/2021  Katie participates in groups and reported eating healthy foods, sleep has been better, is taking medications, and reported biking for exercise.        Treatment Strategies:   Facilitate increased self awareness  Teach adaptive coping skills and communication skills          Area of Treatment Focus:   Community Resources / Support and Discharge Planning  Start Date:    3/1/21    Goal:  Target Date: 4/26/21 Status: Stopped  Client will establish an aftercare plan to include medical providers and social supports by discharge.      Progress:   3/1/2021  Katie participates in groups and  "keeps appointments, and is independent in this area.        Treatment Strategies:   Assist clients in establishing / strengthening support network  Assist with discharge planning  Facilitate increased self awareness          Area of Treatment Focus:   Symptom Stabilization and Management  Start Date:    3/1/26    Goal:  Target Date: 4/26/21 Status: Stopped  Janey will learn and practice 1-2 skills to manage symptoms of depression, anxiety or thought problems.  Practice mindfulness 3-4 times weekly, at home, and with the group.       Progress:   3/1/2021  Katie participates in groups and practices mindfulness with the group and at home, and distraction skills. Katie identified that coding helps to distract from negative thoughts.         Treatment Strategies:   Assess / reassess level of potential for harm to self or others  Engage in safety planning when indicated  Facilitate increased self awareness  Teach adaptive coping skills and communication skills       CLYDE Alvarado/L   Treatment Team - Adult Day Treatment Program    MARYAN Torres,  [phone]    Tierney Doss, Licensed Psychologist,  [phone]    Olga Peterson RN  [phone]    To refer to the program: [phone]    I have reviewed the patient's Individualized Treatment Plan and agree with the current goals, interventions and level of car    Tierney Doss, Licensed Psychologist      NOTE: Required signatures are completed manually and scanned into the electronic medical record. See \"Media\" tab in epic.    The Individualized Treatment Plan Signature Page has been routed to the provider for co-sign.      "

## 2021-02-19 NOTE — DISCHARGE SUMMARY
"       Adult Mental Health Intensive Outpatient Discharge Summary/Instructions      Patient: Janey Givens MRN: [id number]   : 1995 Age: 25 year old Sex: adult     Admission Date: 21  Discharge Date: 3/29/21  Diagnosis: 295.90  (F20.9) Schizophrenia.  300.00 (F41.9) Unspecified Anxiety Disorder     Focus of Treatment / Progress    Personal Safety:      * Follow your safety plan     * Call crisis lines as needed:    [facility] [phone] [facility] [phone]  [facility] [phone] Crisis Connection [phone]  [facility] [phone] [facility] [phone]  [facility] [phone] National Suicide Prevention [phone]  [facility] [phone] Suicide Prevention [phone]  [facility] [phone]    Managing symptoms of:  Depression, anxiety, psychosis    Community support/health:  [place] [address] ([phone]) [email or web address] [place] Crisis text line( Text [place] to 764129),  [facility]  [place], [place] ([phone])  [facility] [place], [place] ( [phone])  [facility] [address] [place] [address], [phone]      Managing Symptoms and Preventing Relapse    * Go to all of your appointments    * Take all medications as directed.      * Carry a current list if medication with you    * Do not use illicit (street) drugs.  Avoid alcohol    * Report these symptoms to your care team. These are early signs of relapse:   Thoughts of suicide   Losing more sleep   Increased confusion   Mood getting worse   Feeling more aggressive   Other:      *Use these skills daily:  Talk to someone you trust at least one time weekly, set boundaries and say \"no\", be assertive, act opposite of negative feelings, accept challenges with a positive attitude, exercise at least three times per week for 30 minutes,  get enough sleep, eat healthy foods, get into a good routine    Copy of " summary sent to: In Epic My Chart    Follow up with psychiatrist / main caregiver: Dr. Holt on 4/26/21 at 11:30AM    Follow up with your therapist: [facility] on 3/31/21 at 1:00pm    Go to group therapy and / or support groups at: [facility] Connection  support groups    See your medical doctor about:  For an annual physical exam or any general wellness or illness as needed.    Other:  Your treatment team appreciates having the opportunity to work with you and wishes you the best.    Client Signature:__Reviewed with Katie via phone.  Unable to sign due to COVID______  Date / Time:__3/29/21 at 4:15PM___  Staff Signature:___LEEROY Castro______   Date / Time:__3/29/21 at 4:15PM____

## 2021-02-20 ASSESSMENT — ANXIETY QUESTIONNAIRES: GAD7 TOTAL SCORE: 12

## 2021-02-22 ENCOUNTER — HOSPITAL ENCOUNTER (OUTPATIENT)
Dept: BEHAVIORAL HEALTH | Facility: CLINIC | Age: 26
End: 2021-02-22
Attending: PSYCHIATRY & NEUROLOGY
Payer: COMMERCIAL

## 2021-02-22 PROBLEM — F20.9 SCHIZOPHRENIA (H): Status: ACTIVE | Noted: 2021-02-22

## 2021-02-22 PROCEDURE — G0177 OPPS/PHP; TRAIN & EDUC SERV: HCPCS | Mod: GT

## 2021-02-22 PROCEDURE — 90853 GROUP PSYCHOTHERAPY: CPT | Mod: GT | Performed by: PSYCHOLOGIST

## 2021-02-22 PROCEDURE — 90853 GROUP PSYCHOTHERAPY: CPT | Mod: GT

## 2021-02-22 NOTE — GROUP NOTE
Psychoeducation Group Note    PATIENT'S NAME: Janey Givens  MRN:   [id number]  :   1995  ACCT. NUMBER: [id number]  DATE OF SERVICE: 21  START TIME:  9:00 AM  END TIME:  9:50 AM  FACILITATOR: Geraldo Mena OT  TOPIC: MH Life Skills Group: Sensory Approaches in Mental Health  [facility] Adult Mental Health Day Treatment  TRACK: 2A    Telemedicine Visit: The patient's condition can be safely assessed and treated via synchronous audio and visual telemedicine encounter.      Reason for Telemedicine Visit: Services only offered telehealth and Covid 19    Originating Site (Patient Location): Patient's home    Distant Site (Provider Location): Provider Remote Setting    Consent:  The patient/guardian has verbally consented to: the potential risks and benefits of telemedicine (video visit) versus in person care; bill my insurance or make self-payment for services provided; and responsibility for payment of non-covered services.     Mode of Communication:  Video Conference via Medical Zoom    As the provider I attest to compliance with applicable laws and regulations related to telemedicine.      NUMBER OF PARTICIPANTS: 4    Summary of Group / Topics Discussed:  Sensory Approaches in Mental Health:  Sensory Enhanced Mindfulness: Patients were taught and provided with an opportunity to explore and practice how using sensory enhanced mindfulness practices can help them stay grounded in the present moment as a way to manage mental health symptoms and stressors.  Focus on breathing and gentle stretching for wellness and present moment awareness. Facilitation of reflection to deepen understanding of impact of process on NS.    Patient Session Goals / Objectives:    Identified how using sensory enhanced mindfulness practices can be used for grounding, stress management, and self regulation      Improved awareness of different types of sensory enhanced mindfulness activities that assist with healthy coping  of stress and symptoms      Established a plan for practice of these skills in their own environments    Practiced and reflected on how to generalize taught skills to their everyday life        Patient Participation / Response:  Fully participated with the group by sharing personal reflections / insights and openly received / provided feedback with other participants.    Patient presentation: First day in program. At times appeared distracted, reports symptoms high this am. Did participate during experiential process with good effort. Will continue to monitor and assess. , Verbalized understanding of content and Patient would benefit from additional opportunities to practice the content to be able to generalize it to their everyday life with increased intentionality, consistency, and efficacy in support of their psychiatric recovery    Treatment Plan:  Patient has an initial individualized treatment plan that was created as part of their diagnostic assessment / admission process.  A master individualized treatment plan is in the process of being developed with the patient and multi-disciplinary care team.    Geraldo Mena, OT

## 2021-02-22 NOTE — GROUP NOTE
Psychotherapy Group Note    PATIENT'S NAME: Janey Givens  MRN:   [id number]  :   1995  ACCT. NUMBER: [id number]  DATE OF SERVICE: 21  START TIME: 11:00 AM  END TIME: 11:50 AM  FACILITATOR: Lily Johnson LICSW  TOPIC: MH EBP Group: Self-Awareness  [facility] Adult Mental Health Day Treatment  TRACK: 2A    Telemedicine Visit: The patient's condition can be safely assessed and treated via synchronous audio and visual telemedicine encounter.      Reason for Telemedicine Visit: Services only offered telehealth    Originating Site (Patient Location): Patient's home    Distant Site (Provider Location): [facility]: [facility]    Consent:  The patient/guardian has verbally consented to: the potential risks and benefits of telemedicine (video visit) versus in person care; bill my insurance or make self-payment for services provided; and responsibility for payment of non-covered services.     Mode of Communication:  Video Conference via zoom    As the provider I attest to compliance with applicable laws and regulations related to telemedicine.      NUMBER OF PARTICIPANTS: 3    Summary of Group / Topics Discussed:  Self-Awareness: Grief: Patients were provided with an overview of how personal losses impact their thoughts, feelings, and behaviors. Different stages of grief were discussed, with a focus on the personal and individual experiences of grief as a natural response to loss. The relationship between grief, depression, and anxiety was also discussed. Patients were provided with information regarding different ways of processing grief and shared their personal experiences.     Patient Session Goals / Objectives:    Defined and explored the concept of grief and the grieving process    Discussed relationship between grief, depression, and anxiety     Normalized and recognized the purpose/benefits of the grieving process    Discussed management of the thoughts and feelings associated  with grief      Patient Participation / Response:  Moderately participated, sharing some personal reflections / insights and adequately adequately received / provided feedback with other participants.    Demonstrated understanding of topics discussed through group discussion and participation and Identified / Expressed readiness to act intentionally, increase self-compassion, promote personal growth    Treatment Plan:  Patient has a current master individualized treatment plan.  See Epic treatment plan for more information.    Lily Medrano, LICSW

## 2021-02-22 NOTE — GROUP NOTE
"Process Group Note  Telemedicine Visit: The patient's condition can be safely assessed and treated via synchronous audio and visual telemedicine encounter.    Reason for Telemedicine Visit: Patient has requested telehealth visit  Originating Site (Patient Location): Patient's home  Distant Site (Provider Location): [facility] Outpatient Setting: Adult Day Tx  Consent:  The patient/guardian has verbally consented to: the potential risks and benefits of telemedicine (video visit) versus in person care; bill my insurance or make self-payment for services provided; and responsibility for payment of non-covered services.   Mode of Communication:  Video Conference via [employer]  As the provider I attest to compliance with applicable laws and regulations related to telemedicine.    PATIENT'S NAME: Janey Givens  MRN:   [id number]  :   1995  ACCT. NUMBER: [id number]  DATE OF SERVICE: 21  START TIME: 10:00 AM  END TIME: 10:50 AM  FACILITATOR: Anais Cid PsyD  TOPIC:  Process Group    Diagnoses:  295.90 Schizophrenia    Psychiatry:: Navigcruz  Therapy:  Aleah Levin    [facility] Adult Mental Health Day Treatment  TRACK: 2A    NUMBER OF PARTICIPANTS: 3          Data:    Session content: At the start of this group, patients were invited to check in by identifying themselves, describing their current emotional status, and identifying issues to address in this group.   Area(s) of treatment focus addressed in this session included Symptom Management, Personal Safety and Community Resources/Discharge Planning.  Client reported being safe today.  Reported mood is anxious.   Goal for today is to attend therapy. The client talked to the group about how she just got out of the hospital and is adjusting to medications. Katie is transgender, and believes the medical field does not treat people with transgender fairly. She reported paranoia, intrusive thoughts and  \"arguing with " "myself.\"      Therapeutic Interventions/Treatment Strategies:  Psychotherapist offered support, feedback and validation. Treatment modalities used include Cognitive Behavioral Therapy and Dialectical Behavioral Therapy. Interventions include Cognitive Restructuring:  Assisted patient in formulating new neutral/positive alternatives to challenge less helpful / ineffective thoughts, Coping Skills: Reviewed patients current calming practices and discussed a more formal way of practicing and accessing skills, Relapse Prevention: Facilitated understanding of effective coping skills in response to triggers for substance use and Mindfulness: Facilitated discussion of when/how to use mindfulness skills to benefit general health, mental health symptoms, and stressors.    Assessment:    Patient response:   Patient responded to session by listening, being attentive and accepting support    Possible barriers to participation / learning include; none    Health Issues:   None reported       Substance Use Review:   Substance Use: No active concerns identified.    Mental Status/Behavioral Observations  Appearance:   Appropriate   Eye Contact:   Good   Psychomotor Behavior: Normal   Attitude:   Cooperative   Orientation:   All  Speech   Rate / Production: Normal    Volume:  Normal   Mood:    Anxious  Depressed   Affect:    Constricted   Thought Content:   Rumination and Psychosis reports hallucinations auditory and visual and paranoia  Thought Form:  Coherent  Logical  Psychosis    Insight:    Good  and Fair     Plan:     Safety Plan: Recommended that patient call 911 or go to the local ED should there be a change in any of these risk factors.     Barriers to treatment: None identified    Patient Contracts (see media tab):  None    Substance Use: Provided encouragement towards sobriety     Continue or Discharge: Patient will continue in Adult Day Treatment (ADT)  as planned. Patient is likely to benefit from learning and using " skills as they work toward the goals identified in their treatment plan.      Anais Cid PsyD  February 22, 2021  Tierney Solorzano, ADELAIDA,  Licensed Clinical Psychologist

## 2021-02-23 ENCOUNTER — HOSPITAL ENCOUNTER (OUTPATIENT)
Dept: BEHAVIORAL HEALTH | Facility: CLINIC | Age: 26
End: 2021-02-23
Attending: PSYCHIATRY & NEUROLOGY
Payer: COMMERCIAL

## 2021-02-23 PROCEDURE — 90853 GROUP PSYCHOTHERAPY: CPT | Mod: GT | Performed by: PSYCHOLOGIST

## 2021-02-23 PROCEDURE — G0177 OPPS/PHP; TRAIN & EDUC SERV: HCPCS | Mod: GT

## 2021-02-23 NOTE — GROUP NOTE
Process Group Note  Telemedicine Visit: The patient's condition can be safely assessed and treated via synchronous audio and visual telemedicine encounter.    Reason for Telemedicine Visit: Patient has requested telehealth visit  Originating Site (Patient Location): Patient's home  Distant Site (Provider Location): [facility] Outpatient Setting: Adult Day Tx  Consent:  The patient/guardian has verbally consented to: the potential risks and benefits of telemedicine (video visit) versus in person care; bill my insurance or make self-payment for services provided; and responsibility for payment of non-covered services.   Mode of Communication:  Video Conference via [employer]  As the provider I attest to compliance with applicable laws and regulations related to telemedicine.    PATIENT'S NAME: Janey Givens  MRN:   [id number]  :   1995  ACCT. NUMBER: [id number]  DATE OF SERVICE: 21  START TIME: 10:00 AM  END TIME: 10:50 AM  FACILITATOR: Anais Cid PsyD  TOPIC:  Process Group    Diagnoses:  295.90 Schizophrenia    Psychiatry:: Ollie  Therapy:  Aleah Levin      [facility] Adult Mental Health Day Treatment  TRACK: 2A    NUMBER OF PARTICIPANTS: 3          Data:    Session content: At the start of this group, patients were invited to check in by identifying themselves, describing their current emotional status, and identifying issues to address in this group.   Area(s) of treatment focus addressed in this session included Symptom Management, Personal Safety and Community Resources/Discharge Planning.  Client reported being safe today.  Reported mood is ok.   Goal for today is to attend therapy groups.  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 She explained the research to others.  She reported intrusive thoughts that cricitize her and interrupt and yell.  She  "reported that she tries to play the piano to entertain herself and that it quiets the voices. She was encouraged to make an individual therapy appointment and asked if she or family made the appointments, and she said that she \"was capable of making appointments.\"      Therapeutic Interventions/Treatment Strategies:  Psychotherapist reinforced use of skills. Treatment modalities used include Cognitive Behavioral Therapy and Dialectical Behavioral Therapy. Interventions include Coping Skills: Reviewed patients current calming practices and discussed a more formal way of practicing and accessing skills, Relapse Prevention: Assisted patient in identifying personal vulnerabilities, thoughts, emotions, and situations that may lead to relapse , Mindfulness: Facilitated discussion of when/how to use mindfulness skills to benefit general health, mental health symptoms, and stressors and Symptoms Management: Promoted understanding of their diagnoses and how it impacts their functioning.    Assessment:    Patient response:   Patient responded to session by accepting feedback, listening and being attentive    Possible barriers to participation / learning include: severity of symptoms    Health Issues:   None reported       Substance Use Review:   Substance Use: No active concerns identified.    Mental Status/Behavioral Observations  Appearance:   Appropriate   Eye Contact:   Good   Psychomotor Behavior: Normal   Attitude:   Cooperative   Orientation:   All  Speech   Rate / Production: Normal    Volume:  Normal   Mood:    Depressed   Affect:    Constricted   Thought Content:   Rumination and Psychosis reports hallucinations auditory  Thought Form:  Coherent  Logical     Insight:    Good     Plan:     Safety Plan: Recommended that patient call 911 or go to the local ED should there be a change in any of these risk factors.     Barriers to treatment: None identified    Patient Contracts (see media tab):  None    Substance Use: " Provided encouragement towards sobriety     Continue or Discharge: Patient will continue in Adult Day Treatment (ADT)  as planned. Patient is likely to benefit from learning and using skills as they work toward the goals identified in their treatment plan.      Anais Cid PsyD  February 23, 2021  Tierney Solorzano D,  Licensed Clinical Psychologist

## 2021-02-23 NOTE — GROUP NOTE
Psychoeducation Group Note    PATIENT'S NAME: Janey Givens  MRN:   [id number]  :   1995  ACCT. NUMBER: [id number]  DATE OF SERVICE: 21  START TIME: 11:00 AM  END TIME: 11:50 AM  FACILITATOR: Olga Peterson RN  TOPIC: SILVIA RN Group: [facility]  Telemedicine Visit: The patient's condition can be safely assessed and treated via synchronous audio and visual telemedicine encounter.      Reason for Telemedicine Visit:  covid19    Originating Site (Patient Location): Patient's home    Distant Site (Provider Location): Provider Remote Setting    Consent:  The patient/guardian has verbally consented to: the potential risks and benefits of telemedicine (video visit) versus in person care; bill my insurance or make self-payment for services provided; and responsibility for payment of non-covered services.     Mode of Communication:  Video Conference via zoom    As the provider I attest to compliance with applicable laws and regulations related to telemedicine.      [facility] Mental Health Day Treatment  TRACK: 2A    NUMBER OF PARTICIPANTS: 4    Summary of Group / Topics Discussed:  Foundations of Health: Nutrition: Macronutrients: Patient were provided education on major macronutrients, their role in the body, and why it is important to meet daily nutritional needs. Obstacles to meeting daily nutritional needs were identified in group discussion and strategies to promote improved nutrition were explored. Macronutrients discussed include: carbohydrates, proteins and amino acids, fats, fiber, and water.     Patient Session Goals / Objectives:  ? Discussed the role of diet on mood, physical health, energy level, and the development of chronic disease.  ? Identified daily nutritional needs recommended by the [employer] via My Plate education resources  ? Developing increased health literacy skills in finding credible nutrition information from reliable sources        Patient Participation /  Response:  Fully participated with the group by sharing personal reflections / insights and openly received / provided feedback with other participants.    Demonstrated understanding of topics discussed through group discussion and participation and Identified / Expressed personal readiness to practice skills    Treatment Plan:  Patient has an initial individualized treatment plan that was created as part of their diagnostic assessment / admission process.  A master individualized treatment plan is in the process of being developed with the patient and multi-disciplinary care team.    Olga Peterson RN

## 2021-02-23 NOTE — GROUP NOTE
Psychoeducation Group Note    PATIENT'S NAME: Janey Givens  MRN:   [id number]  :   1995  ACCT. NUMBER: [id number]  DATE OF SERVICE: 21  START TIME:  9:00 AM  END TIME:  9:50 AM  FACILITATOR: Ulises Sullivan OTR/L  TOPIC: MH Life Skills Group: Communication and Social Skills Development  Telemedicine Visit: The patient's condition can be safely assessed and treated via synchronous audio and visual telemedicine encounter.      Reason for Telemedicine Visit: COVID-19    Originating Site (Patient Location): Patient's home    Distant Site (Provider Location): [facility]: [facility]    Consent:  The patient/guardian has verbally consented to: the potential risks and benefits of telemedicine (video visit) versus in person care; bill my insurance or make self-payment for services provided; and responsibility for payment of non-covered services.     Mode of Communication:  Video Conference via [employer]    As the provider I attest to compliance with applicable laws and regulations related to telemedicine.   [facility] Adult Mental Health Day Treatment  TRACK: 2A    NUMBER OF PARTICIPANTS: 3    Summary of Group / Topics Discussed:  Communication and Social Skills Development: Communication Styles:  Patients were taught and increased awareness of verbal, nonverbal, and other styles and types of communication and how this impacts interactions with other people.  Patients were given an opportunity to build and practice effective communication skills to advocate and get their needs met directly.    Patient Session Goals / Objectives:    Identified their particular style of communication and how that impacts their ability to communicate with other people       Improved awareness of important aspects of communication and how this relates to mental health recovery        Established a plan for practice of these skills in their own environments    Practiced and reflected on how to  generalize taught skills to their everyday life        Patient Participation / Response:  Fully participated with the group by sharing personal reflections / insights and openly received / provided feedback with other participants.    Demonstrated understanding of content through handout/group discussion , Verbalized understanding of content and Patient would benefit from additional opportunities to practice the content to be able to generalize it to their everyday life with increased intentionality, consistency, and efficacy in support of their psychiatric recovery    Treatment Plan:  Patient has a current master individualized treatment plan.  See Epic treatment plan for more information.    Ulises Sullivan, OTR/L

## 2021-02-23 NOTE — PROGRESS NOTES
This writer called Katie to inform her about IOP and partial hospitalization programs. This writer provided the phone number for [facility], to schedule a DA.     Katie's mom, Lay, called this writer back to inquire about obtaining a psychiatrist. This writer will coordinate with the [facility] Team for next steps.     This is a non-billable encounter as it was solely for the purposes of outreach and/or care coordination.

## 2021-02-24 ENCOUNTER — TELEPHONE (OUTPATIENT)
Dept: BEHAVIORAL HEALTH | Facility: CLINIC | Age: 26
End: 2021-02-24

## 2021-02-24 NOTE — TELEPHONE ENCOUNTER
"RN Review of Medical History / Physical Health Screen  Outpatient Mental Health Programs - [facility] Adult Mental Health Day Treatment    PATIENT'S NAME: Janey Givens  MRN:   [id number]  :   1995  ACCT. NUMBER: [id number]  CURRENT AGE:  25 year old    DATE OF DIAGNOSTIC ASSESSMENT: 21  DATE OF ADMISSION: 21     Please see Diagnostic Assessment for additional Medical History.     General Health:   Have you had any exposure to any communicable disease in the past 2-3 weeks? no     Are you aware of safe sex practices? yes       Nutrition:    Are you on a special diet? If yes, please explain:  no   Do you have any concerns regarding your nutritional status? If yes, please explain:  yes partially, some times BM's are \"funny\"; mom and sister have Celiac, is considering looking into this more   Have you had any appetite changes in the last 3 months?  Yes, food has changed d/t living with family rather than living on own     Have you had any weight loss or weight gain in the last 3 months?  No     Do you have a history of an eating disorder? no   Do you have a history of being in an eating disorder program? no     Patient height and weight recorded by RN in epic flowsheet: no No; Unable to measure  Because of temporary in-person programmatic suspension due to COVID-19 pandemic, all pt weights and heights will be collected through patient self-report an recorded in physical health screening progress note upon admission to the program.                            Height/Weight Review:  Patient reported height:  5'11\"      Patient reports weight:  Date last checked:  200 pounds   Any referrals/needs identified?                BMI Review:  Was the patient informed of BMI? no      Findings See above         Fall Risk:   Have you had any falls in the past 3 months? no     Do you currently use any assistive devices for mobility?   no      Additional Comments/Assessment: Occasional orthostatic " lightheadedness but no longer problematic; denies seizures; will look into getting back into the [facility] program will try to do both programs     Per completion of the Medical History / Physical Health Screen, is there a recommendation to see / follow up with a primary care physician/clinic or dentist?    Yes, Recommendations:   other: see above regarding Celiac      Olga Peterson, ROSITA  2/24/2021

## 2021-02-25 ENCOUNTER — HOSPITAL ENCOUNTER (OUTPATIENT)
Dept: BEHAVIORAL HEALTH | Facility: CLINIC | Age: 26
End: 2021-02-25
Attending: PSYCHIATRY & NEUROLOGY
Payer: COMMERCIAL

## 2021-02-25 PROCEDURE — G0177 OPPS/PHP; TRAIN & EDUC SERV: HCPCS | Mod: 95

## 2021-02-25 PROCEDURE — 90853 GROUP PSYCHOTHERAPY: CPT | Mod: GT | Performed by: PSYCHOLOGIST

## 2021-02-25 NOTE — GROUP NOTE
Psychoeducation Group Note    PATIENT'S NAME: Janey Givens  MRN:   [id number]  :   1995  ACCT. NUMBER: [id number]  DATE OF SERVICE: 21  START TIME: 10:00 AM  END TIME: 10:50 AM  FACILITATOR: Ulises Sullivan OTR/L  TOPIC: MH Life Skills Group: Lifestyle Balance and Structure  Telemedicine Visit: The patient's condition can be safely assessed and treated via synchronous audio and visual telemedicine encounter.      Reason for Telemedicine Visit: COVID-19    Originating Site (Patient Location): Patient's home    Distant Site (Provider Location): [facility]: [facility]    Consent:  The patient/guardian has verbally consented to: the potential risks and benefits of telemedicine (video visit) versus in person care; bill my insurance or make self-payment for services provided; and responsibility for payment of non-covered services.     Mode of Communication:  Video Conference via [employer]    As the provider I attest to compliance with applicable laws and regulations related to telemedicine.   [facility] Adult Mental Health Day Treatment  TRACK: 2A    NUMBER OF PARTICIPANTS: 3    Summary of Group / Topics Discussed:  Lifestyle Balance and Strucure:  Benefits of Leisure on Mental Health: Patients explored and learned about the benefits and possibilities of leisure activity to create lifestyle balance that supports their mental and physical wellbeing.  Patients were assisted to identify individualized leisure values and interests, recognized the benefits of leisure activity on mental health, and problem solved barriers to leisure engagement and strategies to overcome.  Patient engaged in an experiential leisure activity to gain self-awareness and build milieu social aspects and reflected on the impact the experiential activity had on their mood.       Patient Session Goals / Objectives:    Increased awareness of the importance of engagement in leisure activities to support  lifestyle balance and perceived quality of life    Identified strategies to recognize and challenge barriers to leisure participation     Facilitated exploration of meaningful leisure interests and values    Practiced and reflected on how to generalize taught skills to their everyday life        Patient Participation / Response:  Fully participated with the group by sharing personal reflections / insights and openly received / provided feedback with other participants.    Demonstrated understanding of content through handout/group discussion , Verbalized understanding of content and Patient would benefit from additional opportunities to practice the content to be able to generalize it to their everyday life with increased intentionality, consistency, and efficacy in support of their psychiatric recovery    Treatment Plan:  Patient has a current master individualized treatment plan.  See Epic treatment plan for more information.    Ulises Sullivan, OTR/L

## 2021-02-25 NOTE — PROGRESS NOTES
Patient Active Problem List   Diagnosis     Blood clot in vein     Gender dysphoria in adult     Acute psychosis (H)     Schizophrenia (H)       Current Outpatient Medications:      ARIPiprazole (ABILIFY) 10 MG tablet, Take 1 tablet (10 mg) by mouth daily, Disp: 90 tablet, Rfl: 1     estradiol (VIVELLE-DOT) 0.1 MG/24HR bi-weekly patch, Place 2 patches onto the skin twice a week, Disp: 16 patch, Rfl: 0     spironolactone (ALDACTONE) 100 MG tablet, Take 2 tablets (200 mg) by mouth daily, Disp: 60 tablet, Rfl: 5  Psychiatry staffing: case discussed  Diagnosis:    As above, does well in group.  Intrusive thoughts.  Improved with current therapies

## 2021-02-25 NOTE — GROUP NOTE
Process Group Note  Client reported being safe today.  Reported mood is     Goal for today is to  The client talked to the group about    PATIENT'S NAME: Janey Givens  MRN:   [id number]  :   1995  ACCT. NUMBER: [id number]  DATE OF SERVICE: 21  START TIME:  9:00 AM  END TIME:  9:50 AM  FACILITATOR: Anais Cid PsyD  TOPIC:  Process Group    Diagnoses:  295.90 Schizophrenia    Psychiatry:: Ollie  Therapy:  Aleah Levin      [facility] Adult Mental Health Day Treatment  TRACK: 2A    NUMBER OF PARTICIPANTS: 3          Data:    Session content: At the start of this group, patients were invited to check in by identifying themselves, describing their current emotional status, and identifying issues to address in this group.   Area(s) of treatment focus addressed in this session included Symptom Management, Personal Safety and Community Resources/Discharge Planning.  Client reported being safe today.  Reported mood is anxious.    Goal for today is to attend therapy groups. The client talked to the group about how she will see another psychiatrist and that her mom made the appointment, but she can't recall who or where it is. She discussed medications and said they were ok. The group discussed anxiety and depression symptoms. Katie reported that psychosis has decreased with medications.      Therapeutic Interventions/Treatment Strategies:  Psychotherapist reinforced use of skills. Treatment modalities used include Cognitive Behavioral Therapy and Dialectical Behavioral Therapy. Interventions include Cognitive Restructuring:  Facilitated recognition of the connection between negative thoughts and negative core beliefs, Coping Skills: Assisted patient in understanding the purpose of planning / creating / participating / sharing in positive experiences, Relapse Prevention: Coached on skills to manage factors that contribute to relapse and Mindfulness: Encouraged a plan to use mindfulness  skills in daily life.    Assessment:    Patient response:   Patient responded to session by giving feedback, listening, being attentive and appearing alert    Possible barriers to participation / learning include: severity of symptoms    Health Issues:   None reported       Substance Use Review:   Substance Use: No active concerns identified.    Mental Status/Behavioral Observations  Appearance:   Appropriate  transgender\   Eye Contact:   Good   Psychomotor Behavior: Normal   Attitude:   Cooperative   Orientation:   All  Speech   Rate / Production: Normal    Volume:  Normal   Mood:    Anxious   Affect:    Constricted   Thought Content:   Rumination and Psychosis reports hallucinations auditory and visual  Thought Form:  Coherent  Logical     Insight:    Good     Plan:     Safety Plan: Recommended that patient call 911 or go to the local ED should there be a change in any of these risk factors.     Barriers to treatment: None identified    Patient Contracts (see media tab):  None    Substance Use: Provided encouragement towards sobriety     Continue or Discharge: Patient will continue in Adult Day Treatment (ADT)  as planned. Patient is likely to benefit from learning and using skills as they work toward the goals identified in their treatment plan.      Anais Cid PsyD  February 25, 2021  Tierney Solorzano D,  Licensed Clinical Psychologist

## 2021-02-25 NOTE — GROUP NOTE
Psychoeducation Group Note    PATIENT'S NAME: Janey Givens  MRN:   [id number]  :   1995  ACCT. NUMBER: [id number]  DATE OF SERVICE: 21  START TIME: 11:00 AM  END TIME: 11:50 AM  FACILITATOR: Ulises Sullivan OTR/L  TOPIC: MH Life Skills Group: Resiliency Development  Telemedicine Visit: The patient's condition can be safely assessed and treated via synchronous audio and visual telemedicine encounter.      Reason for Telemedicine Visit: COVID-19    Originating Site (Patient Location): Patient's home    Distant Site (Provider Location): [facility]: [facility]    Consent:  The patient/guardian has verbally consented to: the potential risks and benefits of telemedicine (video visit) versus in person care; bill my insurance or make self-payment for services provided; and responsibility for payment of non-covered services.     Mode of Communication:  Video Conference via [employer]    As the provider I attest to compliance with applicable laws and regulations related to telemedicine.   [facility] Adult Mental Health Day Treatment  TRACK: 2A    NUMBER OF PARTICIPANTS: 3    Summary of Group / Topics Discussed:  Resiliency Development:  Coping Skills(Life Skills Management): Patients were taught how to identify stressors, signs of stress, coping skills, and prevention strategies for overall stress management.  Patients were given the opportunity to identify both ongoing and acute mental health symptoms and how to effectively manage these symptoms by developing an effective aftercare plan.  Patients increased awareness of community based resources.    Patient Session Goals / Objectives:    Identified how using coping skills can be used for symptom and stress management       Improved awareness of individualed symptoms and stressors and how to effectively cope     Established a relapse prevention plan to practice these skills in their own environments    Practiced and reflected on  how to generalize taught skills to their everyday life          Patient Participation / Response:  Fully participated with the group by sharing personal reflections / insights and openly received / provided feedback with other participants.    Demonstrated understanding of content through handout/group discussion , Verbalized understanding of content and Patient would benefit from additional opportunities to practice the content to be able to generalize it to their everyday life with increased intentionality, consistency, and efficacy in support of their psychiatric recovery    Treatment Plan:  Patient has a current master individualized treatment plan.  See Epic treatment plan for more information.    Ulises Sullivan, OTR/L

## 2021-02-28 ENCOUNTER — HEALTH MAINTENANCE LETTER (OUTPATIENT)
Age: 26
End: 2021-02-28

## 2021-03-01 ENCOUNTER — HOSPITAL ENCOUNTER (OUTPATIENT)
Dept: BEHAVIORAL HEALTH | Facility: CLINIC | Age: 26
End: 2021-03-01
Attending: PSYCHIATRY & NEUROLOGY
Payer: COMMERCIAL

## 2021-03-01 PROCEDURE — 90853 GROUP PSYCHOTHERAPY: CPT | Mod: GT | Performed by: PSYCHOLOGIST

## 2021-03-01 PROCEDURE — G0177 OPPS/PHP; TRAIN & EDUC SERV: HCPCS | Mod: GT

## 2021-03-01 NOTE — PROGRESS NOTES
Acknowledgement of Current Treatment Plan       I have reviewed my treatment plan with my therapist / counselor on 3/1/2021  .   I agree with the plan as it is written in the electronic health record. (2A)    Name:      Signature:  Janey Givens   Unable to sign due to COVID19      Dr Denny Mtz MD  Psychiatrist    Anais Cid Psy.D,  Licensed Psychologist   Tierney Solorzano, ADELAIDA,  Licensed Clinical Psychologist

## 2021-03-01 NOTE — GROUP NOTE
Psychoeducation Group Note    PATIENT'S NAME: Janey Givens  MRN:   [id number]  :   1995  ACCT. NUMBER: [id number]  DATE OF SERVICE: 3/01/21  START TIME: 10:00 AM  END TIME: 10:50 AM  FACILITATOR: Ulises Sullivan OTR/L  TOPIC: MH Life Skills Group: Resiliency Development  Telemedicine Visit: The patient's condition can be safely assessed and treated via synchronous audio and visual telemedicine encounter.      Reason for Telemedicine Visit: COVID-19    Originating Site (Patient Location): Patient's home    Distant Site (Provider Location): [facility]: [facility]    Consent:  The patient/guardian has verbally consented to: the potential risks and benefits of telemedicine (video visit) versus in person care; bill my insurance or make self-payment for services provided; and responsibility for payment of non-covered services.     Mode of Communication:  Video Conference via [employer]    As the provider I attest to compliance with applicable laws and regulations related to telemedicine.   [facility] Adult Mental Health Day Treatment    TRACK: 2A    NUMBER OF PARTICIPANTS: 3    Summary of Group / Topics Discussed:  Resiliency Development:  Coping Skills(Life Skills Management): Patients were taught how to identify stressors, signs of stress, coping skills, and prevention strategies for overall stress management.  Patients were given the opportunity to identify both ongoing and acute mental health symptoms and how to effectively manage these symptoms by developing an effective aftercare plan.  Patients increased awareness of community based resources.    Patient Session Goals / Objectives:    Identified how using coping skills can be used for symptom and stress management       Improved awareness of individualed symptoms and stressors and how to effectively cope     Established a relapse prevention plan to practice these skills in their own environments    Practiced and reflected  on how to generalize taught skills to their everyday life          Patient Participation / Response:  Fully participated with the group by sharing personal reflections / insights and openly received / provided feedback with other participants.    Demonstrated understanding of content through handout/group discussion , Verbalized understanding of content and Patient would benefit from additional opportunities to practice the content to be able to generalize it to their everyday life with increased intentionality, consistency, and efficacy in support of their psychiatric recovery    Treatment Plan:  Patient has a current master individualized treatment plan.  See Epic treatment plan for more information.    Ulises Sullivan, OTR/L

## 2021-03-01 NOTE — GROUP NOTE
Psychoeducation Group Note    PATIENT'S NAME: Janey Givens  MRN:   [id number]  :   1995  ACCT. NUMBER: [id number]  DATE OF SERVICE: 3/01/21  START TIME:  9:00 AM  END TIME:  9:50 AM  FACILITATOR: Geraldo Mena OT  TOPIC: MH Life Skills Group: Sensory Approaches in Mental Health  [facility] Adult Mental Health Day Treatment  TRACK: 2A    NUMBER OF PARTICIPANTS: 2. No charge    Summary of Group / Topics Discussed:  Sensory Approaches in Mental Health:  Sensory Enhanced Mindfulness: Patients were taught and provided with an opportunity to explore and practice how using sensory enhanced mindfulness practices can help them stay grounded in the present moment as a way to manage mental health symptoms and stressors.         Patient Session Goals / Objectives:    Identified how using sensory enhanced mindfulness practices can be used for grounding, stress management, and self regulation      Improved awareness of different types of sensory enhanced mindfulness activities that assist with healthy coping of stress and symptoms      Established a plan for practice of these skills in their own environments    Practiced and reflected on how to generalize taught skills to their everyday life        Patient Participation / Response:  Fully participated with the group by sharing personal reflections / insights and openly received / provided feedback with other participants.    Verbalized understanding of content and Patient would benefit from additional opportunities to practice the content to be able to generalize it to their everyday life with increased intentionality, consistency, and efficacy in support of their psychiatric recovery    Treatment Plan:  Patient has a current master individualized treatment plan.  See Epic treatment plan for more information.    Geraldo Mena OT

## 2021-03-01 NOTE — GROUP NOTE
Process Group Note  Telemedicine Visit: The patient's condition can be safely assessed and treated via synchronous audio and visual telemedicine encounter.    Reason for Telemedicine Visit: Patient has requested telehealth visit  Originating Site (Patient Location): Patient's home  Distant Site (Provider Location): [facility] Outpatient Setting: Adult Day Tx  Consent:  The patient/guardian has verbally consented to: the potential risks and benefits of telemedicine (video visit) versus in person care; bill my insurance or make self-payment for services provided; and responsibility for payment of non-covered services.   Mode of Communication:  Video Conference via [employer]  As the provider I attest to compliance with applicable laws and regulations related to telemedicine.    PATIENT'S NAME: Janey Givens  MRN:   [id number]  :   1995  ACCT. NUMBER: [id number]  DATE OF SERVICE: 3/01/21  START TIME: 11:00 AM  END TIME: 11:50 AM  FACILITATOR: Anais Cid PsyD  TOPIC:  Process Group    Diagnoses:  295.90 Schizophrenia    Psychiatry:: Ollie  Therapy:  Aleah Levin      [facility] Adult Mental Health Day Treatment  TRACK: 2A    NUMBER OF PARTICIPANTS: =4          Data:    Session content: At the start of this group, patients were invited to check in by identifying themselves, describing their current emotional status, and identifying issues to address in this group.   Area(s) of treatment focus addressed in this session included Symptom Management, Personal Safety, Community Resources/Discharge Planning and Abstinence/Relapse Prevention.  Client reported being safe today.  Reported mood is depressed.   Goal for today is to attend therapy.  The client talked to the group about how parents are worried and won't let Katie go to the grocery store and don't believe Katie has concentration to drive a car. 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       Therapeutic Interventions/Treatment Strategies:  Psychotherapist reinforced use of skills. Treatment modalities used include Cognitive Behavioral Therapy and Dialectical Behavioral Therapy. Interventions include Cognitive Restructuring:  Assisted patient in formulating new neutral/positive alternatives to challenge less helpful / ineffective thoughts, Coping Skills: Discussed meditation skills and addressed ways to implement meditation skills , Relapse Prevention: Assisted patient in identifying the challenges and barriers to participation and attendance to support groups/community resources and Mindfulness: Encouraged a plan to use mindfulness skills in daily life.    Assessment:    Patient response:   Patient responded to session by accepting feedback, giving feedback and listening    Possible barriers to participation / learning include: severity of symptoms    Health Issues:   None reported       Substance Use Review:   Substance Use: No active concerns identified.    Mental Status/Behavioral Observations  Appearance:   Appropriate   Eye Contact:   Good   Psychomotor Behavior: Normal   Attitude:   Cooperative   Orientation:   All  Speech   Rate / Production: Normal    Volume:  Normal   Mood:    Depressed   Affect:    Appropriate   Thought Content:   Rumination and Psychosis reports hallucinations auditory  Thought Form:  Coherent  Logical     Insight:    Good     Plan:     Safety Plan: Recommended that patient call 911 or go to the local ED should there be a change in any of these risk factors.     Barriers to treatment: None identified    Patient Contracts (see media tab):  None    Substance Use: Provided encouragement towards sobriety     Continue or Discharge: Patient will continue in Adult Day Treatment (ADT)  as planned. Patient is likely to benefit from learning and using  skills as they work toward the goals identified in their treatment plan.      Anais Cid PsyD  March 1, 2021  Tierney Solorzano, ADELAIDA,  Licensed Clinical Psychologist

## 2021-03-02 ENCOUNTER — VIRTUAL VISIT (OUTPATIENT)
Dept: PSYCHIATRY | Facility: CLINIC | Age: 26
End: 2021-03-02
Payer: COMMERCIAL

## 2021-03-02 ENCOUNTER — BEH TREATMENT PLAN (OUTPATIENT)
Dept: PSYCHIATRY | Facility: CLINIC | Age: 26
End: 2021-03-02

## 2021-03-02 ENCOUNTER — HOSPITAL ENCOUNTER (OUTPATIENT)
Dept: BEHAVIORAL HEALTH | Facility: CLINIC | Age: 26
End: 2021-03-02
Attending: PSYCHIATRY & NEUROLOGY
Payer: COMMERCIAL

## 2021-03-02 DIAGNOSIS — F20.9 SCHIZOPHRENIA, UNSPECIFIED TYPE (H): Primary | ICD-10-CM

## 2021-03-02 PROCEDURE — G0177 OPPS/PHP; TRAIN & EDUC SERV: HCPCS | Mod: 95

## 2021-03-02 PROCEDURE — 90853 GROUP PSYCHOTHERAPY: CPT | Mod: GT | Performed by: PSYCHOLOGIST

## 2021-03-02 NOTE — GROUP NOTE
Process Group Note  Client reported being safe today.  Reported mood is     Goal for today is to  The client talked to the group about    PATIENT'S NAME: Janey Givens  MRN:   [id number]  :   1995  ACCT. NUMBER: [id number]  DATE OF SERVICE: 3/02/21  START TIME: 10:00 AM  END TIME: 10:50 AM  FACILITATOR: Anais Cid PsyD  TOPIC:  Process Group    Diagnoses:  295.90 Schizophrenia    Psychiatry:: Ollie  Therapy:  Aleah Levin      [facility] Adult Mental Health Day Treatment  TRACK: 2A    NUMBER OF PARTICIPANTS: 3          Data:    Session content: At the start of this group, patients were invited to check in by identifying themselves, describing their current emotional status, and identifying issues to address in this group.   Area(s) of treatment focus addressed in this session included Symptom Management, Personal Safety and Community Resources/Discharge Planning.  Client reported being safe today.  Reported mood is depressed.    Goal for today is to attend therapy groups.  The client talked to the group about how she tried to talk to her parents about driving to the grocery store and it ended with people yelling at each other. She stated that she went to her room and that her parents made a family therapy appointment for all of them later this week. She replported problems with a panic attack after the argument.      Therapeutic Interventions/Treatment Strategies:  Psychotherapist reinforced use of skills. Treatment modalities used include Cognitive Behavioral Therapy and Dialectical Behavioral Therapy. Interventions include Cognitive Restructuring:  Assisted patient in formulating new neutral/positive alternatives to challenge less helpful / ineffective thoughts, Coping Skills: Discussed meditation skills and addressed ways to implement meditation skills , Relapse Prevention: Facilitated understanding of effective coping skills in response to triggers for substance use and  Mindfulness: Encouraged a plan to use mindfulness skills in daily life.    Assessment:    Patient response:   Patient responded to session by accepting feedback, giving feedback and listening    Possible barriers to participation / learning include: severity of symptoms    Health Issues:   None reported       Substance Use Review:   Substance Use: No active concerns identified.    Mental Status/Behavioral Observations  Appearance:   Appropriate   Eye Contact:   Good   Psychomotor Behavior: Normal   Attitude:   Cooperative   Orientation:   All  Speech   Rate / Production: Normal    Volume:  Normal   Mood:    Depressed  Sad   Affect:    Constricted   Thought Content:   Rumination and Psychosis reports hallucinations auditory and visual and paranoia  Thought Form:  Coherent  Logical     Insight:    Good     Plan:     Safety Plan: Recommended that patient call 911 or go to the local ED should there be a change in any of these risk factors.     Barriers to treatment: None identified    Patient Contracts (see media tab):  None    Substance Use: Provided encouragement towards sobriety     Continue or Discharge: Patient will continue in Adult Day Treatment (ADT)  as planned. Patient is likely to benefit from learning and using skills as they work toward the goals identified in their treatment plan.      Anais Cid PsyD  March 2, 2021  Tierney Solorzano D,  Licensed Clinical Psychologist

## 2021-03-02 NOTE — GROUP NOTE
Psychoeducation Group Note    PATIENT'S NAME: Janey Givens  MRN:   [id number]  :   1995  ACCT. NUMBER: [id number]  DATE OF SERVICE: 3/02/21  START TIME:  9:00 AM  END TIME:  9:50 AM  FACILITATOR: Ulises Sullivan OTR/L  TOPIC: MH Life Skills Group: Communication and Social Skills Development  Telemedicine Visit: The patient's condition can be safely assessed and treated via synchronous audio and visual telemedicine encounter.      Reason for Telemedicine Visit: COVID-19    Originating Site (Patient Location): Patient's home    Distant Site (Provider Location): [facility]: [facility]    Consent:  The patient/guardian has verbally consented to: the potential risks and benefits of telemedicine (video visit) versus in person care; bill my insurance or make self-payment for services provided; and responsibility for payment of non-covered services.     Mode of Communication:  Video Conference via [employer]    As the provider I attest to compliance with applicable laws and regulations related to telemedicine.   [facility] Adult Mental Health Day Treatment  TRACK: 2A    NUMBER OF PARTICIPANTS: 3    Summary of Group / Topics Discussed:  Communication and Social Skills Development: Communication Styles: The Four Types of Assertive Communication:  Patients were taught and gained awareness of different types of communication and how to be more assertive.  Patients identified and shared personal examples of different types of communication.  Patients discussed and learned skills and strategies to be more assertive and effective when communicating with other people.      Patient Session Goals / Objectives:    Identified current communication style and how this impacts their ability to communicate clearly with other people       Improved awareness of effective assertive communication skills and how this relates to mental health recovery        Established a plan for practice of these  skills in their own environments    Practiced and reflected on how to generalize taught skills to their everyday life      Patient Participation / Response:  Fully participated with the group by sharing personal reflections / insights and openly received / provided feedback with other participants.    Demonstrated understanding of content through handouts/group discussion , Verbalized understanding of content and Patient would benefit from additional opportunities to practice the content to be able to generalize it to their everyday life with increased intentionality, consistency, and efficacy in support of their psychiatric recovery    Treatment Plan:  Patient has a current master individualized treatment plan.  See Epic treatment plan for more information.    Ulises Sullivan, OTR/L

## 2021-03-02 NOTE — PROGRESS NOTES
NAVIGSATINDER Clinician Contact & Progress Note  For Individual Resiliency Training (IRT)  A Part of the [facility] First Episode of Psychosis Program    NAVIGATE Enrollee: Janey Givens (1995)     MRN: [id number]  Date:  3/02/21  Diagnosis: Schizophrenia, unspecified (F20.9)  Clinician: CARMEN Individual Resiliency Trainer, LEEROY See     1. Type of contact: (majority of time spent)  IRT Session via telehealth  Mode of communication: [employer] (HIPAA compliant, secure platform). Patient consented verbally to this mode of therapy today.  Reason for telehealth: COVID-19. This patient visit was converted to a telehealth visit to minimize exposure to COVID-19.    2. People present:   Writer  Client: Yes     3. Length of Actual Contact: Start Time: 12:59; End Time: 2:04     4. Location of contact:  Originating Location (patient location): Their home, located in [place], [place]  Distant Location (provider location): Home office, located in [place], [place], using appropriate privacy considerations and procedures    5. Did the client complete the home practice option(s) from the previous session: Completed    6. Motivational Teaching Strategies:  Connect info and skills with personal goals  Promote hope and positive expectations  Explore pros and cons of change  Re-frame experiences in positive light    7. Educational Teaching Strategies:  Review of written material/education  Relate information to client's experience  Ask questions to check comprehension  Break down information into small chunks  Adopt client's language     8. CBT Teaching Strategies:  Reinforcement and shaping (positive feedback for steps towards goals and gains in knowledge & skills)  Relapse prevention planning (review of stressors and early warning signs)    9. IRT Module(s) Addressed:  Module 4 - Relapse Prevention Planning  Module 5 - Processing the Psychotic Episode    10. Techniques utilized:   Reva announced at  beginning of session  Review of goal  Present new material  Problem-solving practice  Help client choose a home practice option  Summarize progress made in current session    11. Measures:    Mental Status Exam  Alertness: alert  and oriented  Behavior/Demeanor: cooperative and pleasant  Speech: regular rate and rhythm  Language: intact.   Mood: depressed  Thought Process/Associations: unremarkable  Thought Content:  Reports delusions and preoccupations;  Denies suicidal and violent ideation  Perception:  Reports none;  Denies auditory hallucinations  Insight: fair  Judgment: fair  Cognition: does  appear grossly intact; formal cognitive testing was not done    KELSEY-7  Over the last 2 weeks, how often have you been bothered by the following problems?    1. Feeling nervous, anxious or on edge: 1 - Several days  2. Not being able to stop or control worryin - More than half the days  3. Worrying too much about different things: 1 - Several days  4. Trouble relaxin - More than half the days  5. Being so restless that it is hard to sit still: 2 - More than half the days  6. Becoming easily annoyed or irritable: 3 - Nearly every day  7. Feeling afraid as if something awful might happen: 1 - Several days    PHQ-9  Over the last 2 weeks, how often have you been bothered by any the following problems?    1. Little interest or pleasure in doing things: 1 - Several days  2. Feeling down, depressed, or hopeless: 1 - Several days  3. Trouble falling or staying asleep, or sleeping too much: 0 - Not at all  4. Feeling tired or having little energy: 2 - More than half the days  5. Poor appetite or overeatin - More than half the days  6. Feeling bad about yourself - or that you are a failure or have let yourself or your family down: 1 - Several days  7. Trouble concentrating on things, such as reading the newspaper or watching television: 2 - More than half the days  8. Moving or speaking so slowly that other people could  have noticed. Or the opposite-being fidgety or restless that you have been moving around a lot more than usual: 3 - Nearly every day  9. Thoughts that you would be better off dead, or of hurting yourself in some way: 1 - Several days    If you checked off any problems, how difficulty have these problems made it for you to do your work, take care of things at home, or get along with other people? Somewhat difficult    Dundy Protocol Risk Identification  1) Have you wished you were dead or wished you could go to sleep and not wake up? No  2) Have you actually had any thoughts about killing yourself? No  If YES to 2, answer questions 3, 4, 5, 6  If NO to 2, go directly to question 6  3) Have you thought about how you might do this? No  4) Have you had any intension of acting on these thoughts of killing yourself, as opposed to you have the thoughts but you definitely would not act on them? No  5) Have you started to work out or worked out the details of how to kill yourself? Do you intend to carry out this plan? No  Always Ask Question 6  6) Have you done anything, started to do anything, or prepared to do anything to end your life? No  Examples: collected pills, obtained a gun, gave away valuables, wrote a will or suicide note, held a gun but changed your mind, cut yourself, tried to hang yourself, etc.    12. Assessment/Progress Note:     This writer met with Katie for a follow-up IRT session. Katie stated she is currently enrolled in [facility] and requested twice per week IRT sessions with this writer. This writer discussed potential overload/burnout with several appointments per week. Katie agreed to continue with once weekly IRT sessions. Katie then discussed a psychosis episode, occurring approximately 3 weeks prior. She stated she stopped taking antipsychotic medications in the summer of 2020. She noted she felt she did not need them, as she was doing well. She then discussed a significant interpersonal relationship  "stressor that occurred during this timeframe. She noted she failed a test and was using marijuana and alcohol frequently. This writer validated Katie's disappointment and uncertainty in the future. Katie stated she currently does not experience auditory hallucinations or suicidal ideation, but did last week. She discussed a delusional thought about her behaviors causing an end to the universe. However, she noted she is able to reality test this. Katie began taking Abilify approximately 2 weeks prior. She mentioned positive experiences with IOP and identifying with others in the group setting. This writer praised Katie for communicating about symptoms/episode to parents and being open to treatment options. Katie discussed stressors with family, including expectations to stay in the home during the day. This writer validated her frustration and reminded her of their efforts in providing support with relapse prevention in mind. This writer and Katie then briefly updated her goals for treatment, which include \"process previous episodes, process hurtful past memories, find a self-care balance, learn how to actively listen, believe that I deserve good things, eat healthier.\"     13. Plan/Referrals:     This writer will continue to provide supportive counseling in processing the episode and relapse prevention planning, in alignment with Katie's goals of processing painful thoughts/memories.     Billing for \"Interactive Complexity\"?    No      LEEROY See Individual Resiliency Trainer  "

## 2021-03-02 NOTE — GROUP NOTE
Psychoeducation Group Note    PATIENT'S NAME: Janey Givens  MRN:   [id number]  :   1995  ACCT. NUMBER: [id number]  DATE OF SERVICE: 3/02/21  START TIME: 11:00 AM  END TIME: 11:50 AM  FACILITATOR: Jamia Ty RN  TOPIC:  RN Group: Health Maintenance  [facility] Adult Mental Health Day Treatment  TRACK: 2A    NUMBER OF PARTICIPANTS: 4    Summary of Group / Topics Discussed:  Health Maintenance: Wellness Check-in: Patients met with group facilitator to individually review a holistic wellness check-in to assess patient medication adherence/concerns, appointments, physical and mental health, exercise, nutrition, sleep, socialization, substance use, and need for service/resource referrals.       Patient Session Goals / Objectives:    Discussed various aspects of health management and self-care related to physical and mental health    Demonstrated increased self-awareness of current wellness needs    Developed health literacy skills in navigating the healthcare system and self-advocacy/communicating needs with health care team      Patient Participation / Response:  Fully participated with the group by sharing personal reflections / insights and openly received / provided feedback with other participants.    Demonstrated understanding of topics discussed through group discussion and participation, Identified / Expressed personal readiness to practice skills and Verbalized understanding of health maintenance topic    Treatment Plan:  Patient has a current master individualized treatment plan.  See Epic treatment plan for more information.    Jamia Ty RN

## 2021-03-04 ENCOUNTER — HOSPITAL ENCOUNTER (OUTPATIENT)
Dept: BEHAVIORAL HEALTH | Facility: CLINIC | Age: 26
End: 2021-03-04
Attending: PSYCHIATRY & NEUROLOGY
Payer: COMMERCIAL

## 2021-03-04 PROCEDURE — 90853 GROUP PSYCHOTHERAPY: CPT | Mod: 95 | Performed by: PSYCHOLOGIST

## 2021-03-04 PROCEDURE — G0177 OPPS/PHP; TRAIN & EDUC SERV: HCPCS | Mod: GT

## 2021-03-04 NOTE — GROUP NOTE
Psychoeducation Group Note    PATIENT'S NAME: Janey Givens  MRN:   [id number]  :   1995  ACCT. NUMBER: [id number]  DATE OF SERVICE: 3/04/21  START TIME: 11:00 AM  END TIME: 11:50 AM  FACILITATOR: Ulises Sullivan OTR/L  TOPIC: MH Life Skills Group: Lifestyle Balance and Structure  Telemedicine Visit: The patient's condition can be safely assessed and treated via synchronous audio and visual telemedicine encounter.      Reason for Telemedicine Visit: COVID-19    Originating Site (Patient Location): Patient's home    Distant Site (Provider Location): [facility]    Consent:  The patient/guardian has verbally consented to: the potential risks and benefits of telemedicine (video visit) versus in person care; bill my insurance or make self-payment for services provided; and responsibility for payment of non-covered services.     Mode of Communication:  Video Conference via [employer]    As the provider I attest to compliance with applicable laws and regulations related to telemedicine.   [facility] Adult Mental Health Day Treatment  TRACK: 2A    NUMBER OF PARTICIPANTS: 5    Summary of Group / Topics Discussed:  Lifestyle Balance and Strucure:  Goal-setting & integration: Patients were introduced to the process and benefits of setting realistic, timely, and achievable goals to help support their ability to follow through with meaningful personal, health, recovery and treatment goals that they would like to achieve to improve overall functioning.  Patients were also taught and then practiced techniques to manage a variety of obstacles to achieve their goals and how to break goals into manageable pieces.  Patients also reported on follow through of goals.     Patient Session Goals / Objectives:    Facilitated the creation of a vision for recovery and wellbeing    Identified and wrote meaningful SMART goals to engage in meaningful activities of daily living     Identified  and problem solved barriers to achieving goals     Identified plan to support follow through on goals and reflection on progress made          Patient Participation / Response:  Fully participated with the group by sharing personal reflections / insights and openly received / provided feedback with other participants.    Demonstrated understanding of content through handout/group discussion , Verbalized understanding of content and Patient would benefit from additional opportunities to practice the content to be able to generalize it to their everyday life with increased intentionality, consistency, and efficacy in support of their psychiatric recovery    Treatment Plan:  Patient has a current master individualized treatment plan.  See Epic treatment plan for more information.    Ulises Sullivan, OTR/L

## 2021-03-04 NOTE — GROUP NOTE
Psychoeducation Group Note    PATIENT'S NAME: Janey Givens  MRN:   [id number]  :   1995  ACCT. NUMBER: [id number]  DATE OF SERVICE: 3/04/21  START TIME: 10:00 AM  END TIME: 10:50 AM  FACILITATOR: Ulises Sullivan OTR/L  TOPIC: MH Life Skills Group: Lifestyle Balance and Structure  Telemedicine Visit: The patient's condition can be safely assessed and treated via synchronous audio and visual telemedicine encounter.      Reason for Telemedicine Visit: COVID-19    Originating Site (Patient Location): Patient's home    Distant Site (Provider Location): [facility]: [facility]    Consent:  The patient/guardian has verbally consented to: the potential risks and benefits of telemedicine (video visit) versus in person care; bill my insurance or make self-payment for services provided; and responsibility for payment of non-covered services.     Mode of Communication:  Video Conference via [employer]    As the provider I attest to compliance with applicable laws and regulations related to telemedicine.   [facility] Adult Mental Health Day Treatment  TRACK: 2A    NUMBER OF PARTICIPANTS: 4    Summary of Group / Topics Discussed:  Lifestyle Balance and Strucure:  Time Management: Procrastination: Patients were taught and provided an opportunity to identify effective strategies to manage procrastination for more effective time management. Patients were taught strategies to promote improvement in performance of follow through with goals and daily tasks.    Patient Session Goals / Objectives:    Identified how procrastination is impacted by mental health symptoms and can lead to difficulty completing activities of daily living      Identified activities of daily living they are having difficulty engaging in and completing due to procrastination    Identified strategies to challenge procrastination to help improve follow through with activities of daily activities        Patient Participation  / Response:  Fully participated with the group by sharing personal reflections / insights and openly received / provided feedback with other participants.    Demonstrated understanding of content through handouts/group discussion , Verbalized understanding of content and Patient would benefit from additional opportunities to practice the content to be able to generalize it to their everyday life with increased intentionality, consistency, and efficacy in support of their psychiatric recovery    Treatment Plan:  Patient has a current master individualized treatment plan.  See Epic treatment plan for more information.    Ulises Sullivan, OTR/L

## 2021-03-04 NOTE — GROUP NOTE
Process Group Note  Telemedicine Visit: The patient's condition can be safely assessed and treated via synchronous audio and visual telemedicine encounter.    Reason for Telemedicine Visit: Patient has requested telehealth visit  Originating Site (Patient Location): Patient's home  Distant Site (Provider Location): [facility] Outpatient Setting: Adult Day Tx  Consent:  The patient/guardian has verbally consented to: the potential risks and benefits of telemedicine (video visit) versus in person care; bill my insurance or make self-payment for services provided; and responsibility for payment of non-covered services.   Mode of Communication:  Video Conference via [employer]  As the provider I attest to compliance with applicable laws and regulations related to telemedicine.    PATIENT'S NAME: Janey Givens  MRN:   [id number]  :   1995  ACCT. NUMBER: [id number]  DATE OF SERVICE: 3/04/21  START TIME:  9:00 AM  END TIME:  9:50 AM  FACILITATOR: Anais Cid PsyD  TOPIC:  Process Group    Diagnoses:  295.90 Schizophrenia    Psychiatry:: Ollie  Therapy:  Aleah Levin    [facility] Adult Mental Health Day Treatment  TRACK: 2A    NUMBER OF PARTICIPANTS: 4          Data:    Session content: At the start of this group, patients were invited to check in by identifying themselves, describing their current emotional status, and identifying issues to address in this group.   Area(s) of treatment focus addressed in this session included Symptom Management, Personal Safety, Community Resources/Discharge Planning and Abstinence/Relapse Prevention.  Client reported being safe today.  Reported mood is depressed    Goal for today is to  The client talked to the group about how she went to individual therapy and a family therapy session and felt overwhelmed by all the therapy and extremely tired. The group validated her feelings and that she needed to rest. She reported that she talked with a  psychiatrist, but that person is a pediatric psychiatrist and Katie is too old, so Katie has an appointment with the [facility] program at the end of the month to meet their psychiatrist. She reported that she likes her individual therapist: Aleah Cerda        Therapeutic Interventions/Treatment Strategies:  Psychotherapist reinforced use of skills. Treatment modalities used include Cognitive Behavioral Therapy and Dialectical Behavioral Therapy. Interventions include Cognitive Restructuring:  Facilitated recognition of the connection between negative thoughts and negative core beliefs, Coping Skills: Reviewed patients current calming practices and discussed a more formal way of practicing and accessing skills, Relapse Prevention: Facilitated understanding of effective coping skills in response to triggers for substance use and Mindfulness: Encouraged a plan to use mindfulness skills in daily life.    Assessment:    Patient response:   Patient responded to session by accepting feedback, giving feedback and listening    Possible barriers to participation / learning include: severity of symptoms    Health Issues:   None reported       Substance Use Review:   Substance Use: No active concerns identified.    Mental Status/Behavioral Observations  Appearance:   Appropriate   Eye Contact:   Good   Psychomotor Behavior: Normal   Attitude:   Cooperative   Orientation:   All  Speech   Rate / Production: Normal    Volume:  Normal   Mood:    Anxious  Depressed  Sad   Affect:    Constricted   Thought Content:   Rumination and Psychosis reports hallucinations auditory and paranoia  Thought Form:  Coherent  Logical  Psychosis    Insight:    Good     Plan:     Safety Plan: Recommended that patient call 911 or go to the local ED should there be a change in any of these risk factors.     Barriers to treatment: None identified    Patient Contracts (see media tab):  None    Substance Use: Provided encouragement towards sobriety     Continue  or Discharge: Patient will continue in Adult Day Treatment (ADT)  as planned. Patient is likely to benefit from learning and using skills as they work toward the goals identified in their treatment plan.      Anais Cid PsyD  March 4, 2021  Tierney Solorzano, D,  Licensed Clinical Psychologist

## 2021-03-05 ENCOUNTER — PATIENT OUTREACH (OUTPATIENT)
Dept: PSYCHIATRY | Facility: CLINIC | Age: 26
End: 2021-03-05

## 2021-03-07 NOTE — PROGRESS NOTES
[facility] Family Peer Support  A Part of the [facility] First Episode of Psychosis Program     Patient Name: Janey Givens  /Age:  1995 (25 year old)  Date of Encounter: 3/5/2021  Length of Contact: 26 minutes    This writer reached out to offer resources and support to patient's mother, Billie.     Janey is doing well at home and doesn't seem to be experiencing many side effects from her medication.  Billie is hoping an HE will be stabilizing.  Family has started seeing a family therapist (outside of the [facility]) who was recommended by a close friend and that is going well.      Janey has expressed interest in going back to school in [place].  Family is supportive of this, although timing and parameters are undetermined. Billie appreciates that Janey can reengage with Aleah Cerda [facility], for IRT.    This writer will continue to offer ongoing family peer support.  Billie was grateful for the outreach.    NATY Haas Family Peer    [Billing Code 91J6169 for Non Billable Service as family peer support is a nonbillable service]

## 2021-03-08 ENCOUNTER — VIRTUAL VISIT (OUTPATIENT)
Dept: PSYCHIATRY | Facility: CLINIC | Age: 26
End: 2021-03-08
Payer: COMMERCIAL

## 2021-03-08 ENCOUNTER — HOSPITAL ENCOUNTER (OUTPATIENT)
Dept: BEHAVIORAL HEALTH | Facility: CLINIC | Age: 26
End: 2021-03-08
Attending: PSYCHIATRY & NEUROLOGY
Payer: COMMERCIAL

## 2021-03-08 DIAGNOSIS — F20.9 SCHIZOPHRENIA, UNSPECIFIED TYPE (H): Primary | ICD-10-CM

## 2021-03-08 PROCEDURE — G0177 OPPS/PHP; TRAIN & EDUC SERV: HCPCS | Mod: GT

## 2021-03-08 NOTE — PROGRESS NOTES
NAVIGATE SEE Progress Note   For Supported Employment & Education    NAVIGATE Enrollee: Janey Givens (1995)     MRN: [id number]  Date:  3/08/21  Clinician: [employer] Supported Employment & , Ame Borrero    1. Client Status Update:   Janey Givens is interested in education (Client withdrew from education program or class)    2. People present:   SEE/Writer  Client: Janey Givens    3. Length of Actual Contact: 45 minutes   Traveled? No    4. Location of contact:  Other: [facility]    5. Brief description of session, contact, or client status (include: strategies, interventions, client reaction to contact, next steps, etc)    Writer and Janey Givens met via [employer] for a follow up Supported Employment and Education (SEE) session. This writer had texted Katie with a link via [email or web address] earlier in the day though Katie had not seen the link so our session began late via [employer]. 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 Today's agenda included:   General check in, follow along education supports. Katie reports that several months ago she had experienced an episode of psychosis and had to withdraw from her courses at [employer]. Katie would like to create a return to School plan and inquired about obtaining letters and forms from this writer. We discussed a plan and Katie was agreeable to email her advisor and disability services to see what the next steps are and which forms she needs to complete. This writer will request a Letter from her doctor with her diagnosis, symptoms and accommodations she is requesting. Katie said she is open to sharing her diagnosis, symptoms and accommodation requests with the school. She reports her IOP is going well and feels very validated meeting others with similar experiences. We will meet again in 2 weeks to follow up on what is needed.     6. Completion of mutually agreed upon client task from previous  meeting:  Completed    7. Orientation and Treatment Planning:  Pursuing current SEE goals    8. Assessment:  Assessing client's need for follow-along supports    9. Placement:  Not Applicable    10. Follow Along Supports: (for clients who are working or attending school)   Education (Assisting with requesting accommodations, Assisting with development and use of natural support for school and Problem solving difficulties with school)    11. Mutually agreed upon client task for next meeting:     Email advisor     12. Next Meeting Scheduled for: two weeks Tuesday at 4pm    Ame MORALES Supported Employment &

## 2021-03-09 ENCOUNTER — HOSPITAL ENCOUNTER (OUTPATIENT)
Dept: BEHAVIORAL HEALTH | Facility: CLINIC | Age: 26
End: 2021-03-09
Attending: PSYCHIATRY & NEUROLOGY
Payer: COMMERCIAL

## 2021-03-09 PROCEDURE — 90853 GROUP PSYCHOTHERAPY: CPT | Mod: 95 | Performed by: PSYCHOLOGIST

## 2021-03-09 PROCEDURE — G0177 OPPS/PHP; TRAIN & EDUC SERV: HCPCS | Mod: 95

## 2021-03-09 NOTE — GROUP NOTE
Psychoeducation Group Note    PATIENT'S NAME: Janey Givens  MRN:   [id number]  :   1995  ACCT. NUMBER: [id number]  DATE OF SERVICE: 3/08/21  START TIME:  9:00 AM  END TIME:  9:50 AM  FACILITATOR: Geraldo Mena OT  TOPIC: MH Life Skills Group: Sensory Approaches in Mental Health  [facility] Adult Mental Health Day Treatment  TRACK: 2A    Telemedicine Visit: The patient's condition can be safely assessed and treated via synchronous audio and visual telemedicine encounter.      Reason for Telemedicine Visit: Services only offered telehealth and Covid 19    Originating Site (Patient Location): Patient's home    Distant Site (Provider Location): Provider Remote Setting    Consent:  The patient/guardian has verbally consented to: the potential risks and benefits of telemedicine (video visit) versus in person care; bill my insurance or make self-payment for services provided; and responsibility for payment of non-covered services.     Mode of Communication:  Video Conference via [employer]    As the provider I attest to compliance with applicable laws and regulations related to telemedicine.      NUMBER OF PARTICIPANTS: 5    Summary of Group / Topics Discussed:  Sensory Approaches in Mental Health: Lifestyle Health:  Weaving a Mindful Lifestyle. To demystify mindfulness and leverage its benefits by identifying ways to make it more accessible, patients were provided with education on the why, what & how, and ways to practice mindfulness through chosen activities using the metaphor of the components of a weaving (warp/weft). To improve self-efficacy with mindfulness, group members explored and applied the concept of levels of brain activation to help them identify what type of mindful activities might be most helpful depending on context and what part of the brain is dominating their attention: (brainstem, midbrain, cerebellum, limbic, cortex). Group members were led through a structured experiential  process where as a group they worked to create a mindful weaving of a variety of activities in context for each level of brain activation. These included: grounding activities, activities of daily living, informal sensorimotor activities, connecting activities, focused cognitive activities, and lastly formal meditative activities such as guided imagery or mindful movement such as yoga or fabian chi. Patients shared their reflections and exchanged ideas and validation with each other.      Patient Session Goals / Objectives:    Learn about the why, what & how, and ways to create a lifestyle health by brainstorming and beginning to weave a mindful lifestyle.     Demystify the concept of mindfulness and apply curiosity and openness to practicing mindfulness in ways that honor context, are accessible and attune to individual patient values.     Identify two meaningful activities that they can apply taught concepts that fit into their everyday life      Patient Participation / Response:  Fully participated with the group by sharing personal reflections / insights and openly received / provided feedback with other participants.    Verbalized understanding of content and Patient would benefit from additional opportunities to practice the content to be able to generalize it to their everyday life with increased intentionality, consistency, and efficacy in support of their psychiatric recovery    Treatment Plan:  Patient has a current master individualized treatment plan.  See Epic treatment plan for more information.    Geraldo Mena, OT

## 2021-03-09 NOTE — GROUP NOTE
Process Group Note  Telemedicine Visit: The patient's condition can be safely assessed and treated via synchronous audio and visual telemedicine encounter.    Reason for Telemedicine Visit: Patient has requested telehealth visit  Originating Site (Patient Location): Patient's home  Distant Site (Provider Location): [facility] Outpatient Setting: Adult Day Tx  Consent:  The patient/guardian has verbally consented to: the potential risks and benefits of telemedicine (video visit) versus in person care; bill my insurance or make self-payment for services provided; and responsibility for payment of non-covered services.   Mode of Communication:  Video Conference via [employer]  As the provider I attest to compliance with applicable laws and regulations related to telemedicine.    PATIENT'S NAME: Janey Givens  MRN:   [id number]  :   1995  ACCT. NUMBER: [id number]  DATE OF SERVICE: 3/09/21  START TIME: 10:00 AM  END TIME: 10:50 AM  FACILITATOR: Anais Cid PsyD  TOPIC:  Process Group    Diagnoses:  295.90 Schizophrenia    Psychiatry:: Ollie  Therapy:  Aleah Levin      [facility] Adult Mental Health Day Treatment  TRACK: 2A    NUMBER OF PARTICIPANTS: 3          Data:    Session content: At the start of this group, patients were invited to check in by identifying themselves, describing their current emotional status, and identifying issues to address in this group.   Area(s) of treatment focus addressed in this session included Symptom Management, Personal Safety, Community Resources/Discharge Planning and Abstinence/Relapse Prevention.  Client reported being safe today.  Reported mood is frustrated.    Goal for today is to attend therapy. The client talked to the group about how she is working with the family and attended one family therapy session with just the therapist. She reported that she went out to lunch with a friend on the weekend, but her family is strict on what she  "does and where she goes, and she feels frustrated by this. She reported that they were \"overlords.\"      Therapeutic Interventions/Treatment Strategies:  Psychotherapist offered support, feedback and validation and reinforced use of skills.    Assessment:    Patient response:   Patient responded to session by accepting feedback, giving feedback and listening    Possible barriers to participation / learning include: severity of symptoms    Health Issues:   None reported       Substance Use Review:   Substance Use: No active concerns identified.    Mental Status/Behavioral Observations  Appearance:   Appropriate   Eye Contact:   Good   Psychomotor Behavior: Normal   Attitude:   Cooperative   Orientation:   All  Speech   Rate / Production: Normal    Volume:  Normal   Mood:    Anxious   Affect:    Constricted   Thought Content:   Rumination and Psychosis reports hallucinations auditory  Thought Form:  Coherent  Logical     Insight:    Good     Plan:     Safety Plan: Recommended that patient call 911 or go to the local ED should there be a change in any of these risk factors.     Barriers to treatment: None identified    Patient Contracts (see media tab):  None    Substance Use: Provided encouragement towards sobriety     Continue or Discharge: Patient will continue in Adult Day Treatment (ADT)  as planned. Patient is likely to benefit from learning and using skills as they work toward the goals identified in their treatment plan.      Anais Cid PsyD  March 9, 2021  Tierney Solorzano, ADELAIDA,  Licensed Clinical Psychologist    "

## 2021-03-09 NOTE — GROUP NOTE
Psychoeducation Group Note    PATIENT'S NAME: Janey Givens  MRN:   [id number]  :   1995  ACCT. NUMBER: [id number]  DATE OF SERVICE: 3/09/21  START TIME:  9:00 AM  END TIME:  9:50 AM  FACILITATOR: Ulises Sullivan OTR/L  TOPIC: MH Life Skills Group: Resiliency Development  Telemedicine Visit: The patient's condition can be safely assessed and treated via synchronous audio and visual telemedicine encounter.      Reason for Telemedicine Visit: COVID-19    Originating Site (Patient Location): Patient's home    Distant Site (Provider Location): [facility]: [facility]    Consent:  The patient/guardian has verbally consented to: the potential risks and benefits of telemedicine (video visit) versus in person care; bill my insurance or make self-payment for services provided; and responsibility for payment of non-covered services.     Mode of Communication:  Video Conference via [employer]    As the provider I attest to compliance with applicable laws and regulations related to telemedicine.   [facility] Adult Mental Health Day Treatment  TRACK: 2A    NUMBER OF PARTICIPANTS: 3    Summary of Group / Topics Discussed:  Resiliency Development:  Coping Skills(Spiritual Wellness): Patients were taught how to identify stressors, signs of stress, coping skills, and prevention strategies for overall stress management.  Patients were given the opportunity to identify both ongoing and acute mental health symptoms and how to effectively manage these symptoms by developing an effective aftercare plan.  Patients increased awareness of community based resources.    Patient Session Goals / Objectives:    Identified how using coping skills can be used for symptom and stress management       Improved awareness of individualed symptoms and stressors and how to effectively cope     Established a relapse prevention plan to practice these skills in their own environments    Practiced and reflected on how  to generalize taught skills to their everyday life          Patient Participation / Response:  Fully participated with the group by sharing personal reflections / insights and openly received / provided feedback with other participants.    Demonstrated understanding of content through handout/group discussion , Verbalized understanding of content and Patient would benefit from additional opportunities to practice the content to be able to generalize it to their everyday life with increased intentionality, consistency, and efficacy in support of their psychiatric recovery    Treatment Plan:  Patient has a current master individualized treatment plan.  See Epic treatment plan for more information.    Ulises Sullivan, OTR/L

## 2021-03-09 NOTE — GROUP NOTE
Psychoeducation Group Note    PATIENT'S NAME: Janey Givens  MRN:   [id number]  :   1995  ACCT. NUMBER: [id number]  DATE OF SERVICE: 3/09/21  START TIME: 11:00 AM  END TIME: 11:50 AM  FACILITATOR: Olga Peterson RN  TOPIC:  RN Group: Mental Health Maintenance  Telemedicine Visit: The patient's condition can be safely assessed and treated via synchronous audio and visual telemedicine encounter.      Reason for Telemedicine Visit:  covid19    Originating Site (Patient Location): Patient's home    Distant Site (Provider Location): Provider Remote Setting    Consent:  The patient/guardian has verbally consented to: the potential risks and benefits of telemedicine (video visit) versus in person care; bill my insurance or make self-payment for services provided; and responsibility for payment of non-covered services.     Mode of Communication:  Video Conference via zoom    As the provider I attest to compliance with applicable laws and regulations related to telemedicine.      [facility] Mental Health Day Treatment  TRACK: 2A    NUMBER OF PARTICIPANTS: 3    Summary of Group / Topics Discussed:  Mental Health Maintenance:  Vulnerability: In this group, the concept of vulnerability was explored through the viewing, discussion, and self-reflection of the [employer] Dong Talk Titled,  The Power of Vulnerability.      Patient Session Goals / Objectives:  ? Defined and described definition of vulnerability   ? Identified 2 or more ways of practicing authenticity         Patient Participation / Response:  Moderately participated, sharing some personal reflections / insights and adequately adequately received / provided feedback with other participants.    Verbalized understanding of mental health maintenance topic    Treatment Plan:  Patient has a current master individualized treatment plan.  See Epic treatment plan for more information.    Olga Peterson RN

## 2021-03-10 ENCOUNTER — VIRTUAL VISIT (OUTPATIENT)
Dept: PSYCHIATRY | Facility: CLINIC | Age: 26
End: 2021-03-10
Payer: COMMERCIAL

## 2021-03-10 ENCOUNTER — TELEPHONE (OUTPATIENT)
Dept: PSYCHIATRY | Facility: CLINIC | Age: 26
End: 2021-03-10

## 2021-03-10 DIAGNOSIS — F20.9 SCHIZOPHRENIA, UNSPECIFIED TYPE (H): Primary | ICD-10-CM

## 2021-03-10 NOTE — PROGRESS NOTES
NAVIGSATINDER Clinician Contact & Progress Note  For Individual Resiliency Training (IRT)  A Part of the [facility] First Episode of Psychosis Program    NAVIGATE Enrollee: Janey Givens (1995)     MRN: [id number]  Date:  3/10/21  Diagnosis: Schizophrenia, unspecified (F20.9)  Clinician: CARMEN Individual Resiliency Trainer, LEEROY See     1. Type of contact: (majority of time spent)  IRT Session via telehealth  Mode of communication: [employer] (HIPAA compliant, secure platform). Patient consented verbally to this mode of therapy today.  Reason for telehealth: COVID-19. This patient visit was converted to a telehealth visit to minimize exposure to COVID-19.    2. People present:   Writer  Client: Yes    3. Length of Actual Contact: Start Time: 1:00; End Time: 2:01     4. Location of contact:  Originating Location (patient location): Their home, located in [place], [place]  Distant Location (provider location): Home office, located in [place], [place], using appropriate privacy considerations and procedures    5. Did the client complete the home practice option(s) from the previous session: Completed    6. Motivational Teaching Strategies:  Connect info and skills with personal goals  Promote hope and positive expectations  Explore pros and cons of change  Re-frame experiences in positive light    7. Educational Teaching Strategies:  Review of written material/education  Relate information to client's experience  Ask questions to check comprehension  Break down information into small chunks  Adopt client's language     8. CBT Teaching Strategies:  Reinforcement and shaping (positive feedback for steps towards goals, gains in knowledge & skills and follow-through on home assignments)  Coping skills training (review current coping skills, increase currently used skills, feedback and plan home practice)  Cognitive restructuring (identify thoughts related to negative feelings)    9. IRT  Module(s) Addressed:  Module 5 - Processing the Psychotic Episode  Module 9 - Coping with Symptoms    10. Techniques utilized:   [place] announced at beginning of session  Review of homework  Review of goal  Review of previous meeting  Present new material  Problem-solving practice  Help client choose a home practice option  Summarize progress made in current session    11. Measures:    Mental Status Exam  Alertness: alert  and oriented  Behavior/Demeanor: cooperative and pleasant  Speech: regular rate and rhythm  Language: intact.   Mood: depressed  Thought Process/Associations: unremarkable  Thought Content:  Reports delusions;  Denies suicidal and violent ideation  Perception:  Reports none;  Denies auditory hallucinations  Insight: adequate  Judgment: adequate for safety  Cognition: does  appear grossly intact; formal cognitive testing was not done    KELSEY-7  Over the last 2 weeks, how often have you been bothered by the following problems?    1. Feeling nervous, anxious or on edge: 0 - Not at all  2. Not being able to stop or control worryin - Several days  3. Worrying too much about different things: 0 - Not at all  4. Trouble relaxin - Several days  5. Being so restless that it is hard to sit still: 1 - Several days  6. Becoming easily annoyed or irritable: 0 - Not at all  7. Feeling afraid as if something awful might happen: 0 - Not at all    PHQ-9  Over the last 2 weeks, how often have you been bothered by any the following problems?    1. Little interest or pleasure in doing things: 0 - Not at all  2. Feeling down, depressed, or hopeless: 0 - Not at all  3. Trouble falling or staying asleep, or sleeping too much: 1 - Several days  4. Feeling tired or having little energy: 1 - Several days  5. Poor appetite or overeatin - Several days  6. Feeling bad about yourself - or that you are a failure or have let yourself or your family down: 0 - Not at all  7. Trouble concentrating on things, such as  reading the newspaper or watching television: 0 - Not at all  8. Moving or speaking so slowly that other people could have noticed. Or the opposite-being fidgety or restless that you have been moving around a lot more than usual: 1 - Several days  9. Thoughts that you would be better off dead, or of hurting yourself in some way: 0 - Not at all    If you checked off any problems, how difficulty have these problems made it for you to do your work, take care of things at home, or get along with other people? Somewhat difficult    [place] Protocol Risk Identification  1) Have you wished you were dead or wished you could go to sleep and not wake up? No  2) Have you actually had any thoughts about killing yourself? No  If YES to 2, answer questions 3, 4, 5, 6  If NO to 2, go directly to question 6  3) Have you thought about how you might do this? N/A  4) Have you had any intension of acting on these thoughts of killing yourself, as opposed to you have the thoughts but you definitely would not act on them? N/A  5) Have you started to work out or worked out the details of how to kill yourself? Do you intend to carry out this plan? N/A  Always Ask Question 6  6) Have you done anything, started to do anything, or prepared to do anything to end your life? No  Examples: collected pills, obtained a gun, gave away valuables, wrote a will or suicide note, held a gun but changed your mind, cut yourself, tried to hang yourself, etc.    12. Assessment/Progress Note:     This writer met with Katie for a follow-up IRT session. Katie reported occasional delusions, but she is able to reality test and challenge them regularly. She stated she has been on medications approximately 1 month and symptoms have greatly improved. 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 She feels this will be a fulfilling and meaningful activity, with limited stress. Katie discussed her plan to return to  "[place] for graduate school in the fall. Katie inquired about the possibility of this writer continuing therapy services while she is living in [place]. This writer discussed the limitations with licensure in [place]. This writer assured Katie there will be coordination of care, ensuring she has appointments in [place] prior to leaving. Katie expressed interest in processing distressing memories from her childhood. This writer and Katie reviewed coping strategies and the possibility of an increase in emotions/thoughts following disclosure. Katie then discussed memories relating to identity development and invalidation from others. This writer validated her experiences and assisted Katie in processing emotions, thoughts, and reactions to the events.     13. Plan/Referrals:     This writer will continue to provide supportive counseling in processing the episode and relapse prevention planning, in alignment with Katie's goals of processing painful thoughts/memories.     Billing for \"Interactive Complexity\"?    No      LEEROY See Individual Resiliency Trainer  "

## 2021-03-11 ENCOUNTER — HOSPITAL ENCOUNTER (OUTPATIENT)
Dept: BEHAVIORAL HEALTH | Facility: CLINIC | Age: 26
End: 2021-03-11
Attending: PSYCHIATRY & NEUROLOGY
Payer: COMMERCIAL

## 2021-03-11 PROCEDURE — 90853 GROUP PSYCHOTHERAPY: CPT | Mod: GT | Performed by: COUNSELOR

## 2021-03-11 PROCEDURE — G0177 OPPS/PHP; TRAIN & EDUC SERV: HCPCS | Mod: 95

## 2021-03-11 NOTE — GROUP NOTE
Psychoeducation Group Note    PATIENT'S NAME: Janey Givens  MRN:   [id number]  :   1995  ACCT. NUMBER: [id number]  DATE OF SERVICE: 3/11/21  START TIME: 10:00 AM  END TIME: 10:50 AM  FACILITATOR: Ulises Sullivan OTR/L  TOPIC: MH Life Skills Group: Resiliency Development  Telemedicine Visit: The patient's condition can be safely assessed and treated via synchronous audio and visual telemedicine encounter.      Reason for Telemedicine Visit: COVID-19    Originating Site (Patient Location): Patient's home    Distant Site (Provider Location): [facility]: [facility]    Consent:  The patient/guardian has verbally consented to: the potential risks and benefits of telemedicine (video visit) versus in person care; bill my insurance or make self-payment for services provided; and responsibility for payment of non-covered services.     Mode of Communication:  Video Conference via [employer]    As the provider I attest to compliance with applicable laws and regulations related to telemedicine.   [facility] Adult Mental Health Day Treatment  TRACK: 2A    NUMBER OF PARTICIPANTS: 4    Summary of Group / Topics Discussed:  Resiliency Development:  Coping Skills: Patients were taught how to identify stressors, signs of stress, coping skills, and prevention strategies for overall stress management.  Patients were given the opportunity to identify both ongoing and acute mental health symptoms and how to effectively manage these symptoms by developing an effective aftercare plan.  Patients increased awareness of community based resources.    Patient Session Goals / Objectives:    Identified how using coping skills can be used for symptom and stress management       Improved awareness of individualed symptoms and stressors and how to effectively cope     Established a relapse prevention plan to practice these skills in their own environments    Practiced and reflected on how to generalize  taught skills to their everyday life          Patient Participation / Response:  Fully participated with the group by sharing personal reflections / insights and openly received / provided feedback with other participants.    Demonstrated understanding of content through handouts/group discussion , Verbalized understanding of content and Patient would benefit from additional opportunities to practice the content to be able to generalize it to their everyday life with increased intentionality, consistency, and efficacy in support of their psychiatric recovery    Treatment Plan:  Patient has a current master individualized treatment plan.  See Epic treatment plan for more information.    Ulises Sullivan, OTR/L

## 2021-03-11 NOTE — GROUP NOTE
Psychoeducation Group Note    PATIENT'S NAME: Janey Givens  MRN:   [id number]  :   1995  ACCT. NUMBER: [id number]  DATE OF SERVICE: 3/11/21  START TIME: 11:00 AM  END TIME: 11:50 AM  FACILITATOR: Ulises Sullivan OTR/L  TOPIC: MH Life Skills Group: Resiliency Development  Telemedicine Visit: The patient's condition can be safely assessed and treated via synchronous audio and visual telemedicine encounter.      Reason for Telemedicine Visit: COVID-19    Originating Site (Patient Location): Patient's home    Distant Site (Provider Location): [facility]: [facility]    Consent:  The patient/guardian has verbally consented to: the potential risks and benefits of telemedicine (video visit) versus in person care; bill my insurance or make self-payment for services provided; and responsibility for payment of non-covered services.     Mode of Communication:  Video Conference via [employer]    As the provider I attest to compliance with applicable laws and regulations related to telemedicine.   [facility] Adult Mental Health Day Treatment  TRACK: 2A    NUMBER OF PARTICIPANTS: 4    Summary of Group / Topics Discussed:  Resiliency Development:  Coping With Schizophrenia and Psychosis: Patients were taught how to identify stressors, signs of stress, coping skills, and prevention strategies for overall stress management.  Patients were given the opportunity to identify both ongoing and acute mental health symptoms and how to effectively manage these symptoms by developing an effective aftercare plan.  Patients increased awareness of community based resources.    Patient Session Goals / Objectives:    Identified how using coping skills can be used for symptom and stress management       Improved awareness of individualed symptoms and stressors and how to effectively cope     Established a relapse prevention plan to practice these skills in their own environments    Practiced and reflected  on how to generalize taught skills to their everyday life          Patient Participation / Response:  Fully participated with the group by sharing personal reflections / insights and openly received / provided feedback with other participants.    Demonstrated understanding of content through video/group discussion , Verbalized understanding of content and Patient would benefit from additional opportunities to practice the content to be able to generalize it to their everyday life with increased intentionality, consistency, and efficacy in support of their psychiatric recovery    Treatment Plan:  Patient has a current master individualized treatment plan.  See Epic treatment plan for more information.    Ulises Sullivan, OTR/L

## 2021-03-11 NOTE — GROUP NOTE
"Process Group Note    PATIENT'S NAME: Janey Givens  MRN:   [id number]  :   1995  ACCT. NUMBER: [id number]  DATE OF SERVICE: 3/11/21  START TIME:  9:00 AM  END TIME:  9:50 AM  FACILITATOR: Maria Luisa Benoit [facility]  TOPIC:  Process Group    Diagnoses:  295.90 Schizophrenia    [facility] Day Treatment  TRACK: 2A    NUMBER OF PARTICIPANTS: 3    Telemedicine Visit: The patient's condition can be safely assessed and treated via synchronous audio and visual telemedicine encounter.      Reason for Telemedicine Visit: Services only offered telehealth    Originating Site (Patient Location): Patient's home    Distant Site (Provider Location): Provider Remote Setting    Consent:  The patient/guardian has verbally consented to: the potential risks and benefits of telemedicine (video visit) versus in person care; bill my insurance or make self-payment for services provided; and responsibility for payment of non-covered services.     Mode of Communication:  Video Conference via [employer]    As the provider I attest to compliance with applicable laws and regulations related to telemedicine.            Data:    Session content: At the start of this group, patients were invited to check in by identifying themselves, describing their current emotional status, and identifying issues to address in this group.   Area(s) of treatment focus addressed in this session included Symptom Management and Personal Safety.    Janey reported feeling \"pretty good\" today.  Their goal for the day is to work on doing the things they need to do to return to grad school.  Janey reported no symptoms now that they are back on their medications.  Client declined additional process time but contributed to group discussion and provided feedback and support to peers.      Therapeutic Interventions/Treatment Strategies:  Psychotherapist offered support, feedback and validation.    Assessment:    Patient response: "   Patient responded to session by accepting feedback, giving feedback and listening    Possible barriers to participation / learning include: and no barriers identified    Health Issues:   None reported       Substance Use Review:   Substance Use: No active concerns identified.    Mental Status/Behavioral Observations  Appearance:   Appropriate   Eye Contact:   Good   Psychomotor Behavior: Normal   Attitude:   Cooperative   Orientation:   All  Speech   Rate / Production: Normal    Volume:  Normal   Mood:    Normal  Affect:    Appropriate   Thought Content:   Clear  Thought Form:  Coherent  Logical     Insight:    Good     Plan:     Safety Plan: No current safety concerns identified.  Recommended that patient call 911 or go to the local ED should there be a change in any of these risk factors.     Barriers to treatment: None identified    Patient Contracts (see media tab):  None    Substance Use: Not addressed in session     Continue or Discharge: Patient will continue in Adult Day Treatment (ADT)  as planned. Patient is likely to benefit from learning and using skills as they work toward the goals identified in their treatment plan.      Maria Luisa Benoit, [facility]  March 11, 2021

## 2021-03-15 ENCOUNTER — TELEPHONE (OUTPATIENT)
Dept: BEHAVIORAL HEALTH | Facility: CLINIC | Age: 26
End: 2021-03-15

## 2021-03-15 NOTE — TELEPHONE ENCOUNTER
Writer attempted to call Pt today to assist with getting her into group. She did not answer - a vm message was left asking her to call in her absence or join group if possible.

## 2021-03-16 ENCOUNTER — TELEPHONE (OUTPATIENT)
Dept: BEHAVIORAL HEALTH | Facility: CLINIC | Age: 26
End: 2021-03-16

## 2021-03-16 ENCOUNTER — HOSPITAL ENCOUNTER (OUTPATIENT)
Dept: BEHAVIORAL HEALTH | Facility: CLINIC | Age: 26
End: 2021-03-16
Attending: PSYCHIATRY & NEUROLOGY
Payer: COMMERCIAL

## 2021-03-16 ENCOUNTER — TELEPHONE (OUTPATIENT)
Dept: PSYCHIATRY | Facility: CLINIC | Age: 26
End: 2021-03-16

## 2021-03-16 PROCEDURE — G0177 OPPS/PHP; TRAIN & EDUC SERV: HCPCS | Mod: GT

## 2021-03-16 PROCEDURE — 90853 GROUP PSYCHOTHERAPY: CPT | Mod: 95 | Performed by: PSYCHOLOGIST

## 2021-03-16 NOTE — TELEPHONE ENCOUNTER
Writer called Pt today at the beginning of group to see if she is attending.  She did not answer the call.  Explained need to hear from her by 2pm today, or will call her Ermegency Contact.

## 2021-03-16 NOTE — GROUP NOTE
Process Group Note  Telemedicine Visit: The patient's condition can be safely assessed and treated via synchronous audio and visual telemedicine encounter.    Reason for Telemedicine Visit: Patient has requested telehealth visit  Originating Site (Patient Location): Patient's home  Distant Site (Provider Location): [facility] Outpatient Setting: Adult Day Tx  Consent:  The patient/guardian has verbally consented to: the potential risks and benefits of telemedicine (video visit) versus in person care; bill my insurance or make self-payment for services provided; and responsibility for payment of non-covered services.   Mode of Communication:  Video Conference via [employer]  As the provider I attest to compliance with applicable laws and regulations related to telemedicine.    PATIENT'S NAME: Janey Givens  MRN:   [id number]  :   1995  ACCT. NUMBER: [id number]  DATE OF SERVICE: 3/16/21  START TIME: 10:00 AM  END TIME: 10:50 AM  FACILITATOR: Anais Cid PsyD  TOPIC:  Process Group    Diagnoses:  295.90 Schizophrenia    Psychiatry:: Ollie  Therapy:  Aleah Levin      [facility] Adult Mental Health Day Treatment  TRACK: 2A    NUMBER OF PARTICIPANTS: 3          Data:    Session content: At the start of this group, patients were invited to check in by identifying themselves, describing their current emotional status, and identifying issues to address in this group.   Area(s) of treatment focus addressed in this session included Symptom Management, Personal Safety and Community Resources/Discharge Planning.  Client reported being safe today.  Reported mood is fatigued.    Goal for today is to attend therapy.  The client talked to the group about how family therapy has helped, so that she can drive to visit friends and go to the grocery store.  She reported that the family is now doing the therapy and that one sister is critical of her, and the group discussed that the therapy will  be helpful to change that.    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 She stated that she will continue to attend the program and that the class is online research.       Therapeutic Interventions/Treatment Strategies:  Psychotherapist offered support, feedback and validation and reinforced use of skills. Treatment modalities used include Cognitive Behavioral Therapy and Dialectical Behavioral Therapy. Interventions include Cognitive Restructuring:  Facilitated recognition of the connection between negative thoughts and negative core beliefs, Coping Skills: Assisted patient in understanding the purpose of planning / creating / participating / sharing in positive experiences, Relapse Prevention: Facilitated understanding of effective coping skills in response to triggers for substance use and Mindfulness: Encouraged a plan to use mindfulness skills in daily life.    Assessment:    Patient response:   Patient responded to session by giving feedback, listening and focusing on goals    Possible barriers to participation / learning include: severity of symptoms    Health Issues:   None reported       Substance Use Review:   Substance Use: No active concerns identified.    Mental Status/Behavioral Observations  Appearance:   Appropriate   Eye Contact:   Good   Psychomotor Behavior: Normal   Attitude:   Cooperative   Orientation:   All  Speech   Rate / Production: Normal    Volume:  Normal   Mood:    Anxious   Affect:    Constricted   Thought Content:   Clear and Psychosis denies any symptoms of psychosis  Thought Form:  Coherent  Logical     Insight:    Good     Plan:     Safety Plan: Recommended that patient call 911 or go to the local ED should there be a change in any of these risk factors.     Barriers to treatment: None identified    Patient Contracts (see media tab):  None    Substance Use: Provided encouragement towards sobriety     Continue or Discharge:  Patient will continue in Adult Day Treatment (ADT)  as planned. Patient is likely to benefit from learning and using skills as they work toward the goals identified in their treatment plan.      Anais Cid PsyD  March 16, 2021  Tierney Solorzano, ADELAIDA,  Licensed Clinical Psychologist

## 2021-03-16 NOTE — GROUP NOTE
Psychoeducation Group Note    PATIENT'S NAME: Janey Givens  MRN:   [id number]  :   1995  ACCT. NUMBER: [id number]  DATE OF SERVICE: 3/16/21  START TIME: 11:00 AM  END TIME: 11:50 AM  FACILITATOR: Olga Peterson RN  TOPIC: SILVIA RN Group: Mental Health Maintenance  Telemedicine Visit: The patient's condition can be safely assessed and treated via synchronous audio and visual telemedicine encounter.      Reason for Telemedicine Visit:  covid19    Originating Site (Patient Location): Patient's home    Distant Site (Provider Location): Provider Remote Setting    Consent:  The patient/guardian has verbally consented to: the potential risks and benefits of telemedicine (video visit) versus in person care; bill my insurance or make self-payment for services provided; and responsibility for payment of non-covered services.     Mode of Communication:  Video Conference via zoom    As the provider I attest to compliance with applicable laws and regulations related to telemedicine.      [facility] Mental Health Day Treatment  TRACK: 2A    NUMBER OF PARTICIPANTS: 3    Summary of Group / Topics Discussed:  Mental Health Maintenance:  Stigma: In this group patients explored stigma surrounding a mental health diagnosis.  The group discussed the way stigma impacts their own life, and discussed strategies to reduce. The relationship between physical and mental health were also explored in the context of healthcare access, treatment, and support.    Patient Session Goals / Objectives:  ? Patients identified the importance of practicing emotional hygiene  ? Patients identified ways to decrease the  impact of stigma in their own life          Patient Participation / Response:  Fully participated with the group by sharing personal reflections / insights and openly received / provided feedback with other participants.    Demonstrated understanding of topics discussed through group discussion and participation and  Identified / Expressed personal readiness to practice skills    Treatment Plan:  Patient has a current master individualized treatment plan.  See Epic treatment plan for more information.    Olga Peterson RN

## 2021-03-16 NOTE — TELEPHONE ENCOUNTER
NAVIGATE SEE Email Communication  For Supported Employment & Education    NAVIGATE Enrollee: Janey Givens (1995)     MRN: [id number]  Date of Email: 3/16/2021  Contacted: Katie    Discussed:       On Tue, Mar 16, 2021 at 4:06 PM jose [facility] <[email or web address]> wrote:  Nikolai Wilson,  Do you have any questions about the response you received? It looks like they are willing to accommodate a part time return, but will approval (which you will most likely get). Thoughts? I would also schedule an intake with disability student center if you haven't yet. :)      Eloise Stearns <[email or web address]>  Tue, Mar 9, 3:29 PM (7 days ago)  to Bridgette Toth, [email or web address]    Nikolai Wilson,         We aren t able to accommodate a return from [employer] in the middle of the quarter. We could accommodate a change to attend in Spring (beginning on March 29th) part-time, but you would not be eligible to receive your fellowship or support without an exception approved by [facility]. Part-time students are limited to 6 units, so this would mean you would be able to take one course. You will also need to provide documentation for the part-time status application (linked above).         If you are PELP in Spring quarter, it will extend through Summer as whatever a student s status is for Spring will also hold for Summer.         For disability accommodations, you should get in touch with our Student Disability Center at [facility]. We are unfortunately unable to accept previous accommodations from other institutions.         You do not need to send any medical documentation to myself or Prof. Escalante directly. I recommend that you just provide it with the form for part-time status (if you choose to go that route) and any documentation needed by the disability center.         Thank you,         Eloise    From: Janey Givens [email or web address]  Sent: Tuesday, March 9, 2021 1:16 PM  To: Bridgette Escalante  [email or web address]>  Cc: [email or web address]; Eloise Stearns [email or web address]>  Subject: Planning for my Return         Dear Daryn and Dr. Escalante,         I have been feeling much better as of late and would like to start planning my return to school for some time in the middle of April/May. I have been thinking it would be nice to make up the classes from this spring quarter instead of taking them next year again, but I understand that would be a problem that you wouldn't deal with, hopefully, Eloise would be able to help with this, although I also understand it would also depend on my teacher's willingness to let me do this.  I am also wondering about coming back part-time at first to ease back into things, but I have concerns about my fellowship if I haven't lost this yet, or if I need to wait until I am ready to come back full-time.  Please let me know any information you have, such as me needing to contact my teachers about my episode and making up the classes.         I have CC'd Ame from the [facility] Program at the [facility] as she has helped me in the past with accommodations and letters of support for my schizophrenia, and will have a letter to send in the coming weeks.         Thanks,     Katie Borrero

## 2021-03-17 ENCOUNTER — VIRTUAL VISIT (OUTPATIENT)
Dept: PSYCHIATRY | Facility: CLINIC | Age: 26
End: 2021-03-17
Payer: COMMERCIAL

## 2021-03-17 DIAGNOSIS — F20.3 UNDIFFERENTIATED SCHIZOPHRENIA (H): Primary | ICD-10-CM

## 2021-03-17 DIAGNOSIS — F20.9 SCHIZOPHRENIA, UNSPECIFIED TYPE (H): Primary | ICD-10-CM

## 2021-03-17 NOTE — PROGRESS NOTES
NAVIGSATINDER Clinician Contact & Progress Note  For Individual Resiliency Training (IRT)  A Part of the [facility] First Episode of Psychosis Program    NAVIGATE Enrollee: Janey Givens (1995)     MRN: [id number]  Date:  3/17/21  Diagnosis: Schizophrenia, unspecified (F20.9)  Clinician: CARMEN Individual Resiliency Trainer, LEEROY See     1. Type of contact: (majority of time spent)  IRT Session via telehealth  Mode of communication: [employer] (HIPAA compliant, secure platform). Patient consented verbally to this mode of therapy today.  Reason for telehealth: COVID-19. This patient visit was converted to a telehealth visit to minimize exposure to COVID-19.    2. People present:   Writer  Client: Yes     3. Length of Actual Contact: Start Time: 12:59; End Time: 1:56     4. Location of contact:  Originating Location (patient location): Their home, located in [place], [place]  Distant Location (provider location): Home office, located in [place], [place], using appropriate privacy considerations and procedures    5. Did the client complete the home practice option(s) from the previous session: Completed    6. Motivational Teaching Strategies:  Connect info and skills with personal goals  Promote hope and positive expectations  Explore pros and cons of change  Re-frame experiences in positive light    7. Educational Teaching Strategies:  Review of written material/education  Relate information to client's experience  Ask questions to check comprehension  Break down information into small chunks  Adopt client's language     8. CBT Teaching Strategies:  Reinforcement and shaping (positive feedback for steps towards goals, gains in knowledge & skills and follow-through on home assignments)  Cognitive restructuring (identify thoughts related to negative feelings)    9. IRT Module(s) Addressed:  Module 8 - Dealing with Negative Feelings    10. Techniques utilized:   [place] announced at beginning of  session  Review of homework  Review of goal  Review of previous meeting  Plan home practice    11. Measures:    Mental Status Exam  Alertness: alert  and oriented  Behavior/Demeanor: cooperative and pleasant  Speech: regular rate and rhythm  Language: intact.   Mood: depressed  Thought Process/Associations: unremarkable  Thought Content:  Reports preoccupations;  Denies suicidal and violent ideation  Perception:  Reports none;  Denies auditory hallucinations  Insight: good  Judgment: good  Cognition: does  appear grossly intact; formal cognitive testing was not done    PHQ-9  Over the last 2 weeks, how often have you been bothered by any the following problems?    1. Little interest or pleasure in doing things: 0 - Not at all  2. Feeling down, depressed, or hopeless: 0 - Not at all  3. Trouble falling or staying asleep, or sleeping too much: 0 - Not at all  4. Feeling tired or having little energy: 0 - Not at all  5. Poor appetite or overeatin - Not at all  6. Feeling bad about yourself - or that you are a failure or have let yourself or your family down: 0 - Not at all  7. Trouble concentrating on things, such as reading the newspaper or watching television: 0 - Not at all  8. Moving or speaking so slowly that other people could have noticed. Or the opposite-being fidgety or restless that you have been moving around a lot more than usual: 1 - Several days  9. Thoughts that you would be better off dead, or of hurting yourself in some way: 0 - Not at all    If you checked off any problems, how difficulty have these problems made it for you to do your work, take care of things at home, or get along with other people? Not difficult at all    [place] Protocol Risk Identification  1) Have you wished you were dead or wished you could go to sleep and not wake up? No  2) Have you actually had any thoughts about killing yourself? No  If YES to 2, answer questions 3, 4, 5, 6  If NO to 2, go directly to question 6  3)  "Have you thought about how you might do this? No  4) Have you had any intension of acting on these thoughts of killing yourself, as opposed to you have the thoughts but you definitely would not act on them? No  5) Have you started to work out or worked out the details of how to kill yourself? Do you intend to carry out this plan? No  Always Ask Question 6  6) Have you done anything, started to do anything, or prepared to do anything to end your life? No  Examples: collected pills, obtained a gun, gave away valuables, wrote a will or suicide note, held a gun but changed your mind, cut yourself, tried to hang yourself, etc.    12. Assessment/Progress Note:     This writer met with Katie for a follow-up IRT session. Katie reported no concerns with delusional thoughts or hallucinations. She stated she has taken medications for approximately 1 month and has noticed a decrease in symptoms. She mentioned she has considered an injectable medication; this writer reinforced the benefits other individuals have experienced. Katie agreed to talk with her provider about an injection at their next visit. Katie mentioned feeling \"at peace\" with previous memories that were discussed in the last session. This writer praised Katie for processing difficult emotions and thoughts.  During the remainder of the session, Katie identified previous behaviors and conversations which occurred during her episode. Katie expressed embarrassment and guilt related to the thoughts and behaviors. This writer reinforced the thoughts were symptoms, not a representation of her character. This writer used Socratic questioning to explore other ways of internally assessing her character. This writer noted other individuals have experienced very similar thoughts. Katie expressed a feeling of comfort knowing it is related to psychosis.     13. Plan/Referrals:     This writer and Katie will continue with Module 5: Processing the Episode, using narrative therapy to process " "memories, thoughts, and emotions related to her episode.     Billing for \"Interactive Complexity\"?    No      LEEROY See Individual Resiliency Trainer  "

## 2021-03-18 ENCOUNTER — HOSPITAL ENCOUNTER (OUTPATIENT)
Dept: BEHAVIORAL HEALTH | Facility: CLINIC | Age: 26
End: 2021-03-18
Attending: PSYCHIATRY & NEUROLOGY
Payer: COMMERCIAL

## 2021-03-18 PROCEDURE — G0177 OPPS/PHP; TRAIN & EDUC SERV: HCPCS | Mod: 95

## 2021-03-18 PROCEDURE — 90853 GROUP PSYCHOTHERAPY: CPT | Mod: GT | Performed by: PSYCHOLOGIST

## 2021-03-18 NOTE — GROUP NOTE
Psychoeducation Group Note    PATIENT'S NAME: Janey Givens  MRN:   [id number]  :   1995  ACCT. NUMBER: [id number]  DATE OF SERVICE: 3/18/21  START TIME: 11:00 AM  END TIME: 11:50 AM  FACILITATOR: Ulises Sullivan OTR/L  TOPIC: MH Life Skills Group: Resiliency Development  Telemedicine Visit: The patient's condition can be safely assessed and treated via synchronous audio and visual telemedicine encounter.      Reason for Telemedicine Visit: COVID-19    Originating Site (Patient Location): Patient's home    Distant Site (Provider Location): [facility]: [facility]    Consent:  The patient/guardian has verbally consented to: the potential risks and benefits of telemedicine (video visit) versus in person care; bill my insurance or make self-payment for services provided; and responsibility for payment of non-covered services.     Mode of Communication:  Video Conference via [employer]    As the provider I attest to compliance with applicable laws and regulations related to telemedicine.   [facility] Adult Mental Health Day Treatment  TRACK: 2A    NUMBER OF PARTICIPANTS: 4    Summary of Group / Topics Discussed:  Resiliency Development:  Coping and Living with Psychosis: Patients were taught how to identify stressors, signs of stress, coping skills, and prevention strategies for overall stress management.  Patients were given the opportunity to identify both ongoing and acute mental health symptoms and how to effectively manage these symptoms by developing an effective aftercare plan.  Patients increased awareness of community based resources.    Patient Session Goals / Objectives:    Identified how using coping skills can be used for symptom and stress management       Improved awareness of individualed symptoms and stressors and how to effectively cope     Established a relapse prevention plan to practice these skills in their own environments    Practiced and reflected on how  to generalize taught skills to their everyday life          Patient Participation / Response:  Fully participated with the group by sharing personal reflections / insights and openly received / provided feedback with other participants.    Demonstrated understanding of content through video/discussion , Verbalized understanding of content and Patient would benefit from additional opportunities to practice the content to be able to generalize it to their everyday life with increased intentionality, consistency, and efficacy in support of their psychiatric recovery    Treatment Plan:  Patient has a current master individualized treatment plan.  See Epic treatment plan for more information.    Ulises Sullivan, OTR/L

## 2021-03-18 NOTE — GROUP NOTE
Psychoeducation Group Note    PATIENT'S NAME: Janey Givens  MRN:   [id number]  :   1995  ACCT. NUMBER: [id number]  DATE OF SERVICE: 3/18/21  START TIME: 10:00 AM  END TIME: 10:50 AM  FACILITATOR: Ulises Sullivan OTR/L  TOPIC: MH Life Skills Group: Resiliency Development  Telemedicine Visit: The patient's condition can be safely assessed and treated via synchronous audio and visual telemedicine encounter.      Reason for Telemedicine Visit: COVID-19    Originating Site (Patient Location): Patient's home    Distant Site (Provider Location): [facility]: [facility]    Consent:  The patient/guardian has verbally consented to: the potential risks and benefits of telemedicine (video visit) versus in person care; bill my insurance or make self-payment for services provided; and responsibility for payment of non-covered services.     Mode of Communication:  Video Conference via link bird    As the provider I attest to compliance with applicable laws and regulations related to telemedicine.   [facility] Adult Mental Health Day Treatment  TRACK: 2A    NUMBER OF PARTICIPANTS: 4    Summary of Group / Topics Discussed:  Resiliency Development:  Self Esteem and Self Compassion: Patients were given time for reflection and built self awareness around components of self compassion and how it relates to self esteem and overall functioning.  Patients were also given the opportunity to improve self efficacy, self sufficiency, quality of life and a sense of mastery and competency by identifying what is good and to instill hope. Patients were taught about the importance of self kindness and ways to challenge negative thinking.      Patient Session Goals / Objectives:    Identified personal strengths and qualities about themselves as a way to provide hope and build self-compassion as a way to effectively manage both mental health and substance abuse/abuse symptoms     Improved awareness of  positive qualities and how these contribute to the process of recovery        Established a plan for practice of these skills in their own environments    Practiced and reflected on how to generalize taught skills to their everyday life        Patient Participation / Response:  Fully participated with the group by sharing personal reflections / insights and openly received / provided feedback with other participants.    Demonstrated understanding of content through video/handout/group discussion , Verbalized understanding of content and Patient would benefit from additional opportunities to practice the content to be able to generalize it to their everyday life with increased intentionality, consistency, and efficacy in support of their psychiatric recovery    Treatment Plan:  Patient has a current master individualized treatment plan.  See Epic treatment plan for more information.    Ulises Sullivan, OTR/L

## 2021-03-18 NOTE — GROUP NOTE
Process Group Note  Telemedicine Visit: The patient's condition can be safely assessed and treated via synchronous audio and visual telemedicine encounter.    Reason for Telemedicine Visit: Patient has requested telehealth visit  Originating Site (Patient Location): Patient's home  Distant Site (Provider Location): [facility] Outpatient Setting: Adult Day Tx  Consent:  The patient/guardian has verbally consented to: the potential risks and benefits of telemedicine (video visit) versus in person care; bill my insurance or make self-payment for services provided; and responsibility for payment of non-covered services.   Mode of Communication:  Video Conference via "[employer]  As the provider I attest to compliance with applicable laws and regulations related to telemedicine.    PATIENT'S NAME: Janey Givens  MRN:   [id number]  :   1995  ACCT. NUMBER: [id number]  DATE OF SERVICE: 3/18/21  START TIME:  9:00 AM  END TIME:  9:50 AM  FACILITATOR: Anais Cid PsyD  TOPIC:  Process Group    Diagnoses:    295.90 Schizophrenia    Psychiatry:: Ollie  Therapy:  Aleah Levin    [facility] Adult Mental Health Day Treatment  TRACK: 2A    NUMBER OF PARTICIPANTS: 5          Data:    Session content: At the start of this group, patients were invited to check in by identifying themselves, describing their current emotional status, and identifying issues to address in this group.   Area(s) of treatment focus addressed in this session included Symptom Management, Personal Safety, Community Resources/Discharge Planning and Abstinence/Relapse Prevention.  Client reported being safe today.  Reported mood is ok.      Goal for today is to attend therapy groups. 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 Katie goes to another therapist and has services through  [employer].       Therapeutic Interventions/Treatment Strategies:  Psychotherapist offered support, feedback and validation and reinforced use of skills. Treatment modalities used include Cognitive Behavioral Therapy and Dialectical Behavioral Therapy. Interventions include Cognitive Restructuring:  Assisted patient in formulating new neutral/positive alternatives to challenge less helpful / ineffective thoughts, Coping Skills: Assisted patient in understanding the purpose of planning / creating / participating / sharing in positive experiences, Relapse Prevention: Assisted patient in identifying personal vulnerabilities, thoughts, emotions, and situations that may lead to relapse  and Mindfulness: Facilitated discussion of when/how to use mindfulness skills to benefit general health, mental health symptoms, and stressors.    Assessment:    Patient response:   Patient responded to session by being attentive, accepting support and appearing alert    Possible barriers to participation / learning include: severity of symptoms    Health Issues:   None reported       Substance Use Review:   Substance Use: No active concerns identified.    Mental Status/Behavioral Observations  Appearance:   Appropriate   Eye Contact:   Good   Psychomotor Behavior: Normal   Attitude:   Cooperative   Orientation:   All  Speech   Rate / Production: Normal    Volume:  Normal   Mood:    satisfied  Affect:    Constricted   Thought Content:   Rumination and Psychosis denies any symptoms of psychosis  Thought Form:  Coherent  Logical     Insight:    Good     Plan:     Safety Plan: Recommended that patient call 911 or go to the local ED should there be a change in any of these risk factors.     Barriers to treatment: None identified    Patient Contracts (see media tab):  None    Substance Use: Provided encouragement towards sobriety     Continue or Discharge: Patient will continue in Adult Day Treatment (ADT)  as planned. Patient is likely to  benefit from learning and using skills as they work toward the goals identified in their treatment plan.      Anais Cid PsyD  March 18, 2021  Tierney Solorzano, ADELAIDA,  Licensed Clinical Psychologist

## 2021-03-19 ENCOUNTER — TELEPHONE (OUTPATIENT)
Dept: BEHAVIORAL HEALTH | Facility: CLINIC | Age: 26
End: 2021-03-19

## 2021-03-19 NOTE — TELEPHONE ENCOUNTER
Writer spoke with patient and she agreed to transfer from a morning to an afternoon group because of some program changes effective Monday 3/22/21.

## 2021-03-22 ENCOUNTER — VIRTUAL VISIT (OUTPATIENT)
Dept: PSYCHIATRY | Facility: CLINIC | Age: 26
End: 2021-03-22
Attending: PSYCHIATRY & NEUROLOGY
Payer: COMMERCIAL

## 2021-03-22 ENCOUNTER — HOSPITAL ENCOUNTER (OUTPATIENT)
Dept: BEHAVIORAL HEALTH | Facility: CLINIC | Age: 26
End: 2021-03-22
Attending: PSYCHIATRY & NEUROLOGY
Payer: COMMERCIAL

## 2021-03-22 DIAGNOSIS — F20.9 SCHIZOPHRENIA, UNSPECIFIED TYPE (H): Primary | ICD-10-CM

## 2021-03-22 PROCEDURE — 90853 GROUP PSYCHOTHERAPY: CPT | Mod: GT | Performed by: PSYCHOLOGIST

## 2021-03-22 PROCEDURE — 99215 OFFICE O/P EST HI 40 MIN: CPT | Mod: 95 | Performed by: PSYCHIATRY & NEUROLOGY

## 2021-03-22 RX ORDER — ARIPIPRAZOLE 10 MG/1
10 TABLET ORAL DAILY
Qty: 90 TABLET | Refills: 3 | Status: SHIPPED | OUTPATIENT
Start: 2021-03-22

## 2021-03-22 ASSESSMENT — PAIN SCALES - GENERAL: PAINLEVEL: NO PAIN (0)

## 2021-03-22 NOTE — GROUP NOTE
Process Group Note  Telemedicine Visit: The patient's condition can be safely assessed and treated via synchronous audio and visual telemedicine encounter.    Reason for Telemedicine Visit: Patient has requested telehealth visit  Originating Site (Patient Location): Patient's home  Distant Site (Provider Location): [facility] Outpatient Setting: Adult Day Tx  Consent:  The patient/guardian has verbally consented to: the potential risks and benefits of telemedicine (video visit) versus in person care; bill my insurance or make self-payment for services provided; and responsibility for payment of non-covered services.   Mode of Communication:  Video Conference via [employer]  As the provider I attest to compliance with applicable laws and regulations related to telemedicine.    PATIENT'S NAME: Janey Givens  MRN:   [id number]  :   1995  ACCT. NUMBER: [id number]  DATE OF SERVICE: 3/22/21  START TIME:  1:00 PM  END TIME:  1:50 PM  FACILITATOR: Anais Cid PsyD  TOPIC:  Process Group    Diagnoses:    295.90 Schizophrenia    Psychiatry:: Ollie  Therapy:  Aleah Levin    [facility] Adult Mental Health Day Treatment  TRACK: 6B    NUMBER OF PARTICIPANTS: 6          Data:    Session content: At the start of this group, patients were invited to check in by identifying themselves, describing their current emotional status, and identifying issues to address in this group.   Area(s) of treatment focus addressed in this session included Symptom Management, Personal Safety and Community Resources/Discharge Planning.  Client reported being safe today.  Reported mood is ok.      Goal for today is to attend therapy.  The client talked to the group about her past weekend.  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  She reported that she will talk with her psychiatrist about an IM shot, rather  than pills.      Therapeutic Interventions/Treatment Strategies:  Psychotherapist offered support, feedback and validation and reinforced use of skills. Treatment modalities used include Cognitive Behavioral Therapy and Dialectical Behavioral Therapy. Interventions include Cognitive Restructuring:  Assisted patient in identifying new neutral/positive core beliefs, Coping Skills: Assisted patient in understanding the purpose of planning / creating / participating / sharing in positive experiences, Relapse Prevention: Coached on skills to manage factors that contribute to relapse and Mindfulness: Facilitated discussion of when/how to use mindfulness skills to benefit general health, mental health symptoms, and stressors.    Assessment:    Patient response:   Patient responded to session by giving feedback, listening and being attentive    Possible barriers to participation / learning include: severity of symptoms    Health Issues:   None reported       Substance Use Review:   Substance Use: No active concerns identified.    Mental Status/Behavioral Observations  Appearance:   Appropriate   Eye Contact:   Good   Psychomotor Behavior: Normal   Attitude:   Cooperative   Orientation:   All  Speech   Rate / Production: Normal    Volume:  Normal   Mood:    Anxious   Affect:    Constricted   Thought Content:   Rumination and Psychosis denies any symptoms of psychosis  Thought Form:  Coherent  Logical     Insight:    Good     Plan:     Safety Plan: Recommended that patient call 911 or go to the local ED should there be a change in any of these risk factors.     Barriers to treatment: None identified    Patient Contracts (see media tab):  None    Substance Use: Provided encouragement towards sobriety     Continue or Discharge: Patient will continue in Adult Day Treatment (ADT)  as planned. Patient is likely to benefit from learning and using skills as they work toward the goals identified in their treatment plan.      Anais JIMENEZ  Garry Cid  March 22, 2021  Courtney Solorzano., D,  Licensed Clinical Psychologist

## 2021-03-22 NOTE — PATIENT INSTRUCTIONS
**For crisis resources, please see the information at the end of this document**     Patient Education      Thank you for coming to the [facility] MENTAL HEALTH & ADDI[facility] [place] [facility].    Lab Testing:  If you had lab testing today and your results are reassuring or normal they will be mailed to you or sent through ZenDay within 7 days. If the lab tests need quick action we will call you with the results. The phone number we will call with results is # [phone] (home) . If this is not the best number please call our clinic and change the number.    Medication Refills:  If you need any refills please call your pharmacy and they will contact us. Our fax number for refills is [phone]. Please allow three business for refill processing. If you need to  your refill at a new pharmacy, please contact the new pharmacy directly. The new pharmacy will help you get your medications transferred.     Scheduling:  If you have any concerns about today's visit or wish to schedule another appointment please call our office during normal business hours [phone] (8-5:00 M-F)    Contact Us:  Please call [phone] during business hours (8-5:00 M-F).  If after clinic hours, or on the weekend, please call  [phone].    Financial Assistance [phone]  [facility] Billing [phone]  Central Billing Office, [facility]: [phone]  [facility] Billing [phone]  Medical Records [phone]  [facility] Patient Bill of Rights [email or web address]       MENTAL HEALTH CRISIS NUMBERS:  For a medical emergency please call  911 or go to the nearest ER.     [facility]:   [facility] -[phone]   Crisis Residence [facility] Residence -[phone]   Walk-In Counseling Center [facility] -[phone]   COPE 24/7 [place] Mobile Team -[phone] (adults)/[phone] (child)  CHILD: [facility] needs assessment  team - [phone]      [facility]:   [facility] - [phone]   Walk-in counseling [facility] - [phone]   Walk-in counseling [facility] - [phone]   Crisis Residence [facility] Residence - [phone]  Urgent Care Adult Mental [facility]-[phone] mobile unit/ 24/7 crisis line    National Crisis Numbers:   National Suicide Prevention Lifeline: 1-615-872-TALK (303-547-6986)  Poison Control Center - 3-549-515-8679  [employer]/resources for a list of additional resources (SOS)  Trans Lifeline a hotline for transgender people 1-658.901.4771  The [employer] a hotline for LGBT youth 4-686-811-2120  Crisis Text Line: For any crisis 24/7   To: 258870  see www.crisistextline.org  - IF MAKING A CALL FEELS TOO HARD, send a text!         Again thank you for choosing [facility] MENTAL HEALTH & ADDICTION [facility] and please let us know how we can best partner with you to improve you and your family's health.    You may be receiving a survey regarding this appointment. We would love to have your feedback, both positive and negative. The survey is done by an external company, so your answers are anonymous.

## 2021-03-22 NOTE — PROGRESS NOTES
"VIDEO VISIT  Janey Givens is a 26 year old patient who is being evaluated via a billable video visit.      The patient has been notified of following:   \"This video visit will be conducted via a call between you and your physician/provider. We have found that certain health care needs can be provided without the need for an in-person physical exam. This service lets us provide the care you need with a video conversation. If a prescription is necessary we can send it directly to your pharmacy. If lab work is needed we can place an order for that and you can then stop by our lab to have the test done at a later time. Insurers are generally covering virtual visits as they would in-office visits so billing should not be different than normal.  If for some reason you do get billed incorrectly, you should contact the billing office to correct it and that number is in the AVS .    Video Conference to be completed via:  DoubleVerify    Patient has given verbal consent for video visit?:  Yes    Patient would prefer that any video invitations be sent by: Send to e-mail at: [email or web address]      How would patient like to obtain AVS?:  Lang-8    AVS SmartPhrase [PsychAVS] has been placed in 'Patient Instructions':  Yes    PSYCHIATRY NAVIGATE CLINIC TRANSFER  NOTE        The initial diagnostic evaluation was on 11/1/2018 and the last transfer of care evaluation was 3/3/2020.    Video- Visit Details  Type of service:  video visit for medication management  Time of service:    Date:  03/22/2021    Video Start Time:  2:06 PM        Video End Time:  3:00 PM    Reason for video visit:  Patient unable to travel due to Covid-19  Originating Site (patient location):  [facility]   Location- Patient's home  Distant Site (provider location):  provider office  Mode of Communication:  Video Conference via AmWell  Consent:  Patient has given verbal consent for video visit?: Yes       INTERIM HISTORY                                       " "            Janey Givens is a 26 year old TG MTF with a hx of Schizophrenia who was last seen in clinic on 8/24/20 by Dr Francis at which time no changes were made as pt was transitioning out of clinic. Pt was seen alone and then with mom.     Since the last visit, pt states that she went to [place] for college in August and stopped taking medications after brief periods of  intermittent adherence did not lead to any resurgence of symptoms. States that she then stopped taking her meds about 6 months ago and had a psychotic episode in February, prompting a return to [place]. She has since resumed Abilify at 10 mg and is doing well with this,      Currently, mood is fine, feels a bit numb sometimes, but not low.  Denies voices, thought insertions or delusions.  Some worry about going back to school, hoping she can do this in May as she plans to do research over the summer. States that in March, she will be back to online class as she had to take a leave of medical absence.       Per mom, she notes concerns about patient going back to [place] and not adhering to medications, she has previously not been truthful about her pattern of adherence leading parents to frantically fly to [place] last month  when she got into an episode. Mom states they are worreid about this recurring and are thinking injectables may be the best route. Pt adds that she hasn't been given a chance - \" only had 1 strike\" and thinks she can commit to taking her oral  meds daily now that she is aware of the risks/dangers, however open to injectables but no identified provider in [place].. No safety concerns.     Social Updates (home/ school/ substance use):  Family relationships: good     School:   PHD student at [employer]    RECENT SYMPTOMS:   DEPRESSION:  reports-mood dysregulation;  DENIES- suicidal ideation, depressed mood, anhedonia, low energy, poor concentration /memory and excessive guilt  PSYCHOSIS:  reports-none;  DENIES- auditory hallucinations, visual " hallucinations and disorganized behavior  ANXIETY:  nervous/overwhelmed  TRAUMA RELATED:  intrusive memories, trauma trigger psychological / physiological response and negative beliefs / emotions  SLEEP:  good    EATING DISORDER: none    RECENT SUBSTANCE USE:     ALCOHOL- last 2 x in January         TOBACCO- last in November          CAFFEINE- 2-3 cups/week of coffee        OPIOIDS- none           NARCAN KIT- N/A    CANNABIS- last time November 2020         OTHER ILLICIT DRUGS- none     CURRENT SOCIAL HISTORY:  Financial Support- working.     Siblings- 3 siblings.     Living Situation- with family.      Social/Spiritual Support- family.     Feels Safe at Home- Yes.    MEDICAL ROS:  Reports A comprehensive review of systems was performed and is negative other than noted in the HPI..  Denies sedation, fatigue, headache, diaphoresis, dizziness.    SUBSTANCE USE HISTORY                                                                             Past Use- shrooms and LDS once, cannabis and alcohol  Treatment [#, most recent] - [facility] for 2 weeks following discharge from [facility] 2018; discharged after 2 weeks per Katie's report  Medical Consequences [withdrawal, sz etc] - none  HIV/Hepatitis- none  Legal Consequences- none    PSYCHIATRIC HISTORY     SIB [method, most recent]- none  Suicidal Ideation Hx [passive, active]- none  Suicide Attempt [#, recent, method]:   #- N/A   Most Recent- N/A    Violence/Aggression Hx- none  Psychosis Hx- hx of psychosis - Symptoms- reading minds, delusions and child memories.   Psych Hosp [ #, most recent, committed]- 2 x in the past 2017 and 2018  ECT [#, most recent]- none    Eating Disorder- none    Outpatient Programs [ DBT, Day Treatment, Eating Disorder Tx etc] : None    SOCIAL and FAMILY HISTORY                                          patient reported     Trauma History (self-report)- none  Legal- hx of committment  Social/Spiritual Support- family  Early History/Education-  Janey did meet developmental milestones on time. Milestones not met include N/A. Janey did not receive interventions for developmental delays. Interventions include N/A. 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[employer], [place]  Family Mental Health History-  Depression, anxiety and panic attacks    PAST PSYCH MED TRIALS     Zyprexa   Celexa    MEDICAL / SURGICAL HISTORY                                   CARE TEAM:          PCP- Dr Golden                   Therapist- Aleah Cerda      Patient Active Problem List   Diagnosis     Blood clot in vein     Gender dysphoria in adult     Acute psychosis (H)     Schizophrenia (H)       ALLERGY                                Banana  MEDICATIONS                               Current Outpatient Medications   Medication Sig Dispense Refill     ARIPiprazole (ABILIFY) 10 MG tablet Take 1 tablet (10 mg) by mouth daily 90 tablet 1     estradiol (VIVELLE-DOT) 0.1 MG/24HR bi-weekly patch Place 2 patches onto the skin twice a week 16 patch 0     spironolactone (ALDACTONE) 100 MG tablet Take 2 tablets (200 mg) by mouth daily 60 tablet 5       VITALS   There were no vitals taken for this visit.   MENTAL STATUS EXAM                                                             Alertness: alert  and oriented  Appearance: well groomed and casually groomed  Behavior/Demeanor: cooperative, pleasant and calm, with good  eye contact   Speech: regular rate and rhythm  Language: intact and no problems  Psychomotor: normal or unremarkable  Mood: worried  Affect: restricted and appropriate; was congruent to mood; was congruent to content  Thought Process/Associations: unremarkable  Thought Content:  Reports none;  Denies suicidal and violent ideation and delusions  Perception:  Reports none;  Denies auditory hallucinations and visual hallucinations  Insight: fair  Judgment: fair and adequate for safety  Cognition: does  appear grossly intact; formal cognitive testing was not  done    LABS and DATA       PHQ9 TODAY = N/A  PHQ 8/24/2020 2/18/2021 2/19/2021   PHQ-9 Total Score 1 15 18   Q9: Thoughts of better off dead/self-harm past 2 weeks Not at all Several days Several days         PSYCHIATRIC DIAGNOSES                                                                                                     ASSESSMENT                                   Janey Givens is a 24 year old TG female with a diagnosis of Schizophrenia who presents for a NAVIGATE transfer of care appt. There is a  genetic loading for anxiety and mood. Patient's polysubstance use preceded the onset of psychosis and include cannabis.  She has had 3 prior hospitalizations for psychosis, first in 2017 in the context of substance use, which rapidly remitted. Second was in the context of non - adherence to psychotropics - was associated with a more pronounced paranoid psychosis and catatonic features and did not rapidly remit. Most recent clinical decompensation in Feb 2021 for similar reasons. 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 Patient currently stable on Abilify 10 mg. She is engaged  and motivated for treatment.     TODAY, meet with patient for a transfer appt. Most of the visit was spent establishing a therapeutic relationship, verifying clinical hx, obtaining collateral and making preliminary treatment assessments. Provide psycho-education, validation and support - discussion today centered on plan for medications and possible transition to Injectables due to non-adherence. Highlighted pros and cons to this especially without plan for medication management in [place]. As this is a first meeting, also challenging to provide input to this discussion, more logistical considerations would be pertinent. Patient is also wanting a second chance at improving adherence to  oral medications 2/2 improved insight, however cannot discount family's fears and distress with recent psychotic episode - open honest communication would be important to re-gain trust. Will discuss further with team on these concerns and also have family look into potential resources in [place]. In the meantime, will continue Abilify at 10 mg daily. No safety concerns today                            PLAN                                                                                                       1) MEDICATION:      - Continue Abilify 10 mg daily         2) THERAPY:  Continue    3) LABS NEXT DUE:  yes, around May 2021       RATING SCALES:     N/A    4) REFERRALS [CD, medical, other]:  none    5) :  none    6) RTC: 6 weeks    7) CRISIS NUMBERS: Provided in AVS today  Crisis Text Line for any crisis 24/7 send this-   To: [id number]   [facility] ([facility]  [phone]      TREATMENT RISK STATEMENT:  The risks, benefits, alternatives and potential adverse effects have been discussed and are understood by the patient/ patient's guardian. The pt understands the risks of using street drugs or alcohol.  There are no medical contraindications, the pt agrees to treatment with the ability to do so.  The patient understands to call 911 or come to the nearest ED if life threatening or urgent symptoms present.          PROVIDER  Brandi Holt MD

## 2021-03-22 NOTE — GROUP NOTE
Psychoeducation Group Note    PATIENT'S NAME: Janey Givens  MRN:   [id number]  :   1995  ACCT. NUMBER: [id number]  DATE OF SERVICE: 3/22/21  START TIME:  2:00 PM  END TIME:  2:50 PM  FACILITATOR: Olga Peterson RN  TOPIC: MH RN Group: Health Maintenance  Telemedicine Visit: The patient's condition can be safely assessed and treated via synchronous audio and visual telemedicine encounter.      Reason for Telemedicine Visit:  covid19    Originating Site (Patient Location): Patient's home    Distant Site (Provider Location): Provider Remote Setting    Consent:  The patient/guardian has verbally consented to: the potential risks and benefits of telemedicine (video visit) versus in person care; bill my insurance or make self-payment for services provided; and responsibility for payment of non-covered services.     Mode of Communication:  Video Conference via Zoom    As the provider I attest to compliance with applicable laws and regulations related to telemedicine.      [facility] Mental Health Day Treatment  TRACK: 6B    NUMBER OF PARTICIPANTS:      Summary of Group / Topics Discussed:  Health Maintenance: Goal Setting: Meaningful goals can bring a sense of direction and purpose in life.  They also highlight our most important values. Patients were assisted by instructor to identify short term goals to promote their mental health recovery and improve overall health and wellness. Patients were educated on SMART goal setting framework as a strategy to increase outcomes and promote success.    Patient Session Goals / Objectives:  Explained the key concepts of SMART goal setting framework  Identified three goals successfully using SMART goal setting framework  Reviewed concept of balance in wellness as it pertains to goal setting        Patient Participation / Response:  {OP MH PROGRESS NOTE PATIENT PARTICIPATION:160736}    {OP MH RN GROUP NOTE HEALTH MAINTENANCE:528460}    Treatment Plan:  Patient  has {OP  PROGRAMMATIC TX PLAN STATUS:577533}    Olga Peterson RN

## 2021-03-23 ENCOUNTER — VIRTUAL VISIT (OUTPATIENT)
Dept: PSYCHIATRY | Facility: CLINIC | Age: 26
End: 2021-03-23
Payer: COMMERCIAL

## 2021-03-23 ENCOUNTER — OFFICE VISIT (OUTPATIENT)
Dept: PSYCHIATRY | Facility: CLINIC | Age: 26
End: 2021-03-23
Payer: COMMERCIAL

## 2021-03-23 DIAGNOSIS — F20.9 SCHIZOPHRENIA, UNSPECIFIED TYPE (H): Primary | ICD-10-CM

## 2021-03-24 ENCOUNTER — VIRTUAL VISIT (OUTPATIENT)
Dept: PSYCHIATRY | Facility: CLINIC | Age: 26
End: 2021-03-24
Payer: COMMERCIAL

## 2021-03-24 DIAGNOSIS — F20.3 UNDIFFERENTIATED SCHIZOPHRENIA (H): Primary | ICD-10-CM

## 2021-03-25 ENCOUNTER — TELEPHONE (OUTPATIENT)
Dept: BEHAVIORAL HEALTH | Facility: CLINIC | Age: 26
End: 2021-03-25

## 2021-03-25 ASSESSMENT — ANXIETY QUESTIONNAIRES
2. NOT BEING ABLE TO STOP OR CONTROL WORRYING: NOT AT ALL
6. BECOMING EASILY ANNOYED OR IRRITABLE: NOT AT ALL
3. WORRYING TOO MUCH ABOUT DIFFERENT THINGS: NOT AT ALL
GAD7 TOTAL SCORE: 0
5. BEING SO RESTLESS THAT IT IS HARD TO SIT STILL: NOT AT ALL
IF YOU CHECKED OFF ANY PROBLEMS ON THIS QUESTIONNAIRE, HOW DIFFICULT HAVE THESE PROBLEMS MADE IT FOR YOU TO DO YOUR WORK, TAKE CARE OF THINGS AT HOME, OR GET ALONG WITH OTHER PEOPLE: NOT DIFFICULT AT ALL
1. FEELING NERVOUS, ANXIOUS, OR ON EDGE: NOT AT ALL
7. FEELING AFRAID AS IF SOMETHING AWFUL MIGHT HAPPEN: NOT AT ALL

## 2021-03-25 ASSESSMENT — PATIENT HEALTH QUESTIONNAIRE - PHQ9
SUM OF ALL RESPONSES TO PHQ QUESTIONS 1-9: 2
5. POOR APPETITE OR OVEREATING: NOT AT ALL

## 2021-03-25 NOTE — TELEPHONE ENCOUNTER
Phone call to Katie and left a message, as she was in group  For part of the time, but left. Coordinate care, left main phone #   For her.    Courtney Solorzano., D,  Licensed Clinical Psychologist

## 2021-03-26 ASSESSMENT — ANXIETY QUESTIONNAIRES: GAD7 TOTAL SCORE: 0

## 2021-03-29 ENCOUNTER — TELEPHONE (OUTPATIENT)
Dept: PSYCHIATRY | Facility: CLINIC | Age: 26
End: 2021-03-29

## 2021-03-29 ENCOUNTER — HOSPITAL ENCOUNTER (OUTPATIENT)
Dept: BEHAVIORAL HEALTH | Facility: CLINIC | Age: 26
End: 2021-03-29
Attending: PSYCHIATRY & NEUROLOGY
Payer: COMMERCIAL

## 2021-03-29 ENCOUNTER — TELEPHONE (OUTPATIENT)
Dept: BEHAVIORAL HEALTH | Facility: CLINIC | Age: 26
End: 2021-03-29

## 2021-03-29 PROCEDURE — 90853 GROUP PSYCHOTHERAPY: CPT | Mod: GT

## 2021-03-29 PROCEDURE — G0177 OPPS/PHP; TRAIN & EDUC SERV: HCPCS | Mod: GT

## 2021-03-29 NOTE — TELEPHONE ENCOUNTER
NAVIGATE SEE Email Communication  For Supported Employment & Education    NAVIGATE Enrollee: Janey Givens (1995)     MRN: [id number]  Date of Email: 3/29/2021  Contacted: Katie     Discussed:     jose [facility] <[email or web address]  10:00 AM (0 minutes ago)  to Janey Wilson,  It looks like the letter was electronically signed and can be accessed in [facility] under Letters. Let me know if you have any trouble accessing it! Hope you're well.       Ame Borrero

## 2021-03-29 NOTE — GROUP NOTE
Psychoeducation Group Note    PATIENT'S NAME: Janey Givens  MRN:   [id number]  :   1995  ACCT. NUMBER: [id number]  DATE OF SERVICE: 3/29/21  START TIME:  1:00 PM  END TIME:  1:50 PM  FACILITATOR: Olga Peterson RN  TOPIC:  RN Group: Health Maintenance  Telemedicine Visit: The patient's condition can be safely assessed and treated via synchronous audio and visual telemedicine encounter.      Reason for Telemedicine Visit:  covid19    Originating Site (Patient Location): Patient's home    Distant Site (Provider Location): Provider Remote Setting    Consent:  The patient/guardian has verbally consented to: the potential risks and benefits of telemedicine (video visit) versus in person care; bill my insurance or make self-payment for services provided; and responsibility for payment of non-covered services.     Mode of Communication:  Video Conference via zoom    As the provider I attest to compliance with applicable laws and regulations related to telemedicine.      [facility] Mental Health Day Treatment  TRACK: 6B    NUMBER OF PARTICIPANTS:  4    Summary of Group / Topics Discussed:  Health Maintenance: Goal Setting: Meaningful goals can bring a sense of direction and purpose in life.  They also highlight our most important values. Patients were assisted by instructor to identify short term goals to promote their mental health recovery and improve overall health and wellness. Patients were educated on SMART goal setting framework as a strategy to increase outcomes and promote success.    Patient Session Goals / Objectives:  ? Explained the key concepts of SMART goal setting framework  ? Identified three goals successfully using SMART goal setting framework  ? Reviewed concept of balance in wellness as it pertains to goal setting        Patient Participation / Response:  Moderately participated, sharing some personal reflections / insights and adequately adequately received / provided  feedback with other participants.    Verbalized understanding of health maintenance topic    Treatment Plan:  Patient has See Epic Treatment Plan - Patient is discharging.    Olga Peterson RN

## 2021-03-29 NOTE — TELEPHONE ENCOUNTER
Writer called Pt this afternoon after Pt left the second hour group.  Pt had also shared in the 1:00 hour that was starting classes this week and so would be unable to attend two of the three program days.    Pt stated to this writer that was true and also felt ready to discharge.  They stated they liked the group better when it was  by younger and then older peers.  They stated today another peer in the group made a comment about pronouns and Pt felt targeted.  They said it wasn't directly directed at them, but they are tired of bigots and opinions.  They felt that this program staff did not and could not have done anything differently in regards to this particular concern.    They will continue to be in the Navigate program and will follow up with providers there.  No other referrals are needed.  Explained will send discharge instructions through [facility] and also will send assessments.  Asking Pt to complete them.    Pt stated they were safe with no thoughts of harming themselves.      Will inform team of pt's discharge effective today.

## 2021-03-31 ENCOUNTER — VIRTUAL VISIT (OUTPATIENT)
Dept: PSYCHIATRY | Facility: CLINIC | Age: 26
End: 2021-03-31
Payer: COMMERCIAL

## 2021-03-31 DIAGNOSIS — F29 PSYCHOSIS, UNSPECIFIED PSYCHOSIS TYPE (H): Primary | ICD-10-CM

## 2021-03-31 DIAGNOSIS — F20.9 SCHIZOPHRENIA, UNSPECIFIED TYPE (H): Primary | ICD-10-CM

## 2021-03-31 NOTE — PROGRESS NOTES
[facility] Clinician Contact & Progress Note  For Individual Resiliency Training (IRT)  A Part of the [facility] First Episode of Psychosis Program    NAVIGATE Enrollee: Janey Givens (1995)     MRN: [id number]  Date:  3/31/21  Diagnosis: Schizophrenia, unspecified (F20.9)  Clinician: [facility]MEN Individual Resiliency Trainer, LEEROY See     1. Type of contact: (majority of time spent)  IRT Session via telehealth  Mode of communication: [employer] Well (HIPAA compliant, secure platform). Patient consented verbally to this mode of therapy today.  Reason for telehealth: COVID-19. This patient visit was converted to a telehealth visit to minimize exposure to COVID-19.    2. People present:   Writer  Client: Yes    3. Length of Actual Contact: Start Time: 1:00; End Time: 2:01     4. Location of contact:  Originating Location (patient location): Their home, located in [place], [place]  Distant Location (provider location): Home office, located in [place], [place], using appropriate privacy considerations and procedures    5. Did the client complete the home practice option(s) from the previous session: Completed    6. Motivational Teaching Strategies:  Connect info and skills with personal goals  Promote hope and positive expectations  Explore pros and cons of change  Re-frame experiences in positive light    7. Educational Teaching Strategies:  Review of written material/education  Relate information to client's experience  Ask questions to check comprehension  Break down information into small chunks  Adopt client's language     8. CBT Teaching Strategies:  Reinforcement and shaping (positive feedback for steps towards goals, gains in knowledge & skills and follow-through on home assignments)    9. IRT Module(s) Addressed:  Module 6- Processing the Psychotic Episode    10. Techniques utilized:   [place] announced at beginning of session  Review of homework  Review of goal  Review of previous  meeting  Present new material  Problem-solving practice  Help client choose a home practice option  Summarize progress made in current session    11. Measures:    Mental Status Exam  Alertness: alert  and oriented  Behavior/Demeanor: cooperative and pleasant  Speech: regular rate and rhythm  Language: intact.   Mood: depressed  Thought Process/Associations: unremarkable  Thought Content:  Reports preoccupations;  Denies suicidal and violent ideation  Perception:  Reports none;  Denies auditory hallucinations and visual hallucinations  Insight: good  Judgment: good  Cognition: does  appear grossly intact; formal cognitive testing was not done    PHQ-9  Over the last 2 weeks, how often have you been bothered by any the following problems?    1. Little interest or pleasure in doing things: 0 - Not at all  2. Feeling down, depressed, or hopeless: 0 - Not at all  3. Trouble falling or staying asleep, or sleeping too much: 0 - Not at all  4. Feeling tired or having little energy: 0 - Not at all  5. Poor appetite or overeatin - Several days  6. Feeling bad about yourself - or that you are a failure or have let yourself or your family down: 0 - Not at all  7. Trouble concentrating on things, such as reading the newspaper or watching television: 0 - Not at all  8. Moving or speaking so slowly that other people could have noticed. Or the opposite-being fidgety or restless that you have been moving around a lot more than usual: 0 - Not at all  9. Thoughts that you would be better off dead, or of hurting yourself in some way: 0 - Not at all    If you checked off any problems, how difficulty have these problems made it for you to do your work, take care of things at home, or get along with other people? Not difficult at all    [place] Protocol Risk Identification  1) Have you wished you were dead or wished you could go to sleep and not wake up? No  2) Have you actually had any thoughts about killing yourself? No  If YES to  2, answer questions 3, 4, 5, 6  If NO to 2, go directly to question 6  3) Have you thought about how you might do this? No  4) Have you had any intension of acting on these thoughts of killing yourself, as opposed to you have the thoughts but you definitely would not act on them? No  5) Have you started to work out or worked out the details of how to kill yourself? Do you intend to carry out this plan? No  Always Ask Question 6  6) Have you done anything, started to do anything, or prepared to do anything to end your life? No  Examples: collected pills, obtained a gun, gave away valuables, wrote a will or suicide note, held a gun but changed your mind, cut yourself, tried to hang yourself, etc.    12. Assessment/Progress Note:     This writer met with Katie for a follow-up IRT session. Katie denied any concerns with symptoms at this time. Katie stated she is not continuing with day treatment, as clients were making comments about individuals who are transgender. Katie noted the staff could not have done anything more to make the environment safe. She stated she has given feedback to the day treatment staff. This writer assisted Katie in processing thoughts, emotions, and reactions to the statements. During the remainder of the session, this writer and Katie continued discussing Module 6: Processing the Episode. Katie reviewed her experiences within treatment facilities or hospitalizations. She recalled watching a video about ECT at the hospital, leading to fear of having to complete this. She mentioned she was tied down and secluded after worry caused her to try to leave the hospital. She discussed side effects of medications, feeling numb and emotionless. This writer validated her experiences and reactions to the events. This writer and Katie reviewed her responses to the PCL-5. She marked avoidance of memories and reminders of the episodes. Katie reflected on previous inability to talk about the previous episodes and noted she is  "ready to write her story. This writer and Katie will begin writing her narrative during the next session.     13. Plan/Referrals:     This writer and Katie will continue with Module 6: Processing the Episode, using narrative therapy to process memories, thoughts, and emotions related to her episode.     Billing for \"Interactive Complexity\"?    No      LEEROY See Individual Resiliency Trainer  "

## 2021-03-31 NOTE — PROGRESS NOTES
[facility] Clinician Contact & Progress Note  For Individual Resiliency Training (IRT)  A Part of the [facility] First Episode of Psychosis Program    NAVIGATE Enrollee: Janey Givens (1995)     MRN: [id number]  Date:  3/23/21  Diagnosis: Schizophrenia, unspecified (F20.9)  Clinician: CARMEN Individual Resiliency Trainer, LEEROY See     1. Type of contact: (majority of time spent)  IRT Session    2. People present:   Writer  Client: Janey Givens    3. Length of Actual Contact: Start Time: 1:02; End Time: 2:05   Traveled?    No     4. Location of contact:  [facility] [place]    5. Did the client complete the home practice option(s) from the previous session: Completed    6. Motivational Teaching Strategies:  Connect info and skills with personal goals  Promote hope and positive expectations  Explore pros and cons of change    7. Educational Teaching Strategies:  Review of written material/education  Relate information to client's experience  Ask questions to check comprehension  Break down information into small chunks  Adopt client's language     8. CBT Teaching Strategies:  Reinforcement and shaping (positive feedback for steps towards goals, gains in knowledge & skills and follow-through on home assignments)  Cognitive restructuring (identify thoughts related to negative feelings)    9. IRT Module(s) Addressed:  Module 5 - Relapse Prevention Planning  Module 6 - Processing the Psychotic Episode    10. Techniques utilized:   [place] announced at beginning of session  Review of homework  Review of goal  Review of previous meeting  Present new material  Help client choose a home practice option  Summarize progress made in current session    11. Mental Status Exam:    Alertness: alert  and oriented  Appearance: casually groomed  Behavior/Demeanor: cooperative and pleasant, with good  eye contact   Speech: regular rate and rhythm  Language: intact. Preferred language identified as  "English.  Psychomotor: normal or unremarkable  Mood: description consistent with euthymia  Affect: appropriate; was congruent to mood; was congruent to content  Thought Process/Associations: unremarkable  Thought Content:  Reports delusions;  Denies suicidal and violent ideation  Perception:  Reports none;  Denies auditory hallucinations  Insight: adequate  Judgment: adequate for safety  Cognition: does  appear grossly intact; formal cognitive testing was not done  Suicidal ideation: denies SI, denies intent,  and denies plan  Homicidal Ideation: denies    12. Assessment/Progress Note:     This writer met with Katie for a follow-up IRT session. 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 She stated she will be continuing with oral medications when returning to [place]. She discussed her parents' hope for an injection, to limit barriers. Katie expressed confidence in taking medications routinely, and stated they have helped her symptoms. This writer reinforced her parents' concern for her wellbeing and Katie's consistent adherence in limiting relapses. Throughout the remainder of the session, this writer and Katie began Module 6: Processing the Episode. Katie identified specific distressing symptoms during previous psychosis episodes. She described ideas of reference, paranoia, grandiosity, and hallucinations. This writer assisted her in processing thoughts, emotions, and reactions. Katie and this writer collaborated on a home practice opportunity; Katie agreed to complete the PCL-5 assessment prior to the next session.     13. Plan/Referrals:     This writer and Katie will continue with Module 6: Processing the Episode, using narrative therapy to process memories, thoughts, and emotions related to her episode.     Billing for \"Interactive Complexity\"?    No      LEEROY See Individual Resiliency Trainer  "

## 2021-04-01 NOTE — PROGRESS NOTES
[facility]IG[facility] Clinician Contact & Progress Note   For Family Education Program    NAVIGATE Enrollee: Janey Givens (1995)     MRN: [id number]  Date:  3/31/21  Diagnosis(es):   Unspecified psychosis  Clinician: CARMEN Family Clinician, JAE Onofre     1. Type of contact: (majority of time spent)  Family Session via telehealth  Mode of communication: umn zoom. Family consented verbally to this mode of therapy today.  Reason for telehealth: COVID-19. This patient visit was converted to a telehealth visit to minimize exposure to COVID-19.    2. People present:   Writer  Client: No  Significant Other/Family/Friend:  Mother and Father    3. Length of Actual Contact: Start Time: 5 pm; End Time: 5:50 pm     4. Location of contact:  Originating Location (patient location): home, located in [place], [place]  Distant Location (provider location): Home office, located in [place], [place], using appropriate privacy considerations and procedures    5. Did the client complete the home practice option(s) from the previous session: Not Applicable    9. Psychoeducational Topic(s) Addressed:  Just the Facts - Coping with Stress and Just the Facts - A Relative's Guide to Supporting Recovery from Psychosis    10. Techniques utilized:   Problem-solving practice    11. Assessment/Progress Note:     Writer and parents met for weekly [facility] family session. Mother reports that she and pt recently got into a verbal altercation but mother was upset that pt could not recognize the impact that pt's recent choices and behaviors have had on mother. With encouragement from writer, mother eventually agreed that she was looking for validation from pt. Writer and parents discussed the feasibility of receiving validation from pt at this point in time, or perhaps at all. Writer then introduced the concept of radical acceptance, discussed pain vs. suffering, and encouraged parents to decrease their own suffering by beginning  "to accept pt's current state, as well as the state of the parent/child relationship. Parents recognize that accepting current circumstances might relieve them of some of the suffering they have endured as a result of holding onto expectations regarding the way things should be with pt and family at this point in time. Writer and parents also discussed their regular use of the word \"manipulative\" to discuss pt's tendency to redirect conflicts with parents to discussions about pt's transition. Writer reframed pt's behaviors as pt attempting to address her own unmet needs, as opposed to \"manipulating the conversation because she is a narcissist.\" Father agreed to consider the re-frame. Writer challenged father to accept pt's actions to redirect the conversation. Father acknowledged that accepting her behavior as opposed to resisting it might be beneficial. Writer assigned HW of using opposite-action, that is, responding to pt in ways that are opposite to what they might feel inclined to do otherwise.       12. Plan/Referrals:     Will meet with family weekly as schedule allows for evidence based family psychoeducation and therapeutic support aimed at maximizing Janey's opportunity for recovery from psychosis.     The author of this note documented a reason for not sharing it with the patient.          JAE Onofre   NAVIGATE     "

## 2021-04-05 NOTE — ADDENDUM NOTE
Encounter addended by: Anais Cid PsyD on: 4/5/2021 4:21 PM   Actions taken: Order Reconciliation Section accessed

## 2021-04-06 ENCOUNTER — OFFICE VISIT (OUTPATIENT)
Dept: PSYCHIATRY | Facility: CLINIC | Age: 26
End: 2021-04-06
Payer: COMMERCIAL

## 2021-04-06 DIAGNOSIS — F29 PSYCHOSIS, UNSPECIFIED PSYCHOSIS TYPE (H): Primary | ICD-10-CM

## 2021-04-14 ENCOUNTER — VIRTUAL VISIT (OUTPATIENT)
Dept: PSYCHIATRY | Facility: CLINIC | Age: 26
End: 2021-04-14
Payer: COMMERCIAL

## 2021-04-14 DIAGNOSIS — F20.3 UNDIFFERENTIATED SCHIZOPHRENIA (H): Primary | ICD-10-CM

## 2021-04-14 DIAGNOSIS — F29 PSYCHOSIS, UNSPECIFIED PSYCHOSIS TYPE (H): Primary | ICD-10-CM

## 2021-04-14 NOTE — PROGRESS NOTES
C[facility] Clinician Contact & Progress Note  For Individual Resiliency Training (IRT)  A Part of the [facility] First Episode of Psychosis Program    NAVIGATE Enrollee: Janey Givens (1995)     MRN: [id number]  Date:  4/14/21  Diagnosis: Psychosis, unspecified (F29)  Clinician: CARMEN Individual Resiliency Trainer, LEEROY See     1. Type of contact: (majority of time spent)  IRT Session via telehealth  Mode of communication: [employer] Well (HIPAA compliant, secure platform). Patient consented verbally to this mode of therapy today.  Reason for telehealth: COVID-19. This patient visit was converted to a telehealth visit to minimize exposure to COVID-19.    2. People present:   Writer  Client: Yes     3. Length of Actual Contact: Start Time: 1:01; End Time: 1:56     4. Location of contact:  Originating Location (patient location): Their home, located in [place], [place]  Distant Location (provider location): Home office, located in [place], [place], using appropriate privacy considerations and procedures    5. Did the client complete the home practice option(s) from the previous session: Completed    6. Motivational Teaching Strategies:  Connect info and skills with personal goals  Promote hope and positive expectations  Explore pros and cons of change  Re-frame experiences in positive light    7. Educational Teaching Strategies:  Review of written material/education  Relate information to client's experience  Ask questions to check comprehension  Break down information into small chunks  Adopt client's language     8. CBT Teaching Strategies:  Reinforcement and shaping (positive feedback for steps towards goals, gains in knowledge & skills and follow-through on home assignments)  Relapse prevention planning (review of stressors)    9. IRT Module(s) Addressed:  Module 4 - Relapse Prevention Planning  Module 8 - Dealing with Negative Feelings    10. Techniques utilized:   [place] announced at  beginning of session  Review of homework  Review of goal  Review of previous meeting  Present new material  Problem-solving practice  Help client choose a home practice option  Summarize progress made in current session    11. Measures:    Mental Status Exam  Alertness: alert  and oriented  Behavior/Demeanor: cooperative and pleasant  Speech: regular rate and rhythm  Language: intact.   Mood: depressed  Thought Process/Associations: perseverative  Thought Content:  Reports preoccupations;  Denies suicidal and violent ideation  Perception:  Reports none;  Denies auditory hallucinations  Insight: adequate  Judgment: adequate for safety  Cognition: does  appear grossly intact; formal cognitive testing was not done    PHQ-9  Over the last 2 weeks, how often have you been bothered by any the following problems?    1. Little interest or pleasure in doing things: 0 - Not at all  2. Feeling down, depressed, or hopeless: 0 - Not at all  3. Trouble falling or staying asleep, or sleeping too much: 0 - Not at all  4. Feeling tired or having little energy: 0 - Not at all  5. Poor appetite or overeatin - Not at all  6. Feeling bad about yourself - or that you are a failure or have let yourself or your family down: 0 - Not at all  7. Trouble concentrating on things, such as reading the newspaper or watching television: 0 - Not at all  8. Moving or speaking so slowly that other people could have noticed. Or the opposite-being fidgety or restless that you have been moving around a lot more than usual: 1 - Several days  9. Thoughts that you would be better off dead, or of hurting yourself in some way: 0 - Not at all    If you checked off any problems, how difficulty have these problems made it for you to do your work, take care of things at home, or get along with other people? Not difficult at all    [place] Protocol Risk Identification  1) Have you wished you were dead or wished you could go to sleep and not wake up? No  2)  Have you actually had any thoughts about killing yourself? No  If YES to 2, answer questions 3, 4, 5, 6  If NO to 2, go directly to question 6  3) Have you thought about how you might do this? No  4) Have you had any intension of acting on these thoughts of killing yourself, as opposed to you have the thoughts but you definitely would not act on them? No  5) Have you started to work out or worked out the details of how to kill yourself? Do you intend to carry out this plan? No  Always Ask Question 6  6) Have you done anything, started to do anything, or prepared to do anything to end your life? No  Examples: collected pills, obtained a gun, gave away valuables, wrote a will or suicide note, held a gun but changed your mind, cut yourself, tried to hang yourself, etc.    12. Assessment/Progress Note:     This writer met with Katie for a follow-up IRT session. Katie stated she will be moving to [place] in early May and has secured an appointment with a psychiatrist in [place]. This writer praised Katie for planning ahead and setting this up. This writer and Katie discussed therapists in her area. Katie inquired about the program similar to [facility] in [place]; this writer discussed enrollment ineligibility due to length of treatment. This writer will provide a list of resources during the next visit. Katie requested to talk further about a wellness plan. 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 She noted she was triggered by the environment in [place]. She stated she would like to have a different outcome if future episodes occur. This writer reinforced the importance of developing a plan she feels comfortable with, while also emphasizing social support and a reduction in stress. She stated medications are important and reflected on how they have been helpful. This writer and Katie reviewed the stress vulnerability model. This writer  "offered to assist Katie in developing a structured wellness plan during the next session, with the goal of sharing it with parents in a family session. Katie was in agreement with this plan.     13. Plan/Referrals:     This writer will assist Katie in developing a detailed wellness plan prior to her discharge date.     Billing for \"Interactive Complexity\"?    No      LEEROY See    NAVIGATE Individual Resiliency Trainer  "

## 2021-04-23 ENCOUNTER — VIRTUAL VISIT (OUTPATIENT)
Dept: PSYCHIATRY | Facility: CLINIC | Age: 26
End: 2021-04-23
Payer: COMMERCIAL

## 2021-04-23 DIAGNOSIS — F29 PSYCHOSIS, UNSPECIFIED PSYCHOSIS TYPE (H): Primary | ICD-10-CM

## 2021-04-23 NOTE — PROGRESS NOTES
CARMEN Clinician Contact & Progress Note  For Individual Resiliency Training (IRT)  A Part of the [facility] First Episode of Psychosis Program    NAVIGATE Enrollee: Janey Givens (1995)     MRN: [id number]  Date:  4/23/21  Diagnosis: Psychosis, unspecified (F29)  Clinician: CARMEN Individual Resiliency Trainer, LEEROY See     1. Type of contact: (majority of time spent)  IRT Session via telehealth  Mode of communication: [employer] (HIPAA compliant, secure platform). Patient consented verbally to this mode of therapy today.  Reason for telehealth: COVID-19. This patient visit was converted to a telehealth visit to minimize exposure to COVID-19.    2. People present:   Writer  Client: Yes     3. Length of Actual Contact: Start Time: 11:00; End Time: 11:51     4. Location of contact:  Originating Location (patient location): Their home, located in [place], [place]  Distant Location (provider location): Home office, located in [place], [place], using appropriate privacy considerations and procedures    5. Did the client complete the home practice option(s) from the previous session: Completed    6. Motivational Teaching Strategies:  Connect info and skills with personal goals  Promote hope and positive expectations  Explore pros and cons of change  Re-frame experiences in positive light    7. Educational Teaching Strategies:  Review of written material/education  Relate information to client's experience  Ask questions to check comprehension    8. CBT Teaching Strategies:  Reinforcement and shaping (positive feedback for steps towards goals, gains in knowledge & skills and follow-through on home assignments)  Relapse prevention planning (review of stressors, early warning signs and written plan to respond to signs)    9. IRT Module(s) Addressed:  Module 4 - Relapse Prevention Planning    10. Techniques utilized:   [place] announced at beginning of session  Review of homework  Review of  goal  Review of previous meeting  Present new material  Problem-solving practice  Help client choose a home practice option  Summarize progress made in current session    11. Measures:    Mental Status Exam  Alertness: alert  and oriented  Behavior/Demeanor: cooperative and pleasant  Speech: regular rate and rhythm  Language: intact.   Mood: depressed  Thought Process/Associations: unremarkable  Thought Content:  Reports preoccupations;  Denies suicidal and violent ideation  Perception:  Reports none;  Denies auditory hallucinations  Insight: good  Judgment: good  Cognition: does  appear grossly intact; formal cognitive testing was not done    KELSEY-7  Over the last 2 weeks, how often have you been bothered by the following problems?    1. Feeling nervous, anxious or on edge: 1 - Several days  2. Not being able to stop or control worryin - Not at all  3. Worrying too much about different things: 0 - Not at all  4. Trouble relaxin - Several days  5. Being so restless that it is hard to sit still: 0 - Not at all  6. Becoming easily annoyed or irritable: 1 - Several days  7. Feeling afraid as if something awful might happen: 0 - Not at all    PHQ-9  Over the last 2 weeks, how often have you been bothered by any the following problems?    1. Little interest or pleasure in doing things: 0 - Not at all  2. Feeling down, depressed, or hopeless: 0 - Not at all  3. Trouble falling or staying asleep, or sleeping too much: 0 - Not at all  4. Feeling tired or having little energy: 0 - Not at all  5. Poor appetite or overeatin - Several days  6. Feeling bad about yourself - or that you are a failure or have let yourself or your family down: 0 - Not at all  7. Trouble concentrating on things, such as reading the newspaper or watching television: 0 - Not at all  8. Moving or speaking so slowly that other people could have noticed. Or the opposite-being fidgety or restless that you have been moving around a lot more than  usual: 0 - Not at all  9. Thoughts that you would be better off dead, or of hurting yourself in some way: 0 - Not at all    If you checked off any problems, how difficulty have these problems made it for you to do your work, take care of things at home, or get along with other people? Not difficult at all    [place] Protocol Risk Identification  1) Have you wished you were dead or wished you could go to sleep and not wake up? No  2) Have you actually had any thoughts about killing yourself? No  If YES to 2, answer questions 3, 4, 5, 6  If NO to 2, go directly to question 6  3) Have you thought about how you might do this? N/A  4) Have you had any intension of acting on these thoughts of killing yourself, as opposed to you have the thoughts but you definitely would not act on them? N/A  5) Have you started to work out or worked out the details of how to kill yourself? Do you intend to carry out this plan? N/A  Always Ask Question 6  6) Have you done anything, started to do anything, or prepared to do anything to end your life? No  Examples: collected pills, obtained a gun, gave away valuables, wrote a will or suicide note, held a gun but changed your mind, cut yourself, tried to hang yourself, etc.    12. Assessment/Progress Note:     This writer met with Katie for a follow-up IRT session. Katie stated she is looking forward to moving back to [place]. She discussed the physical location of [place] being a significant stressor. This writer assisted her in processing emotions and thoughts related to this. This writer reviewed last session, where Katie expressed interest in developing a relapse prevention plan to share with her family. The remainder of the session was spent on developing the plan.     Causes of Returning Symptoms    Not taking medications regularly                   Stress with school    Friend problems    Warning Signs    Stop brushing teeth, combing hair, showering    Excess excitement about things  "that don t make sense    [place] positive or negative day for no apparent reason    Sleeping too little    Not taking medications    Feeling paranoid related to conspiracy theories      Coping Plan    To be consistent with medications- use pill box, take Estrogen pills and antipsychotics at the same time of day, Moe (roommate) check in and ask if I m taking medications    If dealing with stress at school- talk to Disability Services to request a day off, find tasks that are easier to complete, take a bath, paint my nails, watch movies    If having friend problems- remind self it won t last forever, remind self not everything is your fault, ask a friend to hang out    Action Plan  1. Contact my psychiatrist and therapist to let them know about symptoms I am observing   2. Complete a self check-in on stress levels  3. Moe- listen to my thoughts, tell me if my thoughts don t make sense, ask me if I am taking my medications, ask me to go to the park with you  4. Contact Serjio or Nissa- listen to my thoughts, remind me it ll be okay  5. Parents- if episode is happening, visit me in [place] and make sure I ve called Disability Services, my therapist, and my doctor. If I have not, please do so with me while you are in [place].   6. Call Crisis lines  a. Text  RELATE  to 420340, [facility] text crisis line  b. [place] Suicide Prevention and Crisis Line- [phone]  c. Text  HOPE  to [phone] (for non-emergent support)  7. If I am unable to complete basic needs (eating, grocery shopping) or having suicidal thoughts, take me to a local hospital       13. Plan/Referrals:     This writer and Katie will have a final discharge meeting at the next session.     Billing for \"Interactive Complexity\"?    No      LEEROY SeeATE Individual Resiliency Trainer  "

## 2021-04-26 ENCOUNTER — VIRTUAL VISIT (OUTPATIENT)
Dept: PSYCHIATRY | Facility: CLINIC | Age: 26
End: 2021-04-26
Attending: PSYCHIATRY & NEUROLOGY
Payer: COMMERCIAL

## 2021-04-26 DIAGNOSIS — F29 PSYCHOSIS, UNSPECIFIED PSYCHOSIS TYPE (H): Primary | ICD-10-CM

## 2021-04-26 PROCEDURE — 99214 OFFICE O/P EST MOD 30 MIN: CPT | Mod: 95 | Performed by: PSYCHIATRY & NEUROLOGY

## 2021-04-26 ASSESSMENT — PAIN SCALES - GENERAL: PAINLEVEL: NO PAIN (0)

## 2021-04-26 NOTE — PATIENT INSTRUCTIONS
**For crisis resources, please see the information at the end of this document**     Patient Education      Thank you for coming to the [facility].    Lab Testing:  If you had lab testing today and your results are reassuring or normal they will be mailed to you or sent through [employer] within 7 days. If the lab tests need quick action we will call you with the results. The phone number we will call with results is # [phone] (home) . If this is not the best number please call our clinic and change the number.    Medication Refills:  If you need any refills please call your pharmacy and they will contact us. Our fax number for refills is [phone]. Please allow three business for refill processing. If you need to  your refill at a new pharmacy, please contact the new pharmacy directly. The new pharmacy will help you get your medications transferred.     Scheduling:  If you have any concerns about today's visit or wish to schedule another appointment please call our office during normal business hours [phone] (8-5:00 M-F)    Contact Us:  Please call [phone] during business hours (8-5:00 M-F).  If after clinic hours, or on the weekend, please call  [phone].    Financial Assistance [phone]  [facility] Billing [phone]  Central Billing Office, [facility]: [phone]  [facility] Billing [phone]  Medical Records [phone]  [facility] Patient Bill of Rights [email or web address]       MENTAL HEALTH CRISIS NUMBERS:  For a medical emergency please call  911 or go to the nearest ER.     [facility]:   [facility] -[phone]   Crisis Residence [facility] Residence -[phone]   Walk-In Counseling Center \[facility]\"" -[phone]   COPE 24/7 [place] Mobile Team -[phone] (adults)/[phone] (child)  CHILD: [facility] needs assessment  team - [phone]      [facility]:   [facility] - [phone]   Walk-in counseling [facility] House - [phone]   Walk-in counseling [facility] - [phone]   Crisis Residence [facility] - [phone]  Urgent Care Adult Mental [facility]-[phone] mobile unit/ 24/7 crisis line    National Crisis Numbers:   National Suicide Prevention Lifeline: 8-260-316-TALK (716-145-2998)  Poison Control Center - 4-299-953-3776  [employer]/resources for a list of additional resources (SOS)  Trans Lifeline a hotline for transgender people 1-120.843.5324  The [employer] a hotline for LGBT youth 9-014-512-1969  Crisis Text Line: For any crisis 24/7   To: 806429  see www.crisistextline.org  - IF MAKING A CALL FEELS TOO HARD, send a text!         Again thank you for choosing [facility] MENTAL HEALTH & ADDICTION [facility] and please let us know how we can best partner with you to improve you and your family's health.    You may be receiving a survey regarding this appointment. We would love to have your feedback, both positive and negative. The survey is done by an external company, so your answers are anonymous.

## 2021-04-26 NOTE — PROGRESS NOTES
"VIDEO VISIT  Janey Givens is a 26 year old patient who is being evaluated via a billable video visit.      The patient has been notified of following:   \"This video visit will be conducted via a call between you and your physician/provider. We have found that certain health care needs can be provided without the need for an in-person physical exam. This service lets us provide the care you need with a video conversation. If a prescription is necessary we can send it directly to your pharmacy. If lab work is needed we can place an order for that and you can then stop by our lab to have the test done at a later time. Insurers are generally covering virtual visits as they would in-office visits so billing should not be different than normal.  If for some reason you do get billed incorrectly, you should contact the billing office to correct it and that number is in the AVS .    Video Conference to be completed via:  Zaina    Patient has given verbal consent for video visit?:  Yes    Patient would prefer that any video invitations be sent by: Send to e-mail at: [email or web address]      How would patient like to obtain AVS?:  LineHop    AVS SmartPhrase [PsychAVS] has been placed in 'Patient Instructions':  Yes    VIDEO VISIT  Janey Givens is a 26 year old patient who is being evaluated via a billable video visit.      The patient has been notified of following:   \"This video visit will be conducted via a call between you and your physician/provider. We have found that certain health care needs can be provided without the need for an in-person physical exam. This service lets us provide the care you need with a video conversation. If a prescription is necessary we can send it directly to your pharmacy. If lab work is needed we can place an order for that and you can then stop by our lab to have the test done at a later time. Insurers are generally covering virtual visits as they would in-office visits so " "billing should not be different than normal.  If for some reason you do get billed incorrectly, you should contact the billing office to correct it and that number is in the AVS .    Video Conference to be completed via:  Amwell    Patient has given verbal consent for video visit?:  Yes    Patient would prefer that any video invitations be sent by: Send to e-mail at: [email or web address]      How would patient like to obtain AVS?:  [employer]    AVS SmartPhrase [PsychAVS] has been placed in 'Patient Instructions':  Yes    PSYCHIATRY NAVIGATE CLINIC TRANSFER  NOTE        The initial diagnostic evaluation was on 11/1/2018 and the last transfer of care evaluation was 3/3/2020.    Video- Visit Details  Type of service:  video visit for medication management  Time of service:    Date:  04/26/2021    Video Start Time:  11:58 AM        Video End Time:  12:15 PM    Reason for video visit:  Patient unable to travel due to Covid-19  Originating Site [facility]patient location):  [facility]   Location- Patient's home  Distant Site (provider location):  provider office  Mode of Communication:  Video Conference via AmWell  Consent:  Patient has given verbal consent for video visit?: Yes       INTERIM HISTORY                                                   Janey Givens is a 26 year old TG MTF with a hx of Schizophrenia who was last seen in clinic on 3/22/21 at which time no changes. Pt was seen alone.    Since the last visit, pt states that she is going back to [place] in 2 weeks to continue with her research this summer. She notes that she found a psychiatrist and has an appt on 5/12, and also got on a therapist wait list. She notes that she decided to commit to oral medications as injectables sounded like too much of a hassle.  She states she has been working on her research, but it has been hard living with parents at home and she is eager to go back \"home\". She states her mood is fine, people close to her think she has been " low, but she doesn't think so, and it may be environmentally triggered. She denies voices, thought insertions or delusions. She has been sober since getting back to [place] and continues with Abilify at 10 mg and is doing well with this. No safety concerns.     Social Updates (home/ school/ substance use):  Family relationships: good     School:   PHD student at [employer]    RECENT SYMPTOMS:   DEPRESSION:  reports-mood dysregulation;  DENIES- suicidal ideation, depressed mood, anhedonia, low energy, poor concentration /memory and excessive guilt  PSYCHOSIS:  reports-none;  DENIES- auditory hallucinations, visual hallucinations and disorganized behavior  ANXIETY:  denies  TRAUMA RELATED:  intrusive memories, trauma trigger psychological / physiological response and negative beliefs / emotions  SLEEP:  good    EATING DISORDER: none    RECENT SUBSTANCE USE:     ALCOHOL- last 2 x in January         TOBACCO- last in November          CAFFEINE- 2-3 cups/week of coffee        OPIOIDS- none           NARCAN KIT- N/A    CANNABIS- last time November 2020         OTHER ILLICIT DRUGS- none     CURRENT SOCIAL HISTORY:  Financial Support- working.     Siblings- 3 siblings.     Living Situation- with family.      Social/Spiritual Support- family.     Feels Safe at Home- Yes.    MEDICAL ROS:  Reports A comprehensive review of systems was performed and is negative other than noted in the HPI..  Denies sedation, fatigue, headache, diaphoresis, dizziness.    PAST PSYCH MED TRIALS     Zyprexa   Celexa    MEDICAL / SURGICAL HISTORY                                   CARE TEAM:          PCP- Dr Golden                   Therapist- Aleah Cerda      Patient Active Problem List   Diagnosis     Blood clot in vein     Gender dysphoria in adult     Acute psychosis (H)     Schizophrenia (H)       ALLERGY                                Banana  MEDICATIONS                               Current Outpatient Medications   Medication Sig Dispense Refill      "ARIPiprazole (ABILIFY) 10 MG tablet Take 1 tablet (10 mg) by mouth daily 90 tablet 3     estradiol (VIVELLE-DOT) 0.1 MG/24HR bi-weekly patch Place 2 patches onto the skin twice a week 16 patch 0     spironolactone (ALDACTONE) 100 MG tablet Take 2 tablets (200 mg) by mouth daily 60 tablet 5       VITALS   There were no vitals taken for this visit.   MENTAL STATUS EXAM                                                             Alertness: alert  and oriented  Appearance: well groomed and casually groomed  Behavior/Demeanor: cooperative, pleasant and calm, with good  eye contact   Speech: regular rate and rhythm  Language: intact and no problems  Psychomotor: normal or unremarkable  Mood: \"ok\"  Affect: restricted and appropriate; was congruent to mood; was congruent to content  Thought Process/Associations: unremarkable  Thought Content:  Reports none;  Denies suicidal and violent ideation and delusions  Perception:  Reports none;  Denies auditory hallucinations and visual hallucinations  Insight: fair  Judgment: fair and adequate for safety  Cognition: does  appear grossly intact; formal cognitive testing was not done    LABS and DATA       PHQ9 TODAY = N/A  PHQ 2/18/2021 2/19/2021 3/25/2021   PHQ-9 Total Score 15 18 2   Q9: Thoughts of better off dead/self-harm past 2 weeks Several days Several days Not at all       PSYCHIATRIC DIAGNOSES                                                                                                   Schizophrenia  ASSESSMENT                                   Janey Givens is a 26 year old TG female with a diagnosis of Schizophrenia who presents for a NAVIGATE transfer of care appt. There is a  genetic loading for anxiety and mood. Patient's polysubstance use preceded the onset of psychosis and include cannabis.  She has had 3 prior hospitalizations for psychosis, first in 2017 in the context of substance use, which rapidly remitted. Second was in the context of non - adherence to " psychotropics - was associated with a more pronounced paranoid psychosis and catatonic features and did not rapidly remit. Most recent clinical decompensation in Feb 2021 for similar reasons. 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 Patient currently stable on Abilify 10 mg. She is engaged  and motivated for treatment.     TODAY, meet with patient for a med follow up. At last visit, we had discussed options for meds -  possible transition to injectables due to pattern of non-adherence and MH clinical decompensation. Patient has weighed options and appears to be more comfortable with committing to ongoing PO neuroleptic treatment. Provide psycho-education, validation and support - pt will be returning to [place] in 2 weeks and this serves as incentive for adherence to treatment. Discussed potential risks of non -adherenece and encouraged f/up with new psychiatrist as well as sobriety from substances. Psychotic symptoms are well controlled on Abilify at 10 mg daily, she will  refills prior to leaving [place]. No safety concerns today                            PLAN                                                                                                       1) MEDICATION:      - Continue Abilify 10 mg daily         2) THERAPY:  Continue    3) LABS NEXT DUE:  none       RATING SCALES:     N/A    4) REFERRALS [CD, medical, other]:  none    5) :  none    6) RTC: N/A, patient transferring care to psychiatrist in [place].    7) CRISIS NUMBERS: Provided in S today  Crisis Text Line for any crisis 24/7 send this-   To: [phone]   [facility] ([facility]  [phone]      TREATMENT RISK STATEMENT:  The risks, benefits, alternatives and potential adverse effects have been discussed and are understood by the patient/ patient's  guardian. The pt understands the risks of using street drugs or alcohol.  There are no medical contraindications, the pt agrees to treatment with the ability to do so.  The patient understands to call 911 or come to the nearest ED if life threatening or urgent symptoms present.          PROVIDER  Brandi Holt MD

## 2021-04-28 ENCOUNTER — VIRTUAL VISIT (OUTPATIENT)
Dept: PSYCHIATRY | Facility: CLINIC | Age: 26
End: 2021-04-28
Payer: COMMERCIAL

## 2021-04-28 DIAGNOSIS — F20.3 UNDIFFERENTIATED SCHIZOPHRENIA (H): Primary | ICD-10-CM

## 2021-04-28 NOTE — PROGRESS NOTES
[facility] Clinician Contact & Progress Note  For Individual Resiliency Training (IRT)  A Part of the [facility] First Episode of Psychosis Program    NAVIGATE Enrollee: Jaeny Givens (1995)     MRN: [id number]  Date:  4/06/21  Diagnosis: Psychosis, unspecified (F29)  Clinician: [facility] Individual Resiliency Trainer, LEEROY See     1. Type of contact: (majority of time spent)  IRT Session    2. People present:   Writer  Client: Janey Givens    3. Length of Actual Contact: Start Time: 11:00; End Time: 11:56   Traveled?    No     4. Location of contact:  Psychiatry Clinic, [place]    5. Did the client complete the home practice option(s) from the previous session: Completed    6. Motivational Teaching Strategies:  Connect info and skills with personal goals  Promote hope and positive expectations  Re-frame experiences in positive light    7. Educational Teaching Strategies:  Ask questions to check comprehension  Break down information into small chunks  Adopt client's language     8. CBT Teaching Strategies:  Reinforcement and shaping (positive feedback for steps towards goals, gains in knowledge & skills and follow-through on home assignments)  Cognitive restructuring (identify thoughts related to negative feelings)    9. IRT Module(s) Addressed:  Module 6 - Processing the Psychotic Episode    10. Techniques utilized:   [place] announced at beginning of session  Review of homework  Review of goal  Review of previous meeting  Present new material  Problem-solving practice  Help client choose a home practice option    11. Mental Status Exam:    Alertness: alert  and oriented  Appearance: casually groomed  Behavior/Demeanor: cooperative and pleasant, with good  eye contact   Speech: regular rate and rhythm  Language: intact. Preferred language identified as English.  Psychomotor: normal or unremarkable  Mood: depressed  Affect: appropriate; was congruent to mood; was congruent to content  Thought  "Process/Associations: unremarkable  Thought Content:  Reports none;  Denies suicidal and violent ideation  Perception:  Reports none;  Denies auditory hallucinations  Insight: good  Judgment: good  Cognition: does  appear grossly intact; formal cognitive testing was not done  Suicidal ideation: denies SI, denies intent,  and denies plan  Homicidal Ideation: denies    12. Assessment/Progress Note:     This writer met with Katie at the clinic for a follow-up IRT session. Katie denied any concerns with symptoms at this time. Katie stated she will be moving back to [place] on May 7. She has secured a psychiatry appointment in [place] on May 18. This writer verified Katie would obtain refills through [facility] prior to discharge. Katie noted she is looking for a therapist who specializes in psychosis. This writer is waiting for a response from the clinic similar to [facility] in [place] regarding referrals. This writer encouraged Katie to do a wide search of therapists in her area and provided guidance on search terms. Throughout the remainder of the session, Katie continued processing and writing the narrative of events during her episode. She recalled disorganized behaviors, ruminations, delusional thoughts about professors, ideas of reference when watching television. This writer assisted Katie in processing feelings of fear, confusion, and embarrassment related to the events. Katie noted she does not remember specifics when first entering the hospital. Katie requested a copy of her story, to add in more detail in the future.      13. Plan/Referrals:     This writer and Katie will continue with Module 6: Processing the Episode, using narrative therapy to process memories, thoughts, and emotions related to her episode.     Billing for \"Interactive Complexity\"?    No      LEEROY See Individual Resiliency Trainer  "

## 2021-04-29 NOTE — PROGRESS NOTES
NAVIGATE Clinician Contact & Progress Note   For Family Education Program    NAVIGATE Enrollee: Janey Givens (1995)     MRN: [id number]  Date:  4/28/21  Diagnosis(es):   Unspecified psychosis  Clinician: [facility] Family Clinician, JAE Onofre     1. Type of contact: (majority of time spent)  Family Session via telehealth  Mode of communication: umn zoom. Family consented verbally to this mode of therapy today.  Reason for telehealth: COVID-19. This patient visit was converted to a telehealth visit to minimize exposure to COVID-19.    2. People present:   Writer  Client: No  Significant Other/Family/Friend:  Mother and Father    3. Length of Actual Contact: Start Time: 5 pm; End Time: 5:50 pm     4. Location of contact:  Originating Location (patient location): home, located in [place], [place]  Distant Location (provider location): Home office, located in [place], [place], using appropriate privacy considerations and procedures    5. Did the client complete the home practice option(s) from the previous session: Not Applicable    9. Psychoeducational Topic(s) Addressed:  Just the Facts - Coping with Stress and Just the Facts - A Relative's Guide to Supporting Recovery from Psychosis    10. Techniques utilized:   Problem-solving practice    11. Assessment/Progress Note:     Writer and parents and client met for [facility] family session. Discussed preparations for pt to return to [place], [place]. Pt's goal for session was to discuss what parents can do in the event that she has another episode. Commended family for working together to come up with a plan. Acknowledged the inherent difficulties of crisis planning with significant geographical separation. Pt requested that parents avoid putting her into a hotel for a week during her episode and then driving her across the country. Pt reported that she believed she was being kidnapped and forced into the sex trade. Parents acknowledged pt's  traumatic experience but expressed confusion about how to better handle the situation if pt is having an episode. We discussed the possibility of parents contacting mobile crisis to help mediate the conversation if pt has another episode and needs help, but parents are unsure how to proceed.    12. Plan/Referrals:     Will meet with family weekly as schedule allows for evidence based family psychoeducation and therapeutic support aimed at maximizing Janey's opportunity for recovery from psychosis.             JAE Onofre   NAVIGATE

## 2021-05-04 ENCOUNTER — OFFICE VISIT (OUTPATIENT)
Dept: PSYCHIATRY | Facility: CLINIC | Age: 26
End: 2021-05-04
Payer: COMMERCIAL

## 2021-05-04 DIAGNOSIS — F20.3 UNDIFFERENTIATED SCHIZOPHRENIA (H): Primary | ICD-10-CM

## 2021-05-04 NOTE — PROGRESS NOTES
[facility] Clinician Contact & Progress Note  For Individual Resiliency Training (IRT)  A Part of the [facility] First Episode of Psychosis Program    NAVIGATE Enrollee: Janey Givens (1995)     MRN: [id number]  Date:  5/04/21  Diagnosis: Undifferentiated Schizophrenia (F20.3)  Clinician: [facility] Individual Resiliency Trainer, LEEROY See     1. Type of contact: (majority of time spent)  IRT Session    2. People present:   Writer  Client: Janey Givens    3. Length of Actual Contact: Start Time: 9:53; End Time: 10:35   Traveled?    No     4. Location of contact:  Psychiatry Clinic, [place]    5. Did the client complete the home practice option(s) from the previous session: Completed    6. Motivational Teaching Strategies:  Connect info and skills with personal goals  Promote hope and positive expectations  Explore pros and cons of change  Re-frame experiences in positive light    7. Educational Teaching Strategies:  Relate information to client's experience  Ask questions to check comprehension  Adopt client's language     8. CBT Teaching Strategies:  Reinforcement and shaping (positive feedback for steps towards goals, gains in knowledge & skills and follow-through on home assignments)    9. IRT Module(s) Addressed:  Module 4 - Relapse Prevention Planning    10. Techniques utilized:   [place] announced at beginning of session  Review of homework  Review of goal  Present new material  Help client choose a home practice option  Summarize progress made in current session    11. Mental Status Exam:    Alertness: alert  and oriented  Appearance: casually groomed  Behavior/Demeanor: cooperative and pleasant, with good  eye contact   Speech: regular rate and rhythm  Language: intact. Preferred language identified as English.  Psychomotor: normal or unremarkable  Mood: description consistent with euthymia  Affect: appropriate; was congruent to mood; was congruent to content  Thought Process/Associations:  "unremarkable  Thought Content:  Reports none;  Denies suicidal and violent ideation and delusions  Perception:  Reports none;  Denies auditory hallucinations and visual hallucinations  Insight: good  Judgment: good  Cognition: does  appear grossly intact; formal cognitive testing was not done  Suicidal ideation: denies SI, denies intent,  and denies plan  Homicidal Ideation: denies    12. Assessment/Progress Note:     This writer met with Katie in clinic for a follow-up IRT session. Katie denied any concerns with symptoms at this time. Katie reflected on a recent family session, where her wellness plan was discussed. She noted it went better than expected and she feels comfortable with the plan. She stated she will be traveling to [place] with her family tomorrow morning and will complete a final family session. This writer praised Katie for expressing her needs and advocating for herself. As this was the last IRT session, Katie reflected on her journey within the [facility] Program. She mentioned progress in self-worth and self-esteem, as well as assertive communication with others. She thanked this writer for guidance and development of coping skills. Katie did not have any feedback on areas of improvement for the NAVIGATE team. This writer identified several areas of progress for Katie, including confronting difficult emotions/memories, initiating conversations aimed at reducing conflict, managing symptoms, enhanced relationships, improved self-worth. This writer and Katie reviewed next steps for wellbeing, which include going over her wellness plan with her roommate, continue taking medications, talking with her team about any symptoms, and pursuing daily meaningful activities.     13. Plan/Referrals:     This writer will coordinate with the [place] treatment team to ensure continuity of care.     Billing for \"Interactive Complexity\"?    No      Aleah Cerda [facility] Hospital[facility]NAE MORALES Individual Resiliency Trainer  "

## 2021-05-05 ENCOUNTER — VIRTUAL VISIT (OUTPATIENT)
Dept: PSYCHIATRY | Facility: CLINIC | Age: 26
End: 2021-05-05
Payer: COMMERCIAL

## 2021-05-05 ENCOUNTER — DOCUMENTATION ONLY (OUTPATIENT)
Dept: PSYCHIATRY | Facility: CLINIC | Age: 26
End: 2021-05-05

## 2021-05-05 DIAGNOSIS — F20.3 UNDIFFERENTIATED SCHIZOPHRENIA (H): Primary | ICD-10-CM

## 2021-05-06 NOTE — PROGRESS NOTES
NAVIGATE Clinician Contact & Progress Note   For Family Education Program    NAVIGATE Enrollee: Janey Givens (1995)     MRN: [id number]  Date:  5/05/21  Diagnosis(es):   Unspecified psychosis  Clinician: [facility] Family Clinician, JAE Onofre     1. Type of contact: (majority of time spent)  Family Session via telehealth  Mode of communication: umn zoom. Family consented verbally to this mode of therapy today.  Reason for telehealth: COVID-19. This patient visit was converted to a telehealth visit to minimize exposure to COVID-19.    2. People present:   Writer  Client: No  Significant Other/Family/Friend:  Mother and Father    3. Length of Actual Contact: Start Time: 5 pm; End Time: 5:50 pm     4. Location of contact:  Originating Location (patient location): home, located in [place], [place]  Distant Location (provider location): Home office, located in [place], [place], using appropriate privacy considerations and procedures    5. Did the client complete the home practice option(s) from the previous session: Not Applicable    9. Psychoeducational Topic(s) Addressed:  Just the Facts - Coping with Stress and Just the Facts - A Relative's Guide to Supporting Recovery from Psychosis    10. Techniques utilized:   Problem-solving practice    11. Assessment/Progress Note:     Writer and parents and client met for [facility]ATE family session. Discussed pt's relapse prevention plan. Also discussed options for pt to see therapist in [place] after she returns to [place]. Confirmed phone number for therapist, Karly Mello, and clarified that therapist is expecting a voicemail from pt. Pt agreed to call therapist instead of email (like she had done before).     Commended pt for the important work she has done in her recovery over the past few months and stressed to parents how amazing pt's story is. Also commended parents for continuing to show up and do their work, too.     Lastly, recommended  pt check out [facility]. Writer found an [facility] group in [place].    12. Plan/Referrals:     Will meet with family weekly as schedule allows for evidence based family psychoeducation and therapeutic support aimed at maximizing Janey's opportunity for recovery from psychosis.             JAE Onofre   NAVIGATE

## 2021-05-06 NOTE — PROGRESS NOTES
[facility] Clinician Contact & Progress Note   For Family Education Program    NAVIGATE Enrollee: Janey Givens (1995)     MRN: [id number]  Date:  3/17/21  Diagnosis(es):   schizophrenia  Clinician: [facility] Family Clinician, JAE Onofre     1. Type of contact: (majority of time spent)  Family Session via telehealth  Mode of communication: umn zoom. Family consented verbally to this mode of therapy today.  Reason for telehealth: COVID-19. This patient visit was converted to a telehealth visit to minimize exposure to COVID-19.    2. People present:   Writer  Client: No  Significant Other/Family/Friend:  Mother and Father    3. Length of Actual Contact: Start Time: 5 pm; End Time: 6 pm     4. Location of contact:  Originating Location (patient location): [place], located in [place], [place]  Distant Location (provider location): Psychiatry Clinic, [facility]    5. Did the client complete the home practice option(s) from the previous session: Not Applicable    9. Psychoeducational Topic(s) Addressed:  Just the Facts - A Relative's Guide to Supporting Recovery from Psychosis    10. Techniques utilized:   Problem-solving practice    11. Assessment/Progress Note:     Writer and parents met for [facility] family session. Discussed pt's recent relapse and parents' attempts to aid in pt's recovery. Normalized parents' struggles and provided affirmation for their attempts to help pt maintain stability while she is at home. Discussed informal treatment plan for family while pt is back at home. Will plan to see parents weekly for the foreseeable future.    12. Plan/Referrals:     Will meet with family weekly as schedule allows for evidence based family psychoeducation and therapeutic support aimed at maximizing Janey's opportunity for recovery from psychosis.         JAE Onofre

## 2021-06-07 NOTE — PROGRESS NOTES
NA[facility]IGATE Discharge  A Part of the [facility] First Episode of Psychosis Program     Patient Name: Janey Givens  /Age:  1995 (26 year old)  Diagnosis(es):   Schizophrenia    Program Discharge Date:   21  Reason for Discharge:   Moved or Relocated    LEEROY Morales   NAVIGATE Director & Family Clinician

## 2021-06-11 NOTE — PROGRESS NOTES
NAVIGATE Clinician Contact & Progress Note   For Family Education Program    NAVIGATE Enrollee: Janey Givens (1995)     MRN: [id number]  Date:  3/24/21  Diagnosis(es):   schizophrenia  Clinician: [facility] Family Clinician, JAE Onofre     1. Type of contact: (majority of time spent)  Family Session via telehealth  Mode of communication: umn zoom. Family consented verbally to this mode of therapy today.  Reason for telehealth: COVID-19. This patient visit was converted to a telehealth visit to minimize exposure to COVID-19.    2. People present:   Writer  Client: No  Significant Other/Family/Friend:  Mother and Father    3. Length of Actual Contact: Start Time: 6 pm; End Time: 7 pm     4. Location of contact:  Originating Location (patient location): home, located in [place], [place]  Distant Location (provider location): Home office, located in [place], [place], using appropriate privacy considerations and procedures    5. Did the client complete the home practice option(s) from the previous session: N/A    9. Psychoeducational Topic(s) Addressed:  Just the Facts - A Relative's Guide to Supporting Recovery from Psychosis    10. Techniques utilized:   Problem-solving practice    11. Assessment/Progress Note:     Family and writer met for [facility] family session. Parents report they feel betrayed by pt because she was not honest with them about medication. Parents are overwhelmed because of pt's relapse of psychosis symptoms. They feel that trust has been broken and believe she has manipulated them.     Helped parents with emotional regulation and provided validation about their feelings of frustration, sadness, and helplessness. Discussed next steps with parents, including regular family meetings to develop a plan for support.    12. Plan/Referrals:     Will meet with family weekly as schedule allows for evidence based family psychoeducation and therapeutic support aimed at maximizing  Yeimi opportunity for recovery from psychosis.         JAE Onofre   NAVIGSATINDER     Attestation:    I did not see this patient directly. This patient is discussed with me in individual and group supervision, and I agree with the plan as documented.     Ame Newman, LICSW, MSW, LICSW, July 6, 2021

## 2021-06-13 NOTE — PROGRESS NOTES
NAVIGATE Clinician Contact & Progress Note   For Family Education Program    NAVIGATE Enrollee: Janey Givens (1995)     MRN: [id number]  Date:  4/14/21  Diagnosis(es):   Unspecified psychosis  Clinician: [facility] Family Clinician, JAE Onofre     1. Type of contact: (majority of time spent)  Family Session via telehealth  Mode of communication: umn zoom. Family consented verbally to this mode of therapy today.  Reason for telehealth: COVID-19. This patient visit was converted to a telehealth visit to minimize exposure to COVID-19.    2. People present:   Writer  Client: No  Significant Other/Family/Friend:  Mother and Father    3. Length of Actual Contact: Start Time: 5 pm; End Time: 5:50 pm     4. Location of contact:  Originating Location (patient location): home, located in [place], [place]  Distant Location (provider location): Home office, located in [place], [place], using appropriate privacy considerations and procedures    5. Did the client complete the home practice option(s) from the previous session: Not Applicable    9. Psychoeducational Topic(s) Addressed:  Just the Facts - Coping with Stress and Just the Facts - A Relative's Guide to Supporting Recovery from Psychosis    10. Techniques utilized:   Problem-solving practice    11. Assessment/Progress Note:     Writer and parents met for weekly [facility] family session. Writer referred parents to a therapist in the [place] area, Karly Mello PsyD, who could potentially work with pt once she moves back to [place], [place]. Discussed pertinent updates, including pt wanting to return to [place], and parents' subsequent feelings of relief. Discussed mother's ongoing feeling that pt has betrayed her. Challenged mother to consider other reasons for pt's behavior.    12. Plan/Referrals:     Will meet with family weekly as schedule allows for evidence based family psychoeducation and therapeutic support aimed at maximizing Yeimi  opportunity for recovery from psychosis.     The author of this note documented a reason for not sharing it with the patient.          JAE Onofre   NAVIGSATINDER     Attestation:    I did not see this patient directly. This patient is discussed with me in individual and group supervision, and I agree with the plan as documented.     Ame Newman, LICSW, MSW, LICSW, July 9, 2021

## 2021-06-30 NOTE — PROGRESS NOTES
[facility] NAVIGATE Program Treatment Plan Summary  A Part of the [facility] First Episode of Psychosis Program     NAVIGATE Enrollee: Janey Givens  /Age:  1995 (25 year old)  MRN: [id number]     Date of Initial Service: 18  Date of INTIAL Treatment Plan: 18   Last Review/Update Date:  20 (due to re-enrollment in program)  90-Day Review Date: 21     The following represents an REVIEWED treatment plan.     1. DSM-V Diagnosis (include numeric code)  Schizophrenia, 295.90 (F20.9)     2. Current symptoms and circumstances that substantiate the diagnosis     Janey has a history of psychosis (delusions, thought broadcasting, odd beliefs per family/friends, auditory hallucinations and negative symptoms (diminished emotional expression and anhedonia)).     Today, Janey reports recent auditory hallucinations, delusional thoughts, avolition, and suicidal ideation. Janey reports symptoms were precipitated by graduate school stressors and discontinuation of medications.      3. How symptoms and/or behaviors are affecting level of function     Janey s systems are impacting functioning with respect to social relationships and academics.     4. Risk Assessment:  Suicide:  Assessed Level of Immediate Risk: Medium  Ideation: No but recent  Plan:  No  Means: No  Intent: No     Homicide/Violence:  Assessed Level of Immediate Risk: Low  Ideation: No  Plan: No  Means: No  Intent: No     5. Medications  Current Outpatient Medications   Medication     ARIPiprazole (ABILIFY) 10 MG tablet     estradiol (VIVELLE-DOT) 0.1 MG/24HR bi-weekly patch     spironolactone (ALDACTONE) 100 MG tablet     No current facility-administered medications for this visit.      6. Strengths      Supportive friends, family, recovery environment  Bravery & Valor     Capacity to love and be loved    Caution, Prudence, & Discretion    Creativity, Ingenuity, & Originality    Curiosity    Honest, Authentic, Genuine    Humor &  "Playfulness   Industry, diligence, & perseverance    Judgment, critical thinking, & open-mindedness    Love of learning     Intelligence  Awareness      7. Barriers & Suicide Risk Factors      Symptoms of psychosis, positive (delusions and/or hallucinations)  Symptoms of psychosis, negative (avolition)     8. Treatment Domains and Goals          Domain 1: Illness Management & Recovery  Identify and engage possible areas of improvement related to medication optimization and psychosis (delusions and auditory hallucinations) and ability to management illness.      Measurable Objectives Interventions Target Dates & Discharge Criteria   Medication Objectives      -Take medications as prescribed and have reduced frequency and severity of symptoms    Katie's words: TBD    Medication Management  -Prescribe and monitor medications  -Monitor and treat side effects  -Psychoeducation     IRT/Psychotherapy  -Psychoeducation  -Motivation interviewing  -CBT  -Behavioral activation  -Mindfulness     Family Therapy  -Psychoeducation  -Motivational interviewing  -Behavioral family therapy  -CBT  -Behavioral activation     Case Management  -NA or None    Target date:   12 months from 3/2/21     Discharge criteria:  Marked and sustained symptom improvement             Individual s Objectives     -Process the psychotic episode by demonstrating understanding of how the episode impacted self, identifying positive coping strategies and resiliency used during that time, challenging self-stigmatizing beliefs, and developing a positive attitude towards facing future life challenges  -Identify primary styles of thinking, and demonstrate understanding of and use cognitive restructuring to successfully deal with negative feelings        \"process previous episodes, process hurtful past memories, find a self-care balance, learn how to actively listen, believe that I deserve good things\"    Medication Management  -Prescribe and monitor " "medications  -Monitor and treat side effects  -Psychoeducation     IRT/Psychotherapy  -Psychoeducation  -Motivation interviewing  -CBT  -Behavioral activation  -Mindfulness     Family Therapy  -Psychoeducation  -Motivational interviewing  -Behavioral family therapy  -CBT  -Behavioral activation     Case Management  -NA or None    Target date:   12 months from 3/2/21     Discharge criteria:  Marked and sustained symptom improvement      Janey demonstrates understanding of mental illness      Janey successfully implements strategies to cope with stressors and/or symptoms to mitigate risk for increase in symptom severity or relapse         Gains Made:  -Learn at least 2 coping strategies to successfully target current symptoms  -Demonstrate understanding for how substance use impacts symptoms, identify stage of change, and experiment with reduced use or abstinence from all illicit substances  -Learn and implement communication strategies to disclose episode, resulting in feeling understood and supported  -Identify persistent symptoms that interfere with activities and/or enjoyment and successfully implement two coping strategies to reduce symptoms severity  -Utilized reality testing to cope with distressing psychosis symptoms  -Create an updated relapse prevention plan and share with family/friends to increase support  -Obtained Gender Confirmation Surgery, resulting in an improved quality of life and well-being       Support System Objectives     -Supports, including family members, verbally reinforce the client's active attempts to build self-esteem and rapport   -Learn skills that strengthen and support the client's positive behavior change  -Learn strategies to build positive emotions and facilitate resiliency including use of a resiliency story     \"Develop a plan prior to Katie's return to [place]\" Medication Management  -Prescribe and monitor medications  -Monitor and treat side " "effects  -Psychoeducation     IRT/Psychotherapy  -Psychoeducation  -Motivation interviewing  -CBT  -Behavioral activation  -Mindfulness     Family Therapy  -Psychoeducation  -Motivational interviewing  -Behavioral family therapy  -CBT  -Behavioral activation     Case Management  -NA or None    Target date:   12 months from 3/2/21     Discharge criteria:  Support system demonstrates understanding of mental illness      Support system successfully implements strategies to assist Janey cope with stressors and/or symptoms to mitigate risk for increase in symptom severity or relapse      -Verbalize understanding of the client's long-term and short-term goals  -Learn the client's signs of stress and possible coping skills              Domain 2: Health & Basic Living Needs  Identify and engage possible areas of improvement related to basic needs being met and maintaining or improving overall health and well-being      Measurable Objectives Interventions Discharge Criteria   -Learn and implement at least 2 skills to promote health sleep       \"Eat healthier\" Medication Management  -Prescribe and monitor medications  -Monitor and treat side effects  -Psychoeducation     IRT/Psychotherapy  -Psychoeducation  -Motivation interviewing  -CBT  -Behavioral activation  -Mindfulness     Family Therapy  -Psychoeducation  -Motivational interviewing  -Behavioral family therapy  -CBT  -Behavioral activation     Supported Education & Employment  -Motivational interviewing  -Individualized placement and support   -Behavioral Activation     Case Management  -NA or None    Target date:   12 months from 3/2/21     Discharge criteria:  Janey, her supports and treatment team report no unmet health and basic living needs      Gains Made:  -Established vegan diet to assist in healthy nutrition  -Utilized a sleep hygiene routine  -Reduce fast food intake to one time per week  -Locate an independent apartment in [place] prior to July " "2020  -Obtain and complete Gender Confirmation Surgery by August 2020           Domain 3: Family & Other Supports  Identify and engage possible areas of improvement related to engaging family, friends and other supports      Measurable Objectives Interventions Discharge Criteria   -Improve the quality of relationships by developing skills to better understand other people, communicate more effectively, manage disclosure, and understand social cues  -Renew two old fun activities or develop two new fun activity    \"Learn how to actively listen\"     Medication Management  -Prescribe and monitor medications  -Monitor and treat side effects  -Psychoeducation     IRT/Psychotherapy  -Psychoeducation  -Motivation interviewing  -CBT  -Behavioral activation  -Mindfulness     Family Therapy  -Psychoeducation  -Motivational interviewing  -Behavioral family therapy  -CBT  -Behavioral activation     Supported Education & Employment  -Motivational interviewing  -Individualized placement and support   -Behavioral Activation     Case Management  -NA or None    Target date:   12 months from 3/2/21     Discharge criteria:  Janey and her support system report feeling equipped with the necessary skills to communicate and problem solve during times of disagreement     Conflict with supports and peers are resolved constructively and consistently over time; 6 months      Gains Made:  -Learn and implement calming and coping strategies to manage anxiety symptoms and focus attention usefully during moments of social anxiety    -Advocated for self when experiencing periods of harassment   -Researched potential roommates/living arrangements   -Supports teach and reinforce healthy social skills and attitudes   -Learn and implement problem-solving and/or conflict resolution skills to manage personal and interpersonal problems constructively           Domain 4: Academic and Employment  Identify and engage possible areas of improvement relates " to education and employment      Measurable Objectives Interventions Discharge Criteria     -Explore volunteer opportunities in [place] words: TBD Medication Management  -Prescribe and monitor medications  -Monitor and treat side effects  -Psychoeducation     IRT/Psychotherapy  -Psychoeducation  -Motivation interviewing  -CBT  -Behavioral activation     Family Therapy  -Psychoeducation  -Motivational interviewing  -Behavioral family therapy  -CBT  -Behavioral activation     Supported Education & Employment  -Motivational interviewing  -Individualized placement and support   -Behavioral Activation     Case Management  -NA or None    Target date:   12 months from 3/2/21     Discharge criteria:  Work and school goals are achieved and maintained without follow along NAVIGATE Supported Education and Employment supports for 6 months      Gains Made:  -Completed semester of college and successfully managed symptoms during that time  -Obtained a research assistant position   -Successfully applied to several graduate schools  -Graduate college by June 2020  -Begin and attend graduate school by September 2020      9. Frequency of sessions and expected duration of treatment:   1-4x per month Medication Management with Prescriber ongoing  6 months of weekly IRT/Individual Psychotherapy followed by 12-18 months of biweekly or monthly IRT  2-4x per month Supported Education and Employment Services for 6 months  2-4x per month Family Education and Support Services for 6 months     10. Participants in therapy plan:   Janey MORALES Team:   -Family Clinician: JAE Onofre  -IRT Clinician: DELROY See  -SEE: Ame Borrero  -Prescriber: Dr. Jonathon MD     See scanned document for Acknowledgement of Current Treatment Plan     Regulatory Guidelines for Updating Treatment Plan  [place] Medical Assistance: Reviewed & signed at least every 90 days  Medicare:  Update per  policy

## 2021-08-02 NOTE — PROGRESS NOTES
NAVIGATE SEE Progress Note   For Supported Employment & Education    NAVIGATE Enrollee: Janey Givens (1995)     MRN: [id number]  Date:  3/23/21  Clinician: NAVIGATE Supported Employment & , VERENICE Heredia    1. Client Status Update:   Janey Givens is interested in education (Client enrolled in class or school (e.g. GED course, certificate program, [employer] college or other educational programs))    2. People present:   SEE/Writer  Client: Janey Givens    3. Length of Actual Contact: 37 minutes   Traveled? No    4. Location of contact:  Other: [email or web address]    5. Brief description of session, contact, or client status (include: strategies, interventions, client reaction to contact, next steps, etc)    Writer and Janey Givens met via [email or web address] for a follow up Supported Employment and Education (SEE) session. Janey Givens has been working on the goal of obtaining education supports when she returns to college. Today's agenda included: general check in, placement/follow along education supports. Janey and this writer met as a follow up session to discuss accommodations that she can obtain in college. Discussed common accommodations and reviewed the email this writer sent on 3/16 regarding scheduling an intake meeting. Katie was agreeable to this and reported that she did not have questions on how to obtain accommodations. Discussed if a letter is needed to reach out. This patient visit was converted to a telephone call to minimize exposure to COVID-19.        6. Completion of mutually agreed upon client task from previous meeting:  Completed    7. Orientation and Treatment Planning:  Pursuing current SEE goals    8. Assessment:  Assessing client's need for follow-along supports    9. Placement:  Not Applicable    10. Follow Along Supports: (for clients who are working or attending school)   Education (Assisting with requesting accommodations, Assisting with development and  use of natural support for school, Problem solving difficulties with school and Reviewing coping skills for symptoms for school)    11. Mutually agreed upon client task for next meeting:     Follow up with disability center to schedule intake    12. Next Meeting Scheduled for: VERENICE Chávez  NAVIGATE Supported Employment &

## 2021-10-03 ENCOUNTER — HEALTH MAINTENANCE LETTER (OUTPATIENT)
Age: 26
End: 2021-10-03

## 2021-10-27 NOTE — PLAN OF CARE
"Problem: Psychotic Symptoms  Goal: Psychotic Symptoms  Signs and symptoms of listed problems will be absent or manageable.   Outcome: Therapy, progress toward functional goals is gradual  Patient has been visible in the milieu.  Minimally social.  Did attend group.  Remains disorganized (will not take medication unless crushed and put in soda)--refused aldactone. Sometimes flight of ideas: \"I want help. My sister moved here\"--but when asked specific questions he seemed able to focus better.  Somewhat labile--crying, then quickly calmed.  He denied hearing voices.  He stated \"no\" when asked about suicide or self injurious behaviors.  Has been quite and cooperative thus far.           " Prophylactic measure

## 2021-12-13 NOTE — PROGRESS NOTES
Admission Date: 2/22/2021     Identify any current concerns with potential impact to admission:      medication/medical concerns: No                 immediate safety concerns:No     Does patient have safety plan? Yes  Note: Please copy safety plan copied into BEH Encounter                 Other (insurance/childcare/transportation/housing/planned absences/etc): None     Patient's insurance is: [facility] .      Does patient need appointment with provider? No     Review patient's program schedule and inform them of any variation due to late days or holidays.                                                                                                                                                                                                                                                                                          Patient acknowledged being ready for the program this coming Monday(2/22/21). Reported problems with audio/video while doing DA yesterday. Patient was given Medical Zoom support number for technical support. Patient presented with some cognitive confusion and needed to have some information repeated.        Completed by: Ron TANG/JESSA   No

## 2022-01-23 ENCOUNTER — HEALTH MAINTENANCE LETTER (OUTPATIENT)
Age: 27
End: 2022-01-23

## 2022-03-17 NOTE — PROGRESS NOTES
12/16/17 1300   Art Therapy   Type of Intervention structured groups   Response participates with cues/redirection   Hours 1   Pt is responding to the name Katie today. They painted some hearts with watercolors. Pt was cooperative.   well developed, well nourished , in no acute distress , ambulating without difficulty , normal communication ability

## 2022-09-10 ENCOUNTER — HEALTH MAINTENANCE LETTER (OUTPATIENT)
Age: 27
End: 2022-09-10

## 2023-04-30 ENCOUNTER — HEALTH MAINTENANCE LETTER (OUTPATIENT)
Age: 28
End: 2023-04-30

## 2024-05-29 NOTE — PROGRESS NOTES
Addended by: MINH CANDELARIO on: 5/29/2024 11:26 AM     Modules accepted: Orders     Unable to finish admission profile this shift. Pt cleaned forehead wound as ordered this shift. No signs of infection noted.

## 2024-09-08 NOTE — PROGRESS NOTES
INITIAL OT ASSESSMENT     10/02/18 1200   Clinical Impression   Affect Flat;Vacant;Fluctuates   Orientation Oriented to person;Oriented to place   Appearance and ADLs General cleanliness observed in most areas   Attention to Internal Stimuli Appears distracted/preoccupied internally   Interaction Skills Guarded;Withdrawn;Interacts with prompts, minimal response   Ability to Communicate Needs Does so with prompts   Verbal Content Delayed response;Selective;Word finding problems   Ability to Maintain Boundaries Maintains appropriate physical boundaries;Maintains appropriate verbal boundaries   Participation Participates with frequent encouragement;Observes only   Concentration Concentrates 10-20 minutes   Ability to Concentrate With structure;Preoccupied   Follows and Comprehends Directions Independently follows 2 step verbal directions   Memory Delayed and immediate recall intact   Organization Independently organizes medium tasks   Decision Making Follows the leads of peers   Planning and Problem Solving Needs assistance   Ability to Apply and Learn Concepts Comprehends concepts, but needs assist to apply   Frustrations / Stress Tolerance Utilizes coping skills with assistance   Level of Insight Some insight   Self Esteem Poor self esteem;Accepts positive feedback   Social Supports Has knowledge of support systems      show

## 2024-11-04 NOTE — LETTER
September 19, 2018      To Whom It May Concern:      Janey Givens was seen in our Emergency Department today, 09/19/18 morning.  I expect his condition to improve over the next 1 days.  He may return to work/school when improved.    Sincerely,        Ju Richey MD        
  September 19, 2018      To Whom It May Concern:      Janey Givens was seen in our Emergency Department today, 09/19/18.  I expect her condition to improve over the next 1 days.  She may return to work/school when improved.    Sincerely,        Ju Richey MD        
no